# Patient Record
Sex: FEMALE | Race: WHITE | Employment: OTHER | URBAN - METROPOLITAN AREA
[De-identification: names, ages, dates, MRNs, and addresses within clinical notes are randomized per-mention and may not be internally consistent; named-entity substitution may affect disease eponyms.]

---

## 2017-01-03 ENCOUNTER — OFFICE VISIT (OUTPATIENT)
Dept: FAMILY MEDICINE CLINIC | Age: 67
End: 2017-01-03

## 2017-01-03 ENCOUNTER — HOSPITAL ENCOUNTER (OUTPATIENT)
Dept: LAB | Age: 67
Discharge: HOME | End: 2017-01-03
Payer: MEDICARE

## 2017-01-03 VITALS
DIASTOLIC BLOOD PRESSURE: 66 MMHG | WEIGHT: 194 LBS | HEIGHT: 65 IN | BODY MASS INDEX: 32.32 KG/M2 | RESPIRATION RATE: 18 BRPM | OXYGEN SATURATION: 96 % | TEMPERATURE: 96.4 F | SYSTOLIC BLOOD PRESSURE: 133 MMHG | HEART RATE: 65 BPM

## 2017-01-03 DIAGNOSIS — E11.9 TYPE 2 DIABETES MELLITUS WITHOUT COMPLICATION, WITHOUT LONG-TERM CURRENT USE OF INSULIN (HCC): Primary | ICD-10-CM

## 2017-01-03 DIAGNOSIS — E78.2 MIXED HYPERLIPIDEMIA: ICD-10-CM

## 2017-01-03 DIAGNOSIS — R73.9 HYPERGLYCEMIA: ICD-10-CM

## 2017-01-03 DIAGNOSIS — K22.4 ESOPHAGEAL DYSMOTILITY: ICD-10-CM

## 2017-01-03 DIAGNOSIS — I25.10 CORONARY ARTERY DISEASE INVOLVING NATIVE CORONARY ARTERY OF NATIVE HEART WITHOUT ANGINA PECTORIS: ICD-10-CM

## 2017-01-03 DIAGNOSIS — I10 ESSENTIAL HYPERTENSION, BENIGN: ICD-10-CM

## 2017-01-03 DIAGNOSIS — K22.4 ESOPHAGEAL MOTOR DISORDER: ICD-10-CM

## 2017-01-03 LAB
GLUCOSE POC: 139 MG/DL
HBA1C MFR BLD HPLC: 6.4 %

## 2017-01-03 PROCEDURE — 80053 COMPREHEN METABOLIC PANEL: CPT

## 2017-01-03 PROCEDURE — 82043 UR ALBUMIN QUANTITATIVE: CPT

## 2017-01-03 PROCEDURE — 80061 LIPID PANEL: CPT

## 2017-01-03 PROCEDURE — 36415 COLL VENOUS BLD VENIPUNCTURE: CPT

## 2017-01-03 RX ORDER — GLIMEPIRIDE 4 MG/1
TABLET ORAL
Refills: 0 | COMMUNITY
Start: 2016-10-07 | End: 2017-01-03 | Stop reason: DRUGHIGH

## 2017-01-03 RX ORDER — METFORMIN HYDROCHLORIDE 500 MG/1
1000 TABLET, EXTENDED RELEASE ORAL
Qty: 60 TAB | Refills: 11 | Status: SHIPPED | OUTPATIENT
Start: 2017-01-03 | End: 2017-07-17 | Stop reason: DRUGHIGH

## 2017-01-03 NOTE — PROGRESS NOTES
Chief Complaint   Patient presents with    Diabetes     follow up     [place] / August 2016 eye exam

## 2017-01-03 NOTE — MR AVS SNAPSHOT
Visit Information Date & Time Provider Department Dept. Phone Encounter #  
 1/3/2017 10:45 AM Saul Zarate Christiano [phone] [id number] Follow-up Instructions Return in about 3 months (around 4/3/2017). Upcoming Health Maintenance Date Due MICROALBUMIN Q1 3/9/1960 EYE EXAM RETINAL OR DILATED Q1 3/9/1960 GLAUCOMA SCREENING Q2Y 3/9/2015 Pneumococcal 65+ Low/Medium Risk (2 of 2 - PPSV23) 11/9/2016 HEMOGLOBIN A1C Q6M 1/27/2017 MEDICARE YEARLY EXAM 5/18/2017 LIPID PANEL Q1 10/31/2017 FOOT EXAM Q1 1/3/2018 BREAST CANCER SCRN MAMMOGRAM 4/20/2018 COLONOSCOPY 1/10/2025 DTaP/Tdap/Td series (2 - Td) 5/17/2026 Allergies as of 1/3/2017  Review Complete On: 1/3/2017 By: Davidson Bess MD  
  
 Severity Noted Reaction Type Reactions Pcn [Penicillins] High 06/14/2010   Systemic Hives Protamine High 07/16/2015    Anaphylaxis Sulfa (Sulfonamide Antibiotics) High 06/14/2010   Systemic Hives Imdur [Isosorbide Mononitrate]  07/27/2015    Other (comments)  
 headache Current Immunizations  Reviewed on 1/3/2017 Name Date Influenza High Dose Vaccine PF 10/31/2016 Influenza Vaccine 9/1/2013 Influenza Vaccine Split 10/10/2011 Reviewed by Carmencita Wallace LPN on 0/0/9885 at 46:22 AM  
You Were Diagnosed With   
  
 Codes Comments Type 2 diabetes mellitus without complication, without long-term current use of insulin (HCC)    -  Primary ICD-10-CM: E11.9 ICD-9-CM: 250.00 Coronary artery disease involving native coronary artery of native heart without angina pectoris     ICD-10-CM: I25.10 ICD-9-CM: 414.01 Esophageal motor disorder     ICD-10-CM: K22.4 ICD-9-CM: 530.5 Esophageal dysmotility     ICD-10-CM: K22.4 ICD-9-CM: 530.5 Essential hypertension, benign     ICD-10-CM: I10 
ICD-9-CM: 401.1 Mixed hyperlipidemia     ICD-10-CM: E78.2 ICD-9-CM: 272.2 Hyperglycemia     ICD-10-CM: R73.9 ICD-9-CM: 790.29 Vitals BP Pulse Temp Resp Height(growth percentile) Weight(growth percentile) 133/66 65 96.4 °F (35.8 °C) (Oral) 18 5' 5\" (1.651 m) 194 lb (88 kg) SpO2 BMI OB Status Smoking Status 96% 32.28 kg/m2 Postmenopausal Never Smoker Vitals History BMI and BSA Data Body Mass Index Body Surface Area  
 32.28 kg/m 2 2.01 m 2 Preferred Pharmacy Pharmacy Name Phone [id number] [place] [address], Demi Mcintyre [phone] Your Updated Medication List  
  
   
This list is accurate as of: 1/3/17 11:40 AM.  Always use your most recent med list.  
  
  
  
  
 ACIPHEX 20 mg tablet Generic drug:  RABEprazole Take 20 mg by mouth daily. ADVIL 200 mg tablet Generic drug:  ibuprofen Take 400 mg by mouth as needed for Pain. ALPRAZolam 0.25 mg tablet Commonly known as:  Shyrl Asai Take 1 Tab by mouth two (2) times daily as needed for Anxiety. Max Daily Amount: 0.5 mg.  
  
 amLODIPine 2.5 mg tablet Commonly known as:  NORVASC  
take 1 tablet by mouth daily  
  
 aspirin delayed-release 81 mg tablet  
take 1 tablet by mouth once daily Blood-Glucose Meter monitoring kit AS DIRECTED TWICE DAILY CALTRATE 600+D PLUS MINERALS 600 mg (1,500 mg)-400 unit Chew Generic drug:  Calcium Carbonate-Vit D3-Min Take 1 tablet by mouth daily. CARAFATE 1 gram tablet Generic drug:  sucralfate Take 1 g by mouth four (4) times daily. Patient takes usually a couple times daily  
  
 clopidogrel 75 mg Tab Commonly known as:  PLAVIX  
take 1 tablet by mouth once daily  
  
 coenzyme Q-10 200 mg capsule Commonly known as:  CO Q-10 Take 1 Cap by mouth daily. Over the counter CRESTOR 10 mg tablet Generic drug:  rosuvastatin  
take 1 tablet by mouth NIGHTLY  
  
 fluticasone 50 mcg/actuation nasal spray Commonly known as:  Ginger Echeverria instill 2 sprays into each nostril once daily GAVISCON  mg/15 mL suspension Generic drug:  aluminum hydrox-magnesium carb Take 15 mL by mouth every six (6) hours as needed for Indigestion. glucose blood VI test strips strip Commonly known as:  blood glucose test  
by Does Not Apply route Daily Check three times daily . Dx. Code E11.9 HORIZON NASAL CPAP SYSTEM  
by Does Not Apply route. Lancets Misc Check Blood sugar 3 times daily Dx. Code E11.9  
  
 levothyroxine 25 mcg tablet Commonly known as:  SYNTHROID Take 1 Tab by mouth Daily (before breakfast). metFORMIN  mg tablet Commonly known as:  GLUCOPHAGE XR Take 2 Tabs by mouth daily (with dinner). metoprolol succinate 25 mg XL tablet Commonly known as:  TOPROL-XL  
take 1 tablet by mouth once daily MIRALAX 17 gram packet Generic drug:  polyethylene glycol Take 17 g by mouth daily. MUCINEX 1,200 mg Ta12 ER tablet Generic drug:  guaiFENesin Take  by mouth as needed. NITROSTAT 0.4 mg SL tablet Generic drug:  nitroglycerin  
place 1 tablet under the tongue every 5 minutes if needed RANEXA 1,000 mg Tb12 Generic drug:  Ranolazine  
take 1 tablet by mouth twice a day RESTASIS 0.05 % ophthalmic emulsion Generic drug:  cycloSPORINE Administer  to both eyes every twelve (12) hours. triamterene-hydroCHLOROthiazide 37.5-25 mg per tablet Commonly known as:  MAXZIDE  
take 1 tablet by mouth every morning VITAMIN B-12 1,000 mcg tablet Generic drug:  cyanocobalamin Take 500 mcg by mouth daily. VITAMIN D3 1,000 unit tablet Generic drug:  cholecalciferol Take 1,000 Units by mouth daily. Prescriptions Sent to Pharmacy Refills  
 metFORMIN ER (GLUCOPHAGE XR) 500 mg tablet 11 Sig: Take 2 Tabs by mouth daily (with dinner).   
 Class: Normal  
 [facility] [address]  [facility]  [facility] [phone] Route: Oral  
  
Follow-up Instructions Return in about 3 months (around 4/3/2017). To-Do List   
 04/18/2017 9:00 AM  
  Appointment with DIABETES CLASS #4MRM at [facility] DIABETIC TREATMENT ([phone]) Introducing [facility] & HEALTH SERVICES! Lenka Mendoza introduces [employer] patient portal. Now you can access parts of your medical record, email your doctor's office, and request medication refills online. 1. In your internet browser, go to [email or web address] 2. Click on the First Time User? Click Here link in the Sign In box. You will see the New Member Sign Up page. 3. Enter your [employer] Access Code exactly as it appears below. You will not need to use this code after youve completed the sign-up process. If you do not sign up before the expiration date, you must request a new code. · [employer] Access Code: [email or web address] Expires: 4/3/2017 11:40 AM 
 
4. Enter the last four digits of your Social Security Number (xxxx) and Date of Birth (mm/dd/yyyy) as indicated and click Submit. You will be taken to the next sign-up page. 5. Create a [employer] ID. This will be your [employer] login ID and cannot be changed, so think of one that is secure and easy to remember. 6. Create a [employer] password. You can change your password at any time. 7. Enter your Password Reset Question and Answer. This can be used at a later time if you forget your password. 8. Enter your e-mail address. You will receive e-mail notification when new information is available in [address] 9. Click Sign Up. You can now view and download portions of your medical record. 10. Click the Download Summary menu link to download a portable copy of your medical information. If you have questions, please visit the Frequently Asked Questions section of the [employer] website. Remember, [employer] is NOT to be used for urgent needs. For medical emergencies, dial 911. Now available from your iPhone and Android! Please provide this summary of care documentation to your next provider. Your primary care clinician is listed as Samara Szymanski. If you have any questions after today's visit, please call [phone].

## 2017-01-03 NOTE — PROGRESS NOTES
HISTORY OF PRESENT ILLNESS  Max Bartlett is a 77 y.o. female. f/u dm2 ,hbp,chol,cad. Stopped glimiperide due to falling sugars. Doing well  Diabetes   The history is provided by the patient. This is a chronic problem. The problem occurs daily. The problem has not changed since onset. Pertinent negatives include no chest pain and no shortness of breath. Hypertension    This is a chronic problem. The problem has not changed since onset. Pertinent negatives include no chest pain, no orthopnea, no palpitations, no malaise/fatigue, no peripheral edema, no dizziness and no shortness of breath. Cholesterol Problem   This is a chronic problem. The problem occurs daily. The problem has not changed since onset. Pertinent negatives include no chest pain and no shortness of breath. Review of Systems   Constitutional: Negative for fever, malaise/fatigue and weight loss. Respiratory: Negative for shortness of breath. Cardiovascular: Negative for chest pain, palpitations and orthopnea. Gastrointestinal: Negative for blood in stool, constipation and melena. Genitourinary: Negative for frequency. Neurological: Negative for dizziness. Physical Exam   Constitutional: She appears well-developed and well-nourished. HENT:   Head: Normocephalic and atraumatic. Right Ear: External ear normal.   Left Ear: External ear normal.   Nose: Nose normal.   Mouth/Throat: Oropharynx is clear and moist.   Eyes: Conjunctivae are normal. Pupils are equal, round, and reactive to light. Neck: Normal range of motion. Neck supple. No tracheal deviation present. No thyromegaly present. Cardiovascular: Normal rate, regular rhythm, normal heart sounds and intact distal pulses. Exam reveals no gallop. No murmur heard. Pulmonary/Chest: Effort normal. No respiratory distress. She has no wheezes. Abdominal: Soft. Bowel sounds are normal. She exhibits no distension. There is no tenderness.    Lymphadenopathy:     She has no cervical adenopathy. Neurological: She is alert. Skin: Skin is warm and dry. Comprehensive Diabetic Foot Exam  was performed     Vitals reviewed. ASSESSMENT and PLAN  Ovidio Singh was seen today for diabetes. Diagnoses and all orders for this visit:    Type 2 diabetes mellitus without complication, without long-term current use of insulin (HCC)  -     AMB POC HEMOGLOBIN A1C  -     AMB POC GLUCOSE, QUANTITATIVE, BLOOD  -     MICROALBUMIN, UR, RAND W/ MICROALBUMIN/CREA RATIO  -     metFORMIN ER (GLUCOPHAGE XR) 500 mg tablet; Take 2 Tabs by mouth daily (with dinner). Coronary artery disease involving native coronary artery of native heart without angina pectoris    Esophageal motor disorder    Esophageal dysmotility    Essential hypertension, benign  -     METABOLIC PANEL, COMPREHENSIVE    Mixed hyperlipidemia  -     LIPID PANEL    Hyperglycemia  -     metFORMIN ER (GLUCOPHAGE XR) 500 mg tablet; Take 2 Tabs by mouth daily (with dinner). Follow-up Disposition:  Return in about 3 months (around 4/3/2017).

## 2017-01-04 LAB
ALBUMIN SERPL-MCNC: 4.4 G/DL (ref 3.6–4.8)
ALBUMIN/CREAT UR: 8.7 MG/G CREAT (ref 0–30)
ALBUMIN/GLOB SERPL: 1.7 {RATIO} (ref 1.1–2.5)
ALP SERPL-CCNC: 85 IU/L (ref 39–117)
ALT SERPL-CCNC: 27 IU/L (ref 0–32)
AST SERPL-CCNC: 21 IU/L (ref 0–40)
BILIRUB SERPL-MCNC: 0.9 MG/DL (ref 0–1.2)
BUN SERPL-MCNC: 17 MG/DL (ref 8–27)
BUN/CREAT SERPL: 18 (ref 11–26)
CALCIUM SERPL-MCNC: 10.2 MG/DL (ref 8.7–10.3)
CHLORIDE SERPL-SCNC: 99 MMOL/L (ref 96–106)
CHOLEST SERPL-MCNC: 142 MG/DL (ref 100–199)
CO2 SERPL-SCNC: 25 MMOL/L (ref 18–29)
CREAT SERPL-MCNC: 0.92 MG/DL (ref 0.57–1)
CREAT UR-MCNC: 133.9 MG/DL
GLOBULIN SER CALC-MCNC: 2.6 G/DL (ref 1.5–4.5)
GLUCOSE SERPL-MCNC: 140 MG/DL (ref 65–99)
HDLC SERPL-MCNC: 40 MG/DL
INTERPRETATION, 910389: NORMAL
LDLC SERPL CALC-MCNC: 45 MG/DL (ref 0–99)
MICROALBUMIN UR-MCNC: 11.6 UG/ML
POTASSIUM SERPL-SCNC: 4.3 MMOL/L (ref 3.5–5.2)
PROT SERPL-MCNC: 7 G/DL (ref 6–8.5)
SODIUM SERPL-SCNC: 143 MMOL/L (ref 134–144)
TRIGL SERPL-MCNC: 283 MG/DL (ref 0–149)
VLDLC SERPL CALC-MCNC: 57 MG/DL (ref 5–40)

## 2017-01-05 RX ORDER — METOPROLOL SUCCINATE 25 MG/1
TABLET, EXTENDED RELEASE ORAL
Qty: 30 TAB | Refills: 3 | Status: SHIPPED | OUTPATIENT
Start: 2017-01-05 | End: 2017-05-10 | Stop reason: SDUPTHER

## 2017-02-07 RX ORDER — ROSUVASTATIN CALCIUM 10 MG/1
TABLET, COATED ORAL
Qty: 30 TAB | Refills: 11 | Status: SHIPPED | OUTPATIENT
Start: 2017-02-07 | End: 2018-02-22 | Stop reason: SDUPTHER

## 2017-02-08 RX ORDER — CLOPIDOGREL BISULFATE 75 MG/1
75 TABLET ORAL DAILY
Qty: 30 TAB | Refills: 11 | Status: SHIPPED | OUTPATIENT
Start: 2017-02-08 | End: 2018-02-22 | Stop reason: SDUPTHER

## 2017-02-27 RX ORDER — AZITHROMYCIN 250 MG/1
TABLET, FILM COATED ORAL
Qty: 6 TAB | Refills: 0 | Status: SHIPPED | OUTPATIENT
Start: 2017-02-27 | End: 2017-03-04

## 2017-02-27 NOTE — TELEPHONE ENCOUNTER
PT has sinus infection. Would like a return call on if something can be called in. Uses [facility] on file. Had cold or flu a little while ago.  Please call her @ # [phone]

## 2017-02-27 NOTE — TELEPHONE ENCOUNTER
Patient is requesting antibiotic for sinus headache, ear pressure, eye watering, no coughing.  She has used Zithromax she states that she sent a refill for [facility]

## 2017-03-09 ENCOUNTER — OFFICE VISIT (OUTPATIENT)
Dept: FAMILY MEDICINE CLINIC | Age: 67
End: 2017-03-09

## 2017-03-09 VITALS
RESPIRATION RATE: 26 BRPM | OXYGEN SATURATION: 98 % | DIASTOLIC BLOOD PRESSURE: 74 MMHG | BODY MASS INDEX: 32.82 KG/M2 | WEIGHT: 197 LBS | SYSTOLIC BLOOD PRESSURE: 116 MMHG | TEMPERATURE: 97.4 F | HEART RATE: 70 BPM | HEIGHT: 65 IN

## 2017-03-09 DIAGNOSIS — M54.12 CERVICAL RADICULITIS: ICD-10-CM

## 2017-03-09 DIAGNOSIS — M54.2 NECK PAIN: Primary | ICD-10-CM

## 2017-03-09 RX ORDER — CYCLOBENZAPRINE HCL 10 MG
10 TABLET ORAL
Qty: 30 TAB | Refills: 2 | Status: SHIPPED | OUTPATIENT
Start: 2017-03-09 | End: 2017-10-16 | Stop reason: ALTCHOICE

## 2017-03-09 RX ORDER — HYDROCODONE BITARTRATE AND ACETAMINOPHEN 5; 325 MG/1; MG/1
1 TABLET ORAL
Qty: 30 TAB | Refills: 0 | Status: SHIPPED | OUTPATIENT
Start: 2017-03-09 | End: 2017-07-17 | Stop reason: ALTCHOICE

## 2017-03-09 RX ORDER — PREDNISONE 10 MG/1
TABLET ORAL
Qty: 21 TAB | Refills: 0 | Status: SHIPPED | OUTPATIENT
Start: 2017-03-09 | End: 2017-04-11 | Stop reason: ALTCHOICE

## 2017-03-09 RX ORDER — KETOROLAC TROMETHAMINE 30 MG/ML
30 INJECTION, SOLUTION INTRAMUSCULAR; INTRAVENOUS ONCE
Qty: 1 VIAL | Refills: 0
Start: 2017-03-09 | End: 2017-03-09

## 2017-03-09 NOTE — PROGRESS NOTES
HISTORY OF PRESENT ILLNESS  Roxi Irwin is a 79 y.o. female. worsening rt neck pain x 2 weeks,no apparent injury. Several prior self limited occurrances. Some tingling rt arm to fingers  Neck Pain   The history is provided by the patient. This is a recurrent problem. The problem occurs daily. The problem has not changed since onset. Review of Systems   Constitutional: Positive for malaise/fatigue. Negative for fever. Cardiovascular: Negative for claudication. Musculoskeletal: Positive for neck pain. Skin: Negative for rash. Physical Exam   Constitutional: She appears well-developed and well-nourished. HENT:   Head: Normocephalic and atraumatic. Right Ear: External ear normal.   Left Ear: External ear normal.   Nose: Nose normal.   Mouth/Throat: Oropharynx is clear and moist.   Eyes: Conjunctivae are normal.   Cardiovascular: Normal rate and regular rhythm. Musculoskeletal:        Cervical back: She exhibits decreased range of motion and tenderness. She exhibits no deformity and no spasm. Back:     and DTRs normal and symmetrical in upper extremities         ASSESSMENT and PLAN  Bonilla Riley was seen today for neck pain and shoulder pain. Diagnoses and all orders for this visit:    Neck pain  -     XR SPINE CERV PA LAT ODONT 3 V MAX; Future  -     predniSONE (STERAPRED DS) 10 mg dose pack; See administration instruction per 10mg dose pack  -     cyclobenzaprine (FLEXERIL) 10 mg tablet; Take 1 Tab by mouth three (3) times daily as needed for Muscle Spasm(s). -     HYDROcodone-acetaminophen (NORCO) 5-325 mg per tablet; Take 1 Tab by mouth every four (4) hours as needed for Pain. Max Daily Amount: 6 Tabs. -     ketorolac (TORADOL) 30 mg/mL (1 mL) injection; 1 mL by IntraVENous route once for 1 dose.   -     KETOROLAC TROMETHAMINE INJ  -     WI THER/PROPH/DIAG INJECTION, SUBCUT/IM    Cervical radiculitis  -     XR SPINE CERV PA LAT ODONT 3 V MAX; Future  -     predniSONE (STERAPRED DS) 10 mg dose pack; See administration instruction per 10mg dose pack      Follow-up Disposition:  Return in about 4 weeks (around 4/6/2017).

## 2017-03-09 NOTE — MR AVS SNAPSHOT
Visit Information Date & Time Provider Department Dept. Phone Encounter #  
 3/9/2017 12:00 PM [phone] [id number] Follow-up Instructions Return in about 4 weeks (around 4/6/2017). Your Appointments 4/11/2017 10:00 AM  
ROUTINE CARE with Karlee Melchor MD  
[facility] [address]) Appt Note: r/s 3 mon fu  
 [address] [facility] [place]  [id number] [phone] [address] [place] [address] [address] Upcoming Health Maintenance Date Due  
 EYE EXAM RETINAL OR DILATED Q1 3/9/1960 GLAUCOMA SCREENING Q2Y 3/9/2015 Pneumococcal 65+ Low/Medium Risk (2 of 2 - PPSV23) 11/9/2016 MEDICARE YEARLY EXAM 5/18/2017 HEMOGLOBIN A1C Q6M 7/3/2017 FOOT EXAM Q1 1/3/2018 MICROALBUMIN Q1 1/3/2018 LIPID PANEL Q1 1/3/2018 BREAST CANCER SCRN MAMMOGRAM 4/20/2018 COLONOSCOPY 1/10/2025 DTaP/Tdap/Td series (2 - Td) 5/17/2026 Allergies as of 3/9/2017  Review Complete On: 3/9/2017 By: Mani Hayden Severity Noted Reaction Type Reactions Pcn [Penicillins] High 06/14/2010   Systemic Hives Protamine High 07/16/2015    Anaphylaxis Sulfa (Sulfonamide Antibiotics) High 06/14/2010   Systemic Hives Imdur [Isosorbide Mononitrate]  07/27/2015    Other (comments)  
 headache Current Immunizations  Reviewed on 1/3/2017 Name Date Influenza High Dose Vaccine PF 10/31/2016 Influenza Vaccine 9/1/2013 Influenza Vaccine Split 10/10/2011 Not reviewed this visit You Were Diagnosed With   
  
 Codes Comments Neck pain    -  Primary ICD-10-CM: M54.2 ICD-9-CM: 723.1 Cervical radiculitis     ICD-10-CM: M54.12 
ICD-9-CM: 723.4 Vitals BP Pulse Temp Resp Height(growth percentile) Weight(growth percentile)  116/74 (BP 1 Location: Left arm, BP Patient Position: Sitting) 70 97.4 °F (36.3 °C) (Oral) 26 5' 5\" (1.651 m) 197 lb (89.4 kg) SpO2 BMI OB Status Smoking Status 98% 32.78 kg/m2 Postmenopausal Never Smoker Vitals History BMI and BSA Data Body Mass Index Body Surface Area 32.78 kg/m 2 2.02 m 2 Preferred Pharmacy Pharmacy Name Phone [id number] [address] [phone] Your Updated Medication List  
  
   
This list is accurate as of: 3/9/17 12:42 PM.  Always use your most recent med list.  
  
  
  
  
 ACIPHEX 20 mg tablet Generic drug:  RABEprazole Take 20 mg by mouth daily. ADVIL 200 mg tablet Generic drug:  ibuprofen Take 400 mg by mouth as needed for Pain. ALPRAZolam 0.25 mg tablet Commonly known as:  Devon Beecham Take 1 Tab by mouth two (2) times daily as needed for Anxiety. Max Daily Amount: 0.5 mg.  
  
 amLODIPine 2.5 mg tablet Commonly known as:  NORVASC  
take 1 tablet by mouth daily  
  
 aspirin delayed-release 81 mg tablet  
take 1 tablet by mouth once daily Blood-Glucose Meter monitoring kit AS DIRECTED TWICE DAILY CALTRATE 600+D PLUS MINERALS 600 mg (1,500 mg)-400 unit Chew Generic drug:  Calcium Carbonate-Vit D3-Min Take 1 tablet by mouth daily. CARAFATE 1 gram tablet Generic drug:  sucralfate Take 1 g by mouth four (4) times daily. Patient takes usually a couple times daily  
  
 clopidogrel 75 mg Tab Commonly known as:  PLAVIX Take 1 Tab by mouth daily. coenzyme Q-10 200 mg capsule Commonly known as:  CO Q-10 Take 1 Cap by mouth daily. Over the counter  
  
 cyclobenzaprine 10 mg tablet Commonly known as:  FLEXERIL Take 1 Tab by mouth three (3) times daily as needed for Muscle Spasm(s). fluticasone 50 mcg/actuation nasal spray Commonly known as:  FLONASE  
instill 2 sprays into each nostril once daily GAVISCON  mg/15 mL suspension Generic drug:  aluminum hydrox-magnesium carb Take 15 mL by mouth every six (6) hours as needed for Indigestion. glucose blood VI test strips strip Commonly known as:  blood glucose test  
by Does Not Apply route Daily Check three times daily . Dx. Code E11.9 HORIZON NASAL CPAP SYSTEM  
by Does Not Apply route. HYDROcodone-acetaminophen 5-325 mg per tablet Commonly known as:  Thelbert Carlock Take 1 Tab by mouth every four (4) hours as needed for Pain. Max Daily Amount: 6 Tabs. Lancets Misc Check Blood sugar 3 times daily Dx. Code E11.9  
  
 levothyroxine 25 mcg tablet Commonly known as:  SYNTHROID Take 1 Tab by mouth Daily (before breakfast). metFORMIN  mg tablet Commonly known as:  GLUCOPHAGE XR Take 2 Tabs by mouth daily (with dinner). metoprolol succinate 25 mg XL tablet Commonly known as:  TOPROL-XL  
take 1 tablet by mouth once daily MIRALAX 17 gram packet Generic drug:  polyethylene glycol Take 17 g by mouth daily. MUCINEX 1,200 mg Ta12 ER tablet Generic drug:  guaiFENesin Take  by mouth as needed. NITROSTAT 0.4 mg SL tablet Generic drug:  nitroglycerin  
place 1 tablet under the tongue every 5 minutes if needed  
  
 predniSONE 10 mg dose pack Commonly known as:  STERAPRED DS See administration instruction per 10mg dose pack RANEXA 1,000 mg Tb12 Generic drug:  Ranolazine  
take 1 tablet by mouth twice a day RESTASIS 0.05 % ophthalmic emulsion Generic drug:  cycloSPORINE Administer  to both eyes every twelve (12) hours. rosuvastatin 10 mg tablet Commonly known as:  CRESTOR  
take 1 tablet by mouth every evening  
  
 triamterene-hydroCHLOROthiazide 37.5-25 mg per tablet Commonly known as:  MAXZIDE  
take 1 tablet by mouth every morning VITAMIN B-12 1,000 mcg tablet Generic drug:  cyanocobalamin Take 500 mcg by mouth daily. VITAMIN D3 1,000 unit tablet Generic drug:  cholecalciferol Take 1,000 Units by mouth daily. Prescriptions Printed Refills HYDROcodone-acetaminophen (NORCO) 5-325 mg per tablet 0 Sig: Take 1 Tab by mouth every four (4) hours as needed for Pain. Max Daily Amount: 6 Tabs. Class: Print Route: Oral  
  
Prescriptions Sent to Pharmacy Refills  
 predniSONE (STERAPRED DS) 10 mg dose pack 0 Sig: See administration instruction per 10mg dose pack Class: Normal  
 Pharmacy: [facility]  [address]rich Ph #: [phone]  
 cyclobenzaprine (FLEXERIL) 10 mg tablet 2 Sig: Take 1 Tab by mouth three (3) times daily as needed for Muscle Spasm(s). Class: Normal  
 Pharmacy: [id number] Kevin Chauhan, [address] [place] [address] Ph #: [phone] Route: Oral  
  
Follow-up Instructions Return in about 4 weeks (around 4/6/2017). To-Do List   
 03/09/2017 Imaging:  XR SPINE CERV PA LAT ODONT 3 V MAX   
  
 04/18/2017 9:00 AM  
  Appointment with DIABETES CLASS #4MRM at [facility] DIABETIC TREATMENT ([phone]) Introducing [facility] & HEALTH SERVICES! Anthony Solomon introduces [employer] patient portal. Now you can access parts of your medical record, email your doctor's office, and request medication refills online. 1. In your internet browser, go to [email or web address] 2. Click on the First Time User? Click Here link in the Sign In box. You will see the New Member Sign Up page. 3. Enter your [employer] Access Code exactly as it appears below. You will not need to use this code after youve completed the sign-up process. If you do not sign up before the expiration date, you must request a new code. · [employer] Access Code: [email or web address] Expires: 4/3/2017 11:40 AM 
 
4. Enter the last four digits of your Social Security Number (xxxx) and Date of Birth (mm/dd/yyyy) as indicated and click Submit. You will be taken to the next sign-up page. 5. Create a [employer] ID. This will be your [employer] login ID and cannot be changed, so think of one that is secure and easy to remember. 6. Create a [employer] password. You can change your password at any time. 7. Enter your Password Reset Question and Answer. This can be used at a later time if you forget your password. 8. Enter your e-mail address. You will receive e-mail notification when new information is available in [address] 9. Click Sign Up. You can now view and download portions of your medical record. 10. Click the Download Summary menu link to download a portable copy of your medical information. If you have questions, please visit the Frequently Asked Questions section of the [employer] website. Remember, [employer] is NOT to be used for urgent needs. For medical emergencies, dial 911. Now available from your iPhone and Android! Please provide this summary of care documentation to your next provider. Your primary care clinician is listed as Dami Martinez. If you have any questions after today's visit, please call [phone].

## 2017-03-09 NOTE — PROGRESS NOTES
Chief Complaint   Patient presents with    Neck Pain     Pt has neck pain.  Shoulder Pain     Pt has R shoulder pain the runs down the R arm.

## 2017-03-13 DIAGNOSIS — M54.12 CERVICAL RADICULITIS: Primary | ICD-10-CM

## 2017-03-15 DIAGNOSIS — M54.12 CERVICAL RADICULITIS: Primary | ICD-10-CM

## 2017-03-15 RX ORDER — GABAPENTIN 100 MG/1
100 CAPSULE ORAL 3 TIMES DAILY
Qty: 90 CAP | Refills: 1 | Status: SHIPPED | OUTPATIENT
Start: 2017-03-15 | End: 2017-10-10

## 2017-03-15 RX ORDER — PREDNISONE 5 MG/1
TABLET ORAL
Qty: 21 TAB | Refills: 0 | Status: SHIPPED | OUTPATIENT
Start: 2017-03-15 | End: 2017-04-11 | Stop reason: ALTCHOICE

## 2017-03-21 DIAGNOSIS — B37.81 THRUSH OF MOUTH AND ESOPHAGUS (HCC): Primary | ICD-10-CM

## 2017-03-21 DIAGNOSIS — B37.0 THRUSH OF MOUTH AND ESOPHAGUS (HCC): Primary | ICD-10-CM

## 2017-03-21 RX ORDER — NYSTATIN 100000 [USP'U]/ML
1 SUSPENSION ORAL 4 TIMES DAILY
Qty: 60 ML | Refills: 2 | Status: SHIPPED | OUTPATIENT
Start: 2017-03-21 | End: 2017-07-26 | Stop reason: ALTCHOICE

## 2017-03-30 ENCOUNTER — TELEPHONE (OUTPATIENT)
Dept: CARDIOLOGY CLINIC | Age: 67
End: 2017-03-30

## 2017-03-30 NOTE — TELEPHONE ENCOUNTER
Pt needs to have a MRI and wants to know what are the guidelines for pts with stints. Pt need info today, if possible, so MRI can be scheduled    Please call.      Thanks

## 2017-04-06 NOTE — TELEPHONE ENCOUNTER
I spoke to cardiac MRI expert Dr. Shant Chaudhari who confirms there is absolutely no concern even with long stents and longer MRI times. Proceed as planned. Thanks.

## 2017-04-07 NOTE — TELEPHONE ENCOUNTER
Spoke with patient  Verified patient with 2 patient identifier    Informed per Dr Dodson Other he has  spoken to cardiac MRI expert Dr. Neil Cortés who confirms there is absolutely no concern even with long stents and longer MRI times. Proceed as planned. Patient verbalized understanding.

## 2017-04-11 ENCOUNTER — HOSPITAL ENCOUNTER (OUTPATIENT)
Dept: LAB | Age: 67
Discharge: HOME | End: 2017-04-11
Payer: MEDICARE

## 2017-04-11 ENCOUNTER — OFFICE VISIT (OUTPATIENT)
Dept: FAMILY MEDICINE CLINIC | Age: 67
End: 2017-04-11

## 2017-04-11 VITALS
RESPIRATION RATE: 26 BRPM | HEART RATE: 75 BPM | TEMPERATURE: 98 F | HEIGHT: 65 IN | OXYGEN SATURATION: 98 % | DIASTOLIC BLOOD PRESSURE: 80 MMHG | BODY MASS INDEX: 32.52 KG/M2 | WEIGHT: 195.2 LBS | SYSTOLIC BLOOD PRESSURE: 128 MMHG

## 2017-04-11 DIAGNOSIS — M50.30 DDD (DEGENERATIVE DISC DISEASE), CERVICAL: ICD-10-CM

## 2017-04-11 DIAGNOSIS — E78.2 MIXED HYPERLIPIDEMIA: ICD-10-CM

## 2017-04-11 DIAGNOSIS — K22.4 ESOPHAGEAL DYSMOTILITY: ICD-10-CM

## 2017-04-11 DIAGNOSIS — Z23 ENCOUNTER FOR IMMUNIZATION: ICD-10-CM

## 2017-04-11 DIAGNOSIS — J32.0 MAXILLARY SINUSITIS, UNSPECIFIED CHRONICITY: ICD-10-CM

## 2017-04-11 DIAGNOSIS — E11.9 TYPE 2 DIABETES MELLITUS WITHOUT COMPLICATION, WITHOUT LONG-TERM CURRENT USE OF INSULIN (HCC): Primary | ICD-10-CM

## 2017-04-11 DIAGNOSIS — K21.00 GASTROESOPHAGEAL REFLUX DISEASE WITH ESOPHAGITIS: ICD-10-CM

## 2017-04-11 DIAGNOSIS — M54.10 RADICULITIS: ICD-10-CM

## 2017-04-11 LAB
GLUCOSE POC: 202 MG/DL
HBA1C MFR BLD HPLC: 7.1 %

## 2017-04-11 PROCEDURE — 36415 COLL VENOUS BLD VENIPUNCTURE: CPT

## 2017-04-11 PROCEDURE — 80061 LIPID PANEL: CPT

## 2017-04-11 PROCEDURE — 80053 COMPREHEN METABOLIC PANEL: CPT

## 2017-04-11 RX ORDER — CETIRIZINE HCL 10 MG
10 TABLET ORAL
Qty: 30 TAB | Refills: 11 | Status: SHIPPED | OUTPATIENT
Start: 2017-04-11 | End: 2017-10-10

## 2017-04-11 RX ORDER — AZITHROMYCIN 250 MG/1
TABLET, FILM COATED ORAL
Qty: 6 TAB | Refills: 0 | Status: SHIPPED | OUTPATIENT
Start: 2017-04-11 | End: 2017-07-17 | Stop reason: ALTCHOICE

## 2017-04-11 NOTE — PROGRESS NOTES
HISTORY OF PRESENT ILLNESS  Gladis Manriquez is a 79 y.o. female. f/u dm2 doing well on glimiperide 1 mg and metformin er 1000 daily. Dysphagia improving,ongoing l max sinusitis sx. For C Spine MRI per NS  Hypertension    The history is provided by the patient. This is a chronic problem. The problem has not changed since onset. Associated symptoms include neck pain. Pertinent negatives include no chest pain, no orthopnea, no palpitations, no malaise/fatigue, no peripheral edema, no dizziness, no shortness of breath and no nausea. Cholesterol Problem   This is a chronic problem. The problem occurs daily. The problem has not changed since onset. Pertinent negatives include no chest pain, no abdominal pain and no shortness of breath. Diabetes   This is a chronic problem. The problem occurs daily. The problem has not changed since onset. Pertinent negatives include no chest pain, no abdominal pain and no shortness of breath. Neck Pain   This is a chronic problem. The problem occurs daily. The problem has not changed since onset. Pertinent negatives include no chest pain, no abdominal pain and no shortness of breath. Review of Systems   Constitutional: Negative for fever and malaise/fatigue. Respiratory: Negative for shortness of breath. Cardiovascular: Negative for chest pain, palpitations and orthopnea. Gastrointestinal: Negative for abdominal pain, heartburn and nausea. Musculoskeletal: Positive for neck pain. Skin: Negative for rash. Neurological: Positive for tingling. Negative for dizziness. Physical Exam   Constitutional: She is oriented to person, place, and time. She appears well-developed and well-nourished. HENT:   Head: Normocephalic and atraumatic. Right Ear: External ear normal.   Left Ear: External ear normal.   Nose: Nose normal.   Mouth/Throat: Oropharynx is clear and moist.   Cardiovascular: Normal rate and regular rhythm.     Pulmonary/Chest: Effort normal and breath sounds normal.   Abdominal: Soft. Bowel sounds are normal.   Musculoskeletal:        Cervical back: She exhibits decreased range of motion and tenderness. Back:    Neurological: She is alert and oriented to person, place, and time. Skin: Skin is warm and dry. ASSESSMENT and PLAN  Destiny Hutchison was seen today for hypertension and cholesterol problem. Diagnoses and all orders for this visit:    Type 2 diabetes mellitus without complication, without long-term current use of insulin ([facility])  -     METABOLIC PANEL, COMPREHENSIVE  -     AMB POC HEMOGLOBIN A1C  -     AMB POC GLUCOSE, QUANTITATIVE, BLOOD    Gastroesophageal reflux disease with esophagitis    Esophageal dysmotility    Mixed hyperlipidemia  -     LIPID PANEL    DDD (degenerative disc disease), cervical    Radiculitis    Encounter for immunization  -     Pneumococcal Polysaccharide vaccine, 23-Valent, Adult or Immunocompromised    Maxillary sinusitis, unspecified chronicity  -     azithromycin (ZITHROMAX) 250 mg tablet; Take 2 tablets today, then take 1 tablet daily  -     cetirizine (ZYRTEC) 10 mg tablet; Take 1 Tab by mouth daily as needed for Allergies. Doing well,continue current meds and treatments    Follow-up Disposition:  Return in about 3 months (around 7/11/2017).

## 2017-04-11 NOTE — MR AVS SNAPSHOT
Visit Information Date & Time Provider Department Dept. Phone Encounter #  
 4/11/2017 10:00 AM Esther RasheedSaul [phone] [id number] Follow-up Instructions Return in about 3 months (around 7/11/2017). Upcoming Health Maintenance Date Due  
 EYE EXAM RETINAL OR DILATED Q1 3/9/1960 GLAUCOMA SCREENING Q2Y 3/9/2015 Pneumococcal 65+ Low/Medium Risk (2 of 2 - PPSV23) 11/9/2016 MEDICARE YEARLY EXAM 5/18/2017 HEMOGLOBIN A1C Q6M 7/3/2017 FOOT EXAM Q1 1/3/2018 MICROALBUMIN Q1 1/3/2018 LIPID PANEL Q1 1/3/2018 BREAST CANCER SCRN MAMMOGRAM 4/20/2018 COLONOSCOPY 1/10/2025 DTaP/Tdap/Td series (2 - Td) 5/17/2026 Allergies as of 4/11/2017  Review Complete On: 4/11/2017 By: Esther Rasheed MD  
  
 Severity Noted Reaction Type Reactions Pcn [Penicillins] High 06/14/2010   Systemic Hives Protamine High 07/16/2015    Anaphylaxis Sulfa (Sulfonamide Antibiotics) High 06/14/2010   Systemic Hives Imdur [Isosorbide Mononitrate]  07/27/2015    Other (comments)  
 headache Current Immunizations  Reviewed on 1/3/2017 Name Date Influenza High Dose Vaccine PF 10/31/2016 Influenza Vaccine 9/1/2013 Influenza Vaccine Split 10/10/2011 Pneumococcal Conjugate (PCV-13) 11/9/2015 Pneumococcal Polysaccharide (PPSV-23)  Incomplete Not reviewed this visit You Were Diagnosed With   
  
 Codes Comments Type 2 diabetes mellitus without complication, without long-term current use of insulin (HCC)    -  Primary ICD-10-CM: E11.9 ICD-9-CM: 250.00 Gastroesophageal reflux disease with esophagitis     ICD-10-CM: K21.0 ICD-9-CM: 530.11 Esophageal dysmotility     ICD-10-CM: K22.4 ICD-9-CM: 530.5 Mixed hyperlipidemia     ICD-10-CM: E78.2 ICD-9-CM: 272.2 DDD (degenerative disc disease), cervical     ICD-10-CM: M50.30 ICD-9-CM: 722.4 Radiculitis     ICD-10-CM: M54.10 ICD-9-CM: 729.2 Encounter for immunization     ICD-10-CM: L88 ICD-9-CM: V03.89 Vitals BP Pulse Temp Resp Height(growth percentile) Weight(growth percentile) 128/80 (BP 1 Location: Left arm, BP Patient Position: Sitting) 75 98 °F (36.7 °C) (Oral) 26 5' 5\" (1.651 m) 195 lb 3.2 oz (88.5 kg) SpO2 BMI OB Status Smoking Status 98% 32.48 kg/m2 Postmenopausal Never Smoker Vitals History BMI and BSA Data Body Mass Index Body Surface Area  
 32.48 kg/m 2 2.01 m 2 Preferred Pharmacy Pharmacy Name Phone [id number] [address] [phone] Your Updated Medication List  
  
   
This list is accurate as of: 4/11/17 10:44 AM.  Always use your most recent med list.  
  
  
  
  
 ACIPHEX 20 mg tablet Generic drug:  RABEprazole Take 20 mg by mouth daily. ADVIL 200 mg tablet Generic drug:  ibuprofen Take 400 mg by mouth as needed for Pain. ALPRAZolam 0.25 mg tablet Commonly known as:  Brynda Flies Take 1 Tab by mouth two (2) times daily as needed for Anxiety. Max Daily Amount: 0.5 mg.  
  
 amLODIPine 2.5 mg tablet Commonly known as:  NORVASC  
take 1 tablet by mouth daily  
  
 aspirin delayed-release 81 mg tablet  
take 1 tablet by mouth once daily Blood-Glucose Meter monitoring kit AS DIRECTED TWICE DAILY CALTRATE 600+D PLUS MINERALS 600 mg (1,500 mg)-400 unit Chew Generic drug:  Calcium Carbonate-Vit D3-Min Take 1 tablet by mouth daily. CARAFATE 1 gram tablet Generic drug:  sucralfate Take 1 g by mouth four (4) times daily. Patient takes usually a couple times daily  
  
 clopidogrel 75 mg Tab Commonly known as:  PLAVIX Take 1 Tab by mouth daily. coenzyme Q-10 200 mg capsule Commonly known as:  CO Q-10 Take 1 Cap by mouth daily. Over the counter  
  
 cyclobenzaprine 10 mg tablet Commonly known as:  FLEXERIL  
 Take 1 Tab by mouth three (3) times daily as needed for Muscle Spasm(s). fluticasone 50 mcg/actuation nasal spray Commonly known as:  FLONASE  
instill 2 sprays into each nostril once daily  
  
 gabapentin 100 mg capsule Commonly known as:  NEURONTIN Take 1 Cap by mouth three (3) times daily. GAVISCON  mg/15 mL suspension Generic drug:  aluminum hydrox-magnesium carb Take 15 mL by mouth every six (6) hours as needed for Indigestion. glucose blood VI test strips strip Commonly known as:  blood glucose test  
by Does Not Apply route Daily Check three times daily . Dx. Code E11.9 HORIZON NASAL CPAP SYSTEM  
by Does Not Apply route. HYDROcodone-acetaminophen 5-325 mg per tablet Commonly known as:  [address] Take 1 Tab by mouth every four (4) hours as needed for Pain. Max Daily Amount: 6 Tabs. Lancets Misc Check Blood sugar 3 times daily Dx. Code E11.9  
  
 levothyroxine 25 mcg tablet Commonly known as:  SYNTHROID Take 1 Tab by mouth Daily (before breakfast). metFORMIN  mg tablet Commonly known as:  GLUCOPHAGE XR Take 2 Tabs by mouth daily (with dinner). metoprolol succinate 25 mg XL tablet Commonly known as:  TOPROL-XL  
take 1 tablet by mouth once daily MIRALAX 17 gram packet Generic drug:  polyethylene glycol Take 17 g by mouth daily. MUCINEX 1,200 mg Ta12 ER tablet Generic drug:  guaiFENesin Take  by mouth as needed. NITROSTAT 0.4 mg SL tablet Generic drug:  nitroglycerin  
place 1 tablet under the tongue every 5 minutes if needed  
  
 nystatin 100,000 unit/mL suspension Commonly known as:  MYCOSTATIN Take 5 mL by mouth four (4) times daily. swish and spit RANEXA 1,000 mg Tb12 Generic drug:  Ranolazine  
take 1 tablet by mouth twice a day RESTASIS 0.05 % ophthalmic emulsion Generic drug:  cycloSPORINE Administer  to both eyes every twelve (12) hours. rosuvastatin 10 mg tablet Commonly known as:  CRESTOR  
take 1 tablet by mouth every evening  
  
 triamterene-hydroCHLOROthiazide 37.5-25 mg per tablet Commonly known as:  MAXZIDE  
take 1 tablet by mouth every morning VITAMIN B-12 1,000 mcg tablet Generic drug:  cyanocobalamin Take 500 mcg by mouth daily. VITAMIN D3 1,000 unit tablet Generic drug:  cholecalciferol Take 1,000 Units by mouth daily. We Performed the Following AMB POC GLUCOSE, QUANTITATIVE, BLOOD [20391 CPT(R)] AMB POC HEMOGLOBIN A1C [49150 CPT(R)] LIPID PANEL [89474 CPT(R)] METABOLIC PANEL, COMPREHENSIVE [06407 CPT(R)] PNEUMOCOCCAL POLYSACCHARIDE VACCINE, 23-VALENT, ADULT OR IMMUNOSUPPRESSED PT DOSE, [08793 CPT(R)] Follow-up Instructions Return in about 3 months (around 7/11/2017). To-Do List   
 04/18/2017 9:00 AM  
  Appointment with DIABETES CLASS #[id number] at [facility] ([phone])  
  
 04/21/2017 10:30 AM  
  Appointment with [facility] 1 at [facility] ([phone]) 1. Please bring a list or a bag of your current medications to your appointment 2. Please be sure to remove ALL hair clips, pins, extensions, etc., prior to arriving for your MRI procedure. 3. Bring any non [facility] films or CDs pertaining to the area being imaged with you on the day of appointment. 4. A written order with a valid diagnosis and Physicians  signature is required for all scheduled tests. 5. Check in at registration 30min before your appointment time unless you were instructed to do otherwise. Introducing [facility]! Xiao Carr introduces [employer] patient portal. Now you can access parts of your medical record, email your doctor's office, and request medication refills online. 1. In your internet browser, go to [email or web address] 2. Click on the First Time User? Click Here link in the Sign In box.  You will see the New Member Sign Up page. 3. Enter your Empress[employer] Access Code exactly as it appears below. You will not need to use this code after youve completed the sign-up process. If you do not sign up before the expiration date, you must request a new code. · Empress[employer] Access Code: [email or web address] Expires: 7/6/2017  3:18 PM 
 
4. Enter the last four digits of your Social Security Number (xxxx) and Date of Birth (mm/dd/yyyy) as indicated and click Submit. You will be taken to the next sign-up page. 5. Create a [employer] ID. This will be your Empressr login ID and cannot be changed, so think of one that is secure and easy to remember. 6. Create a Empressr password. You can change your password at any time. 7. Enter your Password Reset Question and Answer. This can be used at a later time if you forget your password. 8. Enter your e-mail address. You will receive e-mail notification when new information is available in [address]. 9. Click Sign Up. You can now view and download portions of your medical record. 10. Click the Download Summary menu link to download a portable copy of your medical information. If you have questions, please visit the Frequently Asked Questions section of the Empress[employer] website. Remember, [employer]r is NOT to be used for urgent needs. For medical emergencies, dial 911. Now available from your iPhone and Android! Please provide this summary of care documentation to your next provider. Your primary care clinician is listed as Federica Quan. If you have any questions after today's visit, please call [phone].

## 2017-04-12 LAB
ALBUMIN SERPL-MCNC: 4.3 G/DL (ref 3.6–4.8)
ALBUMIN/GLOB SERPL: 1.7 {RATIO} (ref 1.2–2.2)
ALP SERPL-CCNC: 90 IU/L (ref 39–117)
ALT SERPL-CCNC: 30 IU/L (ref 0–32)
AST SERPL-CCNC: 25 IU/L (ref 0–40)
BILIRUB SERPL-MCNC: 1.1 MG/DL (ref 0–1.2)
BUN SERPL-MCNC: 16 MG/DL (ref 8–27)
BUN/CREAT SERPL: 17 (ref 12–28)
CALCIUM SERPL-MCNC: 10.3 MG/DL (ref 8.7–10.3)
CHLORIDE SERPL-SCNC: 98 MMOL/L (ref 96–106)
CHOLEST SERPL-MCNC: 129 MG/DL (ref 100–199)
CO2 SERPL-SCNC: 26 MMOL/L (ref 18–29)
CREAT SERPL-MCNC: 0.93 MG/DL (ref 0.57–1)
GLOBULIN SER CALC-MCNC: 2.6 G/DL (ref 1.5–4.5)
GLUCOSE SERPL-MCNC: 200 MG/DL (ref 65–99)
HDLC SERPL-MCNC: 37 MG/DL
INTERPRETATION, 910389: NORMAL
LDLC SERPL CALC-MCNC: 38 MG/DL (ref 0–99)
Lab: NORMAL
POTASSIUM SERPL-SCNC: 3.9 MMOL/L (ref 3.5–5.2)
PROT SERPL-MCNC: 6.9 G/DL (ref 6–8.5)
SODIUM SERPL-SCNC: 141 MMOL/L (ref 134–144)
TRIGL SERPL-MCNC: 268 MG/DL (ref 0–149)
VLDLC SERPL CALC-MCNC: 54 MG/DL (ref 5–40)

## 2017-04-21 ENCOUNTER — HOSPITAL ENCOUNTER (OUTPATIENT)
Dept: MRI IMAGING | Age: 67
Discharge: HOME | End: 2017-04-21
Attending: NEUROLOGICAL SURGERY
Payer: MEDICARE

## 2017-04-21 DIAGNOSIS — M54.12 BRACHIAL NEURITIS OR RADICULITIS NOS: ICD-10-CM

## 2017-04-21 PROCEDURE — 72141 MRI NECK SPINE W/O DYE: CPT

## 2017-05-10 RX ORDER — METOPROLOL SUCCINATE 25 MG/1
TABLET, EXTENDED RELEASE ORAL
Qty: 30 TAB | Refills: 6 | Status: SHIPPED | OUTPATIENT
Start: 2017-05-10 | End: 2017-12-18 | Stop reason: SDUPTHER

## 2017-05-25 ENCOUNTER — HOSPITAL ENCOUNTER (OUTPATIENT)
Dept: NUCLEAR MEDICINE | Age: 67
Discharge: HOME | End: 2017-05-25
Attending: SPECIALIST
Payer: MEDICARE

## 2017-05-25 DIAGNOSIS — R07.9 CHEST PAIN, UNSPECIFIED: ICD-10-CM

## 2017-05-25 DIAGNOSIS — K22.4 DYSKINESIA OF ESOPHAGUS: ICD-10-CM

## 2017-05-25 DIAGNOSIS — R14.0 BLOATING SYMPTOM: ICD-10-CM

## 2017-05-25 DIAGNOSIS — K21.9 GERD (GASTROESOPHAGEAL REFLUX DISEASE): ICD-10-CM

## 2017-05-25 DIAGNOSIS — R13.19 OTHER DYSPHAGIA: ICD-10-CM

## 2017-05-25 PROCEDURE — 78264 GASTRIC EMPTYING IMG STUDY: CPT

## 2017-06-14 RX ORDER — RANOLAZINE 1000 MG/1
TABLET, EXTENDED RELEASE ORAL
Qty: 60 TAB | Refills: 6 | Status: SHIPPED | OUTPATIENT
Start: 2017-06-14 | End: 2018-01-20 | Stop reason: SDUPTHER

## 2017-07-17 ENCOUNTER — HOSPITAL ENCOUNTER (OUTPATIENT)
Dept: LAB | Age: 67
Discharge: HOME | End: 2017-07-17
Payer: MEDICARE

## 2017-07-17 ENCOUNTER — OFFICE VISIT (OUTPATIENT)
Dept: FAMILY MEDICINE CLINIC | Age: 67
End: 2017-07-17

## 2017-07-17 ENCOUNTER — PATIENT OUTREACH (OUTPATIENT)
Dept: FAMILY MEDICINE CLINIC | Age: 67
End: 2017-07-17

## 2017-07-17 VITALS
TEMPERATURE: 98.3 F | SYSTOLIC BLOOD PRESSURE: 120 MMHG | OXYGEN SATURATION: 98 % | DIASTOLIC BLOOD PRESSURE: 78 MMHG | WEIGHT: 195.2 LBS | RESPIRATION RATE: 22 BRPM | HEIGHT: 65 IN | BODY MASS INDEX: 32.52 KG/M2 | HEART RATE: 69 BPM

## 2017-07-17 DIAGNOSIS — I10 ESSENTIAL HYPERTENSION, BENIGN: ICD-10-CM

## 2017-07-17 DIAGNOSIS — Z00.00 ROUTINE GENERAL MEDICAL EXAMINATION AT A HEALTH CARE FACILITY: ICD-10-CM

## 2017-07-17 DIAGNOSIS — L81.4 AGE SPOTS: ICD-10-CM

## 2017-07-17 DIAGNOSIS — I25.10 CORONARY ARTERY DISEASE INVOLVING NATIVE CORONARY ARTERY OF NATIVE HEART WITHOUT ANGINA PECTORIS: ICD-10-CM

## 2017-07-17 DIAGNOSIS — Z13.39 SCREENING FOR ALCOHOLISM: ICD-10-CM

## 2017-07-17 DIAGNOSIS — E11.9 TYPE 2 DIABETES MELLITUS WITHOUT COMPLICATION, WITHOUT LONG-TERM CURRENT USE OF INSULIN (HCC): Primary | ICD-10-CM

## 2017-07-17 DIAGNOSIS — R13.19 OTHER DYSPHAGIA: ICD-10-CM

## 2017-07-17 DIAGNOSIS — Z23 ENCOUNTER FOR IMMUNIZATION: ICD-10-CM

## 2017-07-17 DIAGNOSIS — E78.2 MIXED HYPERLIPIDEMIA: ICD-10-CM

## 2017-07-17 DIAGNOSIS — R73.02 IGT (IMPAIRED GLUCOSE TOLERANCE): ICD-10-CM

## 2017-07-17 DIAGNOSIS — K22.4 ESOPHAGEAL DYSMOTILITY: ICD-10-CM

## 2017-07-17 DIAGNOSIS — J34.89 NASAL OBSTRUCTION: ICD-10-CM

## 2017-07-17 LAB — HBA1C MFR BLD HPLC: 6.4 %

## 2017-07-17 PROCEDURE — 85025 COMPLETE CBC W/AUTO DIFF WBC: CPT

## 2017-07-17 PROCEDURE — 80061 LIPID PANEL: CPT

## 2017-07-17 PROCEDURE — 80053 COMPREHEN METABOLIC PANEL: CPT

## 2017-07-17 PROCEDURE — 36415 COLL VENOUS BLD VENIPUNCTURE: CPT

## 2017-07-17 RX ORDER — RANITIDINE 300 MG/1
300 TABLET ORAL 2 TIMES DAILY
Refills: 1 | COMMUNITY
Start: 2017-07-11 | End: 2019-12-11

## 2017-07-17 RX ORDER — METFORMIN HYDROCHLORIDE 750 MG/1
750 TABLET, EXTENDED RELEASE ORAL 2 TIMES DAILY WITH MEALS
Qty: 60 TAB | Refills: 11 | Status: SHIPPED | OUTPATIENT
Start: 2017-07-17 | End: 2017-12-15 | Stop reason: ALTCHOICE

## 2017-07-17 RX ORDER — PANTOPRAZOLE SODIUM 40 MG/1
TABLET, DELAYED RELEASE ORAL
Refills: 0 | COMMUNITY
Start: 2017-07-12 | End: 2017-10-10

## 2017-07-17 NOTE — PROGRESS NOTES
This is a Subsequent Medicare Annual Wellness Visit providing Personalized Prevention Plan Services (PPPS) (Performed 12 months after initial AWV and PPPS )    I have reviewed the patient's medical history in detail and updated the computerized patient record.      History     Past Medical History:   Diagnosis Date    Allergic rhinitis, cause unspecified 2011    Angina, class III ([address] 2013    as of 8/4/15 pt reports intermittent \"angina pain\" and GRAJEDA; followed by Dr Anne-Marie Gotti CAD (coronary artery disease)     angina / Dr Arce Rho    Colon polyps 2011    Diarrhea     \"random\" per pt; followed by Dr Yasmine Mtz Encounter for long-term (current) use of other medications 2011    Esophageal dysmotility 10/10/2011    Essential hypertension, benign 2011    Gastric ulcer 2015    GERD (gastroesophageal reflux disease)     Hypertension     Mixed hyperlipidemia 2011    Nausea & vomiting     Sleep apnea     CPAP;  Dr Vineet holly MD      Past Surgical History:   Procedure Laterality Date    CARDIAC CATHETERIZATION  2012         HX BUNIONECTOMY Right     HX CATARACT REMOVAL Bilateral     HX  SECTION      x3    HX CHOLECYSTECTOMY      HX HEART CATHETERIZATION  2013    catherization with 3 stents    HX HEART CATHETERIZATION  7/16/15    unable to stent; starting cardiac rehab 2015    TN EGD BALLOON DILATION ESOPHAGUS <30 MM DIAM  10/13/2010         TN EGD TRANSORAL BIOPSY SINGLE/MULTIPLE  10/13/2010         UPPER GI ENDOSCOPY,BALL DIL,30MM  3/30/2015         UPPER GI ENDOSCOPY,BIOPSY  3/30/2015          Current Outpatient Prescriptions   Medication Sig Dispense Refill    raNITIdine (ZANTAC) 300 mg tablet   1    pantoprazole (PROTONIX) 40 mg tablet Take 1 tablet by mouth every other day before breakfast for 60 days  0    Ranolazine (RANEXA) 1,000 mg Tb12 take 1 tablet by mouth twice a day 60 Tab 6    metoprolol succinate (TOPROL-XL) 25 mg XL tablet take 1 tablet by mouth once daily 30 Tab 6    cetirizine (ZYRTEC) 10 mg tablet Take 1 Tab by mouth daily as needed for Allergies. 30 Tab 11    nystatin (MYCOSTATIN) 100,000 unit/mL suspension Take 5 mL by mouth four (4) times daily. swish and spit 60 mL 2    gabapentin (NEURONTIN) 100 mg capsule Take 1 Cap by mouth three (3) times daily. 90 Cap 1    cyclobenzaprine (FLEXERIL) 10 mg tablet Take 1 Tab by mouth three (3) times daily as needed for Muscle Spasm(s). 30 Tab 2    clopidogrel (PLAVIX) 75 mg tab Take 1 Tab by mouth daily. 30 Tab 11    rosuvastatin (CRESTOR) 10 mg tablet take 1 tablet by mouth every evening 30 Tab 11    metFORMIN ER (GLUCOPHAGE XR) 500 mg tablet Take 2 Tabs by mouth daily (with dinner). 60 Tab 11    fluticasone (FLONASE) 50 mcg/actuation nasal spray instill 2 sprays into each nostril once daily 1 Bottle 11    triamterene-hydroCHLOROthiazide (MAXZIDE) 37.5-25 mg per tablet take 1 tablet by mouth every morning 90 Tab 3    levothyroxine (SYNTHROID) 25 mcg tablet Take 1 Tab by mouth Daily (before breakfast). 30 Tab 11    glucose blood VI test strips (BLOOD GLUCOSE TEST) strip by Does Not Apply route Daily Check three times daily . Dx. Code E11.9 150 Strip 11    Lancets misc Check Blood sugar 3 times daily Dx. Code E11.9 150 Each 11    aspirin delayed-release 81 mg tablet take 1 tablet by mouth once daily 30 Tab 9    amLODIPine (NORVASC) 2.5 mg tablet take 1 tablet by mouth daily 90 Tab 3    cyanocobalamin (VITAMIN B-12) 1,000 mcg tablet Take 500 mcg by mouth daily.  Blood-Glucose Meter monitoring kit AS DIRECTED TWICE DAILY 1 Kit 0    ALPRAZolam (XANAX) 0.25 mg tablet Take 1 Tab by mouth two (2) times daily as needed for Anxiety. Max Daily Amount: 0.5 mg. 50 Tab 3    OXYGEN-AIR DELIVERY SYSTEMS (HORIZON NASAL CPAP SYSTEM) by Does Not Apply route.  sucralfate (CARAFATE) 1 gram tablet Take 1 g by mouth four (4) times daily.  Patient takes usually a couple times daily  RABEprazole (ACIPHEX) 20 mg tablet Take 20 mg by mouth daily.  polyethylene glycol (MIRALAX) 17 gram packet Take 17 g by mouth daily.  ibuprofen (ADVIL) 200 mg tablet Take 400 mg by mouth as needed for Pain.  coenzyme Q-10 (CO Q-10) 200 mg capsule Take 1 Cap by mouth daily. Over the counter 30 Cap 11    Guaifenesin (MUCINEX) 1,200 mg TM12 Take  by mouth as needed.  cholecalciferol, vitamin d3, (VITAMIN D) 1,000 unit tablet Take 1,000 Units by mouth daily.  Calcium Carbonate-Vit D3-Min (CALTRATE 600+D PLUS MINERALS) 600 mg (1,500 mg)-400 unit Chew Take 1 tablet by mouth daily.  HYDROcodone-acetaminophen (NORCO) 5-325 mg per tablet Take 1 Tab by mouth every four (4) hours as needed for Pain. Max Daily Amount: 6 Tabs. 30 Tab 0    NITROSTAT 0.4 mg SL tablet place 1 tablet under the tongue every 5 minutes if needed 25 Tab 2    RESTASIS 0.05 % ophthalmic emulsion Administer  to both eyes every twelve (12) hours. 0    aluminum hydrox-magnesium carb (GAVISCON)  mg/15 mL suspension Take 15 mL by mouth every six (6) hours as needed for Indigestion.        Allergies   Allergen Reactions    Pcn [Penicillins] Hives    Protamine Anaphylaxis    Sulfa (Sulfonamide Antibiotics) Hives    Imdur [Isosorbide Mononitrate] Other (comments)     headache     Family History   Problem Relation Age of Onset    Heart Disease Mother     Hypertension Mother     Heart Disease Father     Hypertension Father     Cancer Sister      lung    Cancer Brother      brain tumor     Social History   Substance Use Topics    Smoking status: Never Smoker    Smokeless tobacco: Never Used    Alcohol use 1.0 oz/week     2 Glasses of wine per week      Comment: not always weekly      Patient Active Problem List   Diagnosis Code    Allergic rhinitis J30.9    Esophageal reflux K21.9    Essential hypertension, benign I10    Anxiety state, unspecified F41.1    Encounter for long-term (current) use of other medications Z79.899    Mixed hyperlipidemia E78.2    Esophageal dysmotility K22.4    S/P cardiac cath Z98.890    Chronic total occlusion of coronary artery I25.82    Dysphagia R13.10    Esophageal motor disorder K22.4    Myalgia and myositis, unspecified [id number]    Presence of stent in right coronary artery Z95.5    Snoring R06.83    Sleep-disordered breathing G47.30    CAD (coronary artery disease) I25.10    Diarrhea R19.7    Chest pain R07.9    Angina, class III (HCC) I20.9    Myocardial ischemia I25.9    S/P PTCA (percutaneous transluminal coronary angioplasty) Z98.61    Unstable angina (HCC) I20.0    Gastric ulcer K25.9    Routine adult health maintenance Z00.00       Depression Risk Factor Screening:     PHQ over the last two weeks 7/17/2017   Little interest or pleasure in doing things Not at all   Feeling down, depressed or hopeless Not at all   Total Score PHQ 2 0     Alcohol Risk Factor Screening: On any occasion during the past 3 months, have you had more than 3 drinks containing alcohol? No    Do you average more than 7 drinks per week? No        Functional Ability and Level of Safety:     Hearing Loss   normal-to-mild    Activities of Daily Living   Self-care. Requires assistance with: no ADLs    Fall Risk   Fall Risk Assessment, last 12 mths 7/17/2017   Able to walk? Yes   Fall in past 12 months? No     Abuse Screen   Patient is not abused    Review of Systems   Not required    Physical Examination     Evaluation of Cognitive Function:  Mood/affect:  neutral  Appearance: age appropriate  Family member/caregiver input: here alone  No exam performed today, done by Dr Quinton Russ.     Patient Care Team:  Elisabeth Rendon MD as PCP - [address] [place] [address] Ave, MD as Physician (Sleep Medicine)  Aletta Rinne, MD as Physician (Cardiology)  Chicho Paredes, RN as Nurse Navigator    Advice/Referrals/Counseling   Education and counseling provided:  End-of-Life planning (with patient's consent)  Has the Advanced Directive paper work and Your Right to decide book, just needs to complete and bring in to be scanned into chart. Ordered Tetanus vaccine. Assessment/Plan  As directed by Dr Becky Schuster.

## 2017-07-17 NOTE — PATIENT INSTRUCTIONS
Medicare Part B Preventive Services Limitations Recommendation Scheduled   Bone Mass Measurement  (age 72 & older, biennial) Requires diagnosis related to osteoporosis or estrogen deficiency. Biennial benefit unless patient has history of long-term glucocorticoid tx or baseline is needed because initial test was by other method  Had one done at [facility] years ago-I will call for report   Cardiovascular Screening Blood Tests (every 5 years)  Total cholesterol, HDL, Triglycerides Order as a panel if possible  Done 4/2017   Colorectal Cancer Screening  -Fecal occult blood test (annual)  -Flexible sigmoidoscopy (5y)  -Screening colonoscopy (10y)  -Barium Enema   Done 1/2015   Counseling to Prevent Tobacco Use (up to 8 sessions per year)  - Counseling greater than 3 and up to 10 minutes  - Counseling greater than 10 minutes Patients must be asymptomatic of tobacco-related conditions to receive as preventive service  Non smoker   Diabetes Screening Tests (at least every 3 years, Medicare covers annually or at 6-month intervals for prediabetic patients)    Fasting blood sugar (FBS) or glucose tolerance test (GTT) Patient must be diagnosed with one of the following:  -Hypertension, Dyslipidemia, obesity, previous impaired FBS or GTT  Or any two of the following: overweight, FH of diabetes, age ? 72, history of gestational diabetes, birth of baby weighing more than 9 pounds  Last A1C was 7.1 on 4/2017   Diabetes Self-Management Training (DSMT) (no USPSTF recommendation) Requires referral by treating physician for patient with diabetes or renal disease. 10 hours of initial DSMT session of no less than 30 minutes each in a continuous 12-month period. 2 hours of follow-up DSMT in subsequent years.      Glaucoma Screening (no USPSTF recommendation) Diabetes mellitus, family history, , age 48 or over,  American, age 72 or over  Within the last year with Dr Camden Rivera Virus (HIV) Screening (annually for increased risk patients)  HIV-1 and HIV-2 by EIA, ANDRES, rapid antibody test, or oral mucosa transudate Patient must be at increased risk for HIV infection per USPSTF guidelines or pregnant. Tests covered annually for patients at increased risk. Pregnant patients may receive up to 3 test during pregnancy. Not high risk   Medical Nutrition Therapy (MNT) (for diabetes or renal disease not recommended schedule) Requires referral by treating physician for patient with diabetes or renal disease. Can be provided in same year as diabetes self-management training (DSMT), and CMS recommends medical nutrition therapy take place after DSMT. Up to 3 hours for initial year and 2 hours in subsequent years. N/A   Shingles Vaccination A shingles vaccine is also recommended once in a lifetime after age 61  Done 1/2015   Seasonal Influenza Vaccination (annually)   Fall 2017   Pneumococcal Vaccination (once after 72)   All done   Hepatitis B Vaccinations (if medium/high risk) Medium/high risk factors:  End-stage renal disease,  Hemophiliacs who received Factor VIII or IX concentrates, Clients of institutions for the mentally retarded, Persons who live in the same house as a HepB virus carrier, Homosexual men, Illicit injectable drug abusers. Done 7/2016   Screening Mammography (biennial age 54-69) Annually (age 36 or over)  [de-identified] at [facility]   Screening Pap Tests and Pelvic Examination (up to age 79 and after 79 if unknown history or abnormal study last 10 years) Every 25 months except high risk  Last one was Sept 2016 at [facility].   Ultrasound Screening for Abdominal Aortic Aneurysm (AAA) (once) Patient must be referred through [facility] and not have had a screening for abdominal aortic aneurysm before under Medicare.   Limited to patients who meet one of the following criteria:  - Men who are 73-68 years old and have smoked more than 100 cigarettes in their lifetime.  -Anyone with a FH of AAA  -Anyone recommended for screening by USPSTF  N/A

## 2017-07-17 NOTE — PROGRESS NOTES
Patient seen and AWV completed. [address] to request Bone Density and last Mammogram-they are faxing over.     [address] Dr to request copy of last Pap Smear, they are faxing

## 2017-07-17 NOTE — MR AVS SNAPSHOT
Visit Information Date & Time Provider Department Dept. Phone Encounter #  
 7/17/2017 10:00 AM Saul Templeton [phone] [id number] Follow-up Instructions Return in about 3 months (around 10/17/2017). Your Appointments 7/26/2017  2:00 PM  
6 MONTH with Monisha Lynn MD  
[facility] [address] [facility]) Appt Note: 6mo f/u -lj 6-12-17; wanted to keep appt w/,jaa  
 [address]  
[phone] [address] Upcoming Health Maintenance Date Due  
 EYE EXAM RETINAL OR DILATED Q1 3/9/1960 GLAUCOMA SCREENING Q2Y 3/9/2015 MEDICARE YEARLY EXAM 5/18/2017 INFLUENZA AGE 9 TO ADULT 8/1/2017 HEMOGLOBIN A1C Q6M 10/11/2017 FOOT EXAM Q1 1/3/2018 MICROALBUMIN Q1 1/3/2018 LIPID PANEL Q1 4/11/2018 BREAST CANCER SCRN MAMMOGRAM 4/20/2019 COLONOSCOPY 1/10/2025 DTaP/Tdap/Td series (2 - Td) 5/17/2026 Allergies as of 7/17/2017  Review Complete On: 7/17/2017 By: César Laguna Severity Noted Reaction Type Reactions Pcn [Penicillins] High 06/14/2010   Systemic Hives Protamine High 07/16/2015    Anaphylaxis Sulfa (Sulfonamide Antibiotics) High 06/14/2010   Systemic Hives Imdur [Isosorbide Mononitrate]  07/27/2015    Other (comments)  
 headache Current Immunizations  Reviewed on 1/3/2017 Name Date Influenza High Dose Vaccine PF 10/31/2016 Influenza Vaccine 9/1/2013 Influenza Vaccine Split 10/10/2011 Pneumococcal Conjugate (PCV-13) 11/9/2015 Pneumococcal Polysaccharide (PPSV-23) 4/11/2017 Tdap  Incomplete Not reviewed this visit You Were Diagnosed With   
  
 Codes Comments Type 2 diabetes mellitus without complication, without long-term current use of insulin (HCC)    -  Primary ICD-10-CM: E11.9 ICD-9-CM: 250.00 Coronary artery disease involving native coronary artery of native heart without angina pectoris     ICD-10-CM: I25.10 ICD-9-CM: 414.01 Other dysphagia     ICD-10-CM: R13.19 ICD-9-CM: 787.29 Nasal obstruction     ICD-10-CM: J34.89 ICD-9-CM: 478.19 Esophageal dysmotility     ICD-10-CM: K22.4 ICD-9-CM: 530.5 Essential hypertension, benign     ICD-10-CM: I10 
ICD-9-CM: 401.1 Mixed hyperlipidemia     ICD-10-CM: E78.2 ICD-9-CM: 272.2 IGT (impaired glucose tolerance)     ICD-10-CM: R73.02 
ICD-9-CM: 790.22 Routine general medical examination at a health care facility     ICD-10-CM: Z00.00 ICD-9-CM: V70.0 Screening for alcoholism     ICD-10-CM: Z13.89 ICD-9-CM: V79.1 Encounter for immunization     ICD-10-CM: O13 ICD-9-CM: V03.89 Age spots     ICD-10-CM: L81.4 ICD-9-CM: 709.09 Vitals BP Pulse Temp Resp Height(growth percentile) Weight(growth percentile) 120/78 (BP 1 Location: Left arm, BP Patient Position: Sitting) 69 98.3 °F (36.8 °C) (Oral) 22 5' 5\" (1.651 m) 195 lb 3.2 oz (88.5 kg) SpO2 BMI OB Status Smoking Status 98% 32.48 kg/m2 Postmenopausal Never Smoker Vitals History BMI and BSA Data Body Mass Index Body Surface Area  
 32.48 kg/m 2 2.01 m 2 Preferred Pharmacy Pharmacy Name Phone [id number] [address] [facility] [phone] Your Updated Medication List  
  
   
This list is accurate as of: 7/17/17 11:03 AM.  Always use your most recent med list. ADVIL 200 mg tablet Generic drug:  ibuprofen Take 400 mg by mouth as needed for Pain. ALPRAZolam 0.25 mg tablet Commonly known as:  Jessica Val Take 1 Tab by mouth two (2) times daily as needed for Anxiety. Max Daily Amount: 0.5 mg.  
  
 amLODIPine 2.5 mg tablet Commonly known as:  NORVASC  
take 1 tablet by mouth daily  
  
 aspirin delayed-release 81 mg tablet  
take 1 tablet by mouth once daily Blood-Glucose Meter monitoring kit AS DIRECTED TWICE DAILY CALTRATE 600+D PLUS MINERALS 600 mg (1,500 mg)-400 unit Chew Generic drug:  Calcium Carbonate-Vit D3-Min Take 1 tablet by mouth daily. CARAFATE 1 gram tablet Generic drug:  sucralfate Take 1 g by mouth four (4) times daily. Patient takes usually a couple times daily  
  
 cetirizine 10 mg tablet Commonly known as:  ZYRTEC Take 1 Tab by mouth daily as needed for Allergies. clopidogrel 75 mg Tab Commonly known as:  PLAVIX Take 1 Tab by mouth daily. coenzyme Q-10 200 mg capsule Commonly known as:  CO Q-10 Take 1 Cap by mouth daily. Over the counter  
  
 cyclobenzaprine 10 mg tablet Commonly known as:  FLEXERIL Take 1 Tab by mouth three (3) times daily as needed for Muscle Spasm(s). fluticasone 50 mcg/actuation nasal spray Commonly known as:  FLONASE  
instill 2 sprays into each nostril once daily  
  
 gabapentin 100 mg capsule Commonly known as:  NEURONTIN Take 1 Cap by mouth three (3) times daily. GAVISCON  mg/15 mL suspension Generic drug:  aluminum hydrox-magnesium carb Take 15 mL by mouth every six (6) hours as needed for Indigestion. glucose blood VI test strips strip Commonly known as:  blood glucose test  
by Does Not Apply route Daily Check three times daily . Dx. Code E11.9 HORIZON NASAL CPAP SYSTEM  
by Does Not Apply route. Lancets Misc Check Blood sugar 3 times daily Dx. Code E11.9  
  
 levothyroxine 25 mcg tablet Commonly known as:  SYNTHROID Take 1 Tab by mouth Daily (before breakfast). metFORMIN  mg tablet Commonly known as:  GLUCOPHAGE XR Take 1 Tab by mouth two (2) times daily (with meals). metoprolol succinate 25 mg XL tablet Commonly known as:  TOPROL-XL  
take 1 tablet by mouth once daily MIRALAX 17 gram packet Generic drug:  polyethylene glycol Take 17 g by mouth daily. MUCINEX 1,200 mg Ta12 ER tablet Generic drug:  guaiFENesin Take  by mouth as needed. NITROSTAT 0.4 mg SL tablet Generic drug:  nitroglycerin  
place 1 tablet under the tongue every 5 minutes if needed  
  
 nystatin 100,000 unit/mL suspension Commonly known as:  MYCOSTATIN Take 5 mL by mouth four (4) times daily. swish and spit  
  
 pantoprazole 40 mg tablet Commonly known as:  PROTONIX Take 1 tablet by mouth every other day before breakfast for 60 days  
  
 raNITIdine 300 mg tablet Commonly known as:  ZANTAC  
  
 ranolazine ER 1,000 mg  
Commonly known as:  RANEXA  
take 1 tablet by mouth twice a day RESTASIS 0.05 % ophthalmic emulsion Generic drug:  cycloSPORINE Administer  to both eyes every twelve (12) hours. rosuvastatin 10 mg tablet Commonly known as:  CRESTOR  
take 1 tablet by mouth every evening  
  
 triamterene-hydroCHLOROthiazide 37.5-25 mg per tablet Commonly known as:  MAXZIDE  
take 1 tablet by mouth every morning VITAMIN B-12 1,000 mcg tablet Generic drug:  cyanocobalamin Take 500 mcg by mouth daily. VITAMIN D3 1,000 unit tablet Generic drug:  cholecalciferol Take 1,000 Units by mouth daily. Prescriptions Sent to Pharmacy Refills  
 metFORMIN ER (GLUCOPHAGE XR) 750 mg tablet 11 Sig: Take 1 Tab by mouth two (2) times daily (with meals). Class: Normal  
 Pharmacy: [id number] [address] #: [phone] Route: Oral  
  
We Performed the Following AMB POC HEMOGLOBIN A1C [90860 CPT(R)] CBC WITH AUTOMATED DIFF [44855 CPT(R)] LIPID PANEL [71750 CPT(R)] METABOLIC PANEL, COMPREHENSIVE [70522 CPT(R)] REFERRAL TO DERMATOLOGY [REF19 Custom] Comments:  
 Please evaluate patient for skin lesions REFERRAL TO ENT-OTOLARYNGOLOGY [MRV21 Custom] Comments:  
 Please evaluate patient for nasal obstruction Andrews Hanna TETANUS, DIPHTHERIA TOXOIDS AND ACELLULAR PERTUSSIS VACCINE (TDAP), IN INDIVIDS. >=7, IM O188919 CPT(R)] Follow-up Instructions Return in about 3 months (around 10/17/2017). Referral Information Referral ID Referred By Referred To  
  
 [id number] MD Demetria Fosterantza [address] [facility], [address] [facility] Phone: [phone] Fax: [phone] Visits Status Start Date End Date 1 New Request 7/17/17 7/17/18 If your referral has a status of pending review or denied, additional information will be sent to support the outcome of this decision. Referral ID Referred By Referred To  
 [id number] Warden Rj MD  
   [facility] [address] [facility], [address] [facility] Phone: [phone] Fax: [phone] Visits Status Start Date End Date 1 New Request 7/17/17 7/17/18 If your referral has a status of pending review or denied, additional information will be sent to support the outcome of this decision. Patient Instructions Medicare Part B Preventive Services Limitations Recommendation Scheduled Bone Mass Measurement 
(age 72 & older, biennial) Requires diagnosis related to osteoporosis or estrogen deficiency. Biennial benefit unless patient has history of long-term glucocorticoid tx or baseline is needed because initial test was by other method  Had one done at [facility] years ago-I will call for report Cardiovascular Screening Blood Tests (every 5 years) Total cholesterol, HDL, Triglycerides Order as a panel if possible  Done 4/2017 Colorectal Cancer Screening 
-Fecal occult blood test (annual) -Flexible sigmoidoscopy (5y) 
-Screening colonoscopy (10y) -Barium Enema   Done 1/2015 Counseling to Prevent Tobacco Use (up to 8 sessions per year) - Counseling greater than 3 and up to 10 minutes - Counseling greater than 10 minutes Patients must be asymptomatic of tobacco-related conditions to receive as preventive service  Non smoker Diabetes Screening Tests (at least every 3 years, Medicare covers annually or at 6-month intervals for prediabetic patients) Fasting blood sugar (FBS) or glucose tolerance test (GTT) Patient must be diagnosed with one of the following: 
-Hypertension, Dyslipidemia, obesity, previous impaired FBS or GTT 
Or any two of the following: overweight, FH of diabetes, age ? 72, history of gestational diabetes, birth of baby weighing more than 9 pounds  Last A1C was 7.1 on 4/2017 Diabetes Self-Management Training (DSMT) (no USPSTF recommendation) Requires referral by treating physician for patient with diabetes or renal disease. 10 hours of initial DSMT session of no less than 30 minutes each in a continuous 12-month period. 2 hours of follow-up DSMT in subsequent years. Glaucoma Screening (no USPSTF recommendation) Diabetes mellitus, family history, , age 48 or over,  American, age 72 or over  Within the last year with Dr Landy Hernandez Human Immunodeficiency Virus (HIV) Screening (annually for increased risk patients) HIV-1 and HIV-2 by EIA, ANDRES, rapid antibody test, or oral mucosa transudate Patient must be at increased risk for HIV infection per USPSTF guidelines or pregnant. Tests covered annually for patients at increased risk. Pregnant patients may receive up to 3 test during pregnancy. Not high risk Medical Nutrition Therapy (MNT) (for diabetes or renal disease not recommended schedule) Requires referral by treating physician for patient with diabetes or renal disease. Can be provided in same year as diabetes self-management training (DSMT), and CMS recommends medical nutrition therapy take place after DSMT. Up to 3 hours for initial year and 2 hours in subsequent years.   N/A  
 Shingles Vaccination A shingles vaccine is also recommended once in a lifetime after age 61  Done 1/2015 Seasonal Influenza Vaccination (annually)   Fall 2017 Pneumococcal Vaccination (once after 65)   All done Hepatitis B Vaccinations (if medium/high risk) Medium/high risk factors:  End-stage renal disease, Hemophiliacs who received Factor VIII or IX concentrates, Clients of institutions for the mentally retarded, Persons who live in the same house as a HepB virus carrier, Homosexual men, Illicit injectable drug abusers. Done 7/2016 Screening Mammography (biennial age 54-69) Annually (age 36 or over)  [de-identified] at [facility] Screening Pap Tests and Pelvic Examination (up to age 79 and after 79 if unknown history or abnormal study last 10 years) Every 24 months except high risk  Last one was Sept 2016 at [facility].  
Ultrasound Screening for Abdominal Aortic Aneurysm (AAA) (once) Patient must be referred through [facility] and not have had a screening for abdominal aortic aneurysm before under Medicare. Limited to patients who meet one of the following criteria: 
- Men who are 73-68 years old and have smoked more than 100 cigarettes in their lifetime. 
-Anyone with a FH of AAA 
-Anyone recommended for screening by USPSTF  N/A Introducing [facility] [place] [facility]! Lenka Mendoza introduces [employer] patient portal. Now you can access parts of your medical record, email your doctor's office, and request medication refills online. 1. In your internet browser, go to [email or web address] 2. Click on the First Time User? Click Here link in the Sign In box. You will see the New Member Sign Up page. 3. Enter your [employer] Access Code exactly as it appears below. You will not need to use this code after youve completed the sign-up process. If you do not sign up before the expiration date, you must request a new code. · [employer] Access Code: [email or web address] Expires: 10/15/2017 10:31 AM 
 
4. Enter the last four digits of your Social Security Number (xxxx) and Date of Birth (mm/dd/yyyy) as indicated and click Submit. You will be taken to the next sign-up page. 5. Create a [employer] ID. This will be your [employer] login ID and cannot be changed, so think of one that is secure and easy to remember. 6. Create a [employer] password. You can change your password at any time. 7. Enter your Password Reset Question and Answer. This can be used at a later time if you forget your password. 8. Enter your e-mail address. You will receive e-mail notification when new information is available in [address] 9. Click Sign Up. You can now view and download portions of your medical record. 10. Click the Download Summary menu link to download a portable copy of your medical information. If you have questions, please visit the Frequently Asked Questions section of the [employer] website. Remember, [employer] is NOT to be used for urgent needs. For medical emergencies, dial 911. Now available from your iPhone and Android! Please provide this summary of care documentation to your next provider. Your primary care clinician is listed as Mildred King. If you have any questions after today's visit, please call [phone].

## 2017-07-18 LAB
ALBUMIN SERPL-MCNC: 4.3 G/DL (ref 3.6–4.8)
ALBUMIN/GLOB SERPL: 1.6 {RATIO} (ref 1.2–2.2)
ALP SERPL-CCNC: 82 IU/L (ref 39–117)
ALT SERPL-CCNC: 22 IU/L (ref 0–32)
AST SERPL-CCNC: 19 IU/L (ref 0–40)
BASOPHILS # BLD AUTO: 0 X10E3/UL (ref 0–0.2)
BASOPHILS NFR BLD AUTO: 1 %
BILIRUB SERPL-MCNC: 0.9 MG/DL (ref 0–1.2)
BUN SERPL-MCNC: 17 MG/DL (ref 8–27)
BUN/CREAT SERPL: 18 (ref 12–28)
CALCIUM SERPL-MCNC: 10 MG/DL (ref 8.7–10.3)
CHLORIDE SERPL-SCNC: 98 MMOL/L (ref 96–106)
CHOLEST SERPL-MCNC: 123 MG/DL (ref 100–199)
CO2 SERPL-SCNC: 24 MMOL/L (ref 18–29)
CREAT SERPL-MCNC: 0.93 MG/DL (ref 0.57–1)
EOSINOPHIL # BLD AUTO: 0.1 X10E3/UL (ref 0–0.4)
EOSINOPHIL NFR BLD AUTO: 2 %
ERYTHROCYTE [DISTWIDTH] IN BLOOD BY AUTOMATED COUNT: 13 % (ref 12.3–15.4)
GLOBULIN SER CALC-MCNC: 2.7 G/DL (ref 1.5–4.5)
GLUCOSE SERPL-MCNC: 174 MG/DL (ref 65–99)
HCT VFR BLD AUTO: 42.3 % (ref 34–46.6)
HDLC SERPL-MCNC: 35 MG/DL
HGB BLD-MCNC: 13.9 G/DL (ref 11.1–15.9)
IMM GRANULOCYTES # BLD: 0.1 X10E3/UL (ref 0–0.1)
IMM GRANULOCYTES NFR BLD: 1 %
INTERPRETATION, 910389: NORMAL
LDLC SERPL CALC-MCNC: 28 MG/DL (ref 0–99)
LYMPHOCYTES # BLD AUTO: 2.3 X10E3/UL (ref 0.7–3.1)
LYMPHOCYTES NFR BLD AUTO: 28 %
MCH RBC QN AUTO: 30.2 PG (ref 26.6–33)
MCHC RBC AUTO-ENTMCNC: 32.9 G/DL (ref 31.5–35.7)
MCV RBC AUTO: 92 FL (ref 79–97)
MONOCYTES # BLD AUTO: 0.5 X10E3/UL (ref 0.1–0.9)
MONOCYTES NFR BLD AUTO: 6 %
NEUTROPHILS # BLD AUTO: 5 X10E3/UL (ref 1.4–7)
NEUTROPHILS NFR BLD AUTO: 62 %
PLATELET # BLD AUTO: 239 X10E3/UL (ref 150–379)
POTASSIUM SERPL-SCNC: 3.4 MMOL/L (ref 3.5–5.2)
PROT SERPL-MCNC: 7 G/DL (ref 6–8.5)
RBC # BLD AUTO: 4.6 X10E6/UL (ref 3.77–5.28)
SODIUM SERPL-SCNC: 139 MMOL/L (ref 134–144)
TRIGL SERPL-MCNC: 299 MG/DL (ref 0–149)
VLDLC SERPL CALC-MCNC: 60 MG/DL (ref 5–40)
WBC # BLD AUTO: 8 X10E3/UL (ref 3.4–10.8)

## 2017-07-18 NOTE — PROGRESS NOTES
HISTORY OF PRESENT ILLNESS  Nichol Reeves is a 79 y.o. female. f/u hbp,cad,chol,dm2. Cotinued gerd /dysphagic sx ,to see gi in 2 week  Diabetes   The history is provided by the patient. This is a chronic problem. The problem occurs daily. The problem has not changed since onset. Pertinent negatives include no chest pain, no abdominal pain and no headaches. Hypertension    This is a chronic problem. The problem has not changed since onset. Pertinent negatives include no chest pain, no orthopnea, no palpitations, no malaise/fatigue, no headaches and no peripheral edema. Cholesterol Problem   This is a chronic problem. The problem occurs daily. Pertinent negatives include no chest pain, no abdominal pain and no headaches. GERD   This is a chronic problem. The problem occurs daily. Pertinent negatives include no chest pain, no abdominal pain and no headaches. Review of Systems   Constitutional: Negative for fever and malaise/fatigue. Cardiovascular: Negative for chest pain, palpitations and orthopnea. Gastrointestinal: Positive for heartburn. Negative for abdominal pain, blood in stool, constipation and melena. Neurological: Negative for headaches. Physical Exam   Constitutional: She appears well-developed and well-nourished. HENT:   Head: Normocephalic and atraumatic. Right Ear: External ear normal.   Left Ear: External ear normal.   Nose: Nose normal.   Mouth/Throat: Oropharynx is clear and moist.   Eyes: Conjunctivae are normal. Pupils are equal, round, and reactive to light. Neck: Normal range of motion. Neck supple. No tracheal deviation present. No thyromegaly present. Cardiovascular: Normal rate, regular rhythm, normal heart sounds and intact distal pulses. Pulmonary/Chest: Effort normal and breath sounds normal. No respiratory distress. She has no wheezes. Abdominal: Soft. Bowel sounds are normal. She exhibits no distension. Lymphadenopathy:     She has no cervical adenopathy. Neurological: She is alert. Skin: Skin is warm and dry. Psychiatric: She has a normal mood and affect. Vitals reviewed. ASSESSMENT and PLAN  Marcio Johnson was seen today for diabetes, hypertension, cholesterol problem and annual wellness visit. Diagnoses and all orders for this visit:    Type 2 diabetes mellitus without complication, without long-term current use of insulin (HCC)  -     metFORMIN ER (GLUCOPHAGE XR) 750 mg tablet; Take 1 Tab by mouth two (2) times daily (with meals). Coronary artery disease involving native coronary artery of native heart without angina pectoris    Other dysphagia    Nasal obstruction  -     REFERRAL TO ENT-OTOLARYNGOLOGY    Esophageal dysmotility    Essential hypertension, benign  -     METABOLIC PANEL, COMPREHENSIVE  -     CBC WITH AUTOMATED DIFF    Mixed hyperlipidemia  -     LIPID PANEL    IGT (impaired glucose tolerance)  -     AMB POC HEMOGLOBIN A1C    Routine general medical examination at a health care facility  -     Tetanus, Diphtheria Toxoids and Acellular Pertussis Vaccine (TDAP)    Screening for alcoholism    Encounter for immunization  -     Tetanus, Diphtheria Toxoids and Acellular Pertussis Vaccine (TDAP)    Age spots  -     REFERRAL TO DERMATOLOGY      Follow-up Disposition:  Return in about 3 months (around 10/17/2017).

## 2017-07-19 RX ORDER — ASPIRIN 81 MG/1
TABLET ORAL
Qty: 30 TAB | Refills: 9 | Status: SHIPPED | OUTPATIENT
Start: 2017-07-19 | End: 2017-10-10

## 2017-07-26 ENCOUNTER — OFFICE VISIT (OUTPATIENT)
Dept: CARDIOLOGY CLINIC | Age: 67
End: 2017-07-26

## 2017-07-26 VITALS
HEART RATE: 70 BPM | WEIGHT: 194.7 LBS | BODY MASS INDEX: 32.44 KG/M2 | SYSTOLIC BLOOD PRESSURE: 140 MMHG | OXYGEN SATURATION: 96 % | HEIGHT: 65 IN | DIASTOLIC BLOOD PRESSURE: 70 MMHG | RESPIRATION RATE: 16 BRPM

## 2017-07-26 DIAGNOSIS — K21.9 GASTROESOPHAGEAL REFLUX DISEASE, ESOPHAGITIS PRESENCE NOT SPECIFIED: ICD-10-CM

## 2017-07-26 DIAGNOSIS — Z98.61 S/P PTCA (PERCUTANEOUS TRANSLUMINAL CORONARY ANGIOPLASTY): ICD-10-CM

## 2017-07-26 DIAGNOSIS — E78.2 MIXED HYPERLIPIDEMIA: ICD-10-CM

## 2017-07-26 DIAGNOSIS — I25.10 CORONARY ARTERY DISEASE INVOLVING NATIVE CORONARY ARTERY OF NATIVE HEART WITHOUT ANGINA PECTORIS: Primary | ICD-10-CM

## 2017-07-26 DIAGNOSIS — I10 ESSENTIAL HYPERTENSION, BENIGN: ICD-10-CM

## 2017-07-26 DIAGNOSIS — I25.82 CHRONIC TOTAL OCCLUSION OF CORONARY ARTERY: ICD-10-CM

## 2017-07-26 DIAGNOSIS — K22.4 ESOPHAGEAL DYSMOTILITY: ICD-10-CM

## 2017-07-26 NOTE — MR AVS SNAPSHOT
Visit Information Date & Time Provider Department Dept. Phone Encounter #  
 7/26/2017  2:00 PM Shahana Mcmahon, [address] [facility] [phone] [id number] Your Appointments 10/16/2017 11:00 AM  
ROUTINE CARE with Barney Garcia MD  
[facility]) Appt Note: routine follow up  
 [address] [facility] [place] [id number] [phone] [address] [place] [address] [address] Upcoming Health Maintenance Date Due  
 EYE EXAM RETINAL OR DILATED Q1 3/9/1960 GLAUCOMA SCREENING Q2Y 3/9/2015 INFLUENZA AGE 9 TO ADULT 8/1/2017 FOOT EXAM Q1 1/3/2018 MICROALBUMIN Q1 1/3/2018 HEMOGLOBIN A1C Q6M 1/17/2018 LIPID PANEL Q1 7/17/2018 MEDICARE YEARLY EXAM 7/18/2018 BREAST CANCER SCRN MAMMOGRAM 4/20/2019 COLONOSCOPY 1/10/2025 DTaP/Tdap/Td series (2 - Td) 5/17/2026 Allergies as of 7/26/2017  Review Complete On: 7/26/2017 By: Rosy Osorio NP Severity Noted Reaction Type Reactions Pcn [Penicillins] High 06/14/2010   Systemic Hives Protamine High 07/16/2015    Anaphylaxis Sulfa (Sulfonamide Antibiotics) High 06/14/2010   Systemic Hives Imdur [Isosorbide Mononitrate]  07/27/2015    Other (comments)  
 headache Current Immunizations  Reviewed on 1/3/2017 Name Date Influenza High Dose Vaccine PF 10/31/2016 Influenza Vaccine 9/1/2013 Influenza Vaccine Split 10/10/2011 Pneumococcal Conjugate (PCV-13) 11/9/2015 Pneumococcal Polysaccharide (PPSV-23) 4/11/2017 Tdap  Incomplete Not reviewed this visit You Were Diagnosed With   
  
 Codes Comments Coronary artery disease involving native coronary artery of native heart without angina pectoris    -  Primary ICD-10-CM: I25.10 ICD-9-CM: 414.01 Chronic total occlusion of coronary artery     ICD-10-CM: I25.82 ICD-9-CM: 414.00, 414.2 Mixed hyperlipidemia     ICD-10-CM: E78.2 ICD-9-CM: 272.2 Essential hypertension, benign     ICD-10-CM: I10 
ICD-9-CM: 401.1 S/P PTCA (percutaneous transluminal coronary angioplasty)     ICD-10-CM: Z98.61 ICD-9-CM: V45.82 Gastroesophageal reflux disease, esophagitis presence not specified     ICD-10-CM: K21.9 ICD-9-CM: 530.81 Esophageal dysmotility     ICD-10-CM: K22.4 ICD-9-CM: 530.5 Vitals BP Pulse Resp Height(growth percentile) Weight(growth percentile) SpO2  
 140/70 (BP Patient Position: Sitting) 70 16 5' 5\" (1.651 m) 194 lb 11.2 oz (88.3 kg) 96% BMI OB Status Smoking Status 32.4 kg/m2 Postmenopausal Never Smoker BMI and BSA Data Body Mass Index Body Surface Area  
 32.4 kg/m 2 2.01 m 2 Preferred Pharmacy Pharmacy Name Phone [id number] Dem[facility] Leslie [address] [place] [address] [phone] Your Updated Medication List  
  
   
This list is accurate as of: 7/26/17  2:44 PM.  Always use your most recent med list. ADVIL 200 mg tablet Generic drug:  ibuprofen Take 400 mg by mouth as needed for Pain. ALPRAZolam 0.25 mg tablet Commonly known as:  Johnice Salts Take 1 Tab by mouth two (2) times daily as needed for Anxiety. Max Daily Amount: 0.5 mg.  
  
 amLODIPine 2.5 mg tablet Commonly known as:  NORVASC  
take 1 tablet by mouth daily  
  
 aspirin delayed-release 81 mg tablet  
take 1 tablet by mouth once daily Blood-Glucose Meter monitoring kit AS DIRECTED TWICE DAILY CALTRATE 600+D PLUS MINERALS 600 mg (1,500 mg)-400 unit Chew Generic drug:  Calcium Carbonate-Vit D3-Min Take 1 tablet by mouth daily. CARAFATE 1 gram tablet Generic drug:  sucralfate Take 1 g by mouth four (4) times daily. Patient takes usually a couple times daily  
  
 cetirizine 10 mg tablet Commonly known as:  ZYRTEC Take 1 Tab by mouth daily as needed for Allergies. clopidogrel 75 mg Tab Commonly known as:  PLAVIX Take 1 Tab by mouth daily. coenzyme Q-10 200 mg capsule Commonly known as:  CO Q-10 Take 1 Cap by mouth daily. Over the counter  
  
 cyclobenzaprine 10 mg tablet Commonly known as:  FLEXERIL Take 1 Tab by mouth three (3) times daily as needed for Muscle Spasm(s). fluticasone 50 mcg/actuation nasal spray Commonly known as:  FLONASE  
instill 2 sprays into each nostril once daily  
  
 gabapentin 100 mg capsule Commonly known as:  NEURONTIN Take 1 Cap by mouth three (3) times daily. GAVISCON  mg/15 mL suspension Generic drug:  aluminum hydrox-magnesium carb Take 15 mL by mouth every six (6) hours as needed for Indigestion. glucose blood VI test strips strip Commonly known as:  blood glucose test  
by Does Not Apply route Daily Check three times daily . Dx. Code E11.9 HORIZON NASAL CPAP SYSTEM  
by Does Not Apply route. Lancets Misc Check Blood sugar 3 times daily Dx. Code E11.9  
  
 levothyroxine 25 mcg tablet Commonly known as:  SYNTHROID Take 1 Tab by mouth Daily (before breakfast). metFORMIN  mg tablet Commonly known as:  GLUCOPHAGE XR Take 1 Tab by mouth two (2) times daily (with meals). metoprolol succinate 25 mg XL tablet Commonly known as:  TOPROL-XL  
take 1 tablet by mouth once daily MIRALAX 17 gram packet Generic drug:  polyethylene glycol Take 17 g by mouth daily. MUCINEX 1,200 mg Ta12 ER tablet Generic drug:  guaiFENesin Take  by mouth as needed. NITROSTAT 0.4 mg SL tablet Generic drug:  nitroglycerin  
place 1 tablet under the tongue every 5 minutes if needed  
  
 pantoprazole 40 mg tablet Commonly known as:  PROTONIX Take 1 tablet by mouth every other day before breakfast for 60 days  
  
 raNITIdine 300 mg tablet Commonly known as:  ZANTAC  
  
 ranolazine ER 1,000 mg  
Commonly known as:  RANEXA  
take 1 tablet by mouth twice a day RESTASIS 0.05 % ophthalmic emulsion Generic drug:  cycloSPORINE Administer  to both eyes every twelve (12) hours. rosuvastatin 10 mg tablet Commonly known as:  CRESTOR  
take 1 tablet by mouth every evening  
  
 triamterene-hydroCHLOROthiazide 37.5-25 mg per tablet Commonly known as:  MAXZIDE  
take 1 tablet by mouth every morning VITAMIN B-12 1,000 mcg tablet Generic drug:  cyanocobalamin Take 500 mcg by mouth daily. VITAMIN D3 1,000 unit tablet Generic drug:  cholecalciferol Take 1,000 Units by mouth daily. We Performed the Following AMB POC EKG ROUTINE W/ 12 LEADS, INTER & REP [44021 CPT(R)] Introducing [facility]! Odalys Eldridge introduces [employer] patient portal. Now you can access parts of your medical record, email your doctor's office, and request medication refills online. 1. In your internet browser, go to https://[employer]. [employer] 2. Click on the First Time User? Click Here link in the Sign In box. You will see the New Member Sign Up page. 3. Enter your [employer] Access Code exactly as it appears below. You will not need to use this code after youve completed the sign-up process. If you do not sign up before the expiration date, you must request a new code. · [employer] Access Code: [email or web address] Expires: 10/15/2017 10:31 AM 
 
4. Enter the last four digits of your Social Security Number (xxxx) and Date of Birth (mm/dd/yyyy) as indicated and click Submit. You will be taken to the next sign-up page. 5. Create a [employer] ID. This will be your [employer] login ID and cannot be changed, so think of one that is secure and easy to remember. 6. Create a [employer] password. You can change your password at any time. 7. Enter your Password Reset Question and Answer. This can be used at a later time if you forget your password. 8. Enter your e-mail address. You will receive e-mail notification when new information is available in [address] 9. Click Sign Up. You can now view and download portions of your medical record. 10. Click the Download Summary menu link to download a portable copy of your medical information. If you have questions, please visit the Frequently Asked Questions section of the [employer] website. Remember, [employer] is NOT to be used for urgent needs. For medical emergencies, dial 911. Now available from your iPhone and Android! Please provide this summary of care documentation to your next provider. Your primary care clinician is listed as Mateo Agarwal. If you have any questions after today's visit, please call [phone].

## 2017-07-26 NOTE — PROGRESS NOTES
Subjective/HPI:     Lolis Cowart is a 79 y.o. female is here for f/u appt. She is having stomach issues with burning in her stomach at night, wakes her up at 3am and with bloating. She is preparing to see Dr Jessica Neves next week. She is tired, it varies dependent on multiple factors, no significant worsening. She is having spells where she will feel mentally cloudy, weak, and will sit down. She is thinking it is related to her sugar. She has checked it and it has been 140 before. Her PCP has been adjusting DM meds to try and get sugars lower. The patient denies chest pain/ shortness of breath, orthopnea, PND, LE edema, palpitations, or syncope.          PCP Provider  Tiffany Mason MD  Past Medical History:   Diagnosis Date    Allergic rhinitis, cause unspecified 2011    Angina, class III ([facility] [address] 2013    as of 8/4/15 pt reports intermittent \"angina pain\" and GRAJEDA; followed by Dr Fatimah Sparks CAD (coronary artery disease)     angina / Dr Bonds Pancoast    Colon polyps 2011    Diarrhea     \"random\" per pt; followed by Dr Khadijah Grubbs Encounter for long-term (current) use of other medications 2011    Esophageal dysmotility 10/10/2011    Essential hypertension, benign 2011    Gastric ulcer 2015    GERD (gastroesophageal reflux disease)     Hypertension     Mixed hyperlipidemia 2011    Nausea & vomiting     Sleep apnea     CPAP;  Dr Ganga holly MD      Past Surgical History:   Procedure Laterality Date    CARDIAC CATHETERIZATION           HX BUNIONECTOMY Right     HX CATARACT REMOVAL Bilateral     HX  SECTION      x3    HX CHOLECYSTECTOMY      HX HEART CATHETERIZATION  2013    catherization with 3 stents    HX HEART CATHETERIZATION  7/16/15    unable to stent; starting cardiac rehab 2015    AZ EGD BALLOON DILATION ESOPHAGUS <30 MM DIAM  10/13/2010         [place] EGD TRANSORAL BIOPSY SINGLE/MULTIPLE  10/13/2010         UPPER GI ENDOSCOPY,BALL DIL,30MM  3/30/2015         UPPER GI ENDOSCOPY,BIOPSY  3/30/2015          Allergies   Allergen Reactions    Pcn [Penicillins] Hives    Protamine Anaphylaxis    Sulfa (Sulfonamide Antibiotics) Hives    Imdur [Isosorbide Mononitrate] Other (comments)     headache      Family History   Problem Relation Age of Onset    Heart Disease Mother     Hypertension Mother     Heart Disease Father     Hypertension Father     Cancer Sister      lung    Cancer Brother      brain tumor      Current Outpatient Prescriptions   Medication Sig    aspirin delayed-release 81 mg tablet take 1 tablet by mouth once daily    raNITIdine (ZANTAC) 300 mg tablet     pantoprazole (PROTONIX) 40 mg tablet Take 1 tablet by mouth every other day before breakfast for 60 days    metFORMIN ER (GLUCOPHAGE XR) 750 mg tablet Take 1 Tab by mouth two (2) times daily (with meals).  Ranolazine (RANEXA) 1,000 mg Tb12 take 1 tablet by mouth twice a day    metoprolol succinate (TOPROL-XL) 25 mg XL tablet take 1 tablet by mouth once daily    cetirizine (ZYRTEC) 10 mg tablet Take 1 Tab by mouth daily as needed for Allergies.  cyclobenzaprine (FLEXERIL) 10 mg tablet Take 1 Tab by mouth three (3) times daily as needed for Muscle Spasm(s).  clopidogrel (PLAVIX) 75 mg tab Take 1 Tab by mouth daily.  rosuvastatin (CRESTOR) 10 mg tablet take 1 tablet by mouth every evening    fluticasone (FLONASE) 50 mcg/actuation nasal spray instill 2 sprays into each nostril once daily    triamterene-hydroCHLOROthiazide (MAXZIDE) 37.5-25 mg per tablet take 1 tablet by mouth every morning    levothyroxine (SYNTHROID) 25 mcg tablet Take 1 Tab by mouth Daily (before breakfast).  NITROSTAT 0.4 mg SL tablet place 1 tablet under the tongue every 5 minutes if needed    glucose blood VI test strips (BLOOD GLUCOSE TEST) strip by Does Not Apply route Daily Check three times daily . Dx.  Code E11.9    Lancets misc Check Blood sugar 3 times daily Dx. Code E11.9    amLODIPine (NORVASC) 2.5 mg tablet take 1 tablet by mouth daily    cyanocobalamin (VITAMIN B-12) 1,000 mcg tablet Take 500 mcg by mouth daily.  Blood-Glucose Meter monitoring kit AS DIRECTED TWICE DAILY    OXYGEN-AIR DELIVERY SYSTEMS (HORIZON NASAL CPAP SYSTEM) by Does Not Apply route.  sucralfate (CARAFATE) 1 gram tablet Take 1 g by mouth four (4) times daily. Patient takes usually a couple times daily    polyethylene glycol (MIRALAX) 17 gram packet Take 17 g by mouth daily.  ibuprofen (ADVIL) 200 mg tablet Take 400 mg by mouth as needed for Pain.  aluminum hydrox-magnesium carb (GAVISCON)  mg/15 mL suspension Take 15 mL by mouth every six (6) hours as needed for Indigestion.  coenzyme Q-10 (CO Q-10) 200 mg capsule Take 1 Cap by mouth daily. Over the counter    Guaifenesin (MUCINEX) 1,200 mg TM12 Take  by mouth as needed.  cholecalciferol, vitamin d3, (VITAMIN D) 1,000 unit tablet Take 1,000 Units by mouth daily.  Calcium Carbonate-Vit D3-Min (CALTRATE 600+D PLUS MINERALS) 600 mg (1,500 mg)-400 unit Chew Take 1 tablet by mouth daily.  gabapentin (NEURONTIN) 100 mg capsule Take 1 Cap by mouth three (3) times daily.  ALPRAZolam (XANAX) 0.25 mg tablet Take 1 Tab by mouth two (2) times daily as needed for Anxiety. Max Daily Amount: 0.5 mg.    RESTASIS 0.05 % ophthalmic emulsion Administer  to both eyes every twelve (12) hours. No current facility-administered medications for this visit. Vitals:    07/26/17 1414   BP: 140/70   Pulse: 70   Resp: 16   SpO2: 96%   Weight: 194 lb 11.2 oz (88.3 kg)   Height: 5' 5\" (1.651 m)     Social History     Social History    Marital status:      Spouse name: N/A    Number of children: N/A    Years of education: N/A     Occupational History    Not on file.      Social History Main Topics    Smoking status: Never Smoker    Smokeless tobacco: Never Used    Alcohol use 1.0 oz/week     2 Glasses of wine per week      Comment: not always weekly     Drug use: No    Sexual activity: Yes     Partners: Male     Other Topics Concern    Not on file     Social History Narrative       I have reviewed the nurses notes, vitals, problem list, allergy list, medical history, family, social history and medications. Review of Symptoms:    General: Pt denies excessive weight gain or loss. Pt is able to conduct ADL's. HEENT: Denies blurred vision, headaches, epistaxis and difficulty swallowing. Respiratory: Denies shortness of breath, GRAJEDA, wheezing or stridor. Cardiovascular: Denies precordial pain, palpitations, edema or PND  Gastrointestinal: + poor appetite, indigestion, abdominal pain, denies blood in stool  Urinary: Denies dysuria, pyuria  Musculoskeletal: Denies pain or swelling from muscles or joints  Neurologic: Denies tremor, paresthesias, or sensory motor disturbance. +episodes of mental cloudiness  Skin: Denies rash, itching or texture change. Psych: Denies depression        Physical Exam:      General: Well developed, in no acute distress, cooperative and alert  HEENT: No carotid bruits, no JVD, trach is midline. Neck Supple, PEERL, EOM intact. Heart:  Normal S1/S2 negative S3 or S4. Regular, no murmur, gallop or rub.   Respiratory: Clear bilaterally x 4, no wheezing or rales  Abdomen:   Soft, non-tender, no masses, bowel sounds are active.   Extremities:  No edema, normal cap refill, no cyanosis, atraumatic. Neuro: A&Ox3, speech clear, gait stable. Skin: Skin color is normal. No rashes or lesions.  Non diaphoretic  Vascular: 2+ pulses symmetric in all extremities    Cardiographics    ECG: SR. HR 67    Results for orders placed or performed during the hospital encounter of 07/16/15   EKG, 12 LEAD, INITIAL   Result Value Ref Range    Ventricular Rate 86 BPM    Atrial Rate 86 BPM    P-R Interval 254 ms    QRS Duration 110 ms    Q-T Interval 400 ms    QTC Calculation (Bezet) 478 ms    Calculated P Axis 55 degrees Calculated R Axis 41 degrees    Calculated T Axis 33 degrees    Diagnosis       Sinus rhythm with 1st degree AV block  Nonspecific ST abnormality  When compared with ECG of 13-DEC-2013 03:03,  No significant change  Confirmed by Yael Madrid ([id number]) on 7/17/2015 12:53:21 PM     Results for orders placed or performed in visit on 02/22/11   AMB POC EKG ROUTINE W/ 12 LEADS, INTER & REP    Narrative    EKG: normal EKG, normal sinus rhythm. Impression    EKG: normal EKG, normal sinus rhythm. Cardiology Labs:  Lab Results   Component Value Date/Time    Cholesterol, total 123 07/17/2017 11:13 AM    HDL Cholesterol 35 07/17/2017 11:13 AM    LDL, calculated 28 07/17/2017 11:13 AM    Triglyceride 299 07/17/2017 11:13 AM    CHOL/HDL Ratio 3.0 12/13/2013 03:16 AM       Lab Results   Component Value Date/Time    Sodium 139 07/17/2017 11:13 AM    Potassium 3.4 07/17/2017 11:13 AM    Chloride 98 07/17/2017 11:13 AM    CO2 24 07/17/2017 11:13 AM    Anion gap 3 07/17/2015 04:37 AM    Glucose 174 07/17/2017 11:13 AM    BUN 17 07/17/2017 11:13 AM    Creatinine 0.93 07/17/2017 11:13 AM    BUN/Creatinine ratio 18 07/17/2017 11:13 AM    GFR est AA 74 07/17/2017 11:13 AM    GFR est non-AA 64 07/17/2017 11:13 AM    Calcium 10.0 07/17/2017 11:13 AM    AST (SGOT) 19 07/17/2017 11:13 AM    Alk. phosphatase 82 07/17/2017 11:13 AM    Protein, total 7.0 07/17/2017 11:13 AM    Albumin 4.3 07/17/2017 11:13 AM    Globulin 3.6 09/14/2012 06:45 PM    A-G Ratio 1.6 07/17/2017 11:13 AM    ALT (SGPT) 22 07/17/2017 11:13 AM           Assessment:     Assessment:     Diagnoses and all orders for this visit:    1. Coronary artery disease involving native coronary artery of native heart without angina pectoris    2. Chronic total occlusion of coronary artery    3. Mixed hyperlipidemia  -     AMB POC EKG ROUTINE W/ 12 LEADS, INTER & REP    4. Essential hypertension, benign    5.  S/P PTCA (percutaneous transluminal coronary angioplasty)    6. Gastroesophageal reflux disease, esophagitis presence not specified    7. Esophageal dysmotility        ICD-10-CM ICD-9-CM    1. Coronary artery disease involving native coronary artery of native heart without angina pectoris I25.10 414.01    2. Chronic total occlusion of coronary artery I25.82 414.00      414.2    3. Mixed hyperlipidemia E78.2 272.2 AMB POC EKG ROUTINE W/ 12 LEADS, INTER & REP   4. Essential hypertension, benign I10 401.1    5. S/P PTCA (percutaneous transluminal coronary angioplasty) Z98.61 V45.82    6. Gastroesophageal reflux disease, esophagitis presence not specified K21.9 530.81    7. Esophageal dysmotility K22.4 530.5      Orders Placed This Encounter    AMB POC EKG ROUTINE W/ 12 LEADS, INTER & REP     Order Specific Question:   Reason for Exam:     Answer:   Routine        Plan:     Patient presents today for her for follow up appt with hx of ASHD and 2 unsuccessful attempts at PCI of  of RCA- She is reporting some fatigue, stomach bloating/GI issues, and some episodes of mental cloudiness/weakness/feeling shaky. EKG today SR. Echo in 2015 NL EF and no valvular disease. Last myoview 5/2016 with improved inferior ischemia with suspectedly improved collaterals to  of RCA). On ranexa, toprol and ASA, statin, ccb. LDL at goal. She has some limitation in her GERD meds d/t Plavix. Given her  recommend she continue on Plavix. She can take another GERD medication given the length of time since her last stent besides Protonix. Recommend she f/u with PCP about her mental cloudiness/fatigue and f/u in 6 months.     Hannah Del Rosario, VIRGILIO

## 2017-07-26 NOTE — LETTER
7/26/2017 5:16 PM 
 
Patient:  Marcelina Davila YOB: 1950 Date of Visit: 7/26/2017 Dear Agus Camacho MD 
[address] [place] [address] [phone] VIA In Basket 
 : Thank you for referring Ms. Jose Alberto Chamberlain to me for evaluation/treatment. Below are the relevant portions of my assessment and plan of care. If you have questions, please do not hesitate to call me. I look forward to following Ms. Tj Vincent along with you.  
 
 
 
Sincerely, 
 
 
Lois Keenan MD

## 2017-07-27 ENCOUNTER — TELEPHONE (OUTPATIENT)
Dept: CARDIOLOGY CLINIC | Age: 67
End: 2017-07-27

## 2017-07-27 NOTE — TELEPHONE ENCOUNTER
----- Message from Jair Cadet NP sent at 7/26/2017  5:01 PM EDT -----  Regarding: GERD meds  Can you call this pt, let her know he does want her to stay on Plavix. Given the time from her last stent, however, she can take another GERD med beside Protonix. Yes it may theoretically decrease effectiveness of Plavix, but she doesn't have a recent stent to be as concerned about.     Thanks  Jovi Sheets

## 2017-08-25 RX ORDER — AZITHROMYCIN 250 MG/1
TABLET, FILM COATED ORAL
Qty: 6 TAB | Refills: 0 | Status: SHIPPED | OUTPATIENT
Start: 2017-08-25 | End: 2017-08-30

## 2017-08-25 NOTE — TELEPHONE ENCOUNTER
Patient wants to get something called in for a sinus infection, she is leaving around 11:00 am to go out of town,  Please give her a call @ [phone]

## 2017-08-25 NOTE — TELEPHONE ENCOUNTER
Patient c/o congestion. She has used Zithromax.  She will be calling back with the telephone # to [facility] in [place]

## 2017-09-21 RX ORDER — AMLODIPINE BESYLATE 2.5 MG/1
TABLET ORAL
Qty: 90 TAB | Refills: 3 | Status: SHIPPED | OUTPATIENT
Start: 2017-09-21 | End: 2018-09-23 | Stop reason: SDUPTHER

## 2017-10-03 ENCOUNTER — TELEPHONE (OUTPATIENT)
Dept: CARDIOLOGY CLINIC | Age: 67
End: 2017-10-03

## 2017-10-03 NOTE — TELEPHONE ENCOUNTER
Patient scheduled for endoscopy 10-11-17 with Dr Maricel Dodson requesting recommendation on holding blood thinners prior please advise thanks

## 2017-10-04 NOTE — TELEPHONE ENCOUNTER
Ms. Valerie Galvez may discontinue aspirin and Plavix for 5 days prior to the endoscopy, restart soon thereafter when felt safe by the gastroenterologist.

## 2017-10-10 RX ORDER — ESOMEPRAZOLE MAGNESIUM 40 MG/1
40 CAPSULE, DELAYED RELEASE ORAL DAILY
COMMUNITY
End: 2018-03-26 | Stop reason: ALTCHOICE

## 2017-10-10 RX ORDER — GABAPENTIN 100 MG/1
CAPSULE ORAL
COMMUNITY
End: 2019-03-04 | Stop reason: SDUPTHER

## 2017-10-11 ENCOUNTER — HOSPITAL ENCOUNTER (OUTPATIENT)
Age: 67
Setting detail: OUTPATIENT SURGERY
Discharge: HOME | End: 2017-10-11
Attending: INTERNAL MEDICINE | Admitting: INTERNAL MEDICINE
Payer: MEDICARE

## 2017-10-11 ENCOUNTER — ANESTHESIA (OUTPATIENT)
Dept: ENDOSCOPY | Age: 67
End: 2017-10-11
Payer: MEDICARE

## 2017-10-11 ENCOUNTER — ANESTHESIA EVENT (OUTPATIENT)
Dept: ENDOSCOPY | Age: 67
End: 2017-10-11
Payer: MEDICARE

## 2017-10-11 VITALS
RESPIRATION RATE: 21 BRPM | HEIGHT: 65 IN | WEIGHT: 185.1 LBS | OXYGEN SATURATION: 98 % | DIASTOLIC BLOOD PRESSURE: 79 MMHG | TEMPERATURE: 97.6 F | BODY MASS INDEX: 30.84 KG/M2 | HEART RATE: 71 BPM | SYSTOLIC BLOOD PRESSURE: 120 MMHG

## 2017-10-11 LAB
GLUCOSE BLD STRIP.AUTO-MCNC: 133 MG/DL (ref 65–100)
SERVICE CMNT-IMP: ABNORMAL

## 2017-10-11 PROCEDURE — 76060000031 HC ANESTHESIA FIRST 0.5 HR: Performed by: INTERNAL MEDICINE

## 2017-10-11 PROCEDURE — 76040000019: Performed by: INTERNAL MEDICINE

## 2017-10-11 PROCEDURE — 74011250636 HC RX REV CODE- 250/636

## 2017-10-11 PROCEDURE — 74011000250 HC RX REV CODE- 250

## 2017-10-11 PROCEDURE — 82962 GLUCOSE BLOOD TEST: CPT

## 2017-10-11 PROCEDURE — 77030019957 HC CUF BLN GASTSCP OCOA -B: Performed by: INTERNAL MEDICINE

## 2017-10-11 RX ORDER — SODIUM CHLORIDE 9 MG/ML
INJECTION, SOLUTION INTRAVENOUS
Status: DISCONTINUED | OUTPATIENT
Start: 2017-10-11 | End: 2017-10-11 | Stop reason: HOSPADM

## 2017-10-11 RX ORDER — EPINEPHRINE 0.1 MG/ML
1 INJECTION INTRACARDIAC; INTRAVENOUS
Status: DISCONTINUED | OUTPATIENT
Start: 2017-10-11 | End: 2017-10-11 | Stop reason: HOSPADM

## 2017-10-11 RX ORDER — SODIUM CHLORIDE 0.9 % (FLUSH) 0.9 %
5-10 SYRINGE (ML) INJECTION AS NEEDED
Status: DISCONTINUED | OUTPATIENT
Start: 2017-10-11 | End: 2017-10-11 | Stop reason: HOSPADM

## 2017-10-11 RX ORDER — MIDAZOLAM HYDROCHLORIDE 1 MG/ML
.25-1 INJECTION, SOLUTION INTRAMUSCULAR; INTRAVENOUS
Status: DISCONTINUED | OUTPATIENT
Start: 2017-10-11 | End: 2017-10-11 | Stop reason: HOSPADM

## 2017-10-11 RX ORDER — LIDOCAINE HYDROCHLORIDE 20 MG/ML
INJECTION, SOLUTION INFILTRATION; PERINEURAL AS NEEDED
Status: DISCONTINUED | OUTPATIENT
Start: 2017-10-11 | End: 2017-10-11 | Stop reason: HOSPADM

## 2017-10-11 RX ORDER — NALOXONE HYDROCHLORIDE 0.4 MG/ML
0.4 INJECTION, SOLUTION INTRAMUSCULAR; INTRAVENOUS; SUBCUTANEOUS
Status: DISCONTINUED | OUTPATIENT
Start: 2017-10-11 | End: 2017-10-11 | Stop reason: HOSPADM

## 2017-10-11 RX ORDER — SODIUM CHLORIDE 0.9 % (FLUSH) 0.9 %
5-10 SYRINGE (ML) INJECTION EVERY 8 HOURS
Status: DISCONTINUED | OUTPATIENT
Start: 2017-10-11 | End: 2017-10-11 | Stop reason: HOSPADM

## 2017-10-11 RX ORDER — FENTANYL CITRATE 50 UG/ML
100 INJECTION, SOLUTION INTRAMUSCULAR; INTRAVENOUS
Status: DISCONTINUED | OUTPATIENT
Start: 2017-10-11 | End: 2017-10-11 | Stop reason: HOSPADM

## 2017-10-11 RX ORDER — PROPOFOL 10 MG/ML
INJECTION, EMULSION INTRAVENOUS AS NEEDED
Status: DISCONTINUED | OUTPATIENT
Start: 2017-10-11 | End: 2017-10-11 | Stop reason: HOSPADM

## 2017-10-11 RX ORDER — FLUMAZENIL 0.1 MG/ML
0.2 INJECTION INTRAVENOUS
Status: DISCONTINUED | OUTPATIENT
Start: 2017-10-11 | End: 2017-10-11 | Stop reason: HOSPADM

## 2017-10-11 RX ORDER — SODIUM CHLORIDE 9 MG/ML
50 INJECTION, SOLUTION INTRAVENOUS CONTINUOUS
Status: DISCONTINUED | OUTPATIENT
Start: 2017-10-11 | End: 2017-10-11 | Stop reason: HOSPADM

## 2017-10-11 RX ORDER — ATROPINE SULFATE 0.1 MG/ML
0.5 INJECTION INTRAVENOUS
Status: DISCONTINUED | OUTPATIENT
Start: 2017-10-11 | End: 2017-10-11 | Stop reason: HOSPADM

## 2017-10-11 RX ORDER — GLYCOPYRROLATE 0.2 MG/ML
INJECTION INTRAMUSCULAR; INTRAVENOUS AS NEEDED
Status: DISCONTINUED | OUTPATIENT
Start: 2017-10-11 | End: 2017-10-11 | Stop reason: HOSPADM

## 2017-10-11 RX ORDER — DEXTROMETHORPHAN/PSEUDOEPHED 2.5-7.5/.8
1.2 DROPS ORAL
Status: DISCONTINUED | OUTPATIENT
Start: 2017-10-11 | End: 2017-10-11 | Stop reason: HOSPADM

## 2017-10-11 RX ADMIN — PROPOFOL 20 MG: 10 INJECTION, EMULSION INTRAVENOUS at 16:34

## 2017-10-11 RX ADMIN — PROPOFOL 20 MG: 10 INJECTION, EMULSION INTRAVENOUS at 16:40

## 2017-10-11 RX ADMIN — PROPOFOL 20 MG: 10 INJECTION, EMULSION INTRAVENOUS at 16:32

## 2017-10-11 RX ADMIN — LIDOCAINE HYDROCHLORIDE 100 MG: 20 INJECTION, SOLUTION INFILTRATION; PERINEURAL at 16:30

## 2017-10-11 RX ADMIN — PROPOFOL 20 MG: 10 INJECTION, EMULSION INTRAVENOUS at 16:37

## 2017-10-11 RX ADMIN — PROPOFOL 20 MG: 10 INJECTION, EMULSION INTRAVENOUS at 16:33

## 2017-10-11 RX ADMIN — PROPOFOL 20 MG: 10 INJECTION, EMULSION INTRAVENOUS at 16:35

## 2017-10-11 RX ADMIN — SODIUM CHLORIDE: 9 INJECTION, SOLUTION INTRAVENOUS at 16:27

## 2017-10-11 RX ADMIN — PROPOFOL 20 MG: 10 INJECTION, EMULSION INTRAVENOUS at 16:36

## 2017-10-11 RX ADMIN — PROPOFOL 50 MG: 10 INJECTION, EMULSION INTRAVENOUS at 16:30

## 2017-10-11 RX ADMIN — PROPOFOL 30 MG: 10 INJECTION, EMULSION INTRAVENOUS at 16:31

## 2017-10-11 RX ADMIN — GLYCOPYRROLATE 0.2 MG: 0.2 INJECTION INTRAMUSCULAR; INTRAVENOUS at 16:25

## 2017-10-11 RX ADMIN — PROPOFOL 20 MG: 10 INJECTION, EMULSION INTRAVENOUS at 16:39

## 2017-10-11 NOTE — DISCHARGE INSTRUCTIONS
[address]  [address] [facility]  [phone]                     DISCHARGE INSTRUCTIONS    Max Bartlett  [id number]  1950    DISCOMFORT:  Sore throat- throat lozenges or warm salt water gargle  redness at IV site- apply warm compress to area; if redness or soreness persist- contact your physician  Gaseous discomfort- walking, belching will help relieve any discomfort  You may not operate a vehicle for 12 hours  You may not engage in an occupation involving machinery or appliances for rest of today  You may not drink alcoholic beverages for at least 12 hours  Avoid making any critical decisions for at least 24 hour  DIET  You may eat and drink after you leave. You may resume your regular diet - however -  remember your colon is empty and a heavy meal will produce gas. Avoid these foods:  vegetables, fried / greasy foods, carbonated drinks    ACTIVITY  You may resume your normal daily activities   Spend the remainder of the day resting -  avoid any strenuous activity. CALL M.D. ANY SIGN OF   Increasing pain, nausea, vomiting  Abdominal distension (swelling)  New increased bleeding (oral or rectal)  Fever (chills)  Pain in chest area  Bloody discharge from nose or mouth  Shortness of breath    Follow-up Instructions:   Call Dr. Gentry Jordan for any questions or problems. If we took a biopsy please call the office within 2 weeks to discuss your                             pathology results. Telephone # [phone]        Continue same medications.

## 2017-10-11 NOTE — IP AVS SNAPSHOT
[address] 
[phone] Patient: Emma Tafoya MRN: [id number] EEW:[id number] You are allergic to the following Allergen Reactions Pcn (Penicillins) Hives Protamine Anaphylaxis Sulfa (Sulfonamide Antibiotics) Hives Imdur (Isosorbide Mononitrate) Other (comments)  
 headache Recent Documentation Height Weight Breastfeeding? BMI OB Status Smoking Status 1.651 m 84 kg No 30.8 kg/m2 Postmenopausal Never Smoker Emergency Contacts Name Discharge Info Relation Home Work Mobile Олег Zamora DISCHARGE CAREGIVER [3] Spouse [3] [phone] About your hospitalization You were admitted on:  October 11, 2017 You last received care in the:  [facility] ENDOSCOPY You were discharged on:  October 11, 2017 Unit phone number:  [phone] Why you were hospitalized Your primary diagnosis was:  Not on File Providers Seen During Your Hospitalizations Provider Role Specialty Primary office phone Deo Schilling MD Attending Provider Gastroenterology [phone] Your Primary Care Physician (PCP) Primary Care Physician Office Phone Office Fax Nirav Stain [phone] Follow-up Information None Your Appointments Monday October 16, 2017 11:00 AM EDT ROUTINE CARE with Chante Sandhu MD  
[place] [id number] [phone] Current Discharge Medication List  
  
CONTINUE these medications which have NOT CHANGED Dose & Instructions Dispensing Information Comments Morning Noon Evening Bedtime ADVIL 200 mg tablet Generic drug:  ibuprofen Your last dose was: Your next dose is:    
   
   
 Dose:  400 mg Take 400 mg by mouth as needed for Pain. Refills:  0 ALPRAZolam 0.25 mg tablet Commonly known as:  Christen Granger Your last dose was: Your next dose is:    
   
   
 Dose:  0.25 mg Take 1 Tab by mouth two (2) times daily as needed for Anxiety. Max Daily Amount: 0.5 mg.  
 Quantity:  50 Tab Refills:  3  
     
   
   
   
  
 amLODIPine 2.5 mg tablet Commonly known as:  Jorge Jennnigs Your last dose was: Your next dose is:    
   
   
 take 1 tablet by mouth once daily Quantity:  90 Tab Refills:  3 ASPIRIN PO Your last dose was: Your next dose is:    
   
   
 Dose:  81 mg Take 81 mg by mouth daily. Refills:  0 Blood-Glucose Meter monitoring kit Your last dose was: Your next dose is:    
   
   
 AS DIRECTED TWICE DAILY Quantity:  1 Kit Refills:  0  
     
   
   
   
  
 CALTRATE 600+D PLUS MINERALS PO Your last dose was: Your next dose is: Take  by mouth. Takes one po once daily. Refills:  0  
     
   
   
   
  
 CARAFATE 1 gram tablet Generic drug:  sucralfate Your last dose was: Your next dose is:    
   
   
 Dose:  1 g Take 1 g by mouth four (4) times daily as needed. Refills:  0  
     
   
   
   
  
 clopidogrel 75 mg Tab Commonly known as:  PLAVIX Your last dose was: Your next dose is:    
   
   
 Dose:  75 mg Take 1 Tab by mouth daily. Quantity:  30 Tab Refills:  11  
     
   
   
   
  
 coenzyme Q-10 200 mg capsule Commonly known as:  CO Q-10 Your last dose was: Your next dose is:    
   
   
 Dose:  200 mg Take 1 Cap by mouth daily. Over the counter Quantity:  30 Cap Refills:  11  
     
   
   
   
  
 cyclobenzaprine 10 mg tablet Commonly known as:  FLEXERIL Your last dose was:     
   
Your next dose is:    
   
   
 Dose:  10 mg  
 Take 1 Tab by mouth three (3) times daily as needed for Muscle Spasm(s). Quantity:  30 Tab Refills:  2  
     
   
   
   
  
 esomeprazole 40 mg capsule Commonly known as:  Mohamud Boggs Your last dose was: Your next dose is:    
   
   
 Dose:  40 mg Take 40 mg by mouth daily. Refills:  0  
     
   
   
   
  
 fluticasone 50 mcg/actuation nasal spray Commonly known as:  Ezekiel Metro Your last dose was: Your next dose is:    
   
   
 instill 2 sprays into each nostril once daily Quantity:  1 Bottle Refills:  11  
     
   
   
   
  
 gabapentin 100 mg capsule Commonly known as:  NEURONTIN Your last dose was: Your next dose is: Take  by mouth three (3) times daily as needed. Refills:  0 GAVISCON EXTRA STRENGTH PO Your last dose was: Your next dose is: Take  by mouth as needed. Refills:  0  
     
   
   
   
  
 glucose blood VI test strips strip Commonly known as:  blood glucose test  
   
Your last dose was: Your next dose is:    
   
   
 by Does Not Apply route Daily Check three times daily . Dx. Code E11.9 Quantity:  150 Strip Refills:  11 HORIZON NASAL CPAP SYSTEM Your last dose was: Your next dose is:    
   
   
 by Does Not Apply route. Refills:  0 Lancets Misc Your last dose was: Your next dose is:    
   
   
 Check Blood sugar 3 times daily Dx. Code E11.9 Quantity:  150 Each Refills:  11  
     
   
   
   
  
 levothyroxine 25 mcg tablet Commonly known as:  SYNTHROID Your last dose was: Your next dose is:    
   
   
 Dose:  25 mcg Take 1 Tab by mouth Daily (before breakfast). Quantity:  30 Tab Refills:  11  
     
   
   
   
  
 metFORMIN  mg tablet Commonly known as:  GLUCOPHAGE XR Your last dose was: Your next dose is: Dose:  750 mg Take 1 Tab by mouth two (2) times daily (with meals). Quantity:  60 Tab Refills:  11  
     
   
   
   
  
 metoprolol succinate 25 mg XL tablet Commonly known as:  TOPROL-XL Your last dose was: Your next dose is:    
   
   
 take 1 tablet by mouth once daily Quantity:  30 Tab Refills:  6 MIRALAX PO Your last dose was: Your next dose is: Take  by mouth. 1/2 to one capful as needed. Refills:  0 MUCINEX PO Your last dose was: Your next dose is:    
   
   
 Dose:  1200 mg Take 1,200 mg by mouth as needed. Extra strength. Refills:  0  
     
   
   
   
  
 NITROSTAT 0.4 mg SL tablet Generic drug:  nitroglycerin Your last dose was: Your next dose is:    
   
   
 place 1 tablet under the tongue every 5 minutes if needed Quantity:  25 Tab Refills:  2  
     
   
   
   
  
 raNITIdine 300 mg tablet Commonly known as:  ZANTAC Your last dose was: Your next dose is:    
   
   
 Dose:  300 mg Take 300 mg by mouth two (2) times a day. Refills:  1  
     
   
   
   
  
 ranolazine ER 1,000 mg  
Commonly known as:  RANEXA Your last dose was: Your next dose is:    
   
   
 take 1 tablet by mouth twice a day Quantity:  60 Tab Refills:  6  
     
   
   
   
  
 rosuvastatin 10 mg tablet Commonly known as:  CRESTOR Your last dose was: Your next dose is:    
   
   
 take 1 tablet by mouth every evening Quantity:  30 Tab Refills:  11  
     
   
   
   
  
 triamterene-hydroCHLOROthiazide 37.5-25 mg per tablet Commonly known as:  Monika Divine Your last dose was: Your next dose is:    
   
   
 take 1 tablet by mouth every morning Quantity:  90 Tab Refills:  3 VITAMIN B-12 1,000 mcg tablet Generic drug:  cyanocobalamin Your last dose was: Your next dose is:    
   
   
 Dose:  500 mcg Take 500 mcg by mouth daily. Refills:  0  
     
   
   
   
  
 VITAMIN D3 1,000 unit tablet Generic drug:  cholecalciferol Your last dose was: Your next dose is:    
   
   
 Dose:  1000 Units Take 1,000 Units by mouth daily. Refills:  0 Discharge Instructions Radha Marie. 
[address] [facility] [address] [facility], [address] 
[phone] DISCHARGE INSTRUCTIONS Lisha Ramos [id number] 
1950 DISCOMFORT: 
Sore throat- throat lozenges or warm salt water gargle 
redness at IV site- apply warm compress to area; if redness or soreness persist- contact your physician Gaseous discomfort- walking, belching will help relieve any discomfort You may not operate a vehicle for 12 hours You may not engage in an occupation involving machinery or appliances for rest of today You may not drink alcoholic beverages for at least 12 hours Avoid making any critical decisions for at least 24 hour DIET You may eat and drink after you leave. You may resume your regular diet  however -  remember your colon is empty and a heavy meal will produce gas. Avoid these foods:  vegetables, fried / greasy foods, carbonated drinks ACTIVITY You may resume your normal daily activities Spend the remainder of the day resting -  avoid any strenuous activity. CALL M.D. ANY SIGN OF Increasing pain, nausea, vomiting Abdominal distension (swelling) New increased bleeding (oral or rectal) Fever (chills) Pain in chest area Bloody discharge from nose or mouth Shortness of breath Follow-up Instructions: 
 Call Dr. Gia Rascon for any questions or problems. If we took a biopsy please call the office within 2 weeks to discuss your                             pathology results. Telephone # [phone] Continue same medications. Discharge Orders None ACO Transitions of Care Introducing [employer] [address] offers a voluntary care coordination program to provide high quality service and care to [facility] fee-for-service beneficiaries. Zuleika Calero was designed to help you enhance your health and well-being through the following services: ? Transitions of Care  support for individuals who are transitioning from one care setting to another (example: Hospital to home). ? Chronic and Complex Care Coordination  support for individuals and caregivers of those with serious or chronic illnesses or with more than one chronic (ongoing) condition and those who take a number of different medications. If you meet specific medical criteria, a [address] [place] [address] Rd may call you directly to coordinate your care with your primary care physician and your other care providers. For questions about the [facility] programs, please, contact your physicians office. For general questions or additional information about Accountable Care Organizations: 
Please visit www.medicare.gov/acos. html or call 1-800-MEDICARE (9-792.264.7874) TTY users should call 8-779.620.2869. Introducing [facility]! Damir Addison introduces [employer] patient portal. Now you can access parts of your medical record, email your doctor's office, and request medication refills online. 1. In your internet browser, go to [email or web address] 2. Click on the First Time User? Click Here link in the Sign In box. You will see the New Member Sign Up page. 3. Enter your [employer] Access Code exactly as it appears below. You will not need to use this code after youve completed the sign-up process. If you do not sign up before the expiration date, you must request a new code. · [employer] Access Code: [email or web address] Expires: 10/15/2017 10:31 AM 
 
 4. Enter the last four digits of your Social Security Number (xxxx) and Date of Birth (mm/dd/yyyy) as indicated and click Submit. You will be taken to the next sign-up page. 5. Create a [employer] ID. This will be your [employer] login ID and cannot be changed, so think of one that is secure and easy to remember. 6. Create a [employer] password. You can change your password at any time. 7. Enter your Password Reset Question and Answer. This can be used at a later time if you forget your password. 8. Enter your e-mail address. You will receive e-mail notification when new information is available in [address] 9. Click Sign Up. You can now view and download portions of your medical record. 10. Click the Download Summary menu link to download a portable copy of your medical information. If you have questions, please visit the Frequently Asked Questions section of the [employer] website. Remember, [employer] is NOT to be used for urgent needs. For medical emergencies, dial 911. Now available from your iPhone and Android! General Information Please provide this summary of care documentation to your next provider. Patient Signature:  ____________________________________________________________ Date:  ____________________________________________________________  
  
Damir Douglasmp Provider Signature:  ____________________________________________________________ Date:  ____________________________________________________________
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
## 2017-10-11 NOTE — ANESTHESIA PREPROCEDURE EVALUATION
Anesthetic History     PONV          Review of Systems / Medical History  Patient summary reviewed, nursing notes reviewed and pertinent labs reviewed    Pulmonary        Sleep apnea: CPAP           Neuro/Psych              Cardiovascular    Hypertension          CAD and cardiac stents         GI/Hepatic/Renal     GERD      PUD     Endo/Other    Diabetes    Arthritis     Other Findings            Physical Exam    Airway  Mallampati: II  TM Distance: > 6 cm  Neck ROM: normal range of motion   Mouth opening: Normal     Cardiovascular    Rhythm: regular  Rate: normal         Dental  No notable dental hx       Pulmonary  Breath sounds clear to auscultation               Abdominal         Other Findings            Anesthetic Plan    ASA: 3  Anesthesia type: MAC          Induction: Intravenous  Anesthetic plan and risks discussed with: Patient

## 2017-10-11 NOTE — ROUTINE PROCESS
Alli Enter  1950  [id number]    Situation:  Verbal report received from: Elsi Mcneil RN  Procedure: Procedure(s):  RADIAL EUS  ESOPHAGOGASTRODUODENOSCOPY (EGD)    Background:    Preoperative diagnosis: ESOPHAGEAL MOTILITY DISORDER  Postoperative diagnosis: 1.- Fundic Gland Polyps      :  Dr. Bonny Boudreaux      Anesthesia gave intra-procedure sedation and medications, see anesthesia flow sheet yes    Intravenous fluids: NS@ KVO     Vital signs stable     Abdominal assessment: round and soft     Recommendation:  Discharge patient per MD order  Family or Friend   Permission to share finding with family or friend yes

## 2017-10-11 NOTE — PROGRESS NOTES

## 2017-10-12 NOTE — ANESTHESIA POSTPROCEDURE EVALUATION
Post-Anesthesia Evaluation and Assessment    Patient: Zaynab Mehta MRN: [id number]  SSN: [id number]    YOB: 1950  Age: 79 y.o. Sex: female       Cardiovascular Function/Vital Signs  Visit Vitals    /79    Pulse 71    Temp 36.4 °C (97.6 °F)    Resp 21    Ht 5' 5\" (1.651 m)    Wt 84 kg (185 lb 1.6 oz)    SpO2 98%    Breastfeeding No    BMI 30.8 kg/m2       Patient is status post MAC anesthesia for Procedure(s):  RADIAL EUS  ESOPHAGOGASTRODUODENOSCOPY (EGD). Nausea/Vomiting: None    Postoperative hydration reviewed and adequate. Pain:  Pain Scale 1: Numeric (0 - 10) (10/11/17 1705)  Pain Intensity 1: 0 (10/11/17 1705)   Managed    Neurological Status: At baseline    Mental Status and Level of Consciousness: Arousable    Pulmonary Status:   O2 Device: Room air (10/11/17 1705)   Adequate oxygenation and airway patent    Complications related to anesthesia: None    Post-anesthesia assessment completed.  No concerns    Signed By: Mary Kate Mcleod MD     October 12, 2017

## 2017-10-16 ENCOUNTER — HOSPITAL ENCOUNTER (OUTPATIENT)
Dept: LAB | Age: 67
Discharge: HOME | End: 2017-10-16
Payer: MEDICARE

## 2017-10-16 ENCOUNTER — OFFICE VISIT (OUTPATIENT)
Dept: FAMILY MEDICINE CLINIC | Age: 67
End: 2017-10-16

## 2017-10-16 VITALS
WEIGHT: 195.8 LBS | DIASTOLIC BLOOD PRESSURE: 82 MMHG | TEMPERATURE: 98 F | HEIGHT: 65 IN | SYSTOLIC BLOOD PRESSURE: 128 MMHG | BODY MASS INDEX: 32.62 KG/M2 | HEART RATE: 70 BPM | RESPIRATION RATE: 18 BRPM | OXYGEN SATURATION: 99 %

## 2017-10-16 DIAGNOSIS — Z23 ENCOUNTER FOR IMMUNIZATION: ICD-10-CM

## 2017-10-16 DIAGNOSIS — K22.4 ESOPHAGEAL MOTOR DISORDER: ICD-10-CM

## 2017-10-16 DIAGNOSIS — Z98.61 S/P PTCA (PERCUTANEOUS TRANSLUMINAL CORONARY ANGIOPLASTY): ICD-10-CM

## 2017-10-16 DIAGNOSIS — E78.2 MIXED HYPERLIPIDEMIA: ICD-10-CM

## 2017-10-16 DIAGNOSIS — I10 ESSENTIAL HYPERTENSION, BENIGN: ICD-10-CM

## 2017-10-16 DIAGNOSIS — E11.9 TYPE 2 DIABETES MELLITUS WITHOUT COMPLICATION, WITHOUT LONG-TERM CURRENT USE OF INSULIN (HCC): Primary | ICD-10-CM

## 2017-10-16 LAB
GLUCOSE POC: 243 MG/DL
HBA1C MFR BLD HPLC: 7.3 %

## 2017-10-16 PROCEDURE — 80053 COMPREHEN METABOLIC PANEL: CPT

## 2017-10-16 PROCEDURE — 36415 COLL VENOUS BLD VENIPUNCTURE: CPT

## 2017-10-16 PROCEDURE — 80061 LIPID PANEL: CPT

## 2017-10-16 NOTE — PROGRESS NOTES
Chief Complaint   Patient presents with    Diabetes     F/U on diabetes.  Hypertension     F/U on BP.  Cholesterol Problem     F/U on cholesterol.  Immunization/Injection     Pt getting flu shot.

## 2017-10-16 NOTE — MR AVS SNAPSHOT
Visit Information Date & Time Provider Department Dept. Phone Encounter #  
 10/16/2017 11:00 AM Saul Lutz [phone] [id number] Follow-up Instructions Return in about 3 months (around 1/16/2018). Your Appointments 1/31/2018  1:15 PM  
6 MONTH with Aletta Rinne, MD  
[facility] [address] [place] [address]) Appt Note: per Cory Claude, 6 month f/u,jaa  
 [address]  
[phone] [address] Upcoming Health Maintenance Date Due INFLUENZA AGE 9 TO ADULT 8/1/2017 EYE EXAM RETINAL OR DILATED Q1 9/29/2017 FOOT EXAM Q1 1/3/2018 MICROALBUMIN Q1 1/3/2018 HEMOGLOBIN A1C Q6M 1/17/2018 LIPID PANEL Q1 7/17/2018 MEDICARE YEARLY EXAM 7/18/2018 GLAUCOMA SCREENING Q2Y 9/29/2018 BREAST CANCER SCRN MAMMOGRAM 4/20/2019 COLONOSCOPY 1/10/2025 DTaP/Tdap/Td series (2 - Td) 7/19/2027 Allergies as of 10/16/2017  Review Complete On: 10/16/2017 By: Rah Aldridge Severity Noted Reaction Type Reactions Pcn [Penicillins] High 06/14/2010   Systemic Hives Protamine High 07/16/2015    Anaphylaxis Sulfa (Sulfonamide Antibiotics) High 06/14/2010   Systemic Hives Imdur [Isosorbide Mononitrate]  07/27/2015    Other (comments)  
 headache Current Immunizations  Reviewed on 8/28/2017 Name Date Influenza High Dose Vaccine PF  Incomplete, 10/31/2016 Influenza Vaccine 9/1/2013 Influenza Vaccine Split 10/10/2011 Pneumococcal Conjugate (PCV-13) 11/9/2015 Pneumococcal Polysaccharide (PPSV-23) 4/11/2017 Tdap 7/19/2017 Not reviewed this visit You Were Diagnosed With   
  
 Codes Comments Type 2 diabetes mellitus without complication, without long-term current use of insulin (HCC)    -  Primary ICD-10-CM: E11.9 ICD-9-CM: 250.00 Mixed hyperlipidemia     ICD-10-CM: E78.2 ICD-9-CM: 272.2 Essential hypertension, benign     ICD-10-CM: I10 
ICD-9-CM: 401.1 S/P PTCA (percutaneous transluminal coronary angioplasty)     ICD-10-CM: Z98.61 ICD-9-CM: V45.82 Esophageal motor disorder     ICD-10-CM: K22.4 ICD-9-CM: 530.5 Encounter for immunization     ICD-10-CM: G05 ICD-9-CM: V03.89 Vitals BP Pulse Temp Resp Height(growth percentile) Weight(growth percentile) 128/82 (BP 1 Location: Right arm, BP Patient Position: Sitting) 70 98 °F (36.7 °C) (Oral) 18 5' 5\" (1.651 m) 195 lb 12.8 oz (88.8 kg) SpO2 BMI OB Status Smoking Status 99% 32.58 kg/m2 Postmenopausal Never Smoker Vitals History BMI and BSA Data Body Mass Index Body Surface Area 32.58 kg/m 2 2.02 m 2 Preferred Pharmacy Pharmacy Name Phone [id number] [address]  [phone] Your Updated Medication List  
  
   
This list is accurate as of: 10/16/17 11:33 AM.  Always use your most recent med list. ADVIL 200 mg tablet Generic drug:  ibuprofen Take 400 mg by mouth as needed for Pain. ALPRAZolam 0.25 mg tablet Commonly known as:  Wendall De La Rosa Take 1 Tab by mouth two (2) times daily as needed for Anxiety. Max Daily Amount: 0.5 mg.  
  
 amLODIPine 2.5 mg tablet Commonly known as:  NORVASC  
take 1 tablet by mouth once daily ASPIRIN PO Take 81 mg by mouth daily. Blood-Glucose Meter monitoring kit AS DIRECTED TWICE DAILY CALTRATE 600+D PLUS MINERALS PO Take  by mouth. Takes one po once daily. CARAFATE 1 gram tablet Generic drug:  sucralfate Take 1 g by mouth four (4) times daily as needed. clopidogrel 75 mg Tab Commonly known as:  PLAVIX Take 1 Tab by mouth daily. coenzyme Q-10 200 mg capsule Commonly known as:  CO Q-10 Take 1 Cap by mouth daily. Over the counter  
  
 esomeprazole 40 mg capsule Commonly known as:  Paula White Take 40 mg by mouth daily. fluticasone 50 mcg/actuation nasal spray Commonly known as:  FLONASE  
instill 2 sprays into each nostril once daily  
  
 gabapentin 100 mg capsule Commonly known as:  NEURONTIN Take  by mouth three (3) times daily as needed. GAVISCON EXTRA STRENGTH PO Take  by mouth as needed. glucose blood VI test strips strip Commonly known as:  blood glucose test  
by Does Not Apply route Daily Check three times daily . Dx. Code E11.9 HORIZON NASAL CPAP SYSTEM  
by Does Not Apply route. Lancets Misc Check Blood sugar 3 times daily Dx. Code E11.9  
  
 levothyroxine 25 mcg tablet Commonly known as:  SYNTHROID Take 1 Tab by mouth Daily (before breakfast). metFORMIN  mg tablet Commonly known as:  GLUCOPHAGE XR Take 1 Tab by mouth two (2) times daily (with meals). metoprolol succinate 25 mg XL tablet Commonly known as:  TOPROL-XL  
take 1 tablet by mouth once daily MIRALAX PO Take  by mouth. 1/2 to one capful as needed. MUCINEX PO Take 1,200 mg by mouth as needed. Extra strength. NITROSTAT 0.4 mg SL tablet Generic drug:  nitroglycerin  
place 1 tablet under the tongue every 5 minutes if needed  
  
 raNITIdine 300 mg tablet Commonly known as:  ZANTAC Take 300 mg by mouth two (2) times a day. ranolazine ER 1,000 mg  
Commonly known as:  RANEXA  
take 1 tablet by mouth twice a day  
  
 rosuvastatin 10 mg tablet Commonly known as:  CRESTOR  
take 1 tablet by mouth every evening  
  
 triamterene-hydroCHLOROthiazide 37.5-25 mg per tablet Commonly known as:  MAXZIDE  
take 1 tablet by mouth every morning VITAMIN B-12 1,000 mcg tablet Generic drug:  cyanocobalamin Take 500 mcg by mouth daily. VITAMIN D3 1,000 unit tablet Generic drug:  cholecalciferol Take 1,000 Units by mouth daily. We Performed the Following AMB POC GLUCOSE, QUANTITATIVE, BLOOD [75394 CPT(R)] AMB POC HEMOGLOBIN A1C [95397 CPT(R)] INFLUENZA VIRUS VACCINE, HIGH DOSE SEASONAL, PRESERVATIVE FREE [50570 CPT(R)] LIPID PANEL [17097 CPT(R)] METABOLIC PANEL, COMPREHENSIVE [82904 CPT(R)] Follow-up Instructions Return in about 3 months (around 1/16/2018). Introducing [facility]! [facility] introduces Wow! Stuff patient portal. Now you can access parts of your medical record, email your doctor's office, and request medication refills online. 1. In your internet browser, go to [email or web address] 2. Click on the First Time User? Click Here link in the Sign In box. You will see the New Member Sign Up page. 3. Enter your Wow! Stuff Access Code exactly as it appears below. You will not need to use this code after youve completed the sign-up process. If you do not sign up before the expiration date, you must request a new code. · Wow! Stuff Access Code: [email or web address] Expires: 1/14/2018 11:23 AM 
 
4. Enter the last four digits of your Social Security Number (xxxx) and Date of Birth (mm/dd/yyyy) as indicated and click Submit. You will be taken to the next sign-up page. 5. Create a Wow! Stuff ID. This will be your Wow! Stuff login ID and cannot be changed, so think of one that is secure and easy to remember. 6. Create a Wow! Stuff password. You can change your password at any time. 7. Enter your Password Reset Question and Answer. This can be used at a later time if you forget your password. 8. Enter your e-mail address. You will receive e-mail notification when new information is available in [address] 9. Click Sign Up. You can now view and download portions of your medical record. 10. Click the Download Summary menu link to download a portable copy of your medical information. If you have questions, please visit the Frequently Asked Questions section of the Wow! Stuff website.  Remember, Wow! Stuff is NOT to be used for urgent needs. For medical emergencies, dial 911. Now available from your iPhone and Android! Please provide this summary of care documentation to your next provider. Your primary care clinician is listed as Berl Ore. If you have any questions after today's visit, please call [phone].

## 2017-10-17 LAB
ALBUMIN SERPL-MCNC: 4.4 G/DL (ref 3.6–4.8)
ALBUMIN/GLOB SERPL: 1.7 {RATIO} (ref 1.2–2.2)
ALP SERPL-CCNC: 82 IU/L (ref 39–117)
ALT SERPL-CCNC: 31 IU/L (ref 0–32)
AST SERPL-CCNC: 23 IU/L (ref 0–40)
BILIRUB SERPL-MCNC: 0.9 MG/DL (ref 0–1.2)
BUN SERPL-MCNC: 15 MG/DL (ref 8–27)
BUN/CREAT SERPL: 18 (ref 12–28)
CALCIUM SERPL-MCNC: 9.7 MG/DL (ref 8.7–10.3)
CHLORIDE SERPL-SCNC: 96 MMOL/L (ref 96–106)
CHOLEST SERPL-MCNC: 124 MG/DL (ref 100–199)
CO2 SERPL-SCNC: 24 MMOL/L (ref 18–29)
CREAT SERPL-MCNC: 0.85 MG/DL (ref 0.57–1)
GLOBULIN SER CALC-MCNC: 2.6 G/DL (ref 1.5–4.5)
GLUCOSE SERPL-MCNC: 240 MG/DL (ref 65–99)
HDLC SERPL-MCNC: 38 MG/DL
INTERPRETATION, 910389: NORMAL
LDLC SERPL CALC-MCNC: 33 MG/DL (ref 0–99)
Lab: NORMAL
POTASSIUM SERPL-SCNC: 3.5 MMOL/L (ref 3.5–5.2)
PROT SERPL-MCNC: 7 G/DL (ref 6–8.5)
SODIUM SERPL-SCNC: 136 MMOL/L (ref 134–144)
TRIGL SERPL-MCNC: 264 MG/DL (ref 0–149)
VLDLC SERPL CALC-MCNC: 53 MG/DL (ref 5–40)

## 2017-11-16 RX ORDER — TRIAMTERENE/HYDROCHLOROTHIAZID 37.5-25 MG
TABLET ORAL
Qty: 90 TAB | Refills: 3 | Status: SHIPPED | OUTPATIENT
Start: 2017-11-16 | End: 2017-11-24 | Stop reason: SDUPTHER

## 2017-11-24 RX ORDER — TRIAMTERENE/HYDROCHLOROTHIAZID 37.5-25 MG
TABLET ORAL
Qty: 90 TAB | Refills: 3 | Status: SHIPPED | OUTPATIENT
Start: 2017-11-24 | End: 2018-11-29 | Stop reason: SDUPTHER

## 2017-12-14 PROBLEM — E11.9 DIABETES MELLITUS (HCC): Status: ACTIVE | Noted: 2017-12-14

## 2017-12-14 PROBLEM — E11.9 DIABETES MELLITUS (HCC): Status: RESOLVED | Noted: 2017-12-14 | Resolved: 2017-12-14

## 2017-12-15 ENCOUNTER — HOSPITAL ENCOUNTER (OUTPATIENT)
Dept: LAB | Age: 67
Discharge: HOME | End: 2017-12-15
Payer: MEDICARE

## 2017-12-15 ENCOUNTER — OFFICE VISIT (OUTPATIENT)
Dept: FAMILY MEDICINE CLINIC | Age: 67
End: 2017-12-15

## 2017-12-15 VITALS
DIASTOLIC BLOOD PRESSURE: 62 MMHG | BODY MASS INDEX: 32.49 KG/M2 | TEMPERATURE: 97.6 F | HEART RATE: 67 BPM | RESPIRATION RATE: 22 BRPM | SYSTOLIC BLOOD PRESSURE: 106 MMHG | WEIGHT: 195 LBS | OXYGEN SATURATION: 99 % | HEIGHT: 65 IN

## 2017-12-15 DIAGNOSIS — I10 ESSENTIAL HYPERTENSION, BENIGN: ICD-10-CM

## 2017-12-15 DIAGNOSIS — R10.13 EPIGASTRIC ABDOMINAL PAIN: Primary | ICD-10-CM

## 2017-12-15 DIAGNOSIS — E03.9 HYPOTHYROIDISM, UNSPECIFIED TYPE: ICD-10-CM

## 2017-12-15 DIAGNOSIS — E11.59 TYPE 2 DIABETES MELLITUS WITH OTHER CIRCULATORY COMPLICATION, WITHOUT LONG-TERM CURRENT USE OF INSULIN (HCC): ICD-10-CM

## 2017-12-15 DIAGNOSIS — E78.2 MIXED HYPERLIPIDEMIA: ICD-10-CM

## 2017-12-15 DIAGNOSIS — K22.4 ESOPHAGEAL MOTOR DISORDER: ICD-10-CM

## 2017-12-15 DIAGNOSIS — I25.10 CORONARY ARTERY DISEASE INVOLVING NATIVE CORONARY ARTERY OF NATIVE HEART WITHOUT ANGINA PECTORIS: ICD-10-CM

## 2017-12-15 LAB — GLUCOSE POC: 127 MG/DL

## 2017-12-15 PROCEDURE — 84443 ASSAY THYROID STIM HORMONE: CPT

## 2017-12-15 PROCEDURE — 36415 COLL VENOUS BLD VENIPUNCTURE: CPT

## 2017-12-15 NOTE — MR AVS SNAPSHOT
Visit Information Date & Time Provider Department Dept. Phone Encounter #  
 12/15/2017 10:45 AM Saul Marinelli [phone] [id number] Follow-up Instructions Return in about 4 weeks (around 1/12/2018). Your Appointments 1/31/2018  1:15 PM  
6 MONTH with Heather Martinez MD  
[facility] [address] [place] [address]) Appt Note: per Link Cabrera, 6 month f/u,jaa  
 [address] [facility]  
[phone] [address] [facility] Upcoming Health Maintenance Date Due  
 EYE EXAM RETINAL OR DILATED Q1 9/29/2017 FOOT EXAM Q1 1/3/2018 MICROALBUMIN Q1 1/3/2018 HEMOGLOBIN A1C Q6M 4/16/2018 MEDICARE YEARLY EXAM 7/18/2018 GLAUCOMA SCREENING Q2Y 9/29/2018 LIPID PANEL Q1 10/16/2018 COLONOSCOPY 1/10/2025 DTaP/Tdap/Td series (2 - Td) 7/19/2027 Allergies as of 12/15/2017  Review Complete On: 12/15/2017 By: Bethann Boos Severity Noted Reaction Type Reactions Pcn [Penicillins] High 06/14/2010   Systemic Hives Protamine High 07/16/2015    Anaphylaxis Sulfa (Sulfonamide Antibiotics) High 06/14/2010   Systemic Hives Imdur [Isosorbide Mononitrate]  07/27/2015    Other (comments)  
 headache Current Immunizations  Reviewed on 10/16/2017 Name Date Influenza High Dose Vaccine PF 10/16/2017, 10/31/2016 Influenza Vaccine 9/1/2013 Influenza Vaccine Split 10/10/2011 Pneumococcal Conjugate (PCV-13) 11/9/2015 Pneumococcal Polysaccharide (PPSV-23) 4/11/2017 Tdap 7/19/2017 Not reviewed this visit You Were Diagnosed With   
  
 Codes Comments Epigastric abdominal pain    -  Primary ICD-10-CM: R10.13 ICD-9-CM: 789.06 Type 2 diabetes mellitus with other circulatory complication, without long-term current use of insulin (HCC)     ICD-10-CM: E11.59 
ICD-9-CM: 250.70  Essential hypertension, benign     ICD-10-CM: I10 
 ICD-9-CM: 401.1 Hypothyroidism, unspecified type     ICD-10-CM: E03.9 ICD-9-CM: 244.9 Coronary artery disease involving native coronary artery of native heart without angina pectoris     ICD-10-CM: I25.10 ICD-9-CM: 414.01 Esophageal motor disorder     ICD-10-CM: K22.4 ICD-9-CM: 530.5 Mixed hyperlipidemia     ICD-10-CM: E78.2 ICD-9-CM: 272.2 Vitals BP Pulse Temp Resp Height(growth percentile) Weight(growth percentile) 106/62 (BP 1 Location: Right arm, BP Patient Position: Sitting) 67 97.6 °F (36.4 °C) (Oral) 22 5' 5\" (1.651 m) 195 lb (88.5 kg) SpO2 BMI OB Status Smoking Status 99% 32.45 kg/m2 Postmenopausal Never Smoker BMI and BSA Data Body Mass Index Body Surface Area  
 32.45 kg/m 2 2.01 m 2 Preferred Pharmacy Pharmacy Name Phone [id number] [facility]  Anthony Barefoot [phone] Your Updated Medication List  
  
   
This list is accurate as of: 12/15/17 11:28 AM.  Always use your most recent med list. ADVIL 200 mg tablet Generic drug:  ibuprofen Take 400 mg by mouth as needed for Pain. ALPRAZolam 0.25 mg tablet Commonly known as:  Jaylan [place] Take 1 Tab by mouth two (2) times daily as needed for Anxiety. Max Daily Amount: 0.5 mg.  
  
 amLODIPine 2.5 mg tablet Commonly known as:  NORVASC  
take 1 tablet by mouth once daily ASPIRIN PO Take 81 mg by mouth daily. Blood-Glucose Meter monitoring kit AS DIRECTED TWICE DAILY CALTRATE 600+D PLUS MINERALS PO Take  by mouth. Takes one po once daily. CARAFATE 1 gram tablet Generic drug:  sucralfate Take 1 g by mouth four (4) times daily as needed. clopidogrel 75 mg Tab Commonly known as:  PLAVIX Take 1 Tab by mouth daily. coenzyme Q-10 200 mg capsule Commonly known as:  CO Q-10 Take 1 Cap by mouth daily. Over the counter  
  
 esomeprazole 40 mg capsule Commonly known as:  Nithya Arreola Take 40 mg by mouth daily. fluticasone 50 mcg/actuation nasal spray Commonly known as:  FLONASE  
instill 2 sprays into each nostril once daily  
  
 gabapentin 100 mg capsule Commonly known as:  NEURONTIN Take  by mouth three (3) times daily as needed. GAVISCON EXTRA STRENGTH PO Take  by mouth as needed. glucose blood VI test strips strip Commonly known as:  blood glucose test  
by Does Not Apply route Daily Check three times daily . Dx. Code E11.9 HORIZON NASAL CPAP SYSTEM  
by Does Not Apply route. Lancets Misc Check Blood sugar 3 times daily Dx. Code E11.9  
  
 levothyroxine 25 mcg tablet Commonly known as:  SYNTHROID Take 1 Tab by mouth Daily (before breakfast). metoprolol succinate 25 mg XL tablet Commonly known as:  TOPROL-XL  
take 1 tablet by mouth once daily MIRALAX PO Take  by mouth. 1/2 to one capful as needed. MUCINEX PO Take 1,200 mg by mouth as needed. Extra strength. NITROSTAT 0.4 mg SL tablet Generic drug:  nitroglycerin  
place 1 tablet under the tongue every 5 minutes if needed  
  
 raNITIdine 300 mg tablet Commonly known as:  ZANTAC Take 300 mg by mouth two (2) times a day. ranolazine ER 1,000 mg  
Commonly known as:  RANEXA  
take 1 tablet by mouth twice a day  
  
 rosuvastatin 10 mg tablet Commonly known as:  CRESTOR  
take 1 tablet by mouth every evening SITagliptin 100 mg tablet Commonly known as:  Ayde Bannister Take 1 Tab by mouth daily. triamterene-hydroCHLOROthiazide 37.5-25 mg per tablet Commonly known as:  MAXZIDE  
take 1 tablet by mouth every morning VITAMIN B-12 1,000 mcg tablet Generic drug:  cyanocobalamin Take 500 mcg by mouth daily. VITAMIN D3 1,000 unit tablet Generic drug:  cholecalciferol Take 1,000 Units by mouth daily. We Performed the Following AMB POC GLUCOSE, QUANTITATIVE, BLOOD [53450 CPT(R)] TSH 3RD GENERATION [95374 CPT(R)] Follow-up Instructions Return in about 4 weeks (around 1/12/2018). Introducing [facility]! [facility] introduces Liiiike patient portal. Now you can access parts of your medical record, email your doctor's office, and request medication refills online. 1. In your internet browser, go to [email or web address] 2. Click on the First Time User? Click Here link in the Sign In box. You will see the New Member Sign Up page. 3. Enter your Liiiike Access Code exactly as it appears below. You will not need to use this code after youve completed the sign-up process. If you do not sign up before the expiration date, you must request a new code. · Liiiike Access Code: [email or web address] Expires: 1/14/2018 10:23 AM 
 
4. Enter the last four digits of your Social Security Number (xxxx) and Date of Birth (mm/dd/yyyy) as indicated and click Submit. You will be taken to the next sign-up page. 5. Create a Liiiike ID. This will be your Liiiike login ID and cannot be changed, so think of one that is secure and easy to remember. 6. Create a Liiiike password. You can change your password at any time. 7. Enter your Password Reset Question and Answer. This can be used at a later time if you forget your password. 8. Enter your e-mail address. You will receive e-mail notification when new information is available in [address] 9. Click Sign Up. You can now view and download portions of your medical record. 10. Click the Download Summary menu link to download a portable copy of your medical information. If you have questions, please visit the Frequently Asked Questions section of the Liiiike website. Remember, Liiiike is NOT to be used for urgent needs. For medical emergencies, dial 911. Now available from your iPhone and Android! Please provide this summary of care documentation to your next provider. Your primary care clinician is listed as Dorian Hooks. If you have any questions after today's visit, please call [phone].

## 2017-12-15 NOTE — PROGRESS NOTES
HISTORY OF PRESENT ILLNESS  Alli Montenegro is a 79 y.o. female. F/U DM2 esophogeal dysmotility,cad,gerd,epiastric discomfort. To see GI next week  Diabetes   The history is provided by the patient. This is a chronic problem. The problem occurs daily. The problem has not changed since onset. Associated symptoms include chest pain and abdominal pain. Pertinent negatives include no headaches. Hypertension    This is a chronic problem. The problem has not changed since onset. Associated symptoms include chest pain. Pertinent negatives include no orthopnea, no palpitations, no malaise/fatigue, no headaches, no peripheral edema and no dizziness. Abdominal Pain   This is a chronic problem. The problem occurs daily. Associated symptoms include chest pain and abdominal pain. Pertinent negatives include no headaches. Review of Systems   Constitutional: Negative for fever and malaise/fatigue. Cardiovascular: Positive for chest pain. Negative for palpitations and orthopnea. Gastrointestinal: Positive for abdominal pain and heartburn. Negative for blood in stool, constipation and melena. Genitourinary: Negative for dysuria, frequency and urgency. Neurological: Negative for dizziness and headaches. Physical Exam   Constitutional: She is oriented to person, place, and time. She appears well-developed and well-nourished. HENT:   Head: Normocephalic. Right Ear: External ear normal.   Left Ear: External ear normal.   Nose: Nose normal.   Mouth/Throat: Oropharynx is clear and moist.   Cardiovascular: Normal rate, regular rhythm and normal heart sounds. Pulmonary/Chest: Effort normal and breath sounds normal.   Abdominal: Soft. Bowel sounds are normal. She exhibits no distension. There is tenderness. Mild mid epigastric tenderness   Neurological: She is alert and oriented to person, place, and time. Skin: Skin is warm and dry. ASSESSMENT and PLAN  Diagnoses and all orders for this visit:    1. Epigastric abdominal pain,will hold metformin to see if it helps    2. Type 2 diabetes mellitus with other circulatory complication, without long-term current use of insulin (HCC)  -     AMB POC GLUCOSE, QUANTITATIVE, BLOOD    3. Essential hypertension, benign    4. Hypothyroidism, unspecified type  -     TSH 3RD GENERATION    5. Coronary artery disease involving native coronary artery of native heart without angina pectoris    6. Esophageal motor disorder    7. Mixed hyperlipidemia      Follow-up Disposition:  Return in about 4 weeks (around 1/12/2018).

## 2017-12-15 NOTE — PROGRESS NOTES
Chief Complaint   Patient presents with    Diabetes     F/U on diabetes.  Hypertension     F/U on BP.  Cholesterol Problem     F/U on cholesterol.  Abdominal Pain     Pt having ab pain and diarrhea.

## 2017-12-16 LAB — TSH SERPL DL<=0.005 MIU/L-ACNC: 4.9 UIU/ML (ref 0.45–4.5)

## 2018-01-31 ENCOUNTER — OFFICE VISIT (OUTPATIENT)
Dept: CARDIOLOGY CLINIC | Age: 68
End: 2018-01-31

## 2018-01-31 VITALS
HEIGHT: 65 IN | OXYGEN SATURATION: 97 % | SYSTOLIC BLOOD PRESSURE: 124 MMHG | RESPIRATION RATE: 20 BRPM | HEART RATE: 63 BPM | DIASTOLIC BLOOD PRESSURE: 56 MMHG | BODY MASS INDEX: 32.57 KG/M2 | WEIGHT: 195.5 LBS

## 2018-01-31 DIAGNOSIS — I25.10 CORONARY ARTERY DISEASE INVOLVING NATIVE CORONARY ARTERY OF NATIVE HEART WITHOUT ANGINA PECTORIS: Primary | ICD-10-CM

## 2018-01-31 DIAGNOSIS — Z95.5 PRESENCE OF STENT IN RIGHT CORONARY ARTERY: ICD-10-CM

## 2018-01-31 DIAGNOSIS — E78.2 MIXED HYPERLIPIDEMIA: ICD-10-CM

## 2018-01-31 DIAGNOSIS — I25.82 CHRONIC TOTAL OCCLUSION OF CORONARY ARTERY: ICD-10-CM

## 2018-01-31 DIAGNOSIS — I10 ESSENTIAL HYPERTENSION, BENIGN: ICD-10-CM

## 2018-01-31 DIAGNOSIS — Z98.61 S/P PTCA (PERCUTANEOUS TRANSLUMINAL CORONARY ANGIOPLASTY): ICD-10-CM

## 2018-01-31 DIAGNOSIS — K25.9 GASTRIC ULCER, UNSPECIFIED CHRONICITY, UNSPECIFIED WHETHER GASTRIC ULCER HEMORRHAGE OR PERFORATION PRESENT: ICD-10-CM

## 2018-01-31 NOTE — MR AVS SNAPSHOT
[facility] [address] 
[phone] Patient: Natalia Madden MRN:  [id number] Visit Information Date & Time Provider Department Dept. Phone Encounter #  
 1/31/2018  1:15 PM Bartolome Coello, [address] [place] [address] [facility] [phone] [id number] Upcoming Health Maintenance Date Due  
 EYE EXAM RETINAL OR DILATED Q1 9/29/2017 FOOT EXAM Q1 1/3/2018 MICROALBUMIN Q1 1/3/2018 HEMOGLOBIN A1C Q6M 4/16/2018 MEDICARE YEARLY EXAM 7/18/2018 GLAUCOMA SCREENING Q2Y 9/29/2018 LIPID PANEL Q1 10/16/2018 BREAST CANCER SCRN MAMMOGRAM 4/20/2019 COLONOSCOPY 1/10/2025 DTaP/Tdap/Td series (2 - Td) 7/19/2027 Allergies as of 1/31/2018  Review Complete On: 1/31/2018 By: Bartolome Coello MD  
  
 Severity Noted Reaction Type Reactions Pcn [Penicillins] High 06/14/2010   Systemic Hives Protamine High 07/16/2015    Anaphylaxis Sulfa (Sulfonamide Antibiotics) High 06/14/2010   Systemic Hives Imdur [Isosorbide Mononitrate]  07/27/2015    Other (comments)  
 headache Current Immunizations  Reviewed on 10/16/2017 Name Date Influenza High Dose Vaccine PF 10/16/2017, 10/31/2016 Influenza Vaccine 9/1/2013 Influenza Vaccine Split 10/10/2011 Pneumococcal Conjugate (PCV-13) 11/9/2015 Pneumococcal Polysaccharide (PPSV-23) 4/11/2017 Tdap 7/19/2017 Not reviewed this visit You Were Diagnosed With   
  
 Codes Comments Coronary artery disease involving native coronary artery of native heart without angina pectoris    -  Primary ICD-10-CM: I25.10 ICD-9-CM: 414.01 Chronic total occlusion of coronary artery     ICD-10-CM: I25.82 ICD-9-CM: 414.00, 414.2 Essential hypertension, benign     ICD-10-CM: I10 
ICD-9-CM: 401.1 Mixed hyperlipidemia     ICD-10-CM: E78.2 ICD-9-CM: 272.2  S/P PTCA (percutaneous transluminal coronary angioplasty)     ICD-10-CM: Z98.61 ICD-9-CM: V45.82 Presence of stent in right coronary artery     ICD-10-CM: Z95.5 ICD-9-CM: V45.82 Gastric ulcer, unspecified chronicity, unspecified whether gastric ulcer hemorrhage or perforation present     ICD-10-CM: K25.9 ICD-9-CM: 531.90 Vitals BP Pulse Resp Height(growth percentile) Weight(growth percentile) SpO2  
 124/56 (BP 1 Location: Right arm, BP Patient Position: Sitting) 63 20 5' 5\" (1.651 m) 195 lb 8 oz (88.7 kg) 97% BMI OB Status Smoking Status 32.53 kg/m2 Postmenopausal Never Smoker Vitals History BMI and BSA Data Body Mass Index Body Surface Area 32.53 kg/m 2 2.02 m 2 Preferred Pharmacy Pharmacy Name Phone [id number] [address] [phone] Your Updated Medication List  
  
   
This list is accurate as of: 1/31/18  2:08 PM.  Always use your most recent med list. ADVIL 200 mg tablet Generic drug:  ibuprofen Take 400 mg by mouth as needed for Pain. ALPRAZolam 0.25 mg tablet Commonly known as:  Towana Dennys Take 1 Tab by mouth two (2) times daily as needed for Anxiety. Max Daily Amount: 0.5 mg.  
  
 amLODIPine 2.5 mg tablet Commonly known as:  NORVASC  
take 1 tablet by mouth once daily ASPIRIN PO Take 81 mg by mouth daily. Blood-Glucose Meter monitoring kit AS DIRECTED TWICE DAILY CALTRATE 600+D PLUS MINERALS PO Take  by mouth. Takes one po once daily. CARAFATE 1 gram tablet Generic drug:  sucralfate Take 1 g by mouth four (4) times daily as needed. clopidogrel 75 mg Tab Commonly known as:  PLAVIX Take 1 Tab by mouth daily. coenzyme Q-10 200 mg capsule Commonly known as:  CO Q-10 Take 1 Cap by mouth daily. Over the counter  
  
 esomeprazole 40 mg capsule Commonly known as:  Ophelia Grit Take 40 mg by mouth daily. fluticasone 50 mcg/actuation nasal spray Commonly known as:  Willisrafat Hall instill 2 sprays into each nostril once daily  
  
 gabapentin 100 mg capsule Commonly known as:  NEURONTIN Take  by mouth three (3) times daily as needed. GAVISCON EXTRA STRENGTH PO Take  by mouth as needed. glucose blood VI test strips strip Commonly known as:  blood glucose test  
by Does Not Apply route Daily Check three times daily . Dx. Code E11.9 HORIZON NASAL CPAP SYSTEM  
by Does Not Apply route. Lancets Misc Check Blood sugar 3 times daily Dx. Code E11.9  
  
 levothyroxine 25 mcg tablet Commonly known as:  SYNTHROID Take 1 Tab by mouth Daily (before breakfast). metoprolol succinate 25 mg XL tablet Commonly known as:  TOPROL-XL  
take 1 tablet by mouth once daily MIRALAX PO Take  by mouth. 1/2 to one capful as needed. MUCINEX PO Take 1,200 mg by mouth as needed. Extra strength. NITROSTAT 0.4 mg SL tablet Generic drug:  nitroglycerin  
place 1 tablet under the tongue every 5 minutes if needed RANEXA 1,000 mg Generic drug:  ranolazine ER  
take 1 tablet by mouth twice a day  
  
 raNITIdine 300 mg tablet Commonly known as:  ZANTAC Take 300 mg by mouth two (2) times a day. rosuvastatin 10 mg tablet Commonly known as:  CRESTOR  
take 1 tablet by mouth every evening SITagliptin 100 mg tablet Commonly known as:  Ceil Equality Take 1 Tab by mouth daily. triamterene-hydroCHLOROthiazide 37.5-25 mg per tablet Commonly known as:  MAXZIDE  
take 1 tablet by mouth every morning VITAMIN B-12 1,000 mcg tablet Generic drug:  cyanocobalamin Take 500 mcg by mouth daily. VITAMIN D3 1,000 unit tablet Generic drug:  cholecalciferol Take 1,000 Units by mouth daily. We Performed the Following AMB POC EKG ROUTINE W/ 12 LEADS, INTER & REP [11299 CPT(R)] Introducing [facility]!    
 Maynard [employer] introduces [employer] patient portal. Now you can access parts of your medical record, email your doctor's office, and request medication refills online. 1. In your internet browser, go to [email or web address] [employer] 2. Click on the First Time User? Click Here link in the Sign In box. You will see the New Member Sign Up page. 3. Enter your [employer] Access Code exactly as it appears below. You will not need to use this code after youve completed the sign-up process. If you do not sign up before the expiration date, you must request a new code. · [employer] Access Code: [email or web address] Expires: 5/1/2018  2:07 PM 
 
4. Enter the last four digits of your Social Security Number (xxxx) and Date of Birth (mm/dd/yyyy) as indicated and click Submit. You will be taken to the next sign-up page. 5. Create a [employer] ID. This will be your [employer] login ID and cannot be changed, so think of one that is secure and easy to remember. 6. Create a [employer] password. You can change your password at any time. 7. Enter your Password Reset Question and Answer. This can be used at a later time if you forget your password. 8. Enter your e-mail address. You will receive e-mail notification when new information is available in [address] 9. Click Sign Up. You can now view and download portions of your medical record. 10. Click the Download Summary menu link to download a portable copy of your medical information. If you have questions, please visit the Frequently Asked Questions section of the [employer] website. Remember, [employer] is NOT to be used for urgent needs. For medical emergencies, dial 911. Now available from your iPhone and Android! Please provide this summary of care documentation to your next provider. Your primary care clinician is listed as Alecia Hooksly. If you have any questions after today's visit, please call [phone].

## 2018-01-31 NOTE — PROGRESS NOTES
1. Have you been to the ER, urgent care clinic since your last visit? Hospitalized since your last visit? NO    2. Have you seen or consulted any other health care providers outside of the [address] [place] [address] since your last visit? Include any pap smears or colon screening. YES, DERMATOLOGIST.     6 MONTH FOLLOW UP. NO CARDIAC C/O.

## 2018-01-31 NOTE — PROGRESS NOTES
[address] [address]  [phone]     Subjective:      Bentley Tyson is a 79 y.o. female is here for routine f/u. The patient denies shortness of breath, orthopnea, PND, LE edema, palpitations, syncope, or presyncope. Gets mild exertional chest discomfort, improves with continued walking. Ranexa helps. Uses very rare NTG 1-3 per month.       Patient Active Problem List    Diagnosis Date Noted    Diabetes mellitus ([facility] 12/14/2017    Routine adult health maintenance 10/28/2015    Gastric ulcer 08/11/2015    Unstable angina ([address] 07/16/2015    Angina, class III ([facility] [address] 07/07/2015    Myocardial ischemia 07/07/2015    S/P PTCA (percutaneous transluminal coronary angioplasty) 07/07/2015    Chest pain 05/15/2015    Diarrhea 01/05/2015    CAD (coronary artery disease) 10/23/2014    Snoring 10/21/2014    Sleep-disordered breathing 10/21/2014    Presence of stent in right coronary artery 12/30/2013    Myalgia and myositis, unspecified 02/04/2013    Dysphagia 10/14/2012    Esophageal motor disorder 10/14/2012    Chronic total occlusion of coronary artery 06/25/2012    S/P cardiac cath 03/07/2012    Esophageal dysmotility 10/10/2011    Allergic rhinitis 02/20/2011    Esophageal reflux 02/20/2011    Essential hypertension, benign 02/20/2011    Anxiety state, unspecified 02/20/2011    Encounter for long-term (current) use of other medications 02/20/2011    Mixed hyperlipidemia 02/20/2011      Lianna Clarke MD  Past Medical History:   Diagnosis Date    Allergic rhinitis, cause unspecified 2/20/2011    Angina, class III ([address] 12/13/2013    as of 8/4/15 pt reports intermittent \"angina pain\" and GRAJEDA; followed by Dr [place]za Arthritis     neck - numbness of arm    CAD (coronary artery disease)     angina / Dr Rashmi Hu    Colon polyps 2/22/2011    Diabetes ([address]     Diarrhea     \"random\" per pt; followed by Dr Natalia Ortiz for long-term (current) use of other medications 2011    Esophageal dysmotility 10/10/2011    Essential hypertension, benign 2011    Gastric ulcer 2015    GERD (gastroesophageal reflux disease)     Hypertension     Ill-defined condition     torn MCL - left - no surgery    Mixed hyperlipidemia 2011    Nausea & vomiting     Sleep apnea     CPAP;  Dr Daisha holly MD      Past Surgical History:   Procedure Laterality Date    CARDIAC CATHETERIZATION  2012         HX BUNIONECTOMY Right     HX CATARACT REMOVAL Bilateral     HX  SECTION      x3    HX CHOLECYSTECTOMY      HX GI      EGDs with dilatation    HX HEART CATHETERIZATION      catherization with 3 stents - states stents are blocked    HX HEART CATHETERIZATION  2015    unable to stent; tried to unblock stents but unable to/starting cardiac rehab 2015/has collateral circulation    [place] EGD BALLOON DILATION ESOPHAGUS <30 MM DIAM  10/13/2010         [place] EGD TRANSORAL BIOPSY SINGLE/MULTIPLE  10/13/2010         UPPER GI ENDOSCOPY,BALL DIL,30MM  3/30/2015         UPPER GI ENDOSCOPY,BIOPSY  3/30/2015          Allergies   Allergen Reactions    Pcn [Penicillins] Hives    Protamine Anaphylaxis    Sulfa (Sulfonamide Antibiotics) Hives    Imdur [Isosorbide Mononitrate] Other (comments)     headache      Family History   Problem Relation Age of Onset    Heart Disease Mother     Hypertension Mother     Heart Attack Mother     Heart Disease Father     Hypertension Father     Heart Surgery Father     Cancer Sister      lung    Cancer Brother      brain tumor - malignant    Breast Cancer Sister       Social History     Social History    Marital status:      Spouse name: N/A    Number of children: N/A    Years of education: N/A     Occupational History    Not on file.      Social History Main Topics    Smoking status: Never Smoker    Smokeless tobacco: Never Used    Alcohol use 1.0 oz/week     2 Glasses of wine per week      Comment: not always weekly     Drug use: No    Sexual activity: Yes     Partners: Male     Other Topics Concern    Not on file     Social History Narrative      Current Outpatient Prescriptions   Medication Sig    RANEXA 1,000 mg take 1 tablet by mouth twice a day    metoprolol succinate (TOPROL-XL) 25 mg XL tablet take 1 tablet by mouth once daily    triamterene-hydroCHLOROthiazide (MAXZIDE) 37.5-25 mg per tablet take 1 tablet by mouth every morning    SITagliptin (JANUVIA) 100 mg tablet Take 1 Tab by mouth daily.  MAG CARB/ALUMINUM HYDROX/ALGIN (GAVISCON EXTRA STRENGTH PO) Take  by mouth as needed.  ASPIRIN PO Take 81 mg by mouth daily.  MINH/D3/MAG11/ZINC//RONEN/BOR (CALTRATE 600+D PLUS MINERALS PO) Take  by mouth. Takes one po once daily.  GUAIFENESIN (MUCINEX PO) Take 1,200 mg by mouth as needed. Extra strength.  esomeprazole (NEXIUM) 40 mg capsule Take 40 mg by mouth daily.  POLYETHYLENE GLYCOL 3350 (MIRALAX PO) Take  by mouth. 1/2 to one capful as needed.  amLODIPine (NORVASC) 2.5 mg tablet take 1 tablet by mouth once daily    raNITIdine (ZANTAC) 300 mg tablet Take 300 mg by mouth two (2) times a day.  clopidogrel (PLAVIX) 75 mg tab Take 1 Tab by mouth daily.  rosuvastatin (CRESTOR) 10 mg tablet take 1 tablet by mouth every evening    fluticasone (FLONASE) 50 mcg/actuation nasal spray instill 2 sprays into each nostril once daily    NITROSTAT 0.4 mg SL tablet place 1 tablet under the tongue every 5 minutes if needed    glucose blood VI test strips (BLOOD GLUCOSE TEST) strip by Does Not Apply route Daily Check three times daily . Dx. Code E11.9    Lancets misc Check Blood sugar 3 times daily Dx. Code E11.9    cyanocobalamin (VITAMIN B-12) 1,000 mcg tablet Take 500 mcg by mouth daily.  Blood-Glucose Meter monitoring kit AS DIRECTED TWICE DAILY    OXYGEN-AIR DELIVERY SYSTEMS (HORIZON NASAL CPAP SYSTEM) by Does Not Apply route.     sucralfate (CARAFATE) 1 gram tablet Take 1 g by mouth four (4) times daily as needed.  ibuprofen (ADVIL) 200 mg tablet Take 400 mg by mouth as needed for Pain.  coenzyme Q-10 (CO Q-10) 200 mg capsule Take 1 Cap by mouth daily. Over the counter    cholecalciferol, vitamin d3, (VITAMIN D) 1,000 unit tablet Take 1,000 Units by mouth daily.  gabapentin (NEURONTIN) 100 mg capsule Take  by mouth three (3) times daily as needed.  levothyroxine (SYNTHROID) 25 mcg tablet Take 1 Tab by mouth Daily (before breakfast).  ALPRAZolam (XANAX) 0.25 mg tablet Take 1 Tab by mouth two (2) times daily as needed for Anxiety. Max Daily Amount: 0.5 mg. No current facility-administered medications for this visit. Review of Symptoms:  11 systems reviewed, negative other than as stated in the HPI    Physical ExamPhysical Exam:    Vitals:    01/31/18 1322 01/31/18 1332   BP: 130/60 124/56   Pulse: 63    Resp: 20    SpO2: 97%    Weight: 195 lb 8 oz (88.7 kg)    Height: 5' 5\" (1.651 m)      Body mass index is 32.53 kg/(m^2). General PE   Gen:  NAD  Mental Status - Alert. General Appearance - Not in acute distress. Chest and Lung Exam   Inspection: Accessory muscles - No use of accessory muscles in breathing. Auscultation:   Breath sounds: - Normal.   Cardiovascular   Inspection: Jugular vein - Bilateral - Inspection Normal.   Palpation/Percussion:   Apical Impulse: - Normal.   Auscultation: Rhythm - Regular. Heart Sounds - S1 WNL and S2 WNL. No S3 or S4. Murmurs & Other Heart Sounds: Auscultation of the heart reveals - No Murmurs. Peripheral Vascular   Upper Extremity: Inspection - Bilateral - No Cyanotic nailbeds or Digital clubbing. Lower Extremity:   Palpation: Edema - Bilateral - No edema. Abdomen:   Soft, non-tender, bowel sounds are active.   Neuro: A&O times 3, CN and motor grossly WNL    Labs:   Lab Results   Component Value Date/Time    Cholesterol, total 124 10/16/2017 11:46 AM    Cholesterol, total 123 07/17/2017 11:13 AM    Cholesterol, total 129 04/11/2017 10:45 AM    Cholesterol, total 142 01/03/2017 11:55 AM    Cholesterol, total 135 10/31/2016 10:14 AM    HDL Cholesterol 38 10/16/2017 11:46 AM    HDL Cholesterol 35 07/17/2017 11:13 AM    HDL Cholesterol 37 04/11/2017 10:45 AM    HDL Cholesterol 40 01/03/2017 11:55 AM    HDL Cholesterol 40 10/31/2016 10:14 AM    LDL, calculated 33 10/16/2017 11:46 AM    LDL, calculated 28 07/17/2017 11:13 AM    LDL, calculated 38 04/11/2017 10:45 AM    LDL, calculated 45 01/03/2017 11:55 AM    LDL, calculated 41 10/31/2016 10:14 AM    Triglyceride 264 10/16/2017 11:46 AM    Triglyceride 299 07/17/2017 11:13 AM    Triglyceride 268 04/11/2017 10:45 AM    Triglyceride 283 01/03/2017 11:55 AM    Triglyceride 271 10/31/2016 10:14 AM    CHOL/HDL Ratio 3.0 12/13/2013 03:16 AM     Lab Results   Component Value Date/Time    CK 72 09/14/2012 10:20 PM     Lab Results   Component Value Date/Time    Sodium 136 10/16/2017 11:46 AM    Potassium 3.5 10/16/2017 11:46 AM    Chloride 96 10/16/2017 11:46 AM    CO2 24 10/16/2017 11:46 AM    Anion gap 3 07/17/2015 04:37 AM    Glucose 240 10/16/2017 11:46 AM    BUN 15 10/16/2017 11:46 AM    Creatinine 0.85 10/16/2017 11:46 AM    BUN/Creatinine ratio 18 10/16/2017 11:46 AM    GFR est AA 82 10/16/2017 11:46 AM    GFR est non-AA 71 10/16/2017 11:46 AM    Calcium 9.7 10/16/2017 11:46 AM    Bilirubin, total 0.9 10/16/2017 11:46 AM    AST (SGOT) 23 10/16/2017 11:46 AM    Alk. phosphatase 82 10/16/2017 11:46 AM    Protein, total 7.0 10/16/2017 11:46 AM    Albumin 4.4 10/16/2017 11:46 AM    Globulin 3.6 09/14/2012 06:45 PM    A-G Ratio 1.7 10/16/2017 11:46 AM    ALT (SGPT) 31 10/16/2017 11:46 AM       EKG:  NSR     Assessment:        1. Coronary artery disease involving native coronary artery of native heart without angina pectoris    2. Chronic total occlusion of coronary artery    3. Essential hypertension, benign    4.  Mixed hyperlipidemia 5. S/P PTCA (percutaneous transluminal coronary angioplasty)    6. Presence of stent in right coronary artery        Orders Placed This Encounter    AMB POC EKG ROUTINE W/ 12 LEADS, INTER & REP     Order Specific Question:   Reason for Exam:     Answer:   routine        Plan:     CAD:  2 unsuccessful attempts at PCI of  of RCA, last in 7/2015. Echo in 2015 NL EF and no valvular disease. Last myoview 5/2016 with improved inferior ischemia with suspectedly improved collaterals to  of RCA. On ranexa, toprol and ASA, plavix, statin, ccb. LDL at goal. Given her  recommend she continue on Plavix. She can take another GERD medication given the length of time since her last stent other than Protonix. Counseled on diet and exercise- eventual goal of 30-60 minutes 5-7 times a week as per AHA guidelines. She admits that she needs to get back to the gymnasium.     Armida Gomes MD

## 2018-02-02 RX ORDER — FLUTICASONE PROPIONATE 50 MCG
SPRAY, SUSPENSION (ML) NASAL
Qty: 16 G | Refills: 11 | Status: SHIPPED | OUTPATIENT
Start: 2018-02-02 | End: 2019-05-05 | Stop reason: SDUPTHER

## 2018-02-20 PROBLEM — E11.9 DIABETES MELLITUS (HCC): Status: ACTIVE | Noted: 2018-02-20

## 2018-02-20 PROBLEM — E11.9 DIABETES MELLITUS (HCC): Status: RESOLVED | Noted: 2017-12-14 | Resolved: 2018-02-20

## 2018-02-21 ENCOUNTER — OFFICE VISIT (OUTPATIENT)
Dept: FAMILY MEDICINE CLINIC | Age: 68
End: 2018-02-21

## 2018-02-21 VITALS
OXYGEN SATURATION: 98 % | TEMPERATURE: 97.8 F | HEART RATE: 76 BPM | WEIGHT: 195.2 LBS | HEIGHT: 65 IN | DIASTOLIC BLOOD PRESSURE: 64 MMHG | SYSTOLIC BLOOD PRESSURE: 124 MMHG | RESPIRATION RATE: 24 BRPM | BODY MASS INDEX: 32.52 KG/M2

## 2018-02-21 DIAGNOSIS — I25.10 CORONARY ARTERY DISEASE INVOLVING NATIVE CORONARY ARTERY OF NATIVE HEART WITHOUT ANGINA PECTORIS: ICD-10-CM

## 2018-02-21 DIAGNOSIS — I10 ESSENTIAL HYPERTENSION, BENIGN: ICD-10-CM

## 2018-02-21 DIAGNOSIS — E11.59 TYPE 2 DIABETES MELLITUS WITH OTHER CIRCULATORY COMPLICATION, WITHOUT LONG-TERM CURRENT USE OF INSULIN (HCC): Primary | ICD-10-CM

## 2018-02-21 DIAGNOSIS — E78.2 MIXED HYPERLIPIDEMIA: ICD-10-CM

## 2018-02-21 DIAGNOSIS — K22.4 ESOPHAGEAL DYSMOTILITY: ICD-10-CM

## 2018-02-21 LAB
GLUCOSE POC: 231 MG/DL
HBA1C MFR BLD HPLC: 7.4 %

## 2018-02-21 RX ORDER — GLIMEPIRIDE 2 MG/1
2 TABLET ORAL
Qty: 30 TAB | Refills: 11
Start: 2018-02-21 | End: 2018-06-18 | Stop reason: SDUPTHER

## 2018-02-21 NOTE — PROGRESS NOTES
HISTORY OF PRESENT ILLNESS  Barbara Godfrey is a 79 y.o. female. f/u DM2 ,metformin on hold with some improvement in abdominal discomfort. Sugars borderline. Stable GRAJEDA ,Chest Discomfort. GI appt pending  Diabetes   The history is provided by the patient. This is a chronic problem. The problem occurs daily. The problem has been gradually improving. Associated symptoms include shortness of breath. Pertinent negatives include no chest pain, no abdominal pain and no headaches. Hypertension    This is a chronic problem. The problem has not changed since onset. Associated symptoms include malaise/fatigue and shortness of breath. Pertinent negatives include no chest pain, no orthopnea, no palpitations, no headaches, no peripheral edema and no dizziness. Cholesterol Problem   This is a chronic problem. The problem occurs daily. The problem has not changed since onset. Associated symptoms include shortness of breath. Pertinent negatives include no chest pain, no abdominal pain and no headaches. Review of Systems   Constitutional: Positive for malaise/fatigue. Negative for fever. Respiratory: Positive for shortness of breath. Cardiovascular: Negative for chest pain, palpitations and orthopnea. Gastrointestinal: Negative for abdominal pain, blood in stool, constipation and diarrhea. Genitourinary: Negative for frequency. Neurological: Negative for dizziness and headaches. Physical Exam   Constitutional: She appears well-developed and well-nourished. HENT:   Head: Normocephalic and atraumatic. Right Ear: External ear normal.   Left Ear: External ear normal.   Nose: Nose normal.   Mouth/Throat: Oropharynx is clear and moist.   Cardiovascular: Normal rate and regular rhythm. Pulmonary/Chest: Effort normal and breath sounds normal.   Abdominal: Soft. Bowel sounds are normal. She exhibits no distension. There is no tenderness. ASSESSMENT and PLAN  Diagnoses and all orders for this visit:    1.  Type 2 diabetes mellitus with other circulatory complication, without long-term current use of insulin (HCC)  -     AMB POC HEMOGLOBIN A1C  -     AMB POC GLUCOSE, QUANTITATIVE, BLOOD  -     HM DIABETES FOOT EXAM  [order this if you have performed a diabetic foot exam today)  -     glimepiride (AMARYL) 2 mg tablet; Take 1 Tab by mouth every morning. 2. Coronary artery disease involving native coronary artery of native heart without angina pectoris    3. Mixed hyperlipidemia  -     LIPID PANEL    4. Esophageal dysmotility  -     [facility]    5. Essential hypertension, benign  -     METABOLIC PANEL, COMPREHENSIVE    F/U with GI. Continue current meds and treatments. Follow-up Disposition:  Return in about 4 weeks (around 3/21/2018).

## 2018-02-21 NOTE — MR AVS SNAPSHOT
[address] [facility] 
 
 
 [address] [facility] [address] [address] 
[phone] Patient: Harrie Goodpasture MRN: [id number] [facility]:[birth year] Visit Information Date & Time Provider Department Dept. Phone Encounter #  
 2/21/2018  9:45 AM Saul Thompson [phone] [id number] Follow-up Instructions Return in about 4 weeks (around 3/21/2018). Your Appointments 7/25/2018  1:00 PM  
ESTABLISHED PATIENT with Leonard Sheets MD  
[facility] Appt Note: Per , 6mo f/u-scc [id number] [facility]  
[phone] [id number] [facility] Upcoming Health Maintenance Date Due  
 EYE EXAM RETINAL OR DILATED Q1 9/29/2017 FOOT EXAM Q1 1/3/2018 MICROALBUMIN Q1 1/3/2018 HEMOGLOBIN A1C Q6M 4/16/2018 MEDICARE YEARLY EXAM 7/18/2018 GLAUCOMA SCREENING Q2Y 9/29/2018 LIPID PANEL Q1 10/16/2018 BREAST CANCER SCRN MAMMOGRAM 4/20/2019 COLONOSCOPY 1/10/2025 DTaP/Tdap/Td series (2 - Td) 7/19/2027 Allergies as of 2/21/2018  Review Complete On: 2/21/2018 By: Rosie Jones Severity Noted Reaction Type Reactions Pcn [Penicillins] High 06/14/2010   Systemic Hives Protamine High 07/16/2015    Anaphylaxis Sulfa (Sulfonamide Antibiotics) High 06/14/2010   Systemic Hives Imdur [Isosorbide Mononitrate]  07/27/2015    Other (comments)  
 headache Current Immunizations  Reviewed on 10/16/2017 Name Date Influenza High Dose Vaccine PF 10/16/2017, 10/31/2016 Influenza Vaccine 9/1/2013 Influenza Vaccine Split 10/10/2011 Pneumococcal Conjugate (PCV-13) 11/9/2015 Pneumococcal Polysaccharide (PPSV-23) 4/11/2017 Tdap 7/19/2017 Not reviewed this visit You Were Diagnosed With   
  
 Codes Comments  Type 2 diabetes mellitus with other circulatory complication, without long-term current use of insulin ([facility]    -  Primary ICD-10-CM: E11.59 
ICD-9-CM: 250.70 Coronary artery disease involving native coronary artery of native heart without angina pectoris     ICD-10-CM: I25.10 ICD-9-CM: 414.01 Mixed hyperlipidemia     ICD-10-CM: E78.2 ICD-9-CM: 272.2 Esophageal dysmotility     ICD-10-CM: K22.4 ICD-9-CM: 530.5 Essential hypertension, benign     ICD-10-CM: I10 
ICD-9-CM: 401.1 Vitals BP Pulse Temp Resp Height(growth percentile) Weight(growth percentile) 124/64 (BP 1 Location: Left arm, BP Patient Position: Sitting) 76 97.8 °F (36.6 °C) (Oral) 24 5' 5\" (1.651 m) 195 lb 3.2 oz (88.5 kg) SpO2 BMI OB Status Smoking Status 98% 32.48 kg/m2 Postmenopausal Never Smoker Vitals History BMI and BSA Data Body Mass Index Body Surface Area  
 32.48 kg/m 2 2.01 m 2 Preferred Pharmacy Pharmacy Name Phone [id number] [address] [phone] Your Updated Medication List  
  
   
This list is accurate as of 2/21/18 10:39 AM.  Always use your most recent med list. ADVIL 200 mg tablet Generic drug:  ibuprofen Take 400 mg by mouth as needed for Pain. ALPRAZolam 0.25 mg tablet Commonly known as:  Blinda Scot Take 1 Tab by mouth two (2) times daily as needed for Anxiety. Max Daily Amount: 0.5 mg.  
  
 amLODIPine 2.5 mg tablet Commonly known as:  NORVASC  
take 1 tablet by mouth once daily ASPIRIN PO Take 81 mg by mouth daily. Blood-Glucose Meter monitoring kit AS DIRECTED TWICE DAILY CALTRATE 600+D PLUS MINERALS PO Take  by mouth. Takes one po once daily. CARAFATE 1 gram tablet Generic drug:  sucralfate Take 1 g by mouth four (4) times daily as needed. clopidogrel 75 mg Tab Commonly known as:  PLAVIX Take 1 Tab by mouth daily. coenzyme Q-10 200 mg capsule Commonly known as:  CO Q-10  
 Take 1 Cap by mouth daily. Over the counter  
  
 esomeprazole 40 mg capsule Commonly known as:  Eliezer Alter Take 40 mg by mouth daily. fluticasone 50 mcg/actuation nasal spray Commonly known as:  FLONASE  
instill 2 sprays into each nostril once daily  
  
 gabapentin 100 mg capsule Commonly known as:  NEURONTIN Take  by mouth three (3) times daily as needed. GAVISCON EXTRA STRENGTH PO Take  by mouth as needed. glimepiride 2 mg tablet Commonly known as:  AMARYL Take 1 Tab by mouth every morning. glucose blood VI test strips strip Commonly known as:  blood glucose test  
by Does Not Apply route Daily Check three times daily . Dx. Code E11.9 HORIZON NASAL CPAP SYSTEM  
by Does Not Apply route. Lancets Misc Check Blood sugar 3 times daily Dx. Code E11.9  
  
 levothyroxine 25 mcg tablet Commonly known as:  SYNTHROID Take 1 Tab by mouth Daily (before breakfast). metoprolol succinate 25 mg XL tablet Commonly known as:  TOPROL-XL  
take 1 tablet by mouth once daily MIRALAX PO Take  by mouth. 1/2 to one capful as needed. MUCINEX PO Take 1,200 mg by mouth as needed. Extra strength. NITROSTAT 0.4 mg SL tablet Generic drug:  nitroglycerin  
place 1 tablet under the tongue every 5 minutes if needed RANEXA 1,000 mg Generic drug:  ranolazine ER  
take 1 tablet by mouth twice a day  
  
 raNITIdine 300 mg tablet Commonly known as:  ZANTAC Take 300 mg by mouth two (2) times a day. rosuvastatin 10 mg tablet Commonly known as:  CRESTOR  
take 1 tablet by mouth every evening SITagliptin 100 mg tablet Commonly known as:  Cochise Perlita Take 1 Tab by mouth daily. triamterene-hydroCHLOROthiazide 37.5-25 mg per tablet Commonly known as:  MAXZIDE  
take 1 tablet by mouth every morning VITAMIN B-12 1,000 mcg tablet Generic drug:  cyanocobalamin Take 500 mcg by mouth daily. VITAMIN D3 1,000 unit tablet Generic drug:  cholecalciferol Take 1,000 Units by mouth daily. We Performed the Following AMB POC GLUCOSE, QUANTITATIVE, BLOOD [85011 CPT(R)] AMB POC HEMOGLOBIN A1C [93758 CPT(R)]  DIABETES FOOT EXAM [HM7 Custom] LIPID PANEL [20094 CPT(R)] METABOLIC PANEL, COMPREHENSIVE [40421 CPT(R)] REFERRAL TO GASTROENTEROLOGY [GRN97 Custom] Follow-up Instructions Return in about 4 weeks (around 3/21/2018). Referral Information Referral ID Referred By Referred To  
  
 [id number] [facility] [address] [place], [address] [facility] Visits Status Start Date End Date 1 New Request 2/21/18 2/21/19 If your referral has a status of pending review or denied, additional information will be sent to support the outcome of this decision. Introducing [facility] & HEALTH SERVICES! [facility] introduces [employer] patient portal. Now you can access parts of your medical record, email your doctor's office, and request medication refills online. 1. In your internet browser, go to [email or web address] 2. Click on the First Time User? Click Here link in the Sign In box. You will see the New Member Sign Up page. 3. Enter your [employer] Access Code exactly as it appears below. You will not need to use this code after youve completed the sign-up process. If you do not sign up before the expiration date, you must request a new code. · [employer] Access Code: [email or web address] Expires: 5/1/2018  2:07 PM 
 
4. Enter the last four digits of your Social Security Number (xxxx) and Date of Birth (mm/dd/yyyy) as indicated and click Submit. You will be taken to the next sign-up page. 5. Create a [employer] ID. This will be your [employer] login ID and cannot be changed, so think of one that is secure and easy to remember. 6. Create a [employer] password. You can change your password at any time. 7. Enter your Password Reset Question and Answer. This can be used at a later time if you forget your password. 8. Enter your e-mail address. You will receive e-mail notification when new information is available in [address] 9. Click Sign Up. You can now view and download portions of your medical record. 10. Click the Download Summary menu link to download a portable copy of your medical information. If you have questions, please visit the Frequently Asked Questions section of the [employer] website. Remember, [employer] is NOT to be used for urgent needs. For medical emergencies, dial 911. Now available from your iPhone and Android! Please provide this summary of care documentation to your next provider. Your primary care clinician is listed as Humberto Cason. If you have any questions after today's visit, please call [phone].

## 2018-02-21 NOTE — PROGRESS NOTES
Chief Complaint   Patient presents with    Diabetes     F/U on diabetes.  Hypertension     F/U on BP.  Cholesterol Problem     F/U on cholesterol.      A release form was signed and faxed to get pt last eye exam.

## 2018-02-22 LAB
ALBUMIN SERPL-MCNC: 4.4 G/DL (ref 3.6–4.8)
ALBUMIN/GLOB SERPL: 1.8 {RATIO} (ref 1.2–2.2)
ALP SERPL-CCNC: 87 IU/L (ref 39–117)
ALT SERPL-CCNC: 25 IU/L (ref 0–32)
AST SERPL-CCNC: 20 IU/L (ref 0–40)
BILIRUB SERPL-MCNC: 0.9 MG/DL (ref 0–1.2)
BUN SERPL-MCNC: 16 MG/DL (ref 8–27)
BUN/CREAT SERPL: 18 (ref 12–28)
CALCIUM SERPL-MCNC: 9.8 MG/DL (ref 8.7–10.3)
CHLORIDE SERPL-SCNC: 97 MMOL/L (ref 96–106)
CHOLEST SERPL-MCNC: 151 MG/DL (ref 100–199)
CO2 SERPL-SCNC: 22 MMOL/L (ref 18–29)
CREAT SERPL-MCNC: 0.9 MG/DL (ref 0.57–1)
GFR SERPLBLD CREATININE-BSD FMLA CKD-EPI: 66 ML/MIN/{1.73_M2}
GFR SERPLBLD CREATININE-BSD FMLA CKD-EPI: 77 ML/MIN/{1.73_M2}
GLOBULIN SER CALC-MCNC: 2.5 G/L (ref 1.5–4.5)
GLUCOSE SERPL-MCNC: 232 MG/DL (ref 65–99)
HDLC SERPL-MCNC: 37 MG/DL
INTERPRETATION, 910389: NORMAL
LDLC SERPL CALC-MCNC: 45 MG/DL (ref 0–99)
POTASSIUM SERPL-SCNC: 3.7 MMOL/L (ref 3.5–5.2)
PROT SERPL-MCNC: 6.9 G/DL (ref 6–8.5)
SODIUM SERPL-SCNC: 138 MMOL/L (ref 134–144)
TRIGL SERPL-MCNC: 345 MG/DL (ref 0–149)
VLDLC SERPL CALC-MCNC: 69 MG/DL (ref 5–40)

## 2018-02-23 RX ORDER — CLOPIDOGREL BISULFATE 75 MG/1
TABLET ORAL
Qty: 30 TAB | Refills: 11 | Status: SHIPPED | OUTPATIENT
Start: 2018-02-23 | End: 2019-02-28 | Stop reason: SDUPTHER

## 2018-02-23 RX ORDER — ROSUVASTATIN CALCIUM 10 MG/1
TABLET, COATED ORAL
Qty: 30 TAB | Refills: 11 | Status: SHIPPED | OUTPATIENT
Start: 2018-02-23 | End: 2019-02-27 | Stop reason: SDUPTHER

## 2018-03-26 ENCOUNTER — OFFICE VISIT (OUTPATIENT)
Dept: FAMILY MEDICINE CLINIC | Age: 68
End: 2018-03-26

## 2018-03-26 VITALS
HEIGHT: 65 IN | WEIGHT: 197.4 LBS | RESPIRATION RATE: 24 BRPM | SYSTOLIC BLOOD PRESSURE: 126 MMHG | HEART RATE: 60 BPM | DIASTOLIC BLOOD PRESSURE: 84 MMHG | OXYGEN SATURATION: 97 % | TEMPERATURE: 97.2 F | BODY MASS INDEX: 32.89 KG/M2

## 2018-03-26 DIAGNOSIS — K21.9 GASTROESOPHAGEAL REFLUX DISEASE, ESOPHAGITIS PRESENCE NOT SPECIFIED: ICD-10-CM

## 2018-03-26 DIAGNOSIS — I25.10 CORONARY ARTERY DISEASE INVOLVING NATIVE CORONARY ARTERY OF NATIVE HEART WITHOUT ANGINA PECTORIS: ICD-10-CM

## 2018-03-26 DIAGNOSIS — E11.59 TYPE 2 DIABETES MELLITUS WITH OTHER CIRCULATORY COMPLICATION, WITHOUT LONG-TERM CURRENT USE OF INSULIN (HCC): Primary | ICD-10-CM

## 2018-03-26 DIAGNOSIS — I10 ESSENTIAL HYPERTENSION, BENIGN: ICD-10-CM

## 2018-03-26 PROBLEM — E11.40 TYPE 2 DIABETES MELLITUS WITH DIABETIC NEUROPATHY (HCC): Status: ACTIVE | Noted: 2018-03-26

## 2018-03-26 RX ORDER — RABEPRAZOLE SODIUM 20 MG/1
TABLET, DELAYED RELEASE ORAL
Refills: 0 | COMMUNITY
Start: 2018-03-16 | End: 2019-10-17

## 2018-03-26 RX ORDER — AZELASTINE 1 MG/ML
SPRAY, METERED NASAL AS NEEDED
Refills: 0 | COMMUNITY
Start: 2018-03-21 | End: 2019-10-17

## 2018-03-26 NOTE — MR AVS SNAPSHOT
[address] [facility] 
 
 
 [address] [facility] [address] [address] 
[phone] Patient: Bentley Tyson MRN: [id number] OGX:[birth year] Visit Information Date & Time Provider Department Dept. Phone Encounter #  
 3/26/2018 11:45 AM Saul López [phone] [id number] Follow-up Instructions Return in about 2 months (around 5/26/2018). Your Appointments 7/25/2018  1:00 PM  
ESTABLISHED PATIENT with Desiree Watson MD  
[facility] [address]) Appt Note: Per , 6mo f/u-scc [address]  
[phone] [address] Upcoming Health Maintenance Date Due MICROALBUMIN Q1 1/3/2018 MEDICARE YEARLY EXAM 7/18/2018 HEMOGLOBIN A1C Q6M 8/21/2018 EYE EXAM RETINAL OR DILATED Q1 10/19/2018 FOOT EXAM Q1 2/21/2019 LIPID PANEL Q1 2/21/2019 BREAST CANCER SCRN MAMMOGRAM 4/20/2019 GLAUCOMA SCREENING Q2Y 10/19/2019 COLONOSCOPY 1/10/2025 DTaP/Tdap/Td series (2 - Td) 7/19/2027 Allergies as of 3/26/2018  Review Complete On: 3/26/2018 By: Mallory Pierre Severity Noted Reaction Type Reactions Pcn [Penicillins] High 06/14/2010   Systemic Hives Protamine High 07/16/2015    Anaphylaxis Sulfa (Sulfonamide Antibiotics) High 06/14/2010   Systemic Hives Imdur [Isosorbide Mononitrate]  07/27/2015    Other (comments)  
 headache Current Immunizations  Reviewed on 10/16/2017 Name Date Influenza High Dose Vaccine PF 10/16/2017, 10/31/2016 Influenza Vaccine 9/1/2013 Influenza Vaccine Split 10/10/2011 Pneumococcal Conjugate (PCV-13) 11/9/2015 Pneumococcal Polysaccharide (PPSV-23) 4/11/2017 Tdap 7/19/2017 Not reviewed this visit You Were Diagnosed With   
  
 Codes Comments  Type 2 diabetes mellitus with other circulatory complication, without long-term current use of insulin ([facility]    -  Primary ICD-10-CM: E11.59 
ICD-9-CM: 250.70 Vitals BP Pulse Temp Resp Height(growth percentile) Weight(growth percentile) 126/84 (BP 1 Location: Right arm, BP Patient Position: Sitting) 60 97.2 °F (36.2 °C) (Oral) 24 5' 5\" (1.651 m) 197 lb 6.4 oz (89.5 kg) SpO2 BMI OB Status Smoking Status 97% 32.85 kg/m2 Postmenopausal Never Smoker Vitals History BMI and BSA Data Body Mass Index Body Surface Area  
 32.85 kg/m 2 2.03 m 2 Preferred Pharmacy Pharmacy Name Phone [id number] [address] [phone] Your Updated Medication List  
  
   
This list is accurate as of 3/26/18 12:13 PM.  Always use your most recent med list. ADVIL 200 mg tablet Generic drug:  ibuprofen Take 400 mg by mouth as needed for Pain. ALPRAZolam 0.25 mg tablet Commonly known as:  Brigitte Snuffer Take 1 Tab by mouth two (2) times daily as needed for Anxiety. Max Daily Amount: 0.5 mg.  
  
 amLODIPine 2.5 mg tablet Commonly known as:  NORVASC  
take 1 tablet by mouth once daily ASPIRIN PO Take 81 mg by mouth daily. azelastine 137 mcg (0.1 %) nasal spray Commonly known as:  ASTELIN Blood-Glucose Meter monitoring kit AS DIRECTED TWICE DAILY CALTRATE 600+D PLUS MINERALS PO Take  by mouth. Takes one po once daily. CARAFATE 1 gram tablet Generic drug:  sucralfate Take 1 g by mouth four (4) times daily as needed. clopidogrel 75 mg Tab Commonly known as:  PLAVIX  
take 1 tablet by mouth daily  
  
 coenzyme Q-10 200 mg capsule Commonly known as:  CO Q-10 Take 1 Cap by mouth daily. Over the counter  
  
 fluticasone 50 mcg/actuation nasal spray Commonly known as:  FLONASE  
instill 2 sprays into each nostril once daily  
  
 gabapentin 100 mg capsule Commonly known as:  NEURONTIN Take  by mouth three (3) times daily as needed. GAVISCON EXTRA STRENGTH PO Take  by mouth as needed. glimepiride 2 mg tablet Commonly known as:  AMARYL Take 1 Tab by mouth every morning. glucose blood VI test strips strip Commonly known as:  blood glucose test  
by Does Not Apply route Daily Check three times daily . Dx. Code E11.9 HORIZON NASAL CPAP SYSTEM  
by Does Not Apply route. Lancets Misc Check Blood sugar 3 times daily Dx. Code E11.9  
  
 levothyroxine 25 mcg tablet Commonly known as:  SYNTHROID Take 1 Tab by mouth Daily (before breakfast). metoprolol succinate 25 mg XL tablet Commonly known as:  TOPROL-XL  
take 1 tablet by mouth once daily MIRALAX PO Take  by mouth. 1/2 to one capful as needed. MUCINEX PO Take 1,200 mg by mouth as needed. Extra strength. NITROSTAT 0.4 mg SL tablet Generic drug:  nitroglycerin  
place 1 tablet under the tongue every 5 minutes if needed RABEprazole 20 mg tablet Commonly known as:  ACIPHEX  
take 1 tablet by mouth once daily BEFORE A MEAL  
  
 RANEXA 1,000 mg Generic drug:  ranolazine ER  
take 1 tablet by mouth twice a day  
  
 raNITIdine 300 mg tablet Commonly known as:  ZANTAC Take 300 mg by mouth two (2) times a day. rosuvastatin 10 mg tablet Commonly known as:  CRESTOR  
take 1 tablet by mouth every evening SITagliptin 100 mg tablet Commonly known as:  Kelsi Lindsay Take 1 Tab by mouth daily. triamterene-hydroCHLOROthiazide 37.5-25 mg per tablet Commonly known as:  MAXZIDE  
take 1 tablet by mouth every morning VITAMIN B-12 1,000 mcg tablet Generic drug:  cyanocobalamin Take 500 mcg by mouth daily. VITAMIN D3 1,000 unit tablet Generic drug:  cholecalciferol Take 1,000 Units by mouth daily. Follow-up Instructions Return in about 2 months (around 5/26/2018). Introducing hospitals & HEALTH SERVICES!    
 [employer] introduces [employer] patient portal. Now you can access parts of your medical record, email your doctor's office, and request medication refills online. 1. In your internet browser, go to [email or web address] 2. Click on the First Time User? Click Here link in the Sign In box. You will see the New Member Sign Up page. 3. Enter your [employer] Access Code exactly as it appears below. You will not need to use this code after youve completed the sign-up process. If you do not sign up before the expiration date, you must request a new code. · [employer] Access Code: [email or web address] Expires: 5/1/2018  3:07 PM 
 
4. Enter the last four digits of your Social Security Number (xxxx) and Date of Birth (mm/dd/yyyy) as indicated and click Submit. You will be taken to the next sign-up page. 5. Create a [employer] ID. This will be your [employer] login ID and cannot be changed, so think of one that is secure and easy to remember. 6. Create a [employer] password. You can change your password at any time. 7. Enter your Password Reset Question and Answer. This can be used at a later time if you forget your password. 8. Enter your e-mail address. You will receive e-mail notification when new information is available in [address] 9. Click Sign Up. You can now view and download portions of your medical record. 10. Click the Download Summary menu link to download a portable copy of your medical information. If you have questions, please visit the Frequently Asked Questions section of the [employer] website. Remember, [employer] is NOT to be used for urgent needs. For medical emergencies, dial 911. Now available from your iPhone and Android! Please provide this summary of care documentation to your next provider. Your primary care clinician is listed as Miguel Ángel Hyatt. If you have any questions after today's visit, please call [phone].

## 2018-03-27 NOTE — PROGRESS NOTES
HISTORY OF PRESENT ILLNESS  Lamonte Fowler is a 76 y.o. female. f/u dm2,hbp, cad gerd. Feeling ok,back on aciphex. Worsening sinus congestion  Diabetes   The history is provided by the patient. This is a chronic problem. The problem occurs daily. The problem has not changed since onset. Pertinent negatives include no chest pain. Hypertension    This is a chronic problem. The problem has not changed since onset. Associated symptoms include malaise/fatigue. Pertinent negatives include no chest pain, no orthopnea and no palpitations. Nasal Congestion    This is a chronic problem. The problem has been gradually worsening. The pain is moderate. Associated symptoms include congestion, sinus pressure and sore throat. Pertinent negatives include no chest pain. Review of Systems   Constitutional: Positive for malaise/fatigue. Negative for fever and weight loss. HENT: Positive for congestion, sinus pressure and sore throat. Cardiovascular: Negative for chest pain, palpitations and orthopnea. Gastrointestinal: Positive for heartburn. Genitourinary: Negative for frequency. Physical Exam   Constitutional: She appears well-developed and well-nourished. HENT:   Head: Normocephalic and atraumatic. Right Ear: External ear normal.   Left Ear: External ear normal.   Nose: Mucosal edema and rhinorrhea present. Mouth/Throat: Posterior oropharyngeal erythema present. Eyes: Conjunctivae are normal. Pupils are equal, round, and reactive to light. Neck: Normal range of motion. Neck supple. No tracheal deviation present. No thyromegaly present. Cardiovascular: Normal rate, regular rhythm and normal heart sounds. Exam reveals no gallop. No murmur heard. Pulmonary/Chest: Effort normal and breath sounds normal. No respiratory distress. Abdominal: Soft. Bowel sounds are normal.   Musculoskeletal: Normal range of motion. Neurological: She is alert. Skin: Skin is warm and dry.    Psychiatric: She has a normal mood and affect. Vitals reviewed. ASSESSMENT and PLAN  Diagnoses and all orders for this visit:    1. Type 2 diabetes mellitus with other circulatory complication, without long-term current use of insulin ([facility]    2. Coronary artery disease involving native coronary artery of native heart without angina pectoris    3. Gastroesophageal reflux disease, esophagitis presence not specified    4. Essential hypertension, benign    Doing well,continue current meds and treatments    Follow-up Disposition:  Return in about 2 months (around 5/26/2018).

## 2018-05-31 ENCOUNTER — OFFICE VISIT (OUTPATIENT)
Dept: FAMILY MEDICINE CLINIC | Age: 68
End: 2018-05-31

## 2018-05-31 ENCOUNTER — HOSPITAL ENCOUNTER (OUTPATIENT)
Dept: LAB | Age: 68
Discharge: HOME | End: 2018-05-31
Payer: MEDICARE

## 2018-05-31 VITALS
HEIGHT: 65 IN | BODY MASS INDEX: 32.86 KG/M2 | SYSTOLIC BLOOD PRESSURE: 122 MMHG | DIASTOLIC BLOOD PRESSURE: 70 MMHG | TEMPERATURE: 97.8 F | OXYGEN SATURATION: 97 % | HEART RATE: 55 BPM | WEIGHT: 197.2 LBS | RESPIRATION RATE: 16 BRPM

## 2018-05-31 DIAGNOSIS — I25.82 CHRONIC TOTAL OCCLUSION OF CORONARY ARTERY: ICD-10-CM

## 2018-05-31 DIAGNOSIS — E11.40 TYPE 2 DIABETES MELLITUS WITH DIABETIC NEUROPATHY, WITHOUT LONG-TERM CURRENT USE OF INSULIN (HCC): Primary | ICD-10-CM

## 2018-05-31 DIAGNOSIS — K22.4 ESOPHAGEAL DYSMOTILITY: ICD-10-CM

## 2018-05-31 DIAGNOSIS — E78.2 MIXED HYPERLIPIDEMIA: ICD-10-CM

## 2018-05-31 DIAGNOSIS — I10 ESSENTIAL HYPERTENSION, BENIGN: ICD-10-CM

## 2018-05-31 LAB
GLUCOSE POC: 151 MG/DL
HBA1C MFR BLD HPLC: 6.7 %

## 2018-05-31 PROCEDURE — 80061 LIPID PANEL: CPT

## 2018-05-31 PROCEDURE — 80053 COMPREHEN METABOLIC PANEL: CPT

## 2018-05-31 PROCEDURE — 36415 COLL VENOUS BLD VENIPUNCTURE: CPT

## 2018-05-31 RX ORDER — ESOMEPRAZOLE MAGNESIUM 40 MG/1
CAPSULE, DELAYED RELEASE ORAL
Refills: 1 | COMMUNITY
Start: 2018-02-22 | End: 2018-07-25

## 2018-05-31 NOTE — PROGRESS NOTES
HISTORY OF PRESENT ILLNESS  Virginia Robles is a 76 y.o. female. f/u DM2 ,.Sugars borderline. Stable GRAJEDA ,Chest Discomfort. GI appt pending  Diabetes   The history is provided by the patient. This is a chronic problem. The problem occurs daily. The problem has been gradually improving. Pertinent negatives include no chest pain, no abdominal pain, no headaches and no shortness of breath. Hypertension    This is a chronic problem. The problem has been gradually improving. Associated symptoms include malaise/fatigue. Pertinent negatives include no chest pain, no orthopnea, no palpitations, no headaches, no peripheral edema, no dizziness and no shortness of breath. Cholesterol Problem   This is a chronic problem. The problem occurs daily. The problem has not changed since onset. Pertinent negatives include no chest pain, no abdominal pain, no headaches and no shortness of breath. Review of Systems   Constitutional: Positive for malaise/fatigue. Negative for fever. Respiratory: Negative for shortness of breath. Cardiovascular: Negative for chest pain, palpitations and orthopnea. Gastrointestinal: Negative for abdominal pain, blood in stool, constipation and diarrhea. Genitourinary: Negative for dysuria and frequency. Neurological: Negative for dizziness and headaches. Physical Exam   Constitutional: She appears well-developed and well-nourished. HENT:   Head: Normocephalic and atraumatic. Right Ear: External ear normal.   Left Ear: External ear normal.   Nose: Nose normal.   Mouth/Throat: Oropharynx is clear and moist.   Cardiovascular: Normal rate and regular rhythm. No murmur heard. Pulmonary/Chest: Effort normal and breath sounds normal.   Abdominal: Soft. Bowel sounds are normal. She exhibits no distension. There is no tenderness. ASSESSMENT and PLAN  Diagnoses and all orders for this visit:    1.  Type 2 diabetes mellitus with diabetic neuropathy, without long-term current use of insulin (HCC),good control  -     AMB POC HEMOGLOBIN A1C  -     AMB POC GLUCOSE, QUANTITATIVE, BLOOD    2. Chronic total occlusion of coronary artery    3. Esophageal dysmotility    4. Essential hypertension, benign  -     METABOLIC PANEL, COMPREHENSIVE    5. Mixed hyperlipidemia  -     LIPID PANEL    F/U with GI. Continue current meds and treatments.     Follow-up Disposition: Not on File

## 2018-05-31 NOTE — MR AVS SNAPSHOT
[address] [facility] 
 
 
 [address] [facility] [address] [address] 
[phone] Patient: Lolis Cowart MRN: [id number] PBF:[id number] Visit Information Date & Time Provider Department Dept. Phone Encounter #  
 5/31/2018 10:45 AM Saul Vance [phone] Follow-up Instructions Return in about 3 months (around 8/31/2018). Your Appointments 7/25/2018  1:00 PM  
ESTABLISHED PATIENT with Narendra Ennis MD  
[facility] Appt Note: Per , 6mo f/u-scc [id number] [facility]  
[phone] [address] [facility] Upcoming Health Maintenance Date Due MICROALBUMIN Q1 1/3/2018 MEDICARE YEARLY EXAM 7/18/2018 Influenza Age 5 to Adult 8/1/2018 HEMOGLOBIN A1C Q6M 8/21/2018 EYE EXAM RETINAL OR DILATED Q1 10/19/2018 FOOT EXAM Q1 2/21/2019 LIPID PANEL Q1 2/21/2019 GLAUCOMA SCREENING Q2Y 10/19/2019 BREAST CANCER SCRN MAMMOGRAM 4/24/2020 COLONOSCOPY 1/10/2025 DTaP/Tdap/Td series (2 - Td) 7/19/2027 Allergies as of 5/31/2018  Review Complete On: 5/31/2018 By: Tiffany Mason MD  
  
 Severity Noted Reaction Type Reactions Pcn [Penicillins] High 06/14/2010   Systemic Hives Protamine High 07/16/2015    Anaphylaxis Sulfa (Sulfonamide Antibiotics) High 06/14/2010   Systemic Hives Imdur [Isosorbide Mononitrate]  07/27/2015    Other (comments)  
 headache Current Immunizations  Reviewed on 10/16/2017 Name Date Influenza High Dose Vaccine PF 10/16/2017, 10/31/2016 Influenza Vaccine 9/1/2013 Influenza Vaccine Split 10/10/2011 Pneumococcal Conjugate (PCV-13) 11/9/2015 Pneumococcal Polysaccharide (PPSV-23) 4/11/2017 Tdap 7/19/2017 Not reviewed this visit You Were Diagnosed With   
  
 Codes Comments Type 2 diabetes mellitus with diabetic neuropathy, without long-term current use of insulin (HCC)    -  Primary ICD-10-CM: E11.40 ICD-9-CM: 250.60, 357.2 Chronic total occlusion of coronary artery     ICD-10-CM: I25.82 ICD-9-CM: 414.00, 414.2 Esophageal dysmotility     ICD-10-CM: K22.4 ICD-9-CM: 530.5 Essential hypertension, benign     ICD-10-CM: I10 
ICD-9-CM: 401.1 Mixed hyperlipidemia     ICD-10-CM: E78.2 ICD-9-CM: 272.2 Vitals BP Pulse Temp Resp Height(growth percentile) Weight(growth percentile) 122/70 (BP 1 Location: Right arm, BP Patient Position: Sitting) (!) 55 97.8 °F (36.6 °C) (Oral) 16 5' 5\" (1.651 m) 197 lb 3.2 oz (89.4 kg) SpO2 BMI OB Status Smoking Status 97% 32.82 kg/m2 Postmenopausal Never Smoker Vitals History BMI and BSA Data Body Mass Index Body Surface Area  
 32.82 kg/m 2 2.02 m 2 Preferred Pharmacy Pharmacy Name Phone [id number] Dem[address] Leslie [address] [place] [address] [phone] Your Updated Medication List  
  
   
This list is accurate as of 5/31/18 11:11 AM.  Always use your most recent med list. ADVIL 200 mg tablet Generic drug:  ibuprofen Take 400 mg by mouth as needed for Pain. ALPRAZolam 0.25 mg tablet Commonly known as:  Johnice Salts Take 1 Tab by mouth two (2) times daily as needed for Anxiety. Max Daily Amount: 0.5 mg.  
  
 amLODIPine 2.5 mg tablet Commonly known as:  NORVASC  
take 1 tablet by mouth once daily ASPIRIN PO Take 81 mg by mouth daily. azelastine 137 mcg (0.1 %) nasal spray Commonly known as:  ASTELIN Blood-Glucose Meter monitoring kit AS DIRECTED TWICE DAILY CALTRATE 600+D PLUS MINERALS PO Take  by mouth. Takes one po once daily. CARAFATE 1 gram tablet Generic drug:  sucralfate Take 1 g by mouth four (4) times daily as needed. clopidogrel 75 mg Tab Commonly known as:  PLAVIX take 1 tablet by mouth daily  
  
 coenzyme Q-10 200 mg capsule Commonly known as:  CO Q-10 Take 1 Cap by mouth daily. Over the counter  
  
 esomeprazole 40 mg capsule Commonly known as:  NEXIUM  
  
 fluticasone 50 mcg/actuation nasal spray Commonly known as:  FLONASE  
instill 2 sprays into each nostril once daily  
  
 gabapentin 100 mg capsule Commonly known as:  NEURONTIN Take  by mouth three (3) times daily as needed. GAVISCON EXTRA STRENGTH PO Take  by mouth as needed. glimepiride 2 mg tablet Commonly known as:  AMARYL Take 1 Tab by mouth every morning. glucose blood VI test strips strip Commonly known as:  blood glucose test  
by Does Not Apply route Daily Check three times daily . Dx. Code E11.9 HORIZON NASAL CPAP SYSTEM  
by Does Not Apply route. Lancets Misc Check Blood sugar 3 times daily Dx. Code E11.9  
  
 levothyroxine 25 mcg tablet Commonly known as:  SYNTHROID Take 1 Tab by mouth Daily (before breakfast). metoprolol succinate 25 mg XL tablet Commonly known as:  TOPROL-XL  
take 1 tablet by mouth once daily MIRALAX PO Take  by mouth. 1/2 to one capful as needed. MUCINEX PO Take 1,200 mg by mouth as needed. Extra strength. NITROSTAT 0.4 mg SL tablet Generic drug:  nitroglycerin  
place 1 tablet under the tongue every 5 minutes if needed RABEprazole 20 mg tablet Commonly known as:  ACIPHEX  
take 1 tablet by mouth once daily BEFORE A MEAL  
  
 RANEXA 1,000 mg Generic drug:  ranolazine ER  
take 1 tablet by mouth twice a day  
  
 raNITIdine 300 mg tablet Commonly known as:  ZANTAC Take 300 mg by mouth two (2) times a day. rosuvastatin 10 mg tablet Commonly known as:  CRESTOR  
take 1 tablet by mouth every evening SITagliptin 100 mg tablet Commonly known as:  Birdena Broom Take 1 Tab by mouth daily. triamterene-hydroCHLOROthiazide 37.5-25 mg per tablet Commonly known as:  MAXZIDE  
take 1 tablet by mouth every morning VITAMIN B-12 1,000 mcg tablet Generic drug:  cyanocobalamin Take 500 mcg by mouth daily. VITAMIN D3 1,000 unit tablet Generic drug:  cholecalciferol Take 1,000 Units by mouth daily. We Performed the Following AMB POC GLUCOSE, QUANTITATIVE, BLOOD [30684 CPT(R)] AMB POC HEMOGLOBIN A1C [54300 CPT(R)] LIPID PANEL [09524 CPT(R)] METABOLIC PANEL, COMPREHENSIVE [31168 CPT(R)] Follow-up Instructions Return in about 3 months (around 8/31/2018). Introducing [facility]! Odalys Eldridge introduces [employer] patient portal. Now you can access parts of your medical record, email your doctor's office, and request medication refills online. 1. In your internet browser, go to [email or web address] 2. Click on the First Time User? Click Here link in the Sign In box. You will see the New Member Sign Up page. 3. Enter your Connect [facility] Access Code exactly as it appears below. You will not need to use this code after youve completed the sign-up process. If you do not sign up before the expiration date, you must request a new code. · [employer] [facility] Access Code: [email or web address] Expires: 8/29/2018 10:43 AM 
 
4. Enter the last four digits of your Social Security Number (xxxx) and Date of Birth (mm/dd/yyyy) as indicated and click Submit. You will be taken to the next sign-up page. 5. Create a [employer] ID. This will be your Connect [facility] login ID and cannot be changed, so think of one that is secure and easy to remember. 6. Create a [facility] password. You can change your password at any time. 7. Enter your Password Reset Question and Answer. This can be used at a later time if you forget your password. 8. Enter your e-mail address. You will receive e-mail notification when new information is available in [address] 9. Click Sign Up. You can now view and download portions of your medical record. 10. Click the Download Summary menu link to download a portable copy of your medical information. If you have questions, please visit the Frequently Asked Questions section of the [employer] website. Remember, [employer] is NOT to be used for urgent needs. For medical emergencies, dial 911. Now available from your iPhone and Android! Please provide this summary of care documentation to your next provider. Your primary care clinician is listed as Zunilda Galvan. If you have any questions after today's visit, please call [phone].

## 2018-06-01 LAB
ALBUMIN SERPL-MCNC: 4.1 G/DL (ref 3.6–4.8)
ALBUMIN/GLOB SERPL: 1.5 {RATIO} (ref 1.2–2.2)
ALP SERPL-CCNC: 83 IU/L (ref 39–117)
ALT SERPL-CCNC: 24 IU/L (ref 0–32)
AST SERPL-CCNC: 19 IU/L (ref 0–40)
BILIRUB SERPL-MCNC: 0.9 MG/DL (ref 0–1.2)
BUN SERPL-MCNC: 15 MG/DL (ref 8–27)
BUN/CREAT SERPL: 18 (ref 12–28)
CALCIUM SERPL-MCNC: 9.7 MG/DL (ref 8.7–10.3)
CHLORIDE SERPL-SCNC: 102 MMOL/L (ref 96–106)
CHOLEST SERPL-MCNC: 138 MG/DL (ref 100–199)
CO2 SERPL-SCNC: 24 MMOL/L (ref 18–29)
CREAT SERPL-MCNC: 0.85 MG/DL (ref 0.57–1)
GFR SERPLBLD CREATININE-BSD FMLA CKD-EPI: 71 ML/MIN/1.73
GFR SERPLBLD CREATININE-BSD FMLA CKD-EPI: 81 ML/MIN/1.73
GLOBULIN SER CALC-MCNC: 2.8 G/DL (ref 1.5–4.5)
GLUCOSE SERPL-MCNC: 148 MG/DL (ref 65–99)
HDLC SERPL-MCNC: 39 MG/DL
INTERPRETATION, 910389: NORMAL
LDLC SERPL CALC-MCNC: 49 MG/DL (ref 0–99)
POTASSIUM SERPL-SCNC: 3.7 MMOL/L (ref 3.5–5.2)
PROT SERPL-MCNC: 6.9 G/DL (ref 6–8.5)
SODIUM SERPL-SCNC: 141 MMOL/L (ref 134–144)
TRIGL SERPL-MCNC: 249 MG/DL (ref 0–149)
VLDLC SERPL CALC-MCNC: 50 MG/DL (ref 5–40)

## 2018-06-06 ENCOUNTER — TELEPHONE (OUTPATIENT)
Dept: CARDIOLOGY CLINIC | Age: 68
End: 2018-06-06

## 2018-06-06 RX ORDER — NITROGLYCERIN 0.4 MG/1
TABLET SUBLINGUAL
Qty: 25 TAB | Refills: 2 | Status: SHIPPED | OUTPATIENT
Start: 2018-06-06 | End: 2020-03-24 | Stop reason: SDUPTHER

## 2018-06-06 NOTE — TELEPHONE ENCOUNTER
Pt needs nitroglycerine script sent to pharmacy- pt has appt to be seen next month but script ran out.   Thanks, [facility]ole

## 2018-06-18 DIAGNOSIS — E11.59 TYPE 2 DIABETES MELLITUS WITH OTHER CIRCULATORY COMPLICATION, WITHOUT LONG-TERM CURRENT USE OF INSULIN (HCC): ICD-10-CM

## 2018-06-19 RX ORDER — GLIMEPIRIDE 2 MG/1
TABLET ORAL
Qty: 30 TAB | Refills: 11 | Status: SHIPPED | OUTPATIENT
Start: 2018-06-19 | End: 2018-06-21 | Stop reason: SDUPTHER

## 2018-06-21 DIAGNOSIS — E11.59 TYPE 2 DIABETES MELLITUS WITH OTHER CIRCULATORY COMPLICATION, WITHOUT LONG-TERM CURRENT USE OF INSULIN (HCC): ICD-10-CM

## 2018-06-21 NOTE — TELEPHONE ENCOUNTER
Patient called stating that she needs a refill on glimepiride (AMARYL) 2 mg tablet. Patient can be reached at [phone].

## 2018-06-22 RX ORDER — GLIMEPIRIDE 2 MG/1
TABLET ORAL
Qty: 30 TAB | Refills: 11 | Status: SHIPPED | OUTPATIENT
Start: 2018-06-22 | End: 2019-08-05

## 2018-07-25 ENCOUNTER — OFFICE VISIT (OUTPATIENT)
Dept: CARDIOLOGY CLINIC | Age: 68
End: 2018-07-25

## 2018-07-25 VITALS
DIASTOLIC BLOOD PRESSURE: 74 MMHG | SYSTOLIC BLOOD PRESSURE: 130 MMHG | OXYGEN SATURATION: 98 % | WEIGHT: 197.4 LBS | HEIGHT: 65 IN | RESPIRATION RATE: 18 BRPM | HEART RATE: 58 BPM | BODY MASS INDEX: 32.89 KG/M2

## 2018-07-25 DIAGNOSIS — G47.33 OSA (OBSTRUCTIVE SLEEP APNEA): ICD-10-CM

## 2018-07-25 DIAGNOSIS — E78.2 MIXED HYPERLIPIDEMIA: ICD-10-CM

## 2018-07-25 DIAGNOSIS — I73.9 CLAUDICATION IN PERIPHERAL VASCULAR DISEASE (HCC): ICD-10-CM

## 2018-07-25 DIAGNOSIS — I25.10 CORONARY ARTERY DISEASE INVOLVING NATIVE CORONARY ARTERY OF NATIVE HEART WITHOUT ANGINA PECTORIS: Primary | ICD-10-CM

## 2018-07-25 DIAGNOSIS — Z95.5 PRESENCE OF STENT IN RIGHT CORONARY ARTERY: ICD-10-CM

## 2018-07-25 DIAGNOSIS — I25.82 CHRONIC TOTAL OCCLUSION OF CORONARY ARTERY: ICD-10-CM

## 2018-07-25 DIAGNOSIS — I10 ESSENTIAL HYPERTENSION, BENIGN: ICD-10-CM

## 2018-07-25 NOTE — PROGRESS NOTES
1. Have you been to the ER, urgent care clinic since your last visit? Hospitalized since your last visit? No.    2. Have you seen or consulted any other health care providers outside of the [address] [place] [address] since your last visit? Include any pap smears or colon screening.    No.      Chief Complaint   Patient presents with    Other     6 month- pt denies any cardiac symptoms

## 2018-07-25 NOTE — PROGRESS NOTES
[address], [address] [place] [address] Ne, [address] [facility]  [phone]     Subjective:      Richard Alan is a 76 y.o. female is here for routine f/u. Reports occasional mild exertional chest discomfort,resolves with rest.   States very rare use of NTG. Walks around neighborhood during nice weather. The patient denies shortness of breath, orthopnea, PND, LE edema, palpitations, syncope, or presyncope. She does complain of leg fatigue, sometimes with worse with exertion. Feels fatigued in general at times. Has not seen her sleep doctor in a couple years.       Patient Active Problem List    Diagnosis Date Noted    Type 2 diabetes mellitus with diabetic neuropathy ([facility] 03/26/2018    Diabetes mellitus ([facility] [address] 02/20/2018    Routine adult health maintenance 10/28/2015    Gastric ulcer 08/11/2015    Unstable angina ([address] 07/16/2015    Angina, class III ([address] 07/07/2015    Myocardial ischemia 07/07/2015    S/P PTCA (percutaneous transluminal coronary angioplasty) 07/07/2015    Chest pain 05/15/2015    Diarrhea 01/05/2015    CAD (coronary artery disease) 10/23/2014    Snoring 10/21/2014    Sleep-disordered breathing 10/21/2014    Presence of stent in right coronary artery 12/30/2013    Myalgia and myositis, unspecified 02/04/2013    Dysphagia 10/14/2012    Esophageal motor disorder 10/14/2012    Chronic total occlusion of coronary artery 06/25/2012    S/P cardiac cath 03/07/2012    Esophageal dysmotility 10/10/2011    Allergic rhinitis 02/20/2011    Esophageal reflux 02/20/2011    Essential hypertension, benign 02/20/2011    Anxiety state, unspecified 02/20/2011    Encounter for long-term (current) use of other medications 02/20/2011    Mixed hyperlipidemia 02/20/2011      Leonardo Pastor MD  Past Medical History:   Diagnosis Date    Allergic rhinitis, cause unspecified 2/20/2011    Angina, class III ([address] 12/13/2013    as of 8/4/15 pt reports intermittent \"angina pain\" and GRAJEDA; followed by Dr Dickson Sheehan Arthritis     neck - numbness of arm    CAD (coronary artery disease)     angina / Dr Rocío Lawton    Colon polyps 2011    Diabetes ([address]     Diarrhea     \"random\" per pt; followed by Dr Reyes Service for long-term (current) use of other medications 2011    Esophageal dysmotility 10/10/2011    Essential hypertension, benign 2011    Gastric ulcer 2015    GERD (gastroesophageal reflux disease)     Hypertension     Ill-defined condition     torn MCL - left - no surgery    Mixed hyperlipidemia 2011    Nausea & vomiting     Sleep apnea     CPAP;  Dr Jerry holly MD      Past Surgical History:   Procedure Laterality Date    CARDIAC CATHETERIZATION  2012         HX BUNIONECTOMY Right     HX CATARACT REMOVAL Bilateral     HX  SECTION      x3    HX CHOLECYSTECTOMY      HX GI      EGDs with dilatation    HX HEART CATHETERIZATION      catherization with 3 stents - states stents are blocked    HX HEART CATHETERIZATION  2015    unable to stent; tried to unblock stents but unable to/starting cardiac rehab 2015/has collateral circulation    AR EGD BALLOON DILATION ESOPHAGUS <30 MM DIAM  10/13/2010         AR EGD TRANSORAL BIOPSY SINGLE/MULTIPLE  10/13/2010         UPPER GI ENDOSCOPY,BALL DIL,30MM  3/30/2015         UPPER GI ENDOSCOPY,BIOPSY  3/30/2015          Allergies   Allergen Reactions    Pcn [Penicillins] Hives    Protamine Anaphylaxis    Sulfa (Sulfonamide Antibiotics) Hives    Imdur [Isosorbide Mononitrate] Other (comments)     headache      Family History   Problem Relation Age of Onset    Heart Disease Mother     Hypertension Mother     Heart Attack Mother     Heart Disease Father     Hypertension Father     Heart Surgery Father     Cancer Sister      lung    Cancer Brother      brain tumor - malignant    Breast Cancer Sister       Social History     Social History    Marital status:      Spouse name: N/A    Number of children: N/A    Years of education: N/A     Occupational History    Not on file. Social History Main Topics    Smoking status: Never Smoker    Smokeless tobacco: Never Used    Alcohol use 1.0 oz/week     2 Glasses of wine per week      Comment: occasionally    Drug use: No    Sexual activity: Yes     Partners: Male     Other Topics Concern    Not on file     Social History Narrative      Current Outpatient Prescriptions   Medication Sig    glimepiride (AMARYL) 2 mg tablet take 1 tablet by mouth every morning    nitroglycerin (NITROSTAT) 0.4 mg SL tablet place 1 tablet under the tongue every 5 minutes if needed    RABEprazole (ACIPHEX) 20 mg tablet take 1 tablet by mouth once daily BEFORE A MEAL    azelastine (ASTELIN) 137 mcg (0.1 %) nasal spray     rosuvastatin (CRESTOR) 10 mg tablet take 1 tablet by mouth every evening    clopidogrel (PLAVIX) 75 mg tab take 1 tablet by mouth daily    fluticasone (FLONASE) 50 mcg/actuation nasal spray instill 2 sprays into each nostril once daily    RANEXA 1,000 mg take 1 tablet by mouth twice a day    metoprolol succinate (TOPROL-XL) 25 mg XL tablet take 1 tablet by mouth once daily    triamterene-hydroCHLOROthiazide (MAXZIDE) 37.5-25 mg per tablet take 1 tablet by mouth every morning    SITagliptin (JANUVIA) 100 mg tablet Take 1 Tab by mouth daily.  MAG CARB/ALUMINUM HYDROX/ALGIN (GAVISCON EXTRA STRENGTH PO) Take  by mouth as needed.  ASPIRIN PO Take 81 mg by mouth daily.  MINH/D3/MAG11/ZINC//RONEN/BOR (CALTRATE 600+D PLUS MINERALS PO) Take  by mouth. Takes one po once daily.  gabapentin (NEURONTIN) 100 mg capsule Take  by mouth three (3) times daily as needed.  GUAIFENESIN (MUCINEX PO) Take 1,200 mg by mouth as needed. Extra strength.  POLYETHYLENE GLYCOL 3350 (MIRALAX PO) Take  by mouth. 1/2 to one capful as needed.     amLODIPine (NORVASC) 2.5 mg tablet take 1 tablet by mouth once daily    raNITIdine (ZANTAC) 300 mg tablet Take 300 mg by mouth two (2) times a day.  glucose blood VI test strips (BLOOD GLUCOSE TEST) strip by Does Not Apply route Daily Check three times daily . Dx. Code E11.9    Lancets misc Check Blood sugar 3 times daily Dx. Code E11.9    cyanocobalamin (VITAMIN B-12) 1,000 mcg tablet Take 500 mcg by mouth daily.  Blood-Glucose Meter monitoring kit AS DIRECTED TWICE DAILY    OXYGEN-AIR DELIVERY SYSTEMS (HORIZON NASAL CPAP SYSTEM) by Does Not Apply route.  sucralfate (CARAFATE) 1 gram tablet Take 1 g by mouth four (4) times daily as needed.  ibuprofen (ADVIL) 200 mg tablet Take 400 mg by mouth as needed for Pain.  coenzyme Q-10 (CO Q-10) 200 mg capsule Take 1 Cap by mouth daily. Over the counter    cholecalciferol, vitamin d3, (VITAMIN D) 1,000 unit tablet Take 1,000 Units by mouth daily. No current facility-administered medications for this visit. Review of Symptoms:  11 systems reviewed, negative other than as stated in the HPI    Physical ExamPhysical Exam:    Vitals:    07/25/18 1253 07/25/18 1309 07/25/18 1333   BP: 140/90 142/84 130/74   Pulse: (!) 58     Resp: 18     SpO2: 98%     Weight: 197 lb 6.4 oz (89.5 kg)     Height: 5' 5\" (1.651 m)       Body mass index is 32.85 kg/(m^2). General PE   Gen:  NAD  Mental Status - Alert. General Appearance - Not in acute distress. Chest and Lung Exam   Inspection: Accessory muscles - No use of accessory muscles in breathing. Auscultation:   Breath sounds: - Normal.   Cardiovascular   Inspection: Jugular vein - Bilateral - Inspection Normal.   Palpation/Percussion:   Apical Impulse: - Normal.   Auscultation: Rhythm - Regular. Heart Sounds - S1 WNL and S2 WNL. No S3 or S4. Murmurs & Other Heart Sounds: Auscultation of the heart reveals - No Murmurs. Peripheral Vascular   Upper Extremity: Inspection - Bilateral - No Cyanotic nailbeds or Digital clubbing.    Lower Extremity:   Palpation: Edema - Bilateral - No edema. Abdomen:   Soft, non-tender, bowel sounds are active. Neuro: A&O times 3, CN and motor grossly WNL    Labs:   Lab Results   Component Value Date/Time    Cholesterol, total 138 05/31/2018 11:15 AM    Cholesterol, total 151 02/21/2018 10:34 AM    Cholesterol, total 124 10/16/2017 11:46 AM    Cholesterol, total 123 07/17/2017 11:13 AM    Cholesterol, total 129 04/11/2017 10:45 AM    HDL Cholesterol 39 (L) 05/31/2018 11:15 AM    HDL Cholesterol 37 (L) 02/21/2018 10:34 AM    HDL Cholesterol 38 (L) 10/16/2017 11:46 AM    HDL Cholesterol 35 (L) 07/17/2017 11:13 AM    HDL Cholesterol 37 (L) 04/11/2017 10:45 AM    LDL, calculated 49 05/31/2018 11:15 AM    LDL, calculated 45 02/21/2018 10:34 AM    LDL, calculated 33 10/16/2017 11:46 AM    LDL, calculated 28 07/17/2017 11:13 AM    LDL, calculated 38 04/11/2017 10:45 AM    Triglyceride 249 (H) 05/31/2018 11:15 AM    Triglyceride 345 (H) 02/21/2018 10:34 AM    Triglyceride 264 (H) 10/16/2017 11:46 AM    Triglyceride 299 (H) 07/17/2017 11:13 AM    Triglyceride 268 (H) 04/11/2017 10:45 AM    CHOL/HDL Ratio 3.0 12/13/2013 03:16 AM     Lab Results   Component Value Date/Time    CK 72 09/14/2012 10:20 PM     Lab Results   Component Value Date/Time    Sodium 141 05/31/2018 11:15 AM    Potassium 3.7 05/31/2018 11:15 AM    Chloride 102 05/31/2018 11:15 AM    CO2 24 05/31/2018 11:15 AM    Anion gap 3 (L) 07/17/2015 04:37 AM    Glucose 148 (H) 05/31/2018 11:15 AM    BUN 15 05/31/2018 11:15 AM    Creatinine 0.85 05/31/2018 11:15 AM    BUN/Creatinine ratio 18 05/31/2018 11:15 AM    GFR est AA 81 05/31/2018 11:15 AM    GFR est non-AA 71 05/31/2018 11:15 AM    Calcium 9.7 05/31/2018 11:15 AM    Bilirubin, total 0.9 05/31/2018 11:15 AM    AST (SGOT) 19 05/31/2018 11:15 AM    Alk.  phosphatase 83 05/31/2018 11:15 AM    Protein, total 6.9 05/31/2018 11:15 AM    Albumin 4.1 05/31/2018 11:15 AM    Globulin 3.6 09/14/2012 06:45 PM    A-G Ratio 1.5 05/31/2018 11:15 AM    ALT (SGPT) 24 05/31/2018 11:15 AM       EKG:  SB with 1 AVB     Assessment:     Assessment:      1. Coronary artery disease involving native coronary artery of native heart without angina pectoris    2. Chronic total occlusion of coronary artery    3. Essential hypertension, benign    4. Presence of stent in right coronary artery    5. Mixed hyperlipidemia        Orders Placed This Encounter    AMB POC EKG ROUTINE W/ 12 LEADS, INTER & REP     Order Specific Question:   Reason for Exam:     Answer:   routine        Plan:     Pt presents for f/u    CAD:  Reports occasional mild exertional chest discomfort,resolves with rest.   States very rare use of NTG. Ranexa has helped her symptom tremendously. 2 unsuccessful attempts at PCI of  of RCA, last in 7/2015. Echo in 2015 NL EF and no valvular disease.   Last myoview 5/2016 with improved inferior ischemia with suspectedly improved collaterals to  of RCA.    On ranexa, toprol and ASA, plavix, statin, ccb. Given her  recommend she continue on Plavix. HTN  Repeat BP much improved. Continue current therapy    HLD   5/18 LDL at 49. On statin. Lipids and labs followed by PCP    leg fatigue, mildly reduced dorsalis pedis pulses:  Check ABIs    Generalized fatigue/PHUC on CPAP:  Advised to touch base with her sleep doctor to see if her equipment needs adjustment. Counseled on diet and exercise- eventual goal of 30-60 minutes 5-7 times a week as per AHA guidelines. Continue current care and f/u in 6 months.     Marilyn Harley MD

## 2018-07-25 NOTE — MR AVS SNAPSHOT
[address] [facility] 
[phone] Patient: Tiffany Ojeda MRN:  [id number] Visit Information Date & Time Provider Department Dept. Phone Encounter #  
 7/25/2018  1:00 PM Myrtle Martin, [address] [facility] [phone] Follow-up Instructions Routing History Follow-up and Disposition History Your Appointments 8/8/2018 11:00 AM  
VASCULAR TEST with VASCULAR, [facility]) Appt Note: Dr Will Grant [[id number]] (Order [id number]) [address] [facility]  
[phone] [address] [facility]  
  
    
 8/31/2018 11:00 AM  
ROUTINE CARE with Dena Shah MD  
[facility]) Appt Note: routine follow up  
 [address] [facility] 7 [id number] [phone] [address] [place] [id number]  
  
    
 1/24/2019 10:15 AM  
ESTABLISHED PATIENT with Myrtle Martin MD  
[facility]) Appt Note: 6 months [address] [facility]  
[phone] [address] [facility] Upcoming Health Maintenance Date Due MICROALBUMIN Q1 1/3/2018 Influenza Age 5 to Adult 8/1/2018 EYE EXAM RETINAL OR DILATED Q1 10/19/2018 HEMOGLOBIN A1C Q6M 11/30/2018 FOOT EXAM Q1 2/21/2019 LIPID PANEL Q1 5/31/2019 GLAUCOMA SCREENING Q2Y 10/19/2019 BREAST CANCER SCRN MAMMOGRAM 4/24/2020 COLONOSCOPY 1/10/2025 DTaP/Tdap/Td series (2 - Td) 7/19/2027 Allergies as of 7/25/2018  Review Complete On: 7/25/2018 By: Myrtle Martin MD  
  
 Severity Noted Reaction Type Reactions Pcn [Penicillins] High 06/14/2010   Systemic Hives Protamine High 07/16/2015    Anaphylaxis Sulfa (Sulfonamide Antibiotics) High 06/14/2010   Systemic Hives Imdur [Isosorbide Mononitrate]  07/27/2015    Other (comments)  
 headache Current Immunizations  Reviewed on 10/16/2017 Name Date Influenza High Dose Vaccine PF 10/16/2017, 10/31/2016 Influenza Vaccine 9/1/2013 Influenza Vaccine Split 10/10/2011 Pneumococcal Conjugate (PCV-13) 11/9/2015 Pneumococcal Polysaccharide (PPSV-23) 4/11/2017 Tdap 7/19/2017 Not reviewed this visit You Were Diagnosed With   
  
 Codes Comments Coronary artery disease involving native coronary artery of native heart without angina pectoris    -  Primary ICD-10-CM: I25.10 ICD-9-CM: 414.01 Chronic total occlusion of coronary artery     ICD-10-CM: I25.82 ICD-9-CM: 414.00, 414.2 Essential hypertension, benign     ICD-10-CM: I10 
ICD-9-CM: 401.1 Presence of stent in right coronary artery     ICD-10-CM: Z95.5 ICD-9-CM: V45.82 Mixed hyperlipidemia     ICD-10-CM: E78.2 ICD-9-CM: 272.2   
 PHUC (obstructive sleep apnea)     ICD-10-CM: G47.33 
ICD-9-CM: 327.23 Claudication [facility] peripheral vascular disease [facility]     ICD-10-CM: I73.9 ICD-9-CM: 443. 9 Vitals BP Pulse Resp Height(growth percentile) Weight(growth percentile) SpO2  
 130/74 (!) 58 18 5' 5\" (1.651 m) 197 lb 6.4 oz (89.5 kg) 98% BMI OB Status Smoking Status 32.85 kg/m2 Postmenopausal Never Smoker Vitals History BMI and BSA Data Body Mass Index Body Surface Area  
 32.85 kg/m 2 2.03 m 2 Preferred Pharmacy Pharmacy Name Phone [id number] [address] [place] [address] [phone] Your Updated Medication List  
  
   
This list is accurate as of 7/25/18  2:14 PM.  Always use your most recent med list. ADVIL 200 mg tablet Generic drug:  ibuprofen Take 400 mg by mouth as needed for Pain. amLODIPine 2.5 mg tablet Commonly known as:  Lawson Hinojosa take 1 tablet by mouth once daily ASPIRIN PO Take 81 mg by mouth daily. azelastine 137 mcg (0.1 %) nasal spray Commonly known as:  ASTELIN Blood-Glucose Meter monitoring kit AS DIRECTED TWICE DAILY CALTRATE 600+D PLUS MINERALS PO Take  by mouth. Takes one po once daily. CARAFATE 1 gram tablet Generic drug:  sucralfate Take 1 g by mouth four (4) times daily as needed. clopidogrel 75 mg Tab Commonly known as:  PLAVIX  
take 1 tablet by mouth daily  
  
 coenzyme Q-10 200 mg capsule Commonly known as:  CO Q-10 Take 1 Cap by mouth daily. Over the counter  
  
 fluticasone 50 mcg/actuation nasal spray Commonly known as:  FLONASE  
instill 2 sprays into each nostril once daily  
  
 gabapentin 100 mg capsule Commonly known as:  NEURONTIN Take  by mouth three (3) times daily as needed. GAVISCON EXTRA STRENGTH PO Take  by mouth as needed. glimepiride 2 mg tablet Commonly known as:  AMARYL  
take 1 tablet by mouth every morning  
  
 glucose blood VI test strips strip Commonly known as:  blood glucose test  
by Does Not Apply route Daily Check three times daily . Dx. Code E11.9 HORIZON NASAL CPAP SYSTEM  
by Does Not Apply route. Lancets Misc Check Blood sugar 3 times daily Dx. Code E11.9  
  
 metoprolol succinate 25 mg XL tablet Commonly known as:  TOPROL-XL  
take 1 tablet by mouth once daily MIRALAX PO Take  by mouth. 1/2 to one capful as needed. MUCINEX PO Take 1,200 mg by mouth as needed. Extra strength. nitroglycerin 0.4 mg SL tablet Commonly known as:  NITROSTAT  
place 1 tablet under the tongue every 5 minutes if needed RABEprazole 20 mg tablet Commonly known as:  ACIPHEX  
take 1 tablet by mouth once daily BEFORE A MEAL  
  
 RANEXA 1,000 mg Generic drug:  ranolazine ER  
take 1 tablet by mouth twice a day  
  
 raNITIdine 300 mg tablet Commonly known as:  ZANTAC Take 300 mg by mouth two (2) times a day. rosuvastatin 10 mg tablet Commonly known as:  CRESTOR  
take 1 tablet by mouth every evening SITagliptin 100 mg tablet Commonly known as:  Erling Sinner Take 1 Tab by mouth daily. triamterene-hydroCHLOROthiazide 37.5-25 mg per tablet Commonly known as:  MAXZIDE  
take 1 tablet by mouth every morning VITAMIN B-12 1,000 mcg tablet Generic drug:  cyanocobalamin Take 500 mcg by mouth daily. VITAMIN D3 1,000 unit tablet Generic drug:  cholecalciferol Take 1,000 Units by mouth daily. We Performed the Following AMB POC EKG ROUTINE W/ 12 LEADS, INTER & REP [67154 CPT(R)] To-Do List   
 07/25/2018 Imaging:  ANKLE BRACHIAL INDEX Introducing \[facility]\"" & HEALTH SERVICES! Romayne Duster introduces [employer] patient portal. Now you can access parts of your medical record, email your doctor's office, and request medication refills online. 1. In your internet browser, go to [email or web address] 2. Click on the First Time User? Click Here link in the Sign In box. You will see the New Member Sign Up page. 3. Enter your [employer] Access Code exactly as it appears below. You will not need to use this code after youve completed the sign-up process. If you do not sign up before the expiration date, you must request a new code. · [employer] Access Code: [email or web address] Expires: 8/29/2018 10:43 AM 
 
4. Enter the last four digits of your Social Security Number (xxxx) and Date of Birth (mm/dd/yyyy) as indicated and click Submit. You will be taken to the next sign-up page. 5. Create a [employer] ID. This will be your [employer] login ID and cannot be changed, so think of one that is secure and easy to remember. 6. Create a [employer] password. You can change your password at any time. 7. Enter your Password Reset Question and Answer. This can be used at a later time if you forget your password. 8. Enter your e-mail address. You will receive e-mail notification when new information is available in [address] 9. Click Sign Up. You can now view and download portions of your medical record. 10. Click the Download Summary menu link to download a portable copy of your medical information. If you have questions, please visit the Frequently Asked Questions section of the [employer] website. Remember, [employer] is NOT to be used for urgent needs. For medical emergencies, dial 911. Now available from your iPhone and Android! Please provide this summary of care documentation to your next provider. Your primary care clinician is listed as Lyndsey Negrete. If you have any questions after today's visit, please call [phone].

## 2018-07-30 ENCOUNTER — TELEPHONE (OUTPATIENT)
Dept: SLEEP MEDICINE | Age: 68
End: 2018-07-30

## 2018-07-30 DIAGNOSIS — G47.33 OBSTRUCTIVE SLEEP APNEA (ADULT) (PEDIATRIC): Primary | ICD-10-CM

## 2018-08-06 ENCOUNTER — DOCUMENTATION ONLY (OUTPATIENT)
Dept: SLEEP MEDICINE | Age: 68
End: 2018-08-06

## 2018-08-08 ENCOUNTER — CLINICAL SUPPORT (OUTPATIENT)
Dept: CARDIOLOGY CLINIC | Age: 68
End: 2018-08-08

## 2018-08-08 ENCOUNTER — TELEPHONE (OUTPATIENT)
Dept: CARDIOLOGY CLINIC | Age: 68
End: 2018-08-08

## 2018-08-08 DIAGNOSIS — R29.898 LEG FATIGUE: Primary | ICD-10-CM

## 2018-08-08 NOTE — TELEPHONE ENCOUNTER
Verified patient with two identifiers. Pt informed of DARIA study results. Arterial doppler scheduled per Dr Roz Coats.

## 2018-08-23 ENCOUNTER — CLINICAL SUPPORT (OUTPATIENT)
Dept: CARDIOLOGY CLINIC | Age: 68
End: 2018-08-23

## 2018-08-23 DIAGNOSIS — R68.89 ABNORMAL ANKLE BRACHIAL INDEX (ABI): Primary | ICD-10-CM

## 2018-08-23 NOTE — PROCEDURES
[facility]  *** FINAL REPORT ***    Name: Kaley Bernal  MRN: [id number]        Outpatient  : 09 Mar 1950  HIS Order #: [id number]  [address] [facility] Visit #: [id number]  Date: 23 Aug 2018    TYPE OF TEST: Extremity Arterial Duplex    REASON FOR TEST                            Right                     Left  Artery               PSV   Finding             PSV   Finding  ------------------  -----  ---------------    -----  ---------------  External iliac:  Common femoral:     111.0  Normal              82.0  Normal  Profunda femoris:    70.0  Normal              70.0  Normal  Proximal SFA:       111.0                     108.0  Mid SFA:             83.0  Normal              93.0  Normal  Distal SFA:          79.0                      81.0  Popliteal:           65.0  Normal              69.0  Normal  Anterior tibial:     79.0  Normal              45.0  Normal  Posterior tibial:    67.0  Normal             117.0  Normal    Pressures               Right     Left               -----     -----     Brachial:           DP:           PT:            DARIA:            Toe: INTERPRETATION/FINDINGS  Right leg :  1. No significant occlusive disease in the arteries examined. Left leg :  1. No significant occlusive disease in the arteries examined. ADDITIONAL COMMENTS    I have personally reviewed the data relevant to the interpretation of  this  study. TECHNOLOGIST: Darline Cagle RVT  Signed: 2018 02:36 PM    PHYSICIAN: Aroldo Pantoja.  Yaniv Damon MD  Signed: 2018 04:42 PM

## 2018-08-27 ENCOUNTER — TELEPHONE (OUTPATIENT)
Dept: CARDIOLOGY CLINIC | Age: 68
End: 2018-08-27

## 2018-08-27 NOTE — TELEPHONE ENCOUNTER
----- Message from Sean Valle MD sent at 8/23/2018  4:46 PM EDT -----  Inform her no significant stenosis on LE arterial doppler. Dr. Daljit Gaytan pt. Called pt, verified pt with two pt identifiers, told pt her le arterial doppler has no significant stenosis noted on it. Pt verbalized understanding.

## 2018-08-31 ENCOUNTER — HOSPITAL ENCOUNTER (OUTPATIENT)
Dept: LAB | Age: 68
Discharge: HOME | End: 2018-08-31
Payer: MEDICARE

## 2018-08-31 ENCOUNTER — OFFICE VISIT (OUTPATIENT)
Dept: FAMILY MEDICINE CLINIC | Age: 68
End: 2018-08-31

## 2018-08-31 VITALS
HEIGHT: 65 IN | DIASTOLIC BLOOD PRESSURE: 74 MMHG | RESPIRATION RATE: 18 BRPM | SYSTOLIC BLOOD PRESSURE: 124 MMHG | TEMPERATURE: 97.6 F | OXYGEN SATURATION: 97 % | WEIGHT: 197.6 LBS | HEART RATE: 65 BPM | BODY MASS INDEX: 32.92 KG/M2

## 2018-08-31 DIAGNOSIS — E78.2 MIXED HYPERLIPIDEMIA: ICD-10-CM

## 2018-08-31 DIAGNOSIS — I10 ESSENTIAL HYPERTENSION, BENIGN: ICD-10-CM

## 2018-08-31 DIAGNOSIS — Z98.61 S/P PTCA (PERCUTANEOUS TRANSLUMINAL CORONARY ANGIOPLASTY): ICD-10-CM

## 2018-08-31 DIAGNOSIS — E11.40 TYPE 2 DIABETES MELLITUS WITH DIABETIC NEUROPATHY, WITHOUT LONG-TERM CURRENT USE OF INSULIN (HCC): Primary | ICD-10-CM

## 2018-08-31 DIAGNOSIS — K22.4 ESOPHAGEAL MOTOR DISORDER: ICD-10-CM

## 2018-08-31 DIAGNOSIS — I25.10 CORONARY ARTERY DISEASE INVOLVING NATIVE CORONARY ARTERY OF NATIVE HEART WITHOUT ANGINA PECTORIS: ICD-10-CM

## 2018-08-31 LAB
GLUCOSE POC: 154 MG/DL
HBA1C MFR BLD HPLC: 6.4 %

## 2018-08-31 PROCEDURE — 80061 LIPID PANEL: CPT

## 2018-08-31 PROCEDURE — 80053 COMPREHEN METABOLIC PANEL: CPT

## 2018-08-31 PROCEDURE — 36415 COLL VENOUS BLD VENIPUNCTURE: CPT

## 2018-08-31 NOTE — PROGRESS NOTES
This is the Subsequent Medicare Annual Wellness Exam, performed 12 months or more after the Initial AWV or the last Subsequent AWV I have reviewed the patient's medical history in detail and updated the computerized patient record. History Past Medical History:  
Diagnosis Date  Allergic rhinitis, cause unspecified 2011  Angina, class III ([facility] 2013  
 as of 8/4/15 pt reports intermittent \"angina pain\" and GRAJEDA; followed by Dr Ramírez Sanches  Anxiety  Arthritis   
 neck - numbness of arm  CAD (coronary artery disease)   
 angina / Dr Tavia Cuenca  Colon polyps 2011  Diabetes ([facility]  Diarrhea \"random\" per pt; followed by Dr Luis Collins  Encounter for long-term (current) use of other medications 2011  Esophageal dysmotility 10/10/2011  Essential hypertension, benign 2011  Gastric ulcer 2015  GERD (gastroesophageal reflux disease)  Hypertension  Ill-defined condition   
 torn MCL - left - no surgery  Mixed hyperlipidemia 2011  Nausea & vomiting  Sleep apnea CPAP;  Dr Liana holly MD  
  
Past Surgical History:  
Procedure Laterality Date [address]    HX BUNIONECTOMY Right  HX CATARACT REMOVAL Bilateral   
 HX  SECTION    
 x3  
 HX CHOLECYSTECTOMY  HX GI    
 EGDs with dilatation  HX HEART CATHETERIZATION    
 catherization with 3 stents - states stents are blocked  HX HEART CATHETERIZATION  2015  
 unable to stent; tried to unblock stents but unable to/starting cardiac rehab Aug2015/has collateral circulation  AZ EGD BALLOON DILATION ESOPHAGUS <30 MM DIAM  10/13/2010  AZ EGD TRANSORAL BIOPSY SINGLE/MULTIPLE  10/13/2010  UPPER GI ENDOSCOPY,BALL DIL,30MM  3/30/2015  UPPER GI ENDOSCOPY,BIOPSY  3/30/2015 Current Outpatient Prescriptions Medication Sig Dispense Refill  glimepiride (AMARYL) 2 mg tablet take 1 tablet by mouth every morning 30 Tab 11  
 RABEprazole (ACIPHEX) 20 mg tablet take 1 tablet by mouth once daily BEFORE A MEAL  0  
 azelastine (ASTELIN) 137 mcg (0.1 %) nasal spray   0  
 rosuvastatin (CRESTOR) 10 mg tablet take 1 tablet by mouth every evening 30 Tab 11  
 clopidogrel (PLAVIX) 75 mg tab take 1 tablet by mouth daily 30 Tab 11  
 fluticasone (FLONASE) 50 mcg/actuation nasal spray instill 2 sprays into each nostril once daily 16 g 11  
 RANEXA 1,000 mg take 1 tablet by mouth twice a day 180 Tab 4  
 metoprolol succinate (TOPROL-XL) 25 mg XL tablet take 1 tablet by mouth once daily 90 Tab 4  
 triamterene-hydroCHLOROthiazide (MAXZIDE) 37.5-25 mg per tablet take 1 tablet by mouth every morning 90 Tab 3  
 SITagliptin (JANUVIA) 100 mg tablet Take 1 Tab by mouth daily. 30 Tab 11  
 MAG CARB/ALUMINUM HYDROX/ALGIN (GAVISCON EXTRA STRENGTH PO) Take  by mouth as needed.  ASPIRIN PO Take 81 mg by mouth daily.  MINH/D3/MAG11/ZINC//RONEN/BOR (CALTRATE 600+D PLUS MINERALS PO) Take  by mouth. Takes one po once daily.  gabapentin (NEURONTIN) 100 mg capsule Take  by mouth three (3) times daily as needed.  GUAIFENESIN (MUCINEX PO) Take 1,200 mg by mouth as needed. Extra strength.  POLYETHYLENE GLYCOL 3350 (MIRALAX PO) Take  by mouth. 1/2 to one capful as needed.  amLODIPine (NORVASC) 2.5 mg tablet take 1 tablet by mouth once daily 90 Tab 3  
 raNITIdine (ZANTAC) 300 mg tablet Take 300 mg by mouth two (2) times a day. 1  
 glucose blood VI test strips (BLOOD GLUCOSE TEST) strip by Does Not Apply route Daily Check three times daily . Dx. Code E11.9 150 Strip 11  Lancets misc Check Blood sugar 3 times daily Dx. Code E11.9 150 Each 11  
 cyanocobalamin (VITAMIN B-12) 1,000 mcg tablet Take 500 mcg by mouth daily.     
 Blood-Glucose Meter monitoring kit AS DIRECTED TWICE DAILY 1 Kit 0  
  OXYGEN-AIR DELIVERY SYSTEMS (HORIZON NASAL CPAP SYSTEM) by Does Not Apply route.  sucralfate (CARAFATE) 1 gram tablet Take 1 g by mouth four (4) times daily as needed.  ibuprofen (ADVIL) 200 mg tablet Take 400 mg by mouth as needed for Pain.  coenzyme Q-10 (CO Q-10) 200 mg capsule Take 1 Cap by mouth daily. Over the counter 30 Cap 11  
 cholecalciferol, vitamin d3, (VITAMIN D) 1,000 unit tablet Take 1,000 Units by mouth daily.  nitroglycerin (NITROSTAT) 0.4 mg SL tablet place 1 tablet under the tongue every 5 minutes if needed 25 Tab 2 Allergies Allergen Reactions  Pcn [Penicillins] Hives  Protamine Anaphylaxis  Sulfa (Sulfonamide Antibiotics) Hives  Imdur [Isosorbide Mononitrate] Other (comments)  
  headache Family History Problem Relation Age of Onset  Heart Disease Mother  Hypertension Mother  Heart Attack Mother  Heart Disease Father  Hypertension Father  Heart Surgery Father  Cancer Sister   
  lung  Cancer Brother   
  brain tumor - malignant  Breast Cancer Sister Social History Substance Use Topics  Smoking status: Never Smoker  Smokeless tobacco: Never Used  Alcohol use 1.0 oz/week 2 Glasses of wine per week Comment: occasionally Patient Active Problem List  
Diagnosis Code  Allergic rhinitis J30.9  Esophageal reflux K21.9  Essential hypertension, benign I10  
 Anxiety state, unspecified F41.1  Encounter for long-term (current) use of other medications Z79.899  Mixed hyperlipidemia E78.2  Esophageal dysmotility K22.4  
 S/P cardiac cath Z98.890  Chronic total occlusion of coronary artery I25.82  Dysphagia R13.10  Esophageal motor disorder K22.4  Myalgia and myositis, unspecified [id number]  Presence of stent in right coronary artery Z95.5  Snoring R06.83  
 PHUC (obstructive sleep apnea) G47.33  
 CAD (coronary artery disease) I25.10  Diarrhea R19.7  Chest pain R07.9  Angina, class III ([facility]) I20.9  Myocardial ischemia I25.9  S/P PTCA (percutaneous transluminal coronary angioplasty) Z98.61  
 Unstable angina ([facility]) I20.0  Gastric ulcer K25.9  Routine adult health maintenance Z00.00  
 Diabetes mellitus ([facility] E11.9  Type 2 diabetes mellitus with diabetic neuropathy ([facility]) E11.40 Depression Risk Factor Screening: PHQ over the last two weeks 8/31/2018 Little interest or pleasure in doing things Not at all Feeling down, depressed, irritable, or hopeless Not at all Total Score PHQ 2 0 Alcohol Risk Factor Screening: You do not drink alcohol or very rarely. Functional Ability and Level of Safety:  
Hearing Loss Hearing is good. Activities of Daily Living The home contains: grab bars Patient does total self care Fall Risk Fall Risk Assessment, last 12 mths 8/31/2018 Able to walk? Yes Fall in past 12 months? No  
 
 
Abuse Screen Patient is not abused Cognitive Screening Evaluation of Cognitive Function: 
Has your family/caregiver stated any concerns about your memory: no 
Normal 
 
Patient Care Team  
Patient Care Team: 
Noah Somers MD as PCP - [facility] Allen Polo MD as Physician (Sleep Medicine) Guerda Trujillo MD as Physician (Cardiology) Radha Lange RN as Nurse Navigator Assessment/Plan Education and counseling provided: 
Are appropriate based on today's review and evaluation Diagnoses and all orders for this visit: 
 
1. Type 2 diabetes mellitus with diabetic neuropathy, without long-term current use of insulin ([facility] -     AMB POC HEMOGLOBIN R6O 
-     METABOLIC PANEL, COMPREHENSIVE 
-     AMB POC GLUCOSE, QUANTITATIVE, BLOOD 2. Coronary artery disease involving native coronary artery of native heart without angina pectoris 3. Mixed hyperlipidemia -     LIPID PANEL 4. S/P PTCA (percutaneous transluminal coronary angioplasty) 5. Esophageal motor disorder 6. Essential hypertension, benign Health Maintenance Due Topic Date Due  
 MICROALBUMIN Q1  01/03/2018  MEDICARE YEARLY EXAM  07/25/2018  Influenza Age 5 to Adult  08/01/2018

## 2018-08-31 NOTE — PROGRESS NOTES
Chief Complaint Patient presents with  Diabetes F/U on diabetes.  Hypertension F/U on BP.  Cholesterol Problem F/U on cholesterol.

## 2018-08-31 NOTE — MR AVS SNAPSHOT
[facility] 
 
 
 [address] [facility] [address] [address] 
[phone] Patient: Ulysses Turner MRN: [id number] ODT:9[birth year] Visit Information Date & Time Provider Department Dept. Phone Encounter #  
 8/31/2018 11:00 AM Saul Ernst [phone] [id number] Follow-up Instructions Return in about 3 months (around 11/30/2018). Your Appointments 11/26/2018 11:40 AM  
Any with Leeann Leon MD  
[address] [facility] [place] ([facility] Appt Note: yearly f/up- last seen in 2016  
 [address] [facility], [address] [id number] [address] [place] [address], [address] [phone]  
  
    
 1/24/2019 10:15 AM  
ESTABLISHED PATIENT with Julia Colon MD  
[facility] Appt Note: 6 months [address]  
[phone] [address] Upcoming Health Maintenance Date Due MICROALBUMIN Q1 1/3/2018 MEDICARE YEARLY EXAM 7/25/2018 Influenza Age 5 to Adult 8/1/2018 EYE EXAM RETINAL OR DILATED Q1 10/19/2018 HEMOGLOBIN A1C Q6M 11/30/2018 FOOT EXAM Q1 2/21/2019 LIPID PANEL Q1 5/31/2019 GLAUCOMA SCREENING Q2Y 10/19/2019 BREAST CANCER SCRN MAMMOGRAM 4/24/2020 COLONOSCOPY 1/10/2025 DTaP/Tdap/Td series (2 - Td) 7/19/2027 Allergies as of 8/31/2018  Review Complete On: 8/31/2018 By: Roman Dominguez Severity Noted Reaction Type Reactions Pcn [Penicillins] High 06/14/2010   Systemic Hives Protamine High 07/16/2015    Anaphylaxis Sulfa (Sulfonamide Antibiotics) High 06/14/2010   Systemic Hives Imdur [Isosorbide Mononitrate]  07/27/2015    Other (comments)  
 headache Current Immunizations  Reviewed on 10/16/2017 Name Date Influenza High Dose Vaccine PF 10/16/2017, 10/31/2016 Influenza Vaccine 9/1/2013 Influenza Vaccine Split 10/10/2011 Pneumococcal Conjugate (PCV-13) 11/9/2015 Pneumococcal Polysaccharide (PPSV-23) 4/11/2017 Tdap 7/19/2017 Not reviewed this visit You Were Diagnosed With   
  
 Codes Comments Type 2 diabetes mellitus with diabetic neuropathy, without long-term current use of insulin (HCC)    -  Primary ICD-10-CM: E11.40 ICD-9-CM: 250.60, 357.2 Coronary artery disease involving native coronary artery of native heart without angina pectoris     ICD-10-CM: I25.10 ICD-9-CM: 414.01 Mixed hyperlipidemia     ICD-10-CM: E78.2 ICD-9-CM: 272.2 S/P PTCA (percutaneous transluminal coronary angioplasty)     ICD-10-CM: Z98.61 ICD-9-CM: V45.82 Esophageal motor disorder     ICD-10-CM: K22.4 ICD-9-CM: 530.5 Essential hypertension, benign     ICD-10-CM: I10 
ICD-9-CM: 401.1 Vitals BP Pulse Temp Resp Height(growth percentile) Weight(growth percentile) 124/74 (BP 1 Location: Right arm, BP Patient Position: Sitting) 65 97.6 °F (36.4 °C) (Oral) 18 5' 5\" (1.651 m) 197 lb 9.6 oz (89.6 kg) SpO2 BMI OB Status Smoking Status 97% 32.88 kg/m2 Postmenopausal Never Smoker BMI and BSA Data Body Mass Index Body Surface Area  
 32.88 kg/m 2 2.03 m 2 Preferred Pharmacy Pharmacy Name Phone [id number] [address] [phone] Your Updated Medication List  
  
   
This list is accurate as of 8/31/18 11:37 AM.  Always use your most recent med list. ADVIL 200 mg tablet Generic drug:  ibuprofen Take 400 mg by mouth as needed for Pain. amLODIPine 2.5 mg tablet Commonly known as:  NORVASC  
take 1 tablet by mouth once daily ASPIRIN PO Take 81 mg by mouth daily. azelastine 137 mcg (0.1 %) nasal spray Commonly known as:  ASTELIN  
  
 Blood-Glucose Meter monitoring kit AS DIRECTED TWICE DAILY CALTRATE 600+D PLUS MINERALS PO Take  by mouth. Takes one po once daily. CARAFATE 1 gram tablet Generic drug:  sucralfate Take 1 g by mouth four (4) times daily as needed. clopidogrel 75 mg Tab Commonly known as:  PLAVIX  
take 1 tablet by mouth daily  
  
 coenzyme Q-10 200 mg capsule Commonly known as:  CO Q-10 Take 1 Cap by mouth daily. Over the counter  
  
 fluticasone 50 mcg/actuation nasal spray Commonly known as:  FLONASE  
instill 2 sprays into each nostril once daily  
  
 gabapentin 100 mg capsule Commonly known as:  NEURONTIN Take  by mouth three (3) times daily as needed. GAVISCON EXTRA STRENGTH PO Take  by mouth as needed. glimepiride 2 mg tablet Commonly known as:  AMARYL  
take 1 tablet by mouth every morning  
  
 glucose blood VI test strips strip Commonly known as:  blood glucose test  
by Does Not Apply route Daily Check three times daily . Dx. Code E11.9 HORIZON NASAL CPAP SYSTEM  
by Does Not Apply route. Lancets Misc Check Blood sugar 3 times daily Dx. Code E11.9  
  
 metoprolol succinate 25 mg XL tablet Commonly known as:  TOPROL-XL  
take 1 tablet by mouth once daily MIRALAX PO Take  by mouth. 1/2 to one capful as needed. MUCINEX PO Take 1,200 mg by mouth as needed. Extra strength. nitroglycerin 0.4 mg SL tablet Commonly known as:  NITROSTAT  
place 1 tablet under the tongue every 5 minutes if needed RABEprazole 20 mg tablet Commonly known as:  ACIPHEX  
take 1 tablet by mouth once daily BEFORE A MEAL  
  
 RANEXA 1,000 mg Generic drug:  ranolazine ER  
take 1 tablet by mouth twice a day  
  
 raNITIdine 300 mg tablet Commonly known as:  ZANTAC Take 300 mg by mouth two (2) times a day. rosuvastatin 10 mg tablet Commonly known as:  CRESTOR  
take 1 tablet by mouth every evening SITagliptin 100 mg tablet Commonly known as:  Thor Ericka Take 1 Tab by mouth daily. triamterene-hydroCHLOROthiazide 37.5-25 mg per tablet Commonly known as:  MAXZIDE  
take 1 tablet by mouth every morning VITAMIN B-12 1,000 mcg tablet Generic drug:  cyanocobalamin Take 500 mcg by mouth daily. VITAMIN D3 1,000 unit tablet Generic drug:  cholecalciferol Take 1,000 Units by mouth daily. We Performed the Following AMB POC GLUCOSE, QUANTITATIVE, BLOOD [49167 CPT(R)] AMB POC HEMOGLOBIN A1C [42899 CPT(R)] LIPID PANEL [37508 CPT(R)] METABOLIC PANEL, COMPREHENSIVE [86059 CPT(R)] Follow-up Instructions Return in about 3 months (around 11/30/2018). Introducing [facility] [place] [facility]! [facility] introduces [employer] patient portal. Now you can access parts of your medical record, email your doctor's office, and request medication refills online. 1. In your internet browser, go to [email or web address] 2. Click on the First Time User? Click Here link in the Sign In box. You will see the New Member Sign Up page. 3. Enter your Yoogaia Access Code exactly as it appears below. You will not need to use this code after youve completed the sign-up process. If you do not sign up before the expiration date, you must request a new code. · Yo[employer]ia Access Code: [email or web address] Expires: 11/27/2018  2:16 PM 
 
4. Enter the last four digits of your Social Security Number (xxxx) and Date of Birth (mm/dd/yyyy) as indicated and click Submit. You will be taken to the next sign-up page. 5. Create a BUXt ID. This will be your Yoogaia login ID and cannot be changed, so think of one that is secure and easy to remember. 6. Create a Yoogaia password. You can change your password at any time. 7. Enter your Password Reset Question and Answer. This can be used at a later time if you forget your password. 8. Enter your e-mail address.  You will receive e-mail notification when new information is available in [employer]. 9. Click Sign Up. You can now view and download portions of your medical record. 10. Click the Download Summary menu link to download a portable copy of your medical information. If you have questions, please visit the Frequently Asked Questions section of the [employer] website. Remember, [employer] is NOT to be used for urgent needs. For medical emergencies, dial 911. Now available from your iPhone and Android! Please provide this summary of care documentation to your next provider. Your primary care clinician is listed as Joycelyn Patino. If you have any questions after today's visit, please call [phone].

## 2018-08-31 NOTE — PROGRESS NOTES
HISTORY OF PRESENT ILLNESS Felicitas Galaviz is a 76 y.o. female. f/u dm2,hbp, cad gerd. Feeling ok,sugars improving,chest pain stable Diabetes The history is provided by the patient. This is a chronic problem. The problem occurs daily. The problem has not changed since onset. Associated symptoms include chest pain. Hypertension This is a chronic problem. The problem has not changed since onset. Associated symptoms include chest pain and malaise/fatigue. Pertinent negatives include no orthopnea and no palpitations. Cholesterol Problem Associated symptoms include chest pain. GERD This is a chronic problem. The problem has not changed since onset. Associated symptoms include chest pain. Review of Systems Constitutional: Positive for malaise/fatigue. Negative for fever and weight loss. Cardiovascular: Positive for chest pain. Negative for palpitations and orthopnea. Gastrointestinal: Positive for heartburn. Negative for blood in stool, constipation and diarrhea. Genitourinary: Negative for frequency. Physical Exam  
Constitutional: She appears well-developed and well-nourished. HENT:  
Head: Normocephalic and atraumatic. Right Ear: External ear normal.  
Left Ear: External ear normal.  
Nose: Mucosal edema and rhinorrhea present. Mouth/Throat: Posterior oropharyngeal erythema present. Eyes: Conjunctivae are normal. Pupils are equal, round, and reactive to light. Neck: Normal range of motion. Neck supple. No tracheal deviation present. No thyromegaly present. Cardiovascular: Normal rate, regular rhythm and normal heart sounds. Exam reveals no gallop. No murmur heard. Pulmonary/Chest: Effort normal and breath sounds normal. No respiratory distress. Abdominal: Soft. Bowel sounds are normal. She exhibits no distension. There is no tenderness. There is no rebound. Musculoskeletal: Normal range of motion. Neurological: She is alert. Skin: Skin is warm and dry. Psychiatric: She has a normal mood and affect. Vitals reviewed. ASSESSMENT and PLAN Diagnoses and all orders for this visit: 
 
1. Type 2 diabetes mellitus with diabetic neuropathy, without long-term current use of insulin ([facility] -     AMB POC HEMOGLOBIN T3W 
-     METABOLIC PANEL, COMPREHENSIVE 
-     AMB POC GLUCOSE, QUANTITATIVE, BLOOD 2. Coronary artery disease involving native coronary artery of native heart without angina pectoris 3. Mixed hyperlipidemia -     LIPID PANEL 4. S/P PTCA (percutaneous transluminal coronary angioplasty) 5. Esophageal motor disorder 6. Essential hypertension, benign Doing well,continue current meds and treatments Follow-up Disposition: 
Return in about 3 months (around 11/30/2018).

## 2018-09-01 LAB
ALBUMIN SERPL-MCNC: 4.5 G/DL (ref 3.6–4.8)
ALBUMIN/GLOB SERPL: 2 {RATIO} (ref 1.2–2.2)
ALP SERPL-CCNC: 81 IU/L (ref 39–117)
ALT SERPL-CCNC: 18 IU/L (ref 0–32)
AST SERPL-CCNC: 18 IU/L (ref 0–40)
BILIRUB SERPL-MCNC: 0.8 MG/DL (ref 0–1.2)
BUN SERPL-MCNC: 16 MG/DL (ref 8–27)
BUN/CREAT SERPL: 17 (ref 12–28)
CALCIUM SERPL-MCNC: 10 MG/DL (ref 8.7–10.3)
CHLORIDE SERPL-SCNC: 104 MMOL/L (ref 96–106)
CHOLEST SERPL-MCNC: 139 MG/DL (ref 100–199)
CO2 SERPL-SCNC: 21 MMOL/L (ref 20–29)
CREAT SERPL-MCNC: 0.95 MG/DL (ref 0.57–1)
GLOBULIN SER CALC-MCNC: 2.2 G/DL (ref 1.5–4.5)
GLUCOSE SERPL-MCNC: 157 MG/DL (ref 65–99)
HDLC SERPL-MCNC: 39 MG/DL
INTERPRETATION, 910389: NORMAL
LDLC SERPL CALC-MCNC: 39 MG/DL (ref 0–99)
Lab: NORMAL
POTASSIUM SERPL-SCNC: 3.6 MMOL/L (ref 3.5–5.2)
PROT SERPL-MCNC: 6.7 G/DL (ref 6–8.5)
SODIUM SERPL-SCNC: 142 MMOL/L (ref 134–144)
TRIGL SERPL-MCNC: 306 MG/DL (ref 0–149)
VLDLC SERPL CALC-MCNC: 61 MG/DL (ref 5–40)

## 2018-09-26 RX ORDER — AMLODIPINE BESYLATE 2.5 MG/1
TABLET ORAL
Qty: 90 TAB | Refills: 3 | Status: SHIPPED | OUTPATIENT
Start: 2018-09-26 | End: 2019-09-24 | Stop reason: SDUPTHER

## 2018-10-28 DIAGNOSIS — E11.9 TYPE 2 DIABETES MELLITUS WITHOUT COMPLICATION, WITHOUT LONG-TERM CURRENT USE OF INSULIN (HCC): ICD-10-CM

## 2018-10-29 RX ORDER — SITAGLIPTIN 100 MG/1
TABLET, FILM COATED ORAL
Qty: 30 TAB | Refills: 11 | Status: SHIPPED | OUTPATIENT
Start: 2018-10-29 | End: 2019-11-07 | Stop reason: SDUPTHER

## 2018-11-26 ENCOUNTER — DOCUMENTATION ONLY (OUTPATIENT)
Dept: SLEEP MEDICINE | Age: 68
End: 2018-11-26

## 2018-11-26 ENCOUNTER — OFFICE VISIT (OUTPATIENT)
Dept: SLEEP MEDICINE | Age: 68
End: 2018-11-26

## 2018-11-26 VITALS
HEART RATE: 66 BPM | OXYGEN SATURATION: 94 % | WEIGHT: 194 LBS | DIASTOLIC BLOOD PRESSURE: 53 MMHG | HEIGHT: 65 IN | SYSTOLIC BLOOD PRESSURE: 129 MMHG | BODY MASS INDEX: 32.32 KG/M2

## 2018-11-26 DIAGNOSIS — G47.33 OBSTRUCTIVE SLEEP APNEA (ADULT) (PEDIATRIC): Primary | ICD-10-CM

## 2018-11-26 DIAGNOSIS — I25.10 CORONARY ARTERY DISEASE INVOLVING NATIVE CORONARY ARTERY OF NATIVE HEART WITHOUT ANGINA PECTORIS: ICD-10-CM

## 2018-11-26 DIAGNOSIS — E11.9 TYPE 2 DIABETES MELLITUS WITHOUT COMPLICATION, WITHOUT LONG-TERM CURRENT USE OF INSULIN (HCC): ICD-10-CM

## 2018-11-26 NOTE — PROGRESS NOTES
Settings Change requested by GARRISON Ace   Per Subhash Louis MD  Set Mode to CPAP   Set Set pressure to 7.0 cmH2O   Set EPR to Off   Set EPR level to 1   Set Ramp enable to Off   Set Ramp time to 5 min   Set Start pressure to 4.0 cmH2O    Previous Settings   Set Start pressure to 4.0 cmH2O   Set Mode to CPAP   Set Set pressure to 6.0 cmH2O   Set EPR to Off   Set Ramp enable to Off   Set Ramp time to 5 min

## 2018-11-26 NOTE — PROGRESS NOTES
[address] [facility] [place]. [place], [address]  Tel.  [phone]  Fax. 100 [facility] 60  [place], [address] [facility]  Tel.  [phone]  Fax. [phone] [address] [facility] Rosanna Armendariz   Tel.  [phone]  Fax. [phone]     S>Raina Tellez is a 76 y.o. female seen for a positive airway pressure follow-up. She reports no problems using the device. The following problems are identified:    Drowsiness no Problems exhaling no   Snoring No, feels like she needs more air Forget to put on occasionally   Mask Comfortable yes Can't fall asleep no   Dry Mouth no Mask falls off no   Air Leaking no Frequent awakenings no     Download reviewed. She admits that her sleep has improved. Therapy Apnea Index averaged over PAP use: 5 /hr which reflects improved sleep breathing condition. Allergies   Allergen Reactions    Penicillins Hives     Other reaction(s): Hives    Protamine Anaphylaxis    Sulfa (Sulfonamide Antibiotics) Hives    Imdur [Isosorbide Mononitrate] Other (comments)     headache       She has a current medication list which includes the following prescription(s): januvia, amlodipine, glimepiride, nitroglycerin, rabeprazole, azelastine, rosuvastatin, clopidogrel, fluticasone, ranexa, metoprolol succinate, triamterene-hydrochlorothiazide, mag carb/aluminum hydrox/algin, aspirin, calcium carb/vit d3/minerals, gabapentin, guaifenesin, polyethylene glycol 3350, ranitidine, glucose blood vi test strips, lancets, cyanocobalamin, blood-glucose meter, oxygen-air delivery systems, sucralfate, ibuprofen, coenzyme q-10, and cholecalciferol. .      She  has a past medical history of Allergic rhinitis, cause unspecified, Angina, class III ([address] Anxiety, Arthritis, CAD (coronary artery disease), Colon polyps, Diabetes ([address] Diarrhea, Encounter for long-term (current) use of other medications, Esophageal dysmotility, Essential hypertension, benign, Gastric ulcer, GERD (gastroesophageal reflux disease), Hypertension, Ill-defined condition, Mixed hyperlipidemia, Nausea & vomiting, and Sleep apnea. [place] Sleepiness Score: 6   and Modified F.O.S.Q. Score Total / 2: 16.5   which reflect improved sleep quality over therapy time. O>    Visit Vitals  /53 (BP 1 Location: Left arm, BP Patient Position: Sitting)   Pulse 66   Ht 5' 5\" (1.651 m)   Wt 194 lb (88 kg)   SpO2 94%   BMI 32.28 kg/m²           General:   Alert, oriented, not in distress   Neck:   No JVD    Chest/Lungs:  symetrical lung expansion , no accessory muscle use    Extremities:  no obvious rashes , negative edema    Neuro:  No focal deficits ; No obvious tremor    Psych:  Normal affect ,  Normal countenance ;         A>    ICD-10-CM ICD-9-CM    1. Obstructive sleep apnea (adult) (pediatric) G47.33 327.23 AMB SUPPLY ORDER   2. Coronary artery disease involving native coronary artery of native heart without angina pectoris I25.10 414.01    3. Type 2 diabetes mellitus without complication, without long-term current use of insulin ([facility]) E11.9 250.00      AHI = 28(3-15). On CPAP :  6 cmH2O. Compliant:      yes    Therapeutic Response:  Positive    P>      * Follow-up Disposition:  Return in about 1 year (around 11/26/2019). Change to 7 cmH20  PAP therapy is effective and remains necessary for treatment of sleep apnea    * She was asked to contact our office for any problems regarding PAP therapy. * Counseling was provided regarding the importance of regular PAP use and on proper sleep hygiene and safe driving. * Re-enforced proper and regular cleaning for the device. 2. Coronary Artery Disease - she continues on his current regimen. I have reviewed the relationship between heart disease and sleep disordered breathing. 3. Type II diabetes - she continues on her current regimen. I have reviewed the relationship between sleep disordered breathing as it relates to diabetes.   Type II diabetes - she continues on her current regimen. I have reviewed the relationship between sleep disordered breathing as it relates to diabetes.   Electronically signed by    Jahaira Rubio MD  Diplomate in Sleep Medicine  SHAYAN

## 2018-11-26 NOTE — PATIENT INSTRUCTIONS
217 [facility] [place]. [place], [address]  Tel.  [phone]  Fax. 100 [facility] 60  [place], [address] [facility] [facility] [address]  Tel.  [phone]  Fax. [phone] [address]  Tel.  [phone]  Fax. [phone]     PROPER SLEEP HYGIENE    What to avoid  · Do not have drinks with caffeine, such as coffee or black tea, for 8 hours before bed. · Do not smoke or use other types of tobacco near bedtime. Nicotine is a stimulant and can keep you awake. · Avoid drinking alcohol late in the evening, because it can cause you to wake in the middle of the night. · Do not eat a big meal close to bedtime. If you are hungry, eat a light snack. · Do not drink a lot of water close to bedtime, because the need to urinate may wake you up during the night. · Do not read or watch TV in bed. Use the bed only for sleeping and sexual activity. What to try  · Go to bed at the same time every night, and wake up at the same time every morning. Do not take naps during the day. · Keep your bedroom quiet, dark, and cool. · Get regular exercise, but not within 3 to 4 hours of your bedtime. .  · Sleep on a comfortable pillow and mattress. · If watching the clock makes you anxious, turn it facing away from you so you cannot see the time. · If you worry when you lie down, start a worry book. Well before bedtime, write down your worries, and then set the book and your concerns aside. · Try meditation or other relaxation techniques before you go to bed. · If you cannot fall asleep, get up and go to another room until you feel sleepy. Do something relaxing. Repeat your bedtime routine before you go to bed again. · Make your house quiet and calm about an hour before bedtime. Turn down the lights, turn off the TV, log off the computer, and turn down the volume on music. This can help you relax after a busy day.     Drowsy Driving  The [address] [place] [address] Road Traffic Safety Administration cites drowsiness as a causing factor in more than 931,952 police reported crashes annually, resulting in 76,000 injuries and 1,500 deaths. Other surveys suggest 55% of people polled have driven while drowsy in the past year, 23% had fallen asleep but not crashed, 3% crashed, and 2% had and accident due to drowsy driving. Who is at risk? Young Drivers: One study of drowsy driving accidents states that 55% of the drivers were under 25 years. Of those, 75% were male. Shift Workers and Travelers: People who work overnight or travel across time zones frequently are at higher risk of experiencing Circadian Rhythm Disorders. They are trying to work and function when their body is programed to sleep. Sleep Deprived: Lack of sleep has a serious impact on your ability to pay attention or focus on a task. Consistently getting less than the average of 8 hours your body needs creates partial or cumulative sleep deprivation. Untreated Sleep Disorders: Sleep Apnea, Narcolepsy, R.L.S., and other sleep disorders (untreated) prevent a person from getting enough restful sleep. This leads to excessive daytime sleepiness and increases the risk for drowsy driving accidents by up to 7 times. Medications / Alcohol: Even over the counter medications can cause drowsiness. Medications that impair a drivers attention should have a warning label. Alcohol naturally makes you sleepy and on its own can cause accidents. Combined with excessive drowsiness its effects are amplified. Signs of Drowsy Driving:   * You don't remember driving the last few miles   * You may drift out of your sal   * You are unable to focus and your thoughts wander   * You may yawn more often than normal   * You have difficulty keeping your eyes open / nodding off   * Missing traffic signs, speeding, or tailgating  Prevention-   Good sleep hygiene, lifestyle and behavioral choices have the most impact on drowsy driving.  There is no substitute for sleep and the average person requires 8 hours nightly. If you find yourself driving drowsy, stop and sleep. Consider the sleep hygiene tips provided during your visit as well. Medication Refill Policy: Refills for all medications require 1 week advance notice. Please have your pharmacy fax a refill request. We are unable to fax, or call in \"controled substance\" medications and you will need to pick these prescriptions up from our office. [employer] Activation    Thank you for requesting access to [employer]. Please follow the instructions below to securely access and download your online medical record. [employer] allows you to send messages to your doctor, view your test results, renew your prescriptions, schedule appointments, and more. How Do I Sign Up? 1. In your internet browser, go to [email or web address]. 2. Click on the First Time User? Click Here link in the Sign In box. You will see the New Member Sign Up page. 3. Enter your [employer] Access Code exactly as it appears below. You will not need to use this code after youve completed the sign-up process. If you do not sign up before the expiration date, you must request a new code. [employer] Access Code: [email or web address]  Expires: 2018  1:16 PM (This is the date your [employer] access code will )    4. Enter the last four digits of your Social Security Number (xxxx) and Date of Birth (mm/dd/yyyy) as indicated and click Submit. You will be taken to the next sign-up page. 5. Create a [employer] Network ID. This will be your [employer] login ID and cannot be changed, so think of one that is secure and easy to remember. 6. Create a [employer] password. You can change your password at any time. 7. Enter your Password Reset Question and Answer. This can be used at a later time if you forget your password. 8. Enter your e-mail address. You will receive e-mail notification when new information is available in [address] 9. Click Sign Up.  You can now view and download portions of your medical record. 10. Click the Download Summary menu link to download a portable copy of your medical information. Additional Information    If you have questions, please call [phone]. Remember, [employer] is NOT to be used for urgent needs. For medical emergencies, dial 911.

## 2018-11-29 RX ORDER — TRIAMTERENE/HYDROCHLOROTHIAZID 37.5-25 MG
TABLET ORAL
Qty: 90 TAB | Refills: 3 | Status: SHIPPED | OUTPATIENT
Start: 2018-11-29 | End: 2019-12-30 | Stop reason: SDUPTHER

## 2018-12-03 ENCOUNTER — OFFICE VISIT (OUTPATIENT)
Dept: FAMILY MEDICINE CLINIC | Age: 68
End: 2018-12-03

## 2018-12-03 ENCOUNTER — HOSPITAL ENCOUNTER (OUTPATIENT)
Dept: LAB | Age: 68
Discharge: HOME | End: 2018-12-03
Payer: MEDICARE

## 2018-12-03 VITALS
SYSTOLIC BLOOD PRESSURE: 128 MMHG | TEMPERATURE: 98 F | HEART RATE: 66 BPM | DIASTOLIC BLOOD PRESSURE: 78 MMHG | RESPIRATION RATE: 20 BRPM | HEIGHT: 65 IN | BODY MASS INDEX: 33.36 KG/M2 | WEIGHT: 200.2 LBS | OXYGEN SATURATION: 99 %

## 2018-12-03 DIAGNOSIS — E78.2 MIXED HYPERLIPIDEMIA: ICD-10-CM

## 2018-12-03 DIAGNOSIS — I25.10 CORONARY ARTERY DISEASE INVOLVING NATIVE CORONARY ARTERY OF NATIVE HEART WITHOUT ANGINA PECTORIS: ICD-10-CM

## 2018-12-03 DIAGNOSIS — E11.40 TYPE 2 DIABETES MELLITUS WITH DIABETIC NEUROPATHY, WITHOUT LONG-TERM CURRENT USE OF INSULIN (HCC): ICD-10-CM

## 2018-12-03 DIAGNOSIS — E11.9 TYPE 2 DIABETES MELLITUS WITHOUT COMPLICATION, WITHOUT LONG-TERM CURRENT USE OF INSULIN (HCC): Primary | ICD-10-CM

## 2018-12-03 LAB
GLUCOSE POC: 169 MG/DL
HBA1C MFR BLD HPLC: 6.4 %

## 2018-12-03 PROCEDURE — 36415 COLL VENOUS BLD VENIPUNCTURE: CPT

## 2018-12-03 PROCEDURE — 80053 COMPREHEN METABOLIC PANEL: CPT

## 2018-12-03 PROCEDURE — 80061 LIPID PANEL: CPT

## 2018-12-03 RX ORDER — LUBIPROSTONE 24 UG/1
CAPSULE, GELATIN COATED ORAL
Refills: 1 | COMMUNITY
Start: 2018-11-27 | End: 2019-01-24 | Stop reason: DRUGHIGH

## 2018-12-03 NOTE — PROGRESS NOTES
Chief Complaint Patient presents with  Diabetes F/U ondiabetes.  Hypertension F/U on BP.  Cholesterol Problem F/U on cholesterol.

## 2018-12-03 NOTE — PROGRESS NOTES
HISTORY OF PRESENT ILLNESS Maisha Burch is a 76 y.o. female. f/u dm2,hbp, cad,chol gerd. Feeling ok,followed by GI. Diabetes The history is provided by the patient. This is a chronic problem. The problem occurs daily. The problem has not changed since onset. Hypertension This is a chronic problem. The problem has been gradually improving. Associated symptoms include malaise/fatigue. Pertinent negatives include no orthopnea and no palpitations. Cholesterol Problem This is a chronic problem. The problem occurs daily. The problem has been gradually improving. Review of Systems Constitutional: Positive for malaise/fatigue. Negative for fever and weight loss. Cardiovascular: Negative for palpitations and orthopnea. Gastrointestinal: Positive for heartburn. Negative for blood in stool and constipation. Genitourinary: Negative for dysuria and frequency. Physical Exam  
Constitutional: She appears well-developed and well-nourished. HENT:  
Head: Normocephalic and atraumatic. Right Ear: External ear normal.  
Left Ear: External ear normal.  
Nose: Mucosal edema and rhinorrhea present. Mouth/Throat: Posterior oropharyngeal erythema present. Eyes: Conjunctivae are normal. Pupils are equal, round, and reactive to light. Neck: Normal range of motion. Neck supple. No tracheal deviation present. No thyromegaly present. Cardiovascular: Normal rate, regular rhythm and normal heart sounds. Exam reveals no gallop. No murmur heard. Pulmonary/Chest: Effort normal and breath sounds normal. No respiratory distress. Abdominal: Soft. Bowel sounds are normal.  
Musculoskeletal: Normal range of motion. Neurological: She is alert. Skin: Skin is warm and dry. Psychiatric: She has a normal mood and affect. Vitals reviewed. ASSESSMENT and PLAN Diagnoses and all orders for this visit: 
 
1.  Type 2 diabetes mellitus without complication, without long-term current use of insulin ([facility]),doing well,can halt glimiperide -     METABOLIC PANEL, COMPREHENSIVE 
-     AMB POC GLUCOSE, QUANTITATIVE, BLOOD 
-     AMB POC HEMOGLOBIN A1C 
 
2. Type 2 diabetes mellitus with diabetic neuropathy, without long-term current use of insulin ([facility] -     METABOLIC PANEL, COMPREHENSIVE 
-     AMB POC GLUCOSE, QUANTITATIVE, BLOOD 
-     AMB POC HEMOGLOBIN A1C 
 
3. Mixed hyperlipidemia -     LIPID PANEL 4. Coronary artery disease involving native coronary artery of native heart without angina pectoris,stable. Continue current meds and treatments. Doing well,continue current meds and treatments Follow-up Disposition: 
Return in about 3 months (around 3/3/2019).

## 2018-12-04 LAB
ALBUMIN SERPL-MCNC: 4.5 G/DL (ref 3.6–4.8)
ALBUMIN/GLOB SERPL: 1.7 {RATIO} (ref 1.2–2.2)
ALP SERPL-CCNC: 91 IU/L (ref 39–117)
ALT SERPL-CCNC: 17 IU/L (ref 0–32)
AST SERPL-CCNC: 19 IU/L (ref 0–40)
BILIRUB SERPL-MCNC: 0.9 MG/DL (ref 0–1.2)
BUN SERPL-MCNC: 12 MG/DL (ref 8–27)
BUN/CREAT SERPL: 13 (ref 12–28)
CALCIUM SERPL-MCNC: 9.8 MG/DL (ref 8.7–10.3)
CHLORIDE SERPL-SCNC: 99 MMOL/L (ref 96–106)
CHOLEST SERPL-MCNC: 138 MG/DL (ref 100–199)
CO2 SERPL-SCNC: 23 MMOL/L (ref 20–29)
CREAT SERPL-MCNC: 0.95 MG/DL (ref 0.57–1)
GLOBULIN SER CALC-MCNC: 2.7 G/DL (ref 1.5–4.5)
GLUCOSE SERPL-MCNC: 165 MG/DL (ref 65–99)
HDLC SERPL-MCNC: 39 MG/DL
INTERPRETATION, 910389: NORMAL
LDLC SERPL CALC-MCNC: 35 MG/DL (ref 0–99)
Lab: NORMAL
POTASSIUM SERPL-SCNC: 3.6 MMOL/L (ref 3.5–5.2)
PROT SERPL-MCNC: 7.2 G/DL (ref 6–8.5)
SODIUM SERPL-SCNC: 142 MMOL/L (ref 134–144)
TRIGL SERPL-MCNC: 322 MG/DL (ref 0–149)
VLDLC SERPL CALC-MCNC: 64 MG/DL (ref 5–40)

## 2019-01-24 ENCOUNTER — OFFICE VISIT (OUTPATIENT)
Dept: CARDIOLOGY CLINIC | Age: 69
End: 2019-01-24

## 2019-01-24 VITALS
HEART RATE: 68 BPM | SYSTOLIC BLOOD PRESSURE: 120 MMHG | OXYGEN SATURATION: 96 % | DIASTOLIC BLOOD PRESSURE: 90 MMHG | WEIGHT: 200.2 LBS | HEIGHT: 65 IN | BODY MASS INDEX: 33.36 KG/M2 | RESPIRATION RATE: 16 BRPM

## 2019-01-24 DIAGNOSIS — Z98.61 S/P PTCA (PERCUTANEOUS TRANSLUMINAL CORONARY ANGIOPLASTY): ICD-10-CM

## 2019-01-24 DIAGNOSIS — E78.2 MIXED HYPERLIPIDEMIA: ICD-10-CM

## 2019-01-24 DIAGNOSIS — I25.119 CORONARY ARTERY DISEASE INVOLVING NATIVE CORONARY ARTERY OF NATIVE HEART WITH ANGINA PECTORIS (HCC): Primary | ICD-10-CM

## 2019-01-24 DIAGNOSIS — I25.82 CHRONIC TOTAL OCCLUSION OF CORONARY ARTERY: ICD-10-CM

## 2019-01-24 DIAGNOSIS — I10 ESSENTIAL HYPERTENSION, BENIGN: ICD-10-CM

## 2019-01-24 RX ORDER — LUBIPROSTONE 8 UG/1
CAPSULE, GELATIN COATED ORAL
Refills: 1 | COMMUNITY
Start: 2018-12-28 | End: 2019-09-11

## 2019-01-24 NOTE — PROGRESS NOTES
Subjective/HPI:  
 
Ms. Josie Blunt is a 76 y.o. female is here for routine f/u. She has a PMHx of CAD, DM2, HTN, HLD and PHUC. She notes worsening chest pain symptoms over the holiday period, which she attributes to increased stress. She had to take 2 SL NTG last week while walking up bleachers attending a ball game. She denies complaints of orthopnea, PND or edema. She has some shortness of breath with moderate to extreme exertion, which is her baseline. She is compliant with her CPAP mask. She complains of blurry vision in her left eye that is worsening, and has an appt to see opthalmology tomorrow. PCP Provider David Thomas MD 
Past Medical History:  
Diagnosis Date  Allergic rhinitis, cause unspecified 2011  Angina, class III ([facility] 2013  
 as of 8/4/15 pt reports intermittent \"angina pain\" and GRAJEDA; followed by Dr Kelsi Blackwell  Anxiety  Arthritis   
 neck - numbness of arm  CAD (coronary artery disease)   
 angina / Dr Ronnell Tafoya  Colon polyps 2011  Diabetes ([facility]  Diarrhea \"random\" per pt; followed by Dr Noelle Esparza  Encounter for long-term (current) use of other medications 2011  Esophageal dysmotility 10/10/2011  Essential hypertension, benign 2011  Gastric ulcer 2015  GERD (gastroesophageal reflux disease)  Hypertension  Ill-defined condition   
 torn MCL - left - no surgery  Mixed hyperlipidemia 2011  Nausea & vomiting  Sleep apnea CPAP;  Dr Jerry holly MD  
  
Past Surgical History:  
Procedure Laterality Date [address]    HX BUNIONECTOMY Right  HX CATARACT REMOVAL Bilateral   
 HX  SECTION    
 x3  
 HX CHOLECYSTECTOMY  HX GI    
 EGDs with dilatation  HX HEART CATHETERIZATION    
 catherization with 3 stents - states stents are blocked  HX HEART CATHETERIZATION  2015 unable to stent; tried to unblock stents but unable to/starting cardiac rehab Augu 2015/has collateral circulation  [place] EGD BALLOON DILATION ESOPHAGUS <30 MM DIAM  10/13/2010  [place] EGD TRANSORAL BIOPSY SINGLE/MULTIPLE  10/13/2010  UPPER GI ENDOSCOPY,BALL DIL,30MM  3/30/2015  UPPER GI ENDOSCOPY,BIOPSY  3/30/2015 Family History Problem Relation Age of Onset  Heart Disease Mother  Hypertension Mother  Heart Attack Mother  Heart Disease Father  Hypertension Father  Heart Surgery Father  Cancer Sister   
     lung  Cancer Brother   
     brain tumor - malignant  Breast Cancer Sister Social History Socioeconomic History  Marital status:  Spouse name: Not on file  Number of children: Not on file  Years of education: Not on file  Highest education level: Not on file Social Needs  Financial resource strain: Not on file  Food insecurity - worry: Not on file  Food insecurity - inability: Not on file  Transportation needs - medical: Not on file  Transportation needs - non-medical: Not on file Occupational History  Not on file Tobacco Use  Smoking status: Never Smoker  Smokeless tobacco: Never Used Substance and Sexual Activity  Alcohol use: Yes Alcohol/week: 1.0 oz Types: 2 Glasses of wine per week Comment: occasionally  Drug use: No  
 Sexual activity: Yes  
  Partners: Male Other Topics Concern  Not on file Social History Narrative  Not on file Allergies Allergen Reactions  Penicillins Hives Other reaction(s): Hives  Protamine Anaphylaxis  Sulfa (Sulfonamide Antibiotics) Hives  Imdur [Isosorbide Mononitrate] Other (comments)  
  headache Current Outpatient Medications Medication Sig  AMITIZA 8 mcg capsule take 1 capsule by mouth twice a day with meals  metoprolol succinate (TOPROL-XL) 25 mg XL tablet take 1 tablet by mouth once daily  triamterene-hydroCHLOROthiazide (MAXZIDE) 37.5-25 mg per tablet take 1 tablet by mouth every morning  JANUVIA 100 mg tablet take 1 tablet by mouth once daily  amLODIPine (NORVASC) 2.5 mg tablet take 1 tablet by mouth once daily  glimepiride (AMARYL) 2 mg tablet take 1 tablet by mouth every morning (Patient taking differently: take 1 tablet by mouth every morning)  nitroglycerin (NITROSTAT) 0.4 mg SL tablet place 1 tablet under the tongue every 5 minutes if needed  RABEprazole (ACIPHEX) 20 mg tablet take 1 tablet by mouth once daily BEFORE A MEAL  
 azelastine (ASTELIN) 137 mcg (0.1 %) nasal spray as needed.  rosuvastatin (CRESTOR) 10 mg tablet take 1 tablet by mouth every evening  clopidogrel (PLAVIX) 75 mg tab take 1 tablet by mouth daily  fluticasone (FLONASE) 50 mcg/actuation nasal spray instill 2 sprays into each nostril once daily  RANEXA 1,000 mg take 1 tablet by mouth twice a day  MAG CARB/ALUMINUM HYDROX/ALGIN (GAVISCON EXTRA STRENGTH PO) Take  by mouth as needed.  ASPIRIN PO Take 81 mg by mouth daily.  MINH/D3/MAG11/ZINC//RONEN/BOR (CALTRATE 600+D PLUS MINERALS PO) Take  by mouth. Takes one po once daily.  gabapentin (NEURONTIN) 100 mg capsule Take  by mouth three (3) times daily as needed.  GUAIFENESIN (MUCINEX PO) Take 1,200 mg by mouth as needed. Extra strength.  raNITIdine (ZANTAC) 300 mg tablet Take 300 mg by mouth two (2) times a day.  glucose blood VI test strips (BLOOD GLUCOSE TEST) strip by Does Not Apply route Daily Check three times daily . Dx. Code E11.9  Lancets misc Check Blood sugar 3 times daily Dx. Code E11.9  cyanocobalamin (VITAMIN B-12) 1,000 mcg tablet Take 500 mcg by mouth daily.  Blood-Glucose Meter monitoring kit AS DIRECTED TWICE DAILY  OXYGEN-AIR DELIVERY SYSTEMS (HORIZON NASAL CPAP SYSTEM) by Does Not Apply route.  sucralfate (CARAFATE) 1 gram tablet Take 1 g by mouth four (4) times daily as needed.  ibuprofen (ADVIL) 200 mg tablet Take 400 mg by mouth as needed for Pain.  coenzyme Q-10 (CO Q-10) 200 mg capsule Take 1 Cap by mouth daily. Over the counter  cholecalciferol, vitamin d3, (VITAMIN D) 1,000 unit tablet Take 1,000 Units by mouth daily.  POLYETHYLENE GLYCOL 3350 (MIRALAX PO) Take  by mouth. 1/2 to one capful as needed. No current facility-administered medications for this visit. I have reviewed the problem list, allergy list, medical history, family, social history and medications. Review of Symptoms: 
 
Review of Systems Constitutional: Negative for chills, fever and weight loss. HENT: Negative for nosebleeds. Eyes: Negative for blurred vision and double vision. Respiratory: Negative for cough, shortness of breath and wheezing. Cardiovascular: Negative for chest pain, palpitations, orthopnea, leg swelling and PND. Gastrointestinal: Negative for abdominal pain, blood in stool, diarrhea, nausea and vomiting. Musculoskeletal: Negative for joint pain. Skin: Negative for rash. Neurological: Negative for dizziness, tingling and loss of consciousness. Endo/Heme/Allergies: Does not bruise/bleed easily. Physical Exam:   
 
General: Well developed, in no acute distress, cooperative and alert HEENT: No carotid bruits, no JVD, trach is midline. Neck Supple, PEERL, EOM intact. Heart:  reg rate and rhythm; normal S1/S2; no murmurs, gallops or rubs.  
Respiratory: Clear bilaterally x 4, no wheezing or rales Abdomen:   Soft, non-tender, no distention, no masses. + BS.  
Extremities:  Normal cap refill, no cyanosis, atraumatic. No edema. Neuro: A&Ox3, speech clear, gait stable. Skin: Skin color is normal. No rashes or lesions. Non diaphoretic Vascular: 2+ pulses symmetric in all extremities Vitals:  
 01/24/19 1018 01/24/19 1019 BP: 130/80 120/90 Pulse: 68 Resp: 16 SpO2: 96% Weight: 200 lb 3.2 oz (90.8 kg) Height: 5' 5\" (1.651 m) Cardiographics ECG: sinus rhythm with 1st degree AV block, with GA interval 222 ms. Results for orders placed or performed during the hospital encounter of 07/16/15 EKG, 12 LEAD, INITIAL Result Value Ref Range Ventricular Rate 86 BPM  
 Atrial Rate 86 BPM  
 P-R Interval 254 ms QRS Duration 110 ms  
 Q-T Interval 400 ms QTC Calculation (Bezet) 478 ms Calculated P Axis 55 degrees Calculated R Axis 41 degrees Calculated T Axis 33 degrees Diagnosis Sinus rhythm with 1st degree AV block Nonspecific ST abnormality When compared with ECG of 13-DEC-2013 03:03, No significant change Confirmed by Nicole Galeano ([id number]) on 7/17/2015 12:53:21 PM 
  
Results for orders placed or performed in visit on 02/22/11 AMB POC EKG ROUTINE W/ 12 LEADS, INTER & REP Narrative EKG: normal EKG, normal sinus rhythm. Impression EKG: normal EKG, normal sinus rhythm. Cardiology Labs: 
Lab Results Component Value Date/Time Cholesterol, total 138 12/03/2018 11:27 AM  
 HDL Cholesterol 39 (L) 12/03/2018 11:27 AM  
 LDL, calculated 35 12/03/2018 11:27 AM  
 Triglyceride 322 (H) 12/03/2018 11:27 AM  
 CHOL/HDL Ratio 3.0 12/13/2013 03:16 AM  
 
 
Lab Results Component Value Date/Time Sodium 142 12/03/2018 11:27 AM  
 Potassium 3.6 12/03/2018 11:27 AM  
 Chloride 99 12/03/2018 11:27 AM  
 CO2 23 12/03/2018 11:27 AM  
 Anion gap 3 (L) 07/17/2015 04:37 AM  
 Glucose 165 (H) 12/03/2018 11:27 AM  
 BUN 12 12/03/2018 11:27 AM  
 Creatinine 0.95 12/03/2018 11:27 AM  
 BUN/Creatinine ratio 13 12/03/2018 11:27 AM  
 GFR est AA 71 12/03/2018 11:27 AM  
 GFR est non-AA 62 12/03/2018 11:27 AM  
 Calcium 9.8 12/03/2018 11:27 AM  
 Bilirubin, total 0.9 12/03/2018 11:27 AM  
 AST (SGOT) 19 12/03/2018 11:27 AM  
 Alk.  phosphatase 91 12/03/2018 11:27 AM  
 Protein, total 7.2 12/03/2018 11:27 AM  
 Albumin 4.5 12/03/2018 11:27 AM  
 Globulin 3.6 09/14/2012 06:45 PM  
 A-G Ratio 1.7 12/03/2018 11:27 AM  
 ALT (SGPT) 17 12/03/2018 11:27 AM  
  
 
 
 Assessment: 
 
 Assessment: ICD-10-CM ICD-9-CM 1. Coronary artery disease involving native coronary artery of native heart with angina pectoris (HCC) I25.119 414.01 AMB POC EKG ROUTINE W/ 12 LEADS, INTER & REP  
  413.9 AMITIZA 8 mcg capsule STRESS TEST LEXISCAN/CARDIOLITE  
   STRESS TEST LEXISCAN/CARDIOLITE 2. Essential hypertension, benign I10 401.1 AMB POC EKG ROUTINE W/ 12 LEADS, INTER & REP  
   AMITIZA 8 mcg capsule STRESS TEST LEXISCAN/CARDIOLITE  
   STRESS TEST LEXISCAN/CARDIOLITE 3. Mixed hyperlipidemia E78.2 272.2 AMB POC EKG ROUTINE W/ 12 LEADS, INTER & REP  
   AMITIZA 8 mcg capsule STRESS TEST LEXISCAN/CARDIOLITE  
   STRESS TEST LEXISCAN/CARDIOLITE 4. Chronic total occlusion of coronary artery I25.82 414.00   
  414.2 5. S/P PTCA (percutaneous transluminal coronary angioplasty) Z98.61 V45.82 Plan: 1. Coronary artery disease involving native coronary artery of native heart without angina pectoris With  of the RCA with 2x unsuccessful attempts to revascularize this in the past. Echo with preserved LVEF 60-65% in 7/2015. She has some worsening symptoms, but is on a very good medical regimen. Will plan for NM Lexiscan to assess for new areas of ischemia. Would continue her DAPT lifelong given , along with BB, CCB, Ranolazine and statin therapy. F/u in 6 months if Laury Night is negative and symptoms are stable; otherwise, will f/u sooner to discuss further plan of care. - STRESS TEST LEXISCAN/CARDIOLITE; Future - AMB POC EKG ROUTINE W/ 12 LEADS, INTER & REP 2. Essential hypertension, benign BP controlled; continue present anti-hypertensive therapy and low sodium diet. Consider lisinopril given diabetes, for renal protection. 3. Mixed hyperlipidemia LDL 35;  in 12/2018. Continue statin therapy and low fat, low cholesterol diet. Follow up in 6 months, sooner PRN. Malissa Astudillo, FNP-BC Lemuel Petty MD

## 2019-01-24 NOTE — PROGRESS NOTES
1. Have you been to the ER, urgent care clinic since your last visit? Hospitalized since your last visit? No 
 
2. Have you seen or consulted any other health care providers outside of the [address] [place] [address] since your last visit? Include any pap smears or colon screening. Yes When: DR. Arely Rivero 12/2018 Chief Complaint Patient presents with  Follow-up Hx of CAD, PTCA Pt c/o occasional chest pain with walking. Pt has taken NTG 1-2 tablets in the past few months. Pt c/o fatigue-states she has had more stress recently (grandson moved in with her)

## 2019-02-02 RX ORDER — RANOLAZINE 1000 MG/1
TABLET, FILM COATED, EXTENDED RELEASE ORAL
Qty: 180 TAB | Refills: 4 | Status: SHIPPED | OUTPATIENT
Start: 2019-02-02 | End: 2020-02-10 | Stop reason: SDUPTHER

## 2019-02-06 ENCOUNTER — CLINICAL SUPPORT (OUTPATIENT)
Dept: CARDIOLOGY CLINIC | Age: 69
End: 2019-02-06

## 2019-02-06 VITALS
SYSTOLIC BLOOD PRESSURE: 128 MMHG | HEIGHT: 65 IN | BODY MASS INDEX: 33.32 KG/M2 | WEIGHT: 200 LBS | DIASTOLIC BLOOD PRESSURE: 70 MMHG

## 2019-02-06 DIAGNOSIS — I10 ESSENTIAL HYPERTENSION, BENIGN: ICD-10-CM

## 2019-02-06 DIAGNOSIS — R07.9 CHEST PAIN, UNSPECIFIED TYPE: ICD-10-CM

## 2019-02-06 DIAGNOSIS — E78.2 MIXED HYPERLIPIDEMIA: ICD-10-CM

## 2019-02-06 DIAGNOSIS — R06.02 SOB (SHORTNESS OF BREATH): ICD-10-CM

## 2019-02-06 DIAGNOSIS — R93.1 ABNORMAL NUCLEAR CARDIAC IMAGING TEST: Primary | ICD-10-CM

## 2019-02-06 DIAGNOSIS — I25.119 CORONARY ARTERY DISEASE INVOLVING NATIVE CORONARY ARTERY OF NATIVE HEART WITH ANGINA PECTORIS (HCC): ICD-10-CM

## 2019-02-06 LAB
STRESS BASELINE DIAS BP: 70 MMHG
STRESS BASELINE HR: 61 BPM
STRESS BASELINE SYS BP: 128 MMHG
STRESS PEAK DIAS BP: 76 MMHG
STRESS PEAK SYS BP: 140 MMHG
STRESS PERCENT HR ACHIEVED: 58 %
STRESS POST PEAK HR: 75 BPM
STRESS RATE PRESSURE PRODUCT: NORMAL BPM*MMHG
STRESS TARGET HR: 129 BPM

## 2019-02-11 ENCOUNTER — TELEPHONE (OUTPATIENT)
Dept: CARDIOLOGY CLINIC | Age: 69
End: 2019-02-11

## 2019-02-11 NOTE — TELEPHONE ENCOUNTER
----- Message from Tameka Hanson MD sent at 2/11/2019  7:44 AM EST -----  Please advise the patient that there are no new areas of blood flow concern to her heart in the area of somewhat limited blood flow on the bottom of the heart is slightly improved from the previous, probably as a result of good collateral blood flow from blood vessels that have formed to help supply that area. That is good news. Continue with healthy diet and gradual increase in exercise, if symptoms are stable follow-up in 6 months. If progressive symptoms, let us know and follow-up sooner.

## 2019-02-11 NOTE — PROGRESS NOTES
Please advise the patient that there are no new areas of blood flow concern to her heart in the area of somewhat limited blood flow on the bottom of the heart is slightly improved from the previous, probably as a result of good collateral blood flow from blood vessels that have formed to help supply that area. That is good news. Continue with healthy diet and gradual increase in exercise, if symptoms are stable follow-up in 6 months. If progressive symptoms, let us know and follow-up sooner.

## 2019-02-28 RX ORDER — ROSUVASTATIN CALCIUM 10 MG/1
TABLET, COATED ORAL
Qty: 30 TAB | Refills: 11 | Status: SHIPPED | OUTPATIENT
Start: 2019-02-28 | End: 2020-02-18 | Stop reason: SDUPTHER

## 2019-03-04 ENCOUNTER — HOSPITAL ENCOUNTER (OUTPATIENT)
Dept: LAB | Age: 69
Discharge: HOME | End: 2019-03-04
Payer: MEDICARE

## 2019-03-04 ENCOUNTER — OFFICE VISIT (OUTPATIENT)
Dept: FAMILY MEDICINE CLINIC | Age: 69
End: 2019-03-04

## 2019-03-04 VITALS
WEIGHT: 199.8 LBS | RESPIRATION RATE: 20 BRPM | OXYGEN SATURATION: 97 % | TEMPERATURE: 97.6 F | HEIGHT: 65 IN | DIASTOLIC BLOOD PRESSURE: 68 MMHG | HEART RATE: 59 BPM | BODY MASS INDEX: 33.29 KG/M2 | SYSTOLIC BLOOD PRESSURE: 110 MMHG

## 2019-03-04 DIAGNOSIS — E78.2 MIXED HYPERLIPIDEMIA: ICD-10-CM

## 2019-03-04 DIAGNOSIS — K22.4 ESOPHAGEAL DYSMOTILITY: ICD-10-CM

## 2019-03-04 DIAGNOSIS — I25.10 CORONARY ARTERY DISEASE INVOLVING NATIVE CORONARY ARTERY OF NATIVE HEART WITHOUT ANGINA PECTORIS: ICD-10-CM

## 2019-03-04 DIAGNOSIS — E11.9 TYPE 2 DIABETES MELLITUS WITHOUT COMPLICATION, WITHOUT LONG-TERM CURRENT USE OF INSULIN (HCC): Primary | ICD-10-CM

## 2019-03-04 DIAGNOSIS — H65.91 SEROMUCINOUS OTITIS MEDIA OF RIGHT EAR: ICD-10-CM

## 2019-03-04 DIAGNOSIS — M54.2 CERVICALGIA: ICD-10-CM

## 2019-03-04 DIAGNOSIS — K21.00 GASTROESOPHAGEAL REFLUX DISEASE WITH ESOPHAGITIS: ICD-10-CM

## 2019-03-04 LAB
GLUCOSE POC: 143 MG/DL
HBA1C MFR BLD HPLC: 6.6 %

## 2019-03-04 PROCEDURE — 80053 COMPREHEN METABOLIC PANEL: CPT

## 2019-03-04 PROCEDURE — 36415 COLL VENOUS BLD VENIPUNCTURE: CPT

## 2019-03-04 PROCEDURE — 80061 LIPID PANEL: CPT

## 2019-03-04 RX ORDER — CYCLOBENZAPRINE HCL 10 MG
10 TABLET ORAL
Qty: 50 TAB | Refills: 3 | Status: SHIPPED | OUTPATIENT
Start: 2019-03-04 | End: 2019-03-14

## 2019-03-04 RX ORDER — PREDNISONE 10 MG/1
10 TABLET ORAL 2 TIMES DAILY
Qty: 14 TAB | Refills: 0 | Status: SHIPPED | OUTPATIENT
Start: 2019-03-04 | End: 2019-06-04 | Stop reason: ALTCHOICE

## 2019-03-04 RX ORDER — AZITHROMYCIN 250 MG/1
TABLET, FILM COATED ORAL
Qty: 6 TAB | Refills: 0 | Status: SHIPPED | OUTPATIENT
Start: 2019-03-04 | End: 2019-06-04 | Stop reason: ALTCHOICE

## 2019-03-04 RX ORDER — GABAPENTIN 100 MG/1
100 CAPSULE ORAL 3 TIMES DAILY
Qty: 90 CAP | Refills: 5 | Status: SHIPPED | OUTPATIENT
Start: 2019-03-04 | End: 2020-10-13 | Stop reason: SDUPTHER

## 2019-03-04 NOTE — PROGRESS NOTES
HISTORY OF PRESENT ILLNESS Marcelina Davila is a 76 y.o. female. f/u dm2,hbp, cad,chol gerd. Feeling ok. Diabetes The history is provided by the patient. This is a chronic problem. The problem occurs daily. The problem has not changed since onset. Hypertension The history is provided by the patient. This is a chronic problem. The problem has not changed since onset. Associated symptoms include malaise/fatigue. Pertinent negatives include no orthopnea and no palpitations. Cholesterol Problem The history is provided by the patient. This is a chronic problem. The problem occurs daily. The problem has been gradually improving. Review of Systems Constitutional: Positive for malaise/fatigue. Negative for fever and weight loss. Respiratory: Negative for cough. Cardiovascular: Negative for palpitations and orthopnea. Gastrointestinal: Positive for heartburn. Negative for blood in stool and constipation. Genitourinary: Negative for dysuria and frequency. Musculoskeletal: Negative for myalgias. Psychiatric/Behavioral: Negative for depression. Physical Exam  
Constitutional: She is oriented to person, place, and time. She appears well-developed and well-nourished. HENT:  
Head: Normocephalic and atraumatic. Right Ear: External ear normal.  
Left Ear: External ear normal.  
Nose: Mucosal edema and rhinorrhea present. Mouth/Throat: Posterior oropharyngeal erythema present. Eyes: Conjunctivae are normal. Pupils are equal, round, and reactive to light. Neck: Normal range of motion. Neck supple. No tracheal deviation present. No thyromegaly present. Cardiovascular: Normal rate, regular rhythm and normal heart sounds. Exam reveals no gallop. No murmur heard. Pulmonary/Chest: Effort normal and breath sounds normal. No respiratory distress. Abdominal: Soft. Bowel sounds are normal.  
Musculoskeletal: Normal range of motion. Neurological: She is alert and oriented to person, place, and time. Skin: Skin is warm and dry. Psychiatric: She has a normal mood and affect. Her behavior is normal.  
Vitals reviewed. Diagnoses and all orders for this visit: 
 
1. Type 2 diabetes mellitus without complication, without long-term current use of insulin ([facility] -     METABOLIC PANEL, COMPREHENSIVE 
-     AMB POC HEMOGLOBIN A1C 
-     AMB POC GLUCOSE, QUANTITATIVE, BLOOD 2. Mixed hyperlipidemia -     LIPID PANEL 3. Coronary artery disease involving native coronary artery of native heart without angina pectoris 4. Esophageal dysmotility 5. Gastroesophageal reflux disease with esophagitis 6. Seromucinous otitis media of right ear -     predniSONE (DELTASONE) 10 mg tablet; Take 10 mg by mouth two (2) times a day. -     azithromycin (ZITHROMAX) 250 mg tablet; Take 2 tabs together day 1,then 1 tab daily. Take with food Heddie [place] 7. Cervicalgia 
-     gabapentin (NEURONTIN) 100 mg capsule; Take 1 Cap by mouth three (3) times daily. -     cyclobenzaprine (FLEXERIL) 10 mg tablet; Take 1 Tab by mouth three (3) times daily as needed for Muscle Spasm(s) for up to 10 days. Follow-up Disposition: 
Return in about 3 months (around 6/4/2019). Follow-up Disposition: 
Return in about 3 months (around 6/4/2019).

## 2019-03-05 LAB
ALBUMIN SERPL-MCNC: 4.2 G/DL (ref 3.6–4.8)
ALBUMIN/GLOB SERPL: 1.6 {RATIO} (ref 1.2–2.2)
ALP SERPL-CCNC: 86 IU/L (ref 39–117)
ALT SERPL-CCNC: 14 IU/L (ref 0–32)
AST SERPL-CCNC: 13 IU/L (ref 0–40)
BILIRUB SERPL-MCNC: 0.8 MG/DL (ref 0–1.2)
BUN SERPL-MCNC: 14 MG/DL (ref 8–27)
BUN/CREAT SERPL: 16 (ref 12–28)
CALCIUM SERPL-MCNC: 9.6 MG/DL (ref 8.7–10.3)
CHLORIDE SERPL-SCNC: 103 MMOL/L (ref 96–106)
CHOLEST SERPL-MCNC: 132 MG/DL (ref 100–199)
CO2 SERPL-SCNC: 23 MMOL/L (ref 20–29)
CREAT SERPL-MCNC: 0.86 MG/DL (ref 0.57–1)
GLOBULIN SER CALC-MCNC: 2.6 G/DL (ref 1.5–4.5)
GLUCOSE SERPL-MCNC: 141 MG/DL (ref 65–99)
HDLC SERPL-MCNC: 43 MG/DL
INTERPRETATION, 910389: NORMAL
LDLC SERPL CALC-MCNC: 48 MG/DL (ref 0–99)
Lab: NORMAL
POTASSIUM SERPL-SCNC: 3.5 MMOL/L (ref 3.5–5.2)
PROT SERPL-MCNC: 6.8 G/DL (ref 6–8.5)
SODIUM SERPL-SCNC: 141 MMOL/L (ref 134–144)
TRIGL SERPL-MCNC: 203 MG/DL (ref 0–149)
VLDLC SERPL CALC-MCNC: 41 MG/DL (ref 5–40)

## 2019-04-16 ENCOUNTER — TELEPHONE (OUTPATIENT)
Dept: FAMILY MEDICINE CLINIC | Age: 69
End: 2019-04-16

## 2019-04-16 DIAGNOSIS — M65.312 TRIGGER FINGER OF LEFT THUMB: Primary | ICD-10-CM

## 2019-04-16 RX ORDER — DICLOFENAC SODIUM 10 MG/G
GEL TOPICAL
Qty: 100 G | Refills: 3 | Status: SHIPPED | OUTPATIENT
Start: 2019-04-16 | End: 2020-06-10 | Stop reason: ALTCHOICE

## 2019-05-05 RX ORDER — FLUTICASONE PROPIONATE 50 MCG
SPRAY, SUSPENSION (ML) NASAL
Qty: 16 G | Refills: 11 | Status: SHIPPED | OUTPATIENT
Start: 2019-05-05 | End: 2020-08-12

## 2019-05-23 ENCOUNTER — TELEPHONE (OUTPATIENT)
Dept: CARDIOLOGY CLINIC | Age: 69
End: 2019-05-23

## 2019-05-23 NOTE — TELEPHONE ENCOUNTER
Patient scheduled for endoscopic procedure 8/9/19 requesting cardiac clearance and holding blood  thinners prior please advise thanks

## 2019-06-04 ENCOUNTER — OFFICE VISIT (OUTPATIENT)
Dept: FAMILY MEDICINE CLINIC | Age: 69
End: 2019-06-04

## 2019-06-04 ENCOUNTER — HOSPITAL ENCOUNTER (OUTPATIENT)
Dept: LAB | Age: 69
Discharge: HOME | End: 2019-06-04
Payer: MEDICARE

## 2019-06-04 VITALS
WEIGHT: 202 LBS | OXYGEN SATURATION: 98 % | BODY MASS INDEX: 33.66 KG/M2 | TEMPERATURE: 97.3 F | HEART RATE: 66 BPM | DIASTOLIC BLOOD PRESSURE: 68 MMHG | SYSTOLIC BLOOD PRESSURE: 124 MMHG | RESPIRATION RATE: 18 BRPM | HEIGHT: 65 IN

## 2019-06-04 DIAGNOSIS — R22.0 LEFT FACIAL SWELLING: ICD-10-CM

## 2019-06-04 DIAGNOSIS — E78.2 MIXED HYPERLIPIDEMIA: ICD-10-CM

## 2019-06-04 DIAGNOSIS — E11.9 TYPE 2 DIABETES MELLITUS WITHOUT COMPLICATION, WITHOUT LONG-TERM CURRENT USE OF INSULIN (HCC): Primary | ICD-10-CM

## 2019-06-04 DIAGNOSIS — I25.10 CORONARY ARTERY DISEASE INVOLVING NATIVE CORONARY ARTERY OF NATIVE HEART WITHOUT ANGINA PECTORIS: ICD-10-CM

## 2019-06-04 DIAGNOSIS — K22.4 ESOPHAGEAL DYSMOTILITY: ICD-10-CM

## 2019-06-04 DIAGNOSIS — M65.312 TRIGGER FINGER OF LEFT THUMB: ICD-10-CM

## 2019-06-04 LAB
GLUCOSE POC: 140 MG/DL
HBA1C MFR BLD HPLC: 6.8 %

## 2019-06-04 PROCEDURE — 36415 COLL VENOUS BLD VENIPUNCTURE: CPT

## 2019-06-04 PROCEDURE — 80053 COMPREHEN METABOLIC PANEL: CPT

## 2019-06-04 PROCEDURE — 80061 LIPID PANEL: CPT

## 2019-06-04 NOTE — PROGRESS NOTES
HISTORY OF PRESENT ILLNESS  Lisha Ramos is a 71 y.o. female. f/u dm2,hbp, cad,chol gerd. Has stable reproducible exertional chest painFeeling ok,followed by GI,Cardiology. Has new facial swelling l nasolabial fold  Diabetes   The history is provided by the patient. This is a chronic problem. The problem occurs daily. The problem has not changed since onset. Hypertension    The history is provided by the patient. This is a chronic problem. The problem has been gradually improving. Associated symptoms include malaise/fatigue. Pertinent negatives include no orthopnea and no palpitations. Cholesterol Problem   The history is provided by the patient. This is a chronic problem. The problem occurs daily. The problem has been gradually improving. Facial Swelling   The history is provided by the patient. This is a new problem. The current episode started more than 1 week ago. The problem occurs daily. The problem has not changed since onset. Review of Systems   Constitutional: Positive for malaise/fatigue. HENT: Positive for facial swelling. Cardiovascular: Negative for palpitations and orthopnea. Gastrointestinal: Positive for heartburn. Negative for blood in stool and constipation. Genitourinary: Negative for frequency. Physical Exam   Constitutional: She appears well-developed and well-nourished. HENT:   Head: Normocephalic and atraumatic. Right Ear: External ear normal.   Left Ear: External ear normal.   Nose: Mucosal edema and rhinorrhea present. Mouth/Throat: Posterior oropharyngeal erythema present. Eyes: Pupils are equal, round, and reactive to light. Conjunctivae are normal.   Neck: Normal range of motion. Neck supple. No tracheal deviation present. No thyromegaly present. Cardiovascular: Normal rate, regular rhythm and normal heart sounds. Exam reveals no gallop. No murmur heard. Pulmonary/Chest: Effort normal and breath sounds normal. No respiratory distress.    Abdominal: Soft. Bowel sounds are normal.   Musculoskeletal: Normal range of motion. Triggering of l thumb DIP   Neurological: She is alert. Skin: Skin is warm and dry. Psychiatric: She has a normal mood and affect. Vitals reviewed. Diagnoses and all orders for this visit:    1. Type 2 diabetes mellitus without complication, without long-term current use of insulin (HCC)  -     METABOLIC PANEL, COMPREHENSIVE  -     AMB POC GLUCOSE, QUANTITATIVE, BLOOD  -     AMB POC HEMOGLOBIN A1C    2. Mixed hyperlipidemia  -     LIPID PANEL    3. Coronary artery disease involving native coronary artery of native heart without angina pectoris    4. Esophageal dysmotility    5. Trigger finger of left thumb  -     REFERRAL TO HAND SURGERY    6. Left facial swelling,likely due to CPAP device,to discuss with Sleep              Doing well,continue current meds and treatments    Follow-up and Dispositions    · Return in about 3 months (around 9/4/2019).

## 2019-06-04 NOTE — PROGRESS NOTES
Chief Complaint   Patient presents with    Diabetes     F/U on diabetes.  Hypertension     F/U on BP.  Cholesterol Problem     F/U on cholesterol.  Back Pain     Pt state she is having back pain. 1. Have you been to the ER, urgent care clinic since your last visit? Hospitalized since your last visit? No    2. Have you seen or consulted any other health care providers outside of the [address] [place] [address] since your last visit? Include any pap smears or colon screening.  No.

## 2019-06-05 LAB
ALBUMIN SERPL-MCNC: 4.2 G/DL (ref 3.6–4.8)
ALBUMIN/GLOB SERPL: 1.6 {RATIO} (ref 1.2–2.2)
ALP SERPL-CCNC: 85 IU/L (ref 39–117)
ALT SERPL-CCNC: 17 IU/L (ref 0–32)
AST SERPL-CCNC: 14 IU/L (ref 0–40)
BILIRUB SERPL-MCNC: 1 MG/DL (ref 0–1.2)
BUN SERPL-MCNC: 13 MG/DL (ref 8–27)
BUN/CREAT SERPL: 15 (ref 12–28)
CALCIUM SERPL-MCNC: 10 MG/DL (ref 8.7–10.3)
CHLORIDE SERPL-SCNC: 101 MMOL/L (ref 96–106)
CHOLEST SERPL-MCNC: 129 MG/DL (ref 100–199)
CO2 SERPL-SCNC: 24 MMOL/L (ref 20–29)
CREAT SERPL-MCNC: 0.86 MG/DL (ref 0.57–1)
GLOBULIN SER CALC-MCNC: 2.6 G/DL (ref 1.5–4.5)
GLUCOSE SERPL-MCNC: 138 MG/DL (ref 65–99)
HDLC SERPL-MCNC: 38 MG/DL
INTERPRETATION, 910389: NORMAL
LDLC SERPL CALC-MCNC: 40 MG/DL (ref 0–99)
Lab: NORMAL
POTASSIUM SERPL-SCNC: 3.5 MMOL/L (ref 3.5–5.2)
PROT SERPL-MCNC: 6.8 G/DL (ref 6–8.5)
SODIUM SERPL-SCNC: 140 MMOL/L (ref 134–144)
TRIGL SERPL-MCNC: 254 MG/DL (ref 0–149)
VLDLC SERPL CALC-MCNC: 51 MG/DL (ref 5–40)

## 2019-07-13 DIAGNOSIS — E11.59 TYPE 2 DIABETES MELLITUS WITH OTHER CIRCULATORY COMPLICATION, WITHOUT LONG-TERM CURRENT USE OF INSULIN (HCC): ICD-10-CM

## 2019-07-14 RX ORDER — GLIMEPIRIDE 2 MG/1
TABLET ORAL
Qty: 30 TAB | Refills: 11 | Status: SHIPPED | OUTPATIENT
Start: 2019-07-14 | End: 2020-06-10

## 2019-07-18 ENCOUNTER — TELEPHONE (OUTPATIENT)
Dept: SLEEP MEDICINE | Age: 69
End: 2019-07-18

## 2019-07-19 RX ORDER — AZITHROMYCIN 250 MG/1
TABLET, FILM COATED ORAL
Qty: 6 TAB | Refills: 0 | Status: SHIPPED | OUTPATIENT
Start: 2019-07-19 | End: 2019-07-24

## 2019-07-19 RX ORDER — PREDNISONE 10 MG/1
10 TABLET ORAL 2 TIMES DAILY
Qty: 10 TAB | Refills: 0 | Status: SHIPPED | OUTPATIENT
Start: 2019-07-19 | End: 2019-08-05

## 2019-08-05 RX ORDER — CYCLOBENZAPRINE HCL 10 MG
TABLET ORAL
COMMUNITY
End: 2021-11-08 | Stop reason: ALTCHOICE

## 2019-08-05 NOTE — PERIOP NOTES
[facility]  Ambulatory Surgery Unit  Pre-operative Instructions for Endo Procedures    Procedure Date  Friday, August 9, 2019            Tentative Arrival Time 0900      1. On the day of your procedure, please report to the Ambulatory Surgery Unit Registration Desk and sign in at your designated time. The Ambulatory Surgery Unit is located in [facility] on the [facility] side of the \[facility]\"" across from the [address] [place] [address]. Please have all of your health insurance cards and a photo ID. 2. You must have someone with you to drive you home, as you should not drive a car for 24 hours following anesthesia. Please make arrangements for a responsible adult friend or family member to stay with you for at least the first 24 hours after your procedure. 3. Do not have anything to eat or drink (including water, gum, mints, coffee, juice) after 11:59 PM, Thursday. This may not apply to medications prescribed by your physician. (Please note below the special instructions with medications to take the morning of your procedure.)    4. If applicable, follow the clear liquid diet and bowel prep instructions provided by your physician's office. If you do not have this information, or have any questions, please contact your physician's office. 5. We recommend you do not drink any alcoholic beverages for 24 hours before and after your procedure. 6. Contact your surgeons office for instructions on the following medications: non-steroidal anti-inflammatory drugs (i.e. Advil, Aleve), vitamins, and supplements. (Some surgeons will want you to stop these medications prior to surgery and others may allow you to take them)   **If you are currently taking Plavix, Coumadin, Aspirin and/or other blood-thinning agents, contact your surgeon for instructions. ** Your surgeon will partner with the physician prescribing these medications to determine if it is safe to stop or if you need to continue taking. Please do not stop taking these medications without instructions from your surgeon. 7. In an effort to help prevent surgical site infection, we ask that you shower with an anti-bacterial soap (i.e. Dial or Safeguard) on the morning of your procedure. Do not apply any lotions, powders, or deodorants after showering. 8. Wear comfortable clothes. Wear glasses instead of contacts. Do not bring any jewelry or money (other than copays or fees as instructed). Do not wear make-up, particularly mascara, the morning of your procedure. Wear your hair loose or down, no ponytails, buns, shawna pins or clips. All body piercings must be removed. 9. You should understand that if you do not follow these instructions your procedure may be cancelled. If your physical condition changes (i.e. fever, cold or flu) please contact your surgeon as soon as possible. 10. It is important that you be on time. If a situation occurs where you may be late, or if you have any questions or problems, please call [phone]. 11. Your procedure time may be subject to change. You will receive a phone call the day prior to confirm your arrival time. Special Instructions: Take all medications and inhalers, as prescribed, on the morning of surgery with a sip of water EXCEPT: no diabetic medications day of surgery. Insulin Dependent Diabetic patients: Take your diabetic medications as prescribed the day before surgery. Hold all diabetic medications the day of surgery. If you are scheduled to arrive for surgery after 8:00 AM, and your AM blood sugar is >200, please call Ambulatory Surgery. I understand a pre-operative phone call will be made to verify my procedure time. In the event that I am not available, I give permission for a message to be left on my answering service and/or with another person?       yes    Preop instructions reviewed  Pt verbalized understanding.      ___________________      ___________________ ___________________  (Signature of Patient)          (Witness)                   (Date and Time)

## 2019-08-07 RX ORDER — SODIUM CHLORIDE 9 MG/ML
75 INJECTION, SOLUTION INTRAVENOUS CONTINUOUS
Status: CANCELLED | OUTPATIENT
Start: 2019-08-07 | End: 2019-08-07

## 2019-08-07 RX ORDER — SODIUM CHLORIDE 0.9 % (FLUSH) 0.9 %
5-40 SYRINGE (ML) INJECTION EVERY 8 HOURS
Status: CANCELLED | OUTPATIENT
Start: 2019-08-07

## 2019-08-07 RX ORDER — SODIUM CHLORIDE 0.9 % (FLUSH) 0.9 %
5-40 SYRINGE (ML) INJECTION AS NEEDED
Status: CANCELLED | OUTPATIENT
Start: 2019-08-07

## 2019-08-08 ENCOUNTER — DOCUMENTATION ONLY (OUTPATIENT)
Dept: SLEEP MEDICINE | Age: 69
End: 2019-08-08

## 2019-08-08 ENCOUNTER — OFFICE VISIT (OUTPATIENT)
Dept: SLEEP MEDICINE | Age: 69
End: 2019-08-08

## 2019-08-08 ENCOUNTER — ANESTHESIA EVENT (OUTPATIENT)
Dept: SURGERY | Age: 69
End: 2019-08-08
Payer: MEDICARE

## 2019-08-08 VITALS
SYSTOLIC BLOOD PRESSURE: 120 MMHG | OXYGEN SATURATION: 95 % | HEIGHT: 65 IN | DIASTOLIC BLOOD PRESSURE: 74 MMHG | WEIGHT: 199 LBS | RESPIRATION RATE: 17 BRPM | HEART RATE: 68 BPM | BODY MASS INDEX: 33.15 KG/M2

## 2019-08-08 DIAGNOSIS — G47.33 OBSTRUCTIVE SLEEP APNEA (ADULT) (PEDIATRIC): Primary | ICD-10-CM

## 2019-08-08 DIAGNOSIS — I10 ESSENTIAL HYPERTENSION, BENIGN: ICD-10-CM

## 2019-08-08 NOTE — PATIENT INSTRUCTIONS
[address] [place]. [place], [address]  Tel.  [phone]  Fax. [address] [facility] [address]  [place], [address] [facility]  Tel.  [phone]  Fax. [phone] [address] [facility] Rosanna Armendariz  Tel.  [phone]  Fax. [phone]     PROPER SLEEP HYGIENE    What to avoid  · Do not have drinks with caffeine, such as coffee or black tea, for 8 hours before bed. · Do not smoke or use other types of tobacco near bedtime. Nicotine is a stimulant and can keep you awake. · Avoid drinking alcohol late in the evening, because it can cause you to wake in the middle of the night. · Do not eat a big meal close to bedtime. If you are hungry, eat a light snack. · Do not drink a lot of water close to bedtime, because the need to urinate may wake you up during the night. · Do not read or watch TV in bed. Use the bed only for sleeping and sexual activity. What to try  · Go to bed at the same time every night, and wake up at the same time every morning. Do not take naps during the day. · Keep your bedroom quiet, dark, and cool. · Get regular exercise, but not within 3 to 4 hours of your bedtime. .  · Sleep on a comfortable pillow and mattress. · If watching the clock makes you anxious, turn it facing away from you so you cannot see the time. · If you worry when you lie down, start a worry book. Well before bedtime, write down your worries, and then set the book and your concerns aside. · Try meditation or other relaxation techniques before you go to bed. · If you cannot fall asleep, get up and go to another room until you feel sleepy. Do something relaxing. Repeat your bedtime routine before you go to bed again. · Make your house quiet and calm about an hour before bedtime. Turn down the lights, turn off the TV, log off the computer, and turn down the volume on music. This can help you relax after a busy day.     Drowsy Driving  The [address] [place] [address] HTP Traffic Safety Administration cites drowsiness as a causing factor in more than 486,047 police reported crashes annually, resulting in 76,000 injuries and 1,500 deaths. Other surveys suggest 55% of people polled have driven while drowsy in the past year, 23% had fallen asleep but not crashed, 3% crashed, and 2% had and accident due to drowsy driving. Who is at risk? Young Drivers: One study of drowsy driving accidents states that 55% of the drivers were under 25 years. Of those, 75% were male. Shift Workers and Travelers: People who work overnight or travel across time zones frequently are at higher risk of experiencing Circadian Rhythm Disorders. They are trying to work and function when their body is programed to sleep. Sleep Deprived: Lack of sleep has a serious impact on your ability to pay attention or focus on a task. Consistently getting less than the average of 8 hours your body needs creates partial or cumulative sleep deprivation. Untreated Sleep Disorders: Sleep Apnea, Narcolepsy, R.L.S., and other sleep disorders (untreated) prevent a person from getting enough restful sleep. This leads to excessive daytime sleepiness and increases the risk for drowsy driving accidents by up to 7 times. Medications / Alcohol: Even over the counter medications can cause drowsiness. Medications that impair a drivers attention should have a warning label. Alcohol naturally makes you sleepy and on its own can cause accidents. Combined with excessive drowsiness its effects are amplified. Signs of Drowsy Driving:   * You don't remember driving the last few miles   * You may drift out of your sal   * You are unable to focus and your thoughts wander   * You may yawn more often than normal   * You have difficulty keeping your eyes open / nodding off   * Missing traffic signs, speeding, or tailgating  Prevention-   Good sleep hygiene, lifestyle and behavioral choices have the most impact on drowsy driving.  There is no substitute for sleep and the average person requires 8 hours nightly. If you find yourself driving drowsy, stop and sleep. Consider the sleep hygiene tips provided during your visit as well. Medication Refill Policy: Refills for all medications require 1 week advance notice. Please have your pharmacy fax a refill request. We are unable to fax, or call in \"controled substance\" medications and you will need to pick these prescriptions up from our office. Payteller Activation    Thank you for requesting access to [employer]. Please follow the instructions below to securely access and download your online medical record. Payteller allows you to send messages to your doctor, view your test results, renew your prescriptions, schedule appointments, and more. How Do I Sign Up? 1. In your internet browser, go to [email or web address]. 2. Click on the First Time User? Click Here link in the Sign In box. You will see the New Member Sign Up page. 3. Enter your Payteller Access Code exactly as it appears below. You will not need to use this code after youve completed the sign-up process. If you do not sign up before the expiration date, you must request a new code. Pay[employer]eller Access Code: [email or web address]  Expires: 2019 10:46 AM (This is the date your Payteller access code will )    4. Enter the last four digits of your Social Security Number (xxxx) and Date of Birth (mm/dd/yyyy) as indicated and click Submit. You will be taken to the next sign-up page. 5. Create a Payteller ID. This will be your Payteller login ID and cannot be changed, so think of one that is secure and easy to remember. 6. Create a Payteller password. You can change your password at any time. 7. Enter your Password Reset Question and Answer. This can be used at a later time if you forget your password. 8. Enter your e-mail address. You will receive e-mail notification when new information is available in [address] 9. Click Sign Up.  You can now view and download portions of your medical record. 10. Click the Download Summary menu link to download a portable copy of your medical information. Additional Information    If you have questions, please call [phone]. Remember, [employer] is NOT to be used for urgent needs. For medical emergencies, dial 911.

## 2019-08-08 NOTE — PROGRESS NOTES
[address] [facility] [place]. [place], [address]  Tel.  [phone]  Fax. 100 [facility] 60  [place], [address]  Tel.  [phone]  Fax. [phone] [address]  Tel.  [phone]  Fax. [phone]     S>Raina Maier is a 71 y.o. female seen for a positive airway pressure follow-up. She reports no problems using the device. The following problems are identified:    Drowsiness no Problems exhaling Yes, she had called to have her device turned down. Erinernesto Fox still showing device set at 7   Snoring no Forget to put on no   Mask Comfortable No , causing swelling on side she sleeps on Can't fall asleep no   Dry Mouth no Mask falls off no   Air Leaking no Frequent awakenings no     Download reviewed. She admits that her sleep has improved. Therapy Apnea Index averaged over PAP use: 5 /hr which reflects improved sleep breathing condition. Allergies   Allergen Reactions    Penicillins Hives     Other reaction(s): Hives    Protamine Anaphylaxis    Sulfa (Sulfonamide Antibiotics) Hives    Imdur [Isosorbide Mononitrate] Other (comments)     headache       She has a current medication list which includes the following prescription(s): cyclobenzaprine, glimepiride, fluticasone propionate, diclofenac, gabapentin, clopidogrel, rosuvastatin, ranexa, amitiza, metoprolol succinate, triamterene-hydrochlorothiazide, januvia, amlodipine, nitroglycerin, rabeprazole, azelastine, mag carb/aluminum hydrox/algin, aspirin, calcium carb/vit d3/minerals, ranitidine, glucose blood vi test strips, lancets, cyanocobalamin, blood-glucose meter, sucralfate, ibuprofen, coenzyme q-10, cholecalciferol, and guaifenesin. .      She  has a past medical history of Allergic rhinitis, cause unspecified (2/20/2011), Angina, class III ([address] (12/13/2013), Anxiety, Arthritis, CAD (coronary artery disease), Colon polyps (2/22/2011), Diabetes ([facility] [address] Diarrhea, Encounter for long-term (current) use of other medications (2/20/2011), Esophageal dysmotility (10/10/2011), Essential hypertension, benign (2/20/2011), Gastric ulcer (8/11/2015), GERD (gastroesophageal reflux disease), Hypertension, Ill-defined condition, Mixed hyperlipidemia (2/20/2011), Nausea & vomiting, and Sleep apnea. Gorham Sleepiness Score: 4   and Modified F.O.S.Q. Score Total / 2: 16   which reflect improved sleep quality over therapy time. O>    Visit Vitals  /74 (BP 1 Location: Left arm, BP Patient Position: Sitting)   Pulse 68   Resp 17   Ht 5' 5\" (1.651 m)   Wt 199 lb (90.3 kg)   SpO2 95%   BMI 33.12 kg/m²           General:   Alert, oriented, not in distress   Neck:   No JVD    Chest/Lungs:  symetrical lung expansion , no accessory muscle use    Extremities:  no obvious rashes , negative edema    Neuro:  No focal deficits ; No obvious tremor    Psych:  Normal affect ,  Normal countenance ;         A>    ICD-10-CM ICD-9-CM    1. Obstructive sleep apnea (adult) (pediatric) G47.33 327.23 AMB SUPPLY ORDER   2. Essential hypertension, benign I10 401.1      AHI = 28(3-15). On CPAP :  7 cmH2O. Compliant:      yes    Therapeutic Response:  Positive    P>      *   Follow-up and Dispositions    · Return in about 1 year (around 8/8/2020). she is compliant with PAP therapy and PAP continues to benefit patient and remains necessary for control of her sleep apnea. I have ordered replacement supplies with a mask-fitting  I have reviewed medicare requirements regarding PAP usage  she will continue on her current pressure settings. * She was asked to contact our office for any problems regarding PAP therapy. * Counseling was provided regarding the importance of regular PAP use and on proper sleep hygiene and safe driving. * Re-enforced proper and regular cleaning for the device. 2. Hypertension - she continues on her current regimen.   I have reviewed the relationship between hypertension as it relates to sleep-disordered breathing.    Electronically signed by    Corry Azevedo MD  Diplomate in Sleep Medicine  [facility]

## 2019-08-09 ENCOUNTER — HOSPITAL ENCOUNTER (OUTPATIENT)
Age: 69
Setting detail: OUTPATIENT SURGERY
Discharge: HOME | End: 2019-08-09
Attending: SPECIALIST | Admitting: SPECIALIST
Payer: MEDICARE

## 2019-08-09 ENCOUNTER — ANESTHESIA (OUTPATIENT)
Dept: SURGERY | Age: 69
End: 2019-08-09
Payer: MEDICARE

## 2019-08-09 VITALS
OXYGEN SATURATION: 97 % | RESPIRATION RATE: 14 BRPM | WEIGHT: 199 LBS | HEART RATE: 68 BPM | SYSTOLIC BLOOD PRESSURE: 125 MMHG | BODY MASS INDEX: 33.15 KG/M2 | TEMPERATURE: 97.9 F | DIASTOLIC BLOOD PRESSURE: 75 MMHG | HEIGHT: 65 IN

## 2019-08-09 PROBLEM — R14.2 ERUCTATION: Status: ACTIVE | Noted: 2019-08-09

## 2019-08-09 LAB
GLUCOSE BLD STRIP.AUTO-MCNC: 202 MG/DL (ref 65–100)
GLUCOSE BLD STRIP.AUTO-MCNC: 213 MG/DL (ref 65–100)
H PYLORI FROM TISSUE: NEGATIVE
KIT LOT NO., HCLOLOT: NORMAL
NEGATIVE CONTROL: NEGATIVE
POSITIVE CONTROL: POSITIVE
SERVICE CMNT-IMP: ABNORMAL
SERVICE CMNT-IMP: ABNORMAL

## 2019-08-09 PROCEDURE — 76210000040 HC AMBSU PH I REC FIRST 0.5 HR: Performed by: SPECIALIST

## 2019-08-09 PROCEDURE — 74011250636 HC RX REV CODE- 250/636: Performed by: NURSE ANESTHETIST, CERTIFIED REGISTERED

## 2019-08-09 PROCEDURE — C1726 CATH, BAL DIL, NON-VASCULAR: HCPCS

## 2019-08-09 PROCEDURE — 88305 TISSUE EXAM BY PATHOLOGIST: CPT

## 2019-08-09 PROCEDURE — 74011250636 HC RX REV CODE- 250/636: Performed by: ANESTHESIOLOGY

## 2019-08-09 PROCEDURE — 74011000250 HC RX REV CODE- 250: Performed by: NURSE ANESTHETIST, CERTIFIED REGISTERED

## 2019-08-09 PROCEDURE — 82962 GLUCOSE BLOOD TEST: CPT

## 2019-08-09 PROCEDURE — 76210000046 HC AMBSU PH II REC FIRST 0.5 HR: Performed by: SPECIALIST

## 2019-08-09 PROCEDURE — 87077 CULTURE AEROBIC IDENTIFY: CPT | Performed by: SPECIALIST

## 2019-08-09 PROCEDURE — 77030021352 HC CBL LD SYS DISP COVD -B: Performed by: SPECIALIST

## 2019-08-09 PROCEDURE — 76030000002 HC AMB SURG OR TIME FIRST 0.: Performed by: SPECIALIST

## 2019-08-09 PROCEDURE — 77030020255 HC SOL INJ LR 1000ML BG: Performed by: SPECIALIST

## 2019-08-09 PROCEDURE — 76060000073 HC AMB SURG ANES FIRST 0.5 HR: Performed by: SPECIALIST

## 2019-08-09 RX ORDER — ATROPINE SULFATE 0.1 MG/ML
0.5 INJECTION INTRAVENOUS
Status: DISCONTINUED | OUTPATIENT
Start: 2019-08-09 | End: 2019-08-09 | Stop reason: HOSPADM

## 2019-08-09 RX ORDER — HYDROMORPHONE HYDROCHLORIDE 1 MG/ML
.2-.5 INJECTION, SOLUTION INTRAMUSCULAR; INTRAVENOUS; SUBCUTANEOUS
Status: DISCONTINUED | OUTPATIENT
Start: 2019-08-09 | End: 2019-08-09 | Stop reason: HOSPADM

## 2019-08-09 RX ORDER — MORPHINE SULFATE 10 MG/ML
2 INJECTION, SOLUTION INTRAMUSCULAR; INTRAVENOUS
Status: DISCONTINUED | OUTPATIENT
Start: 2019-08-09 | End: 2019-08-09 | Stop reason: HOSPADM

## 2019-08-09 RX ORDER — ONDANSETRON 2 MG/ML
4 INJECTION INTRAMUSCULAR; INTRAVENOUS AS NEEDED
Status: DISCONTINUED | OUTPATIENT
Start: 2019-08-09 | End: 2019-08-09 | Stop reason: HOSPADM

## 2019-08-09 RX ORDER — PROPOFOL 10 MG/ML
INJECTION, EMULSION INTRAVENOUS AS NEEDED
Status: DISCONTINUED | OUTPATIENT
Start: 2019-08-09 | End: 2019-08-09 | Stop reason: HOSPADM

## 2019-08-09 RX ORDER — MIDAZOLAM HYDROCHLORIDE 1 MG/ML
.25-5 INJECTION, SOLUTION INTRAMUSCULAR; INTRAVENOUS
Status: DISCONTINUED | OUTPATIENT
Start: 2019-08-09 | End: 2019-08-09 | Stop reason: HOSPADM

## 2019-08-09 RX ORDER — FLUMAZENIL 0.1 MG/ML
0.2 INJECTION INTRAVENOUS
Status: DISCONTINUED | OUTPATIENT
Start: 2019-08-09 | End: 2019-08-09 | Stop reason: HOSPADM

## 2019-08-09 RX ORDER — SODIUM CHLORIDE 0.9 % (FLUSH) 0.9 %
5-40 SYRINGE (ML) INJECTION AS NEEDED
Status: DISCONTINUED | OUTPATIENT
Start: 2019-08-09 | End: 2019-08-09 | Stop reason: HOSPADM

## 2019-08-09 RX ORDER — NALOXONE HYDROCHLORIDE 0.4 MG/ML
0.4 INJECTION, SOLUTION INTRAMUSCULAR; INTRAVENOUS; SUBCUTANEOUS
Status: DISCONTINUED | OUTPATIENT
Start: 2019-08-09 | End: 2019-08-09 | Stop reason: HOSPADM

## 2019-08-09 RX ORDER — DEXTROMETHORPHAN/PSEUDOEPHED 2.5-7.5/.8
1.2 DROPS ORAL
Status: DISCONTINUED | OUTPATIENT
Start: 2019-08-09 | End: 2019-08-09 | Stop reason: HOSPADM

## 2019-08-09 RX ORDER — SODIUM CHLORIDE, SODIUM LACTATE, POTASSIUM CHLORIDE, CALCIUM CHLORIDE 600; 310; 30; 20 MG/100ML; MG/100ML; MG/100ML; MG/100ML
25 INJECTION, SOLUTION INTRAVENOUS CONTINUOUS
Status: DISCONTINUED | OUTPATIENT
Start: 2019-08-09 | End: 2019-08-09 | Stop reason: HOSPADM

## 2019-08-09 RX ORDER — FENTANYL CITRATE 50 UG/ML
25 INJECTION, SOLUTION INTRAMUSCULAR; INTRAVENOUS
Status: DISCONTINUED | OUTPATIENT
Start: 2019-08-09 | End: 2019-08-09 | Stop reason: HOSPADM

## 2019-08-09 RX ORDER — SODIUM CHLORIDE 0.9 % (FLUSH) 0.9 %
5-40 SYRINGE (ML) INJECTION EVERY 8 HOURS
Status: DISCONTINUED | OUTPATIENT
Start: 2019-08-09 | End: 2019-08-09 | Stop reason: HOSPADM

## 2019-08-09 RX ORDER — GLYCOPYRROLATE 0.2 MG/ML
INJECTION INTRAMUSCULAR; INTRAVENOUS AS NEEDED
Status: DISCONTINUED | OUTPATIENT
Start: 2019-08-09 | End: 2019-08-09 | Stop reason: HOSPADM

## 2019-08-09 RX ORDER — LIDOCAINE HYDROCHLORIDE 20 MG/ML
INJECTION, SOLUTION EPIDURAL; INFILTRATION; INTRACAUDAL; PERINEURAL AS NEEDED
Status: DISCONTINUED | OUTPATIENT
Start: 2019-08-09 | End: 2019-08-09 | Stop reason: HOSPADM

## 2019-08-09 RX ORDER — DIPHENHYDRAMINE HYDROCHLORIDE 50 MG/ML
12.5 INJECTION, SOLUTION INTRAMUSCULAR; INTRAVENOUS AS NEEDED
Status: DISCONTINUED | OUTPATIENT
Start: 2019-08-09 | End: 2019-08-09 | Stop reason: HOSPADM

## 2019-08-09 RX ADMIN — PROPOFOL 250 MG: 10 INJECTION, EMULSION INTRAVENOUS at 10:28

## 2019-08-09 RX ADMIN — SODIUM CHLORIDE, SODIUM LACTATE, POTASSIUM CHLORIDE, AND CALCIUM CHLORIDE 25 ML/HR: 600; 310; 30; 20 INJECTION, SOLUTION INTRAVENOUS at 09:14

## 2019-08-09 RX ADMIN — GLYCOPYRROLATE 0.2 MG: 0.2 INJECTION, SOLUTION INTRAMUSCULAR; INTRAVENOUS at 10:18

## 2019-08-09 RX ADMIN — LIDOCAINE HYDROCHLORIDE 100 MG: 20 INJECTION, SOLUTION INTRAVENOUS at 10:19

## 2019-08-09 RX ADMIN — SODIUM CHLORIDE, SODIUM LACTATE, POTASSIUM CHLORIDE, AND CALCIUM CHLORIDE: 600; 310; 30; 20 INJECTION, SOLUTION INTRAVENOUS at 10:16

## 2019-08-09 NOTE — ANESTHESIA PREPROCEDURE EVALUATION
Anesthetic History     PONV          Review of Systems / Medical History  Patient summary reviewed, nursing notes reviewed and pertinent labs reviewed    Pulmonary        Sleep apnea: CPAP           Neuro/Psych              Cardiovascular    Hypertension          CAD and cardiac stents    Exercise tolerance: <4 METS     GI/Hepatic/Renal     GERD      PUD    Comments: Dysphagia  Bloating  Esophageal Motility Disorder Endo/Other    Diabetes: type 2    Obesity and arthritis     Other Findings              Physical Exam    Airway  Mallampati: II  TM Distance: > 6 cm  Neck ROM: normal range of motion   Mouth opening: Normal     Cardiovascular    Rhythm: regular  Rate: normal         Dental      Comments: Several missing teeth, none loose.    Pulmonary  Breath sounds clear to auscultation               Abdominal  GI exam deferred       Other Findings            Anesthetic Plan    ASA: 3  Anesthesia type: general and total IV anesthesia          Induction: Intravenous  Anesthetic plan and risks discussed with: Patient

## 2019-08-09 NOTE — TELEPHONE ENCOUNTER
Settings requested by Abena Jon   Set Mode to CPAP   Set Set pressure to 6.0 cmH2O   Set EPR to Off   Set EPR level to 1   Set Ramp enable to Off   Set Ramp time to 5 min   Set Start pressure to 4.0 cmH2O    Previous Settings   Set Start pressure to 4.0 cmH2O   Set Mode to CPAP   Set Set pressure to 7.0 cmH2O   Set EPR to Off   Set Ramp enable to Off   Set Ramp time to 5 min

## 2019-08-09 NOTE — PERIOP NOTES
Johann Hall  1950  [id number]    Situation:  Verbal report given from: Francisco Perez CRNA  Procedure: Procedure(s):  ESOPHAGOGASTRODUODENOSCOPY (EGD) WITH BIOPSIES  ESOPHAGEAL DILATION    Background:    Preoperative diagnosis: BLOATING/DYSPHAGIA/ESOPHAGEAL MOTILITY DISORDER    Postoperative diagnosis: Corkscrew esophagus, esophageal stricture    :  Dr. Ania Russell    Assistant(s): Circ-1: Gagan España  Circ-2: Joaquim Calvillo RN  Scrub Tech-1: Karlos Amin Specimens:   ID Type Source Tests Collected by Time Destination   1 : Mid-esophagus biopsy Preservative Esophagus, Mid  Mery Quinn MD 8/9/2019 1019 Pathology       Assessment:  Intra-procedure medications   Propofol 250 mg      Anesthesia gave intra-procedure sedation and medications, see anesthesia flow sheet     Intravenous fluids: LR@ KVO     Vital signs stable     Abdominal assessment: round and soft       Recommendation:    Permission to share finding with  yes    All side rails up, bed in low position, wheels locked. Nurse at bedside.

## 2019-08-09 NOTE — DISCHARGE INSTRUCTIONS
Chrissy Hatchet  [id number]  1950              Procedure  Discharge Instructions:      Discomfort:  Redness at IV site- apply warm compress to area; if redness or soreness persist- contact your physician  There may be a slight amount of blood passed from the rectum  Gaseous discomfort- walking, belching will help relieve any discomfort  You may not operate a vehicle for 12 hours  You may not engage in an occupation involving machinery or appliances for rest of today  You may not drink alcoholic beverages for at least 12 hours  Avoid making any critical decisions for at least 24 hour  DIET:   You may resume your normal diet today. You should not overeat or \"feast\" today as your abdomen may become distended or uncomfortable. MEDICATIONS:   I reconciled this list from the list you gave us when you came today for the procedure. Please clarify with me, your primary care physician and the nurse who is discharging you if we have any discrepancies. Aspirin and or non-steroidal medication (Ibuprofen, Motrin, naproxen, etc.) is ok in limited quantities. ACTIVITY:  You may resume your normal daily activities it is recommended that you spend the remainder of the day resting -  avoid any strenuous activity. CALL M.D. ANY SIGN OF:  Increasing pain, nausea, vomiting  Abdominal distension (swelling)  New increased bleeding (oral or rectal)  Fever (chills)  Pain in chest area  Bloody discharge from nose or mouth  Shortness of breath          Follow-up Instructions:   Call Dr. Dariana Sauer for the results of  biopsy in approximately one week  Telephone #  [phone]  Follow up visit as previously scheduled. Jonny Alaniz MD  10:36 AM  8/9/2019         DO NOT TAKE SLEEPING MEDICATIONS OR ANTIANXIETY MEDICATIONS WHILE TAKING NARCOTIC PAIN MEDICATIONS,  ESPECIALLY THE NIGHT OF ANESTHESIA. CPAP PATIENTS BE SURE TO WEAR MACHINE WHENEVER NAPPING OR SLEEPING.     DISCHARGE SUMMARY from Nurse    The following personal items collected during your admission are returned to you:   Dental Appliance: Dental Appliances: None  Vision: Visual Aid: Glasses(purse)  Hearing Aid:    Jewelry: Jewelry: Watch(2 rings/1 watch)  Clothing: Clothing: With patient  Other Valuables:    Valuables sent to safe:        PATIENT INSTRUCTIONS:    After General Anesthesia or Intravenous Sedation, for 24 hours or while taking prescription Narcotics:        Someone should be with you for the next 24 hours. For your own safety, a responsible adult must drive you home. · Limit your activities  · Recommended activity: Rest today, up with assistance today. Do not climb stairs or shower unattended for the next 24 hours. · Please start with a soft bland diet and advance as tolerated (no nausea) to regular diet. · If you have a sore throat you should try the following: fluids, warm salt water gargles, or throat lozenges. If it does not improve after several days please follow up with your primary physician. · Do not drive and operate hazardous machinery  · Do not make important personal or business decisions  · Do  not drink alcoholic beverages  · If you have not urinated within 8 hours after discharge, please contact your surgeon on call. Report the following to your surgeon:  · Excessive pain, swelling, redness or odor of or around the surgical area  · Temperature over 100.5  · Nausea and vomiting lasting longer than 4 hours or if unable to take medications  · Any signs of decreased circulation or nerve impairment to extremity: change in color, persistent  numbness, tingling, coldness or increase pain      · You will receive a Post Operative Call from one of the Recovery Room Nurses on the day after your surgery to check on you. It is very important for us to know how you are recovering after your surgery. If you have an issue or need to speak with someone, please call your surgeon, do not wait for the post operative call.     · You may receive an e-mail or letter in the mail from [employer] regarding your experience with us in the Ambulatory Surgery Unit. Your feedback is valuable to us and we appreciate your participation in the survey. · If the above instructions are not adequate, please contact CYNTHIA Bellamy, RN Perianesthesia Manager at [phone]. If you are having problems after your surgery, call the physician at his office number. · We wish you a speedy recovery ? What to do at Home:      *  Please give a list of your current medications to your Primary Care Provider. *  Please update this list whenever your medications are discontinued, doses are      changed, or new medications (including over-the-counter products) are added. *  Please carry medication information at all times in case of emergency situations. If you have not received your influenza and/or pneumococcal vaccine, please follow up with your primary care physician. The discharge information has been reviewed with the patient and spouse. The patient and spouse verbalized understanding.

## 2019-08-09 NOTE — PROCEDURES
Esophagogastroduodenoscopy    Indications:  Dysphagia, esophageal  Known motor disorder    Medications:  See anesthesia form    Assistants:  Ronda johnson      Post procedure diagnosis:  Corkscrew esophagus, esophageal stricture    Description of Procedure:    Prior to the procedure its objectives, risks, consequences and alternatives were discussed with the patient who then elected to proceed. The Olympus video endoscope was inserted under direct vision into the mouth and then into the esophagus. The lumen of the esophagus had a corkscrew esophagus. The mucosal of the esophagus looked normal until the distal two cm of the esophagus where a ringed appearance was seen. There was a sensation of a clasp knife at the ge junction. A stenosis became visually evident at the time of dilation. The z-line was located at 39cm. There were no diagnostic abnormalities of the body, fundus, antrum, cardia and incisura of the stomach. This included direct and retroflexion examination. The first and second portion of the duodenum appeared normal.    I took biopsies of the stomach for hp rapid and biopsies of the mid esophagus for eosinophilic esophagitis. I then dilated the distal esophagus with a through the scope balloon. I dilated from 15mm to 16.5mm to 18 mm. Each time the balloon was inflated for sixty seconds. There were no apparent complications. Complications: There were no apparent complications and the patient tolerated the procedure well.         Implants:  none    Estimated Blood Loss:  none  Specimens Removed:  Stomach, hp rapid  Mid esophagus  Impressions:  Corkscrew esophagus  Stenosis of the esophagus, dilated to 18mm        Signed By: Alexandro Blas MD                        August 9, 2019     10:32 AM

## 2019-08-09 NOTE — PERIOP NOTES
Permission received to review discharge instructions and discuss private health information with   Patient states that family will be with them for at least 24 hours following today's procedure.    Bear Paw warmer attached to pt's gown; remote given to pt

## 2019-08-09 NOTE — PERIOP NOTES
Endoscope was pre-cleaned at bedside immediately by TB    CRE balloon dilatation of the esophagus  15 mm Balloon inflated to 3 ATMs and held for 60 seconds   16.5 mm Balloon inflated to 4.5 ATMs and held for 60 seconds. 18 mm Balloon inflated to 7 ATMs and held for 60 seconds.     No subcutaneous crepitus of the chest or cervical region was noted post dilatation.     Rapid H Pylori obtained @1021

## 2019-08-09 NOTE — ANESTHESIA POSTPROCEDURE EVALUATION
Procedure(s):  ESOPHAGOGASTRODUODENOSCOPY (EGD) WITH BIOPSIES  ESOPHAGEAL DILATION. general, total IV anesthesia    Anesthesia Post Evaluation        Patient location during evaluation: PACU  Note status: Adequate. Level of consciousness: responsive to verbal stimuli and sleepy but conscious  Pain management: satisfactory to patient  Airway patency: patent  Anesthetic complications: no  Cardiovascular status: acceptable  Respiratory status: acceptable  Hydration status: acceptable  Comments: +Post-Anesthesia Evaluation and Assessment    Patient: Johann Borges MRN: [id number]  SSN: [id number]   YOB: 1950  Age: 71 y.o. Sex: female      Cardiovascular Function/Vital Signs    /67   Pulse 69   Temp 36.7 °C (98.1 °F)   Resp 16   Ht 5' 5\" (1.651 m)   Wt 90.3 kg (199 lb)   SpO2 96%   Breastfeeding? No   BMI 33.12 kg/m²     Patient is status post Procedure(s):  ESOPHAGOGASTRODUODENOSCOPY (EGD) WITH BIOPSIES  ESOPHAGEAL DILATION. Nausea/Vomiting: Controlled. Postoperative hydration reviewed and adequate. Pain:  Pain Scale 1: Numeric (0 - 10) (08/09/19 1039)  Pain Intensity 1: 0 (08/09/19 1039)   Managed. Neurological Status:   Neuro (WDL): Within Defined Limits (08/09/19 1034)   At baseline. Mental Status and Level of Consciousness: Arousable. Pulmonary Status:   O2 Device: Nasal cannula (08/09/19 1035)   Adequate oxygenation and airway patent. Complications related to anesthesia: None    Post-anesthesia assessment completed. No concerns.     Signed By: Jason Oneill MD    8/9/2019  Post anesthesia nausea and vomiting:  controlled      Vitals Value Taken Time   /67 8/9/2019 10:39 AM   Temp 36.7 °C (98.1 °F) 8/9/2019 10:35 AM   Pulse 69 8/9/2019 10:39 AM   Resp 16 8/9/2019 10:39 AM   SpO2 96 % 8/9/2019 10:35 AM

## 2019-09-05 ENCOUNTER — TELEPHONE (OUTPATIENT)
Dept: SLEEP MEDICINE | Age: 69
End: 2019-09-05

## 2019-09-05 NOTE — TELEPHONE ENCOUNTER
Patient was called and informed that her CPAP pressure was changed from 6 cmH2O to 7 cmH2O via airview.

## 2019-09-05 NOTE — TELEPHONE ENCOUNTER
Patient called in today stating she had gotten back from vacation and her machine was moved to the setting of 6. She said at her last office visit Dr. Juvencio Kaplan stated it should stay on a 7. Patient wants to have her setting moved back to 7.

## 2019-09-11 ENCOUNTER — OFFICE VISIT (OUTPATIENT)
Dept: FAMILY MEDICINE CLINIC | Age: 69
End: 2019-09-11

## 2019-09-11 ENCOUNTER — HOSPITAL ENCOUNTER (OUTPATIENT)
Dept: LAB | Age: 69
Discharge: HOME | End: 2019-09-11
Payer: MEDICARE

## 2019-09-11 VITALS
HEIGHT: 65 IN | TEMPERATURE: 97.9 F | RESPIRATION RATE: 20 BRPM | WEIGHT: 202 LBS | BODY MASS INDEX: 33.66 KG/M2 | OXYGEN SATURATION: 97 % | SYSTOLIC BLOOD PRESSURE: 140 MMHG | HEART RATE: 66 BPM | DIASTOLIC BLOOD PRESSURE: 76 MMHG

## 2019-09-11 DIAGNOSIS — K21.00 GASTROESOPHAGEAL REFLUX DISEASE WITH ESOPHAGITIS: ICD-10-CM

## 2019-09-11 DIAGNOSIS — E78.2 MIXED HYPERLIPIDEMIA: ICD-10-CM

## 2019-09-11 DIAGNOSIS — K22.4 ESOPHAGEAL DYSMOTILITY: ICD-10-CM

## 2019-09-11 DIAGNOSIS — Z23 ENCOUNTER FOR IMMUNIZATION: ICD-10-CM

## 2019-09-11 DIAGNOSIS — E11.59 TYPE 2 DIABETES MELLITUS WITH OTHER CIRCULATORY COMPLICATION, WITHOUT LONG-TERM CURRENT USE OF INSULIN (HCC): ICD-10-CM

## 2019-09-11 DIAGNOSIS — I25.10 CORONARY ARTERY DISEASE INVOLVING NATIVE CORONARY ARTERY OF NATIVE HEART WITHOUT ANGINA PECTORIS: ICD-10-CM

## 2019-09-11 DIAGNOSIS — Z00.00 MEDICARE ANNUAL WELLNESS VISIT, SUBSEQUENT: Primary | ICD-10-CM

## 2019-09-11 LAB
GLUCOSE POC: 186 MG/DL
HBA1C MFR BLD HPLC: 7.4 %

## 2019-09-11 PROCEDURE — 82043 UR ALBUMIN QUANTITATIVE: CPT

## 2019-09-11 PROCEDURE — 36415 COLL VENOUS BLD VENIPUNCTURE: CPT

## 2019-09-11 PROCEDURE — 82465 ASSAY BLD/SERUM CHOLESTEROL: CPT

## 2019-09-11 PROCEDURE — 80053 COMPREHEN METABOLIC PANEL: CPT
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
## 2019-09-11 NOTE — PROGRESS NOTES
HISTORY OF PRESENT ILLNESS  Tom Celaya is a 71 y.o. female. f/u dm2,hbp, cad,chol gerd. Feeling more fatigued and irritable. Sleep not restful. Diabetes   The history is provided by the patient. This is a chronic problem. The problem occurs daily. The problem has not changed since onset. Nothing relieves the symptoms. Hypertension    The history is provided by the patient. This is a chronic problem. The problem has been gradually improving. Associated symptoms include malaise/fatigue. Pertinent negatives include no orthopnea and no palpitations. Cholesterol Problem   The history is provided by the patient. This is a chronic problem. The problem occurs daily. The problem has been gradually improving. Fatigue   The history is provided by the patient. This is a chronic problem. The problem occurs daily. Review of Systems   Constitutional: Positive for fatigue and malaise/fatigue. Negative for fever and weight loss. Respiratory: Negative for cough. Cardiovascular: Negative for palpitations and orthopnea. Gastrointestinal: Positive for heartburn. Negative for blood in stool and constipation. Genitourinary: Negative for dysuria and frequency. Musculoskeletal: Negative for back pain. Psychiatric/Behavioral: Negative for depression. The patient does not have insomnia. Physical Exam   Constitutional: She is oriented to person, place, and time. She appears well-developed and well-nourished. HENT:   Head: Normocephalic and atraumatic. Right Ear: External ear normal.   Left Ear: External ear normal.   Nose: Mucosal edema and rhinorrhea present. Mouth/Throat: Posterior oropharyngeal erythema present. Eyes: Pupils are equal, round, and reactive to light. Conjunctivae are normal.   Neck: Normal range of motion. Neck supple. No tracheal deviation present. No thyromegaly present. Cardiovascular: Normal rate, regular rhythm and normal heart sounds. Exam reveals no gallop.    No murmur heard.  Pulmonary/Chest: Effort normal and breath sounds normal. No respiratory distress. Musculoskeletal: Normal range of motion. Neurological: She is alert and oriented to person, place, and time. Skin: Skin is warm and dry. Psychiatric: She has a normal mood and affect. Her behavior is normal.   Vitals reviewed. Diagnoses and all orders for this visit:    1. Medicare annual wellness visit, subsequent    2. Type 2 diabetes mellitus with other circulatory complication, without long-term current use of insulin ([facility])  -     METABOLIC PANEL, COMPREHENSIVE  -     AMB POC HEMOGLOBIN A1C  -     MICROALBUMIN, UR, RAND W/ MICROALB/CREAT RATIO  -     AMB POC GLUCOSE, QUANTITATIVE, BLOOD    3. Mixed hyperlipidemia  -     CHOLESTEROL, TOTAL    4. Coronary artery disease involving native coronary artery of native heart without angina pectoris    5. Esophageal dysmotility    6. Gastroesophageal reflux disease with esophagitis    7.  Encounter for immunization  -     INFLUENZA VACCINE INACTIVATED (IIV), SUBUNIT, ADJUVANTED, IM              Doing well,continue current meds and treatments

## 2019-09-11 NOTE — PROGRESS NOTES
Chief Complaint   Patient presents with    Diabetes     follow up    Hypertension     follow up    Cholesterol Problem     follow up

## 2019-09-12 LAB
ALBUMIN SERPL-MCNC: 4.2 G/DL (ref 3.6–4.8)
ALBUMIN/CREAT UR: 13.6 MG/G CREAT (ref 0–30)
ALBUMIN/GLOB SERPL: 1.6 {RATIO} (ref 1.2–2.2)
ALP SERPL-CCNC: 101 IU/L (ref 39–117)
ALT SERPL-CCNC: 17 IU/L (ref 0–32)
AST SERPL-CCNC: 19 IU/L (ref 0–40)
BILIRUB SERPL-MCNC: 0.8 MG/DL (ref 0–1.2)
BUN SERPL-MCNC: 13 MG/DL (ref 8–27)
BUN/CREAT SERPL: 17 (ref 12–28)
CALCIUM SERPL-MCNC: 9.9 MG/DL (ref 8.7–10.3)
CHLORIDE SERPL-SCNC: 99 MMOL/L (ref 96–106)
CHOLEST SERPL-MCNC: 143 MG/DL (ref 100–199)
CO2 SERPL-SCNC: 26 MMOL/L (ref 20–29)
CREAT SERPL-MCNC: 0.75 MG/DL (ref 0.57–1)
CREAT UR-MCNC: 91.5 MG/DL
GLOBULIN SER CALC-MCNC: 2.6 G/DL (ref 1.5–4.5)
GLUCOSE SERPL-MCNC: 175 MG/DL (ref 65–99)
Lab: NORMAL
MICROALBUMIN UR-MCNC: 12.4 UG/ML
POTASSIUM SERPL-SCNC: 3.5 MMOL/L (ref 3.5–5.2)
PROT SERPL-MCNC: 6.8 G/DL (ref 6–8.5)
SODIUM SERPL-SCNC: 140 MMOL/L (ref 134–144)

## 2019-09-26 RX ORDER — AMLODIPINE BESYLATE 2.5 MG/1
TABLET ORAL
Qty: 90 TAB | Refills: 3 | Status: SHIPPED | OUTPATIENT
Start: 2019-09-26 | End: 2020-10-22

## 2019-10-17 ENCOUNTER — OFFICE VISIT (OUTPATIENT)
Dept: CARDIOLOGY CLINIC | Age: 69
End: 2019-10-17

## 2019-10-17 VITALS
HEART RATE: 64 BPM | HEIGHT: 65 IN | BODY MASS INDEX: 33.89 KG/M2 | WEIGHT: 203.4 LBS | OXYGEN SATURATION: 97 % | SYSTOLIC BLOOD PRESSURE: 134 MMHG | RESPIRATION RATE: 16 BRPM | DIASTOLIC BLOOD PRESSURE: 76 MMHG

## 2019-10-17 DIAGNOSIS — E78.2 MIXED HYPERLIPIDEMIA: ICD-10-CM

## 2019-10-17 DIAGNOSIS — I10 ESSENTIAL HYPERTENSION, BENIGN: ICD-10-CM

## 2019-10-17 DIAGNOSIS — I25.10 CORONARY ARTERY DISEASE INVOLVING NATIVE CORONARY ARTERY OF NATIVE HEART WITHOUT ANGINA PECTORIS: Primary | ICD-10-CM

## 2019-10-17 DIAGNOSIS — G47.33 OSA (OBSTRUCTIVE SLEEP APNEA): ICD-10-CM

## 2019-10-17 DIAGNOSIS — E11.8 CONTROLLED TYPE 2 DIABETES MELLITUS WITH COMPLICATION, WITHOUT LONG-TERM CURRENT USE OF INSULIN (HCC): ICD-10-CM

## 2019-10-17 DIAGNOSIS — I25.82 CHRONIC TOTAL OCCLUSION OF CORONARY ARTERY: ICD-10-CM

## 2019-10-17 DIAGNOSIS — Z98.61 S/P PTCA (PERCUTANEOUS TRANSLUMINAL CORONARY ANGIOPLASTY): ICD-10-CM

## 2019-10-17 NOTE — PROGRESS NOTES
1. Have you been to the ER, urgent care clinic since your last visit? Hospitalized since your last visit? Yes 8/2019 Endoscopy    2. Have you seen or consulted any other health care providers outside of the [address] [place] [address] since your last visit? Include any pap smears or colon screening. Audiologist.    Chief Complaint   Patient presents with    Follow-up    Coronary Artery Disease    Patient has no cardiac complaints having lower extremities aching all the time will improve with activity.

## 2019-10-17 NOTE — PROGRESS NOTES
[address] [facility], [place], [address] [facility]  [phone]     Subjective:      Rosa Hernandez is a 71 y.o. female is here for routine f/u on ASHD HTN HLD PHUC  S/p esophageal dilatation performed by Dr Teresa Alvarado in 8/19, doing well. She wears a fit bit, sometimes she walks 10,000 steps, no exertional symptoms. The patient denies chest pain/ shortness of breath, orthopnea, PND, LE edema, palpitations, syncope, or presyncope.        Patient Active Problem List    Diagnosis Date Noted    Eructation 08/09/2019    Type 2 diabetes mellitus with diabetic neuropathy ([facility] [address] 03/26/2018    Diabetes mellitus ([facility] 02/20/2018    Routine adult health maintenance 10/28/2015    Gastric ulcer 08/11/2015    Unstable angina ([facility] [address] 07/16/2015    Angina, class III ([facility] [address] 07/07/2015    Myocardial ischemia 07/07/2015    S/P PTCA (percutaneous transluminal coronary angioplasty) 07/07/2015    Chest pain 05/15/2015    Diarrhea 01/05/2015    CAD (coronary artery disease) 10/23/2014    Snoring 10/21/2014    PHUC (obstructive sleep apnea) 10/21/2014    Presence of stent in right coronary artery 12/30/2013    Myalgia and myositis, unspecified 02/04/2013    Esophageal dysphagia 10/14/2012    Esophageal motor disorder 10/14/2012    Chronic total occlusion of coronary artery 06/25/2012    S/P cardiac cath 03/07/2012    Esophageal dysmotility 10/10/2011    Allergic rhinitis 02/20/2011    Esophageal reflux 02/20/2011    Essential hypertension, benign 02/20/2011    Anxiety state, unspecified 02/20/2011    Encounter for long-term (current) use of other medications 02/20/2011    Mixed hyperlipidemia 02/20/2011      Lucia Guido MD  Past Medical History:   Diagnosis Date    Allergic rhinitis, cause unspecified 2/20/2011    Angina, class III ([address] 12/13/2013    as of 8/4/15 pt reports intermittent \"angina pain\" and GRAJEDA; followed by Dr Josemanuel Willingham Arthritis     neck - numbness of arm  CAD (coronary artery disease)     angina / Dr Long Crest    Colon polyps 2011    Diabetes ([facility] [address]     Diarrhea     \"random\" per pt; followed by Dr Celina Louis for long-term (current) use of other medications 2011    Eructation 2019    Esophageal dysmotility 10/10/2011    Essential hypertension, benign 2011    Gastric ulcer 2015    GERD (gastroesophageal reflux disease)     Hypertension     Ill-defined condition     torn MCL - left - no surgery    Mixed hyperlipidemia 2011    Nausea & vomiting     Sleep apnea     CPAP;  Dr Cayetano holly MD      Past Surgical History:   Procedure Laterality Date    CARDIAC CATHETERIZATION           HX BUNIONECTOMY Right     HX CATARACT REMOVAL Bilateral     HX  SECTION      x3    HX CHOLECYSTECTOMY      HX COLONOSCOPY      HX GI      EGDs with dilatation    HX HEART CATHETERIZATION      catherization with 3 stents - states stents are blocked    HX HEART CATHETERIZATION  2015    unable to stent; tried to unblock stents but unable to/starting cardiac rehab Aug2015/has collateral circulation    AK EGD BALLOON DILATION ESOPHAGUS <30 MM DIAM  10/13/2010         AK EGD TRANSORAL BIOPSY SINGLE/MULTIPLE  10/13/2010         UPPER GI ENDOSCOPY,BALL DIL,30MM  3/30/2015         UPPER GI ENDOSCOPY,BALL DIL,30MM  2019         UPPER GI ENDOSCOPY,BIOPSY  3/30/2015         UPPER GI ENDOSCOPY,BIOPSY  2019          Allergies   Allergen Reactions    Penicillins Hives     Other reaction(s): Hives    Protamine Anaphylaxis    Sulfa (Sulfonamide Antibiotics) Hives    Imdur [Isosorbide Mononitrate] Other (comments)     headache      Family History   Problem Relation Age of Onset    Heart Disease Mother     Hypertension Mother     Heart Attack Mother     Heart Disease Father     Hypertension Father     Heart Surgery Father     Cancer Sister         lung    Cancer Brother         brain tumor - malignant  Breast Cancer Sister       Social History     Socioeconomic History    Marital status:      Spouse name: Not on file    Number of children: Not on file    Years of education: Not on file    Highest education level: Not on file   Occupational History    Not on file   Social Needs    Financial resource strain: Not on file    Food insecurity:     Worry: Not on file     Inability: Not on file    Transportation needs:     Medical: Not on file     Non-medical: Not on file   Tobacco Use    Smoking status: Never Smoker    Smokeless tobacco: Never Used   Substance and Sexual Activity    Alcohol use: Yes     Alcohol/week: 1.7 standard drinks     Types: 2 Glasses of wine per week     Comment: occasionally    Drug use: Never    Sexual activity: Yes     Partners: Male   Lifestyle    Physical activity:     Days per week: Not on file     Minutes per session: Not on file    Stress: Not on file   Relationships    Social connections:     Talks on phone: Not on file     Gets together: Not on file     Attends Congregation service: Not on file     Active member of club or organization: Not on file     Attends meetings of clubs or organizations: Not on file     Relationship status: Not on file    Intimate partner violence:     Fear of current or ex partner: Not on file     Emotionally abused: Not on file     Physically abused: Not on file     Forced sexual activity: Not on file   Other Topics Concern    Not on file   Social History Narrative    Not on file      Current Outpatient Medications   Medication Sig    amLODIPine (NORVASC) 2.5 mg tablet take 1 tablet by mouth once daily    cyclobenzaprine (FLEXERIL) 10 mg tablet Take  by mouth three (3) times daily as needed for Muscle Spasm(s).     glimepiride (AMARYL) 2 mg tablet take 1 tablet by mouth every morning    fluticasone propionate (FLONASE) 50 mcg/actuation nasal spray instill 2 sprays into each nostril once daily    diclofenac (VOLTAREN) 1 % gel Apply locally to affected area 4 times daily    gabapentin (NEURONTIN) 100 mg capsule Take 1 Cap by mouth three (3) times daily. (Patient taking differently: Take 100 mg by mouth as needed.)    clopidogrel (PLAVIX) 75 mg tab take 1 tablet by mouth once daily    rosuvastatin (CRESTOR) 10 mg tablet take 1 tablet by mouth every evening    RANEXA 1,000 mg take 1 tablet by mouth twice a day    metoprolol succinate (TOPROL-XL) 25 mg XL tablet take 1 tablet by mouth once daily    triamterene-hydroCHLOROthiazide (MAXZIDE) 37.5-25 mg per tablet take 1 tablet by mouth every morning    JANUVIA 100 mg tablet take 1 tablet by mouth once daily    nitroglycerin (NITROSTAT) 0.4 mg SL tablet place 1 tablet under the tongue every 5 minutes if needed    MAG CARB/ALUMINUM HYDROX/ALGIN (GAVISCON EXTRA STRENGTH PO) Take  by mouth as needed.  ASPIRIN PO Take 81 mg by mouth daily.  MINH/D3/MAG11/ZINC//RONEN/BOR (CALTRATE 600+D PLUS MINERALS PO) Take  by mouth. Takes one po once daily.  GUAIFENESIN (MUCINEX PO) Take 1,200 mg by mouth as needed. Extra strength.  raNITIdine (ZANTAC) 300 mg tablet Take 300 mg by mouth two (2) times a day.  glucose blood VI test strips (BLOOD GLUCOSE TEST) strip by Does Not Apply route Daily Check three times daily . Dx. Code E11.9    Lancets misc Check Blood sugar 3 times daily Dx. Code E11.9    cyanocobalamin (VITAMIN B-12) 1,000 mcg tablet Take 500 mcg by mouth daily.  Blood-Glucose Meter monitoring kit AS DIRECTED TWICE DAILY    sucralfate (CARAFATE) 1 gram tablet Take 1 g by mouth four (4) times daily as needed.  ibuprofen (ADVIL) 200 mg tablet Take 400 mg by mouth as needed for Pain.  coenzyme Q-10 (CO Q-10) 200 mg capsule Take 1 Cap by mouth daily. Over the counter    cholecalciferol, vitamin d3, (VITAMIN D) 1,000 unit tablet Take 1,000 Units by mouth daily. No current facility-administered medications for this visit.           Review of Symptoms:  11 systems reviewed, negative other than as stated in the HPI    Physical ExamPhysical Exam:    Vitals:    10/17/19 1026 10/17/19 1037   BP: 140/80 134/76   Pulse: 64    Resp: 16    SpO2: 97%    Weight: 203 lb 6.4 oz (92.3 kg)    Height: 5' 5\" (1.651 m)      Body mass index is 33.85 kg/m². General PE  Gen:  NAD  Mental Status - Alert. General Appearance - Not in acute distress. HEENT:  PERRL, no carotid bruits or JVD  Chest and Lung Exam   Inspection: Accessory muscles - No use of accessory muscles in breathing. Auscultation:   Breath sounds: - Normal.   Cardiovascular   Inspection: Jugular vein - Bilateral - Inspection Normal.   Palpation/Percussion:   Apical Impulse: - Normal.   Auscultation: Rhythm - Regular. Heart Sounds - S1 WNL and S2 WNL. No S3 or S4. Murmurs & Other Heart Sounds: Auscultation of the heart reveals - No Murmurs. Peripheral Vascular   Upper Extremity: Inspection - Bilateral - No Cyanotic nailbeds or Digital clubbing. Lower Extremity:   Palpation: Edema - Bilateral - No edema. Abdomen:   Soft, non-tender, bowel sounds are active.   Neuro: A&O times 3, CN and motor grossly WNL    Labs:   Lab Results   Component Value Date/Time    Cholesterol, total 143 09/11/2019 11:51 AM    Cholesterol, total 129 06/04/2019 10:00 AM    Cholesterol, total 132 03/04/2019 12:06 PM    Cholesterol, total 138 12/03/2018 11:27 AM    Cholesterol, total 139 08/31/2018 11:59 AM    HDL Cholesterol 38 (L) 06/04/2019 10:00 AM    HDL Cholesterol 43 03/04/2019 12:06 PM    HDL Cholesterol 39 (L) 12/03/2018 11:27 AM    HDL Cholesterol 39 (L) 08/31/2018 11:59 AM    HDL Cholesterol 39 (L) 05/31/2018 11:15 AM    LDL, calculated 40 06/04/2019 10:00 AM    LDL, calculated 48 03/04/2019 12:06 PM    LDL, calculated 35 12/03/2018 11:27 AM    LDL, calculated 39 08/31/2018 11:59 AM    LDL, calculated 49 05/31/2018 11:15 AM    Triglyceride 254 (H) 06/04/2019 10:00 AM    Triglyceride 203 (H) 03/04/2019 12:06 PM    Triglyceride 322 (H) 12/03/2018 11:27 AM    Triglyceride 306 (H) 08/31/2018 11:59 AM    Triglyceride 249 (H) 05/31/2018 11:15 AM    CHOL/HDL Ratio 3.0 12/13/2013 03:16 AM     Lab Results   Component Value Date/Time    CK 72 09/14/2012 10:20 PM     Lab Results   Component Value Date/Time    Sodium 140 09/11/2019 11:51 AM    Potassium 3.5 09/11/2019 11:51 AM    Chloride 99 09/11/2019 11:51 AM    CO2 26 09/11/2019 11:51 AM    Anion gap 3 (L) 07/17/2015 04:37 AM    Glucose 175 (H) 09/11/2019 11:51 AM    BUN 13 09/11/2019 11:51 AM    Creatinine 0.75 09/11/2019 11:51 AM    BUN/Creatinine ratio 17 09/11/2019 11:51 AM    GFR est AA 94 09/11/2019 11:51 AM    GFR est non-AA 82 09/11/2019 11:51 AM    Calcium 9.9 09/11/2019 11:51 AM    Bilirubin, total 0.8 09/11/2019 11:51 AM    AST (SGOT) 19 09/11/2019 11:51 AM    Alk. phosphatase 101 09/11/2019 11:51 AM    Protein, total 6.8 09/11/2019 11:51 AM    Albumin 4.2 09/11/2019 11:51 AM    Globulin 3.6 09/14/2012 06:45 PM    A-G Ratio 1.6 09/11/2019 11:51 AM    ALT (SGPT) 17 09/11/2019 11:51 AM       EKG:  NSR      Assessment:        1. Coronary artery disease involving native coronary artery of native heart without angina pectoris    2. Essential hypertension, benign    3. Mixed hyperlipidemia    4. PHUC (obstructive sleep apnea)    5. Chronic total occlusion of coronary artery    6. S/P PTCA (percutaneous transluminal coronary angioplasty)    7. Controlled type 2 diabetes mellitus with complication, without long-term current use of insulin ([facility] [address]        Orders Placed This Encounter    AMB POC EKG ROUTINE W/ 12 LEADS, INTER & REP     Order Specific Question:   Reason for Exam:     Answer:   routine        Plan:     Patient presents for f/u, doing well and stable from cardiac standpoint. S/p esophageal dilatation performed by Dr Nathanael Membreno in 8/19. She wears a fit bit, sometimes she walks 10,000 steps, no exertional symptoms. ASHD  2 unsuccessful attempts at PCI of  of RCA, last in 7/2015.   Echo in 2015 NL EF and no valvular disease.   No ischemia per NST in 2/19  Last myoview 5/2016 with improved inferior ischemia with suspectedly improved collaterals to  of RCA.    Continue ASA, Plavix: Given her  recommend she continue on Plavix. Continue BB, CCB, Ranexa    HTN  Controlled with current therapy     HLD   6/19 LDL at 40. On statin. Lipids and labs followed by PCP    PHUC  On CPAP therapy    DM  On oral agents    Counseled on diet and exercise- eventual goal of 30-60 minutes 5-7 times a week as per AHA guidelines. Continue current care and f/u in 1 year, sooner PRN.       Amanda Meade MD

## 2019-11-07 DIAGNOSIS — E11.9 TYPE 2 DIABETES MELLITUS WITHOUT COMPLICATION, WITHOUT LONG-TERM CURRENT USE OF INSULIN (HCC): ICD-10-CM

## 2019-11-07 RX ORDER — SITAGLIPTIN 100 MG/1
TABLET, FILM COATED ORAL
Qty: 180 TAB | Refills: 0 | Status: SHIPPED | OUTPATIENT
Start: 2019-11-07 | End: 2021-07-18

## 2019-12-11 ENCOUNTER — HOSPITAL ENCOUNTER (OUTPATIENT)
Dept: LAB | Age: 69
Discharge: HOME | End: 2019-12-11
Payer: MEDICARE

## 2019-12-11 ENCOUNTER — OFFICE VISIT (OUTPATIENT)
Dept: FAMILY MEDICINE CLINIC | Age: 69
End: 2019-12-11

## 2019-12-11 VITALS
SYSTOLIC BLOOD PRESSURE: 126 MMHG | WEIGHT: 202 LBS | HEIGHT: 65 IN | TEMPERATURE: 97.9 F | DIASTOLIC BLOOD PRESSURE: 66 MMHG | BODY MASS INDEX: 33.66 KG/M2 | RESPIRATION RATE: 18 BRPM | OXYGEN SATURATION: 97 % | HEART RATE: 61 BPM

## 2019-12-11 DIAGNOSIS — I25.10 CORONARY ARTERY DISEASE INVOLVING NATIVE CORONARY ARTERY OF NATIVE HEART WITHOUT ANGINA PECTORIS: ICD-10-CM

## 2019-12-11 DIAGNOSIS — E11.9 TYPE 2 DIABETES MELLITUS WITHOUT COMPLICATION, WITHOUT LONG-TERM CURRENT USE OF INSULIN (HCC): Primary | ICD-10-CM

## 2019-12-11 DIAGNOSIS — E78.2 MIXED HYPERLIPIDEMIA: ICD-10-CM

## 2019-12-11 DIAGNOSIS — K22.4 ESOPHAGEAL DYSMOTILITY: ICD-10-CM

## 2019-12-11 LAB
GLUCOSE POC: 185 MG/DL
HBA1C MFR BLD HPLC: 7.6 %

## 2019-12-11 PROCEDURE — 80061 LIPID PANEL: CPT

## 2019-12-11 PROCEDURE — 80053 COMPREHEN METABOLIC PANEL: CPT

## 2019-12-11 PROCEDURE — 36415 COLL VENOUS BLD VENIPUNCTURE: CPT

## 2019-12-11 NOTE — PROGRESS NOTES
HISTORY OF PRESENT ILLNESS  Monty Salmeron is a 71 y.o. female. f/u dm2,hbp, cad,chol gerd. Feeling ok,followed by GI,Cardiology. Diabetes   The history is provided by the patient. This is a chronic problem. The problem occurs daily. The problem has been gradually worsening. Hypertension    The history is provided by the patient. This is a chronic problem. The problem has been gradually improving. Associated symptoms include malaise/fatigue. Pertinent negatives include no orthopnea and no palpitations. Cholesterol Problem   The history is provided by the patient. This is a chronic problem. The problem occurs daily. The problem has been gradually improving. GERD   The history is provided by the patient. This is a chronic problem. The problem occurs daily. The problem has not changed since onset. Review of Systems   Constitutional: Positive for malaise/fatigue. Cardiovascular: Negative for palpitations and orthopnea. Gastrointestinal: Positive for heartburn. Negative for blood in stool and constipation. Genitourinary: Negative for frequency. Musculoskeletal: Negative for back pain and myalgias. Physical Exam  Vitals signs reviewed. Constitutional:       Appearance: She is well-developed. HENT:      Head: Normocephalic and atraumatic. Right Ear: External ear normal.      Left Ear: External ear normal.      Nose: Mucosal edema and rhinorrhea present. Mouth/Throat:      Pharynx: Posterior oropharyngeal erythema present. Eyes:      Conjunctiva/sclera: Conjunctivae normal.      Pupils: Pupils are equal, round, and reactive to light. Neck:      Musculoskeletal: Normal range of motion and neck supple. Thyroid: No thyromegaly. Trachea: No tracheal deviation. Cardiovascular:      Rate and Rhythm: Normal rate and regular rhythm. Heart sounds: Normal heart sounds. No murmur. No gallop. Pulmonary:      Effort: Pulmonary effort is normal. No respiratory distress. Breath sounds: Normal breath sounds. Abdominal:      General: Bowel sounds are normal.      Palpations: Abdomen is soft. Musculoskeletal: Normal range of motion. Comments: Triggering of l thumb DIP   Skin:     General: Skin is warm and dry. Neurological:      General: No focal deficit present. Mental Status: She is alert. Mental status is at baseline. Diagnoses and all orders for this visit:    1. Type 2 diabetes mellitus without complication, without long-term current use of insulin (HCC)  -     METABOLIC PANEL, COMPREHENSIVE  -     AMB POC GLUCOSE, QUANTITATIVE, BLOOD  -     AMB POC HEMOGLOBIN A1C    2. Mixed hyperlipidemia  -     LIPID PANEL    3. Coronary artery disease involving native coronary artery of native heart without angina pectoris    4. Esophageal dysmotility                    Doing well,continue current meds and treatments    Follow-up and Dispositions    · Return in about 3 months (around 3/11/2020).

## 2019-12-11 NOTE — PROGRESS NOTES
Chief Complaint   Patient presents with    Diabetes     F/U on diabetes.  Hypertension     F/U on BP.  Cholesterol Problem     F/U on cholesterol. 1. Have you been to the ER, urgent care clinic since your last visit? Hospitalized since your last visit? No    2. Have you seen or consulted any other health care providers outside of the [address] [place] [address] since your last visit? Include any pap smears or colon screening.  No

## 2019-12-12 LAB
ALBUMIN SERPL-MCNC: 4.3 G/DL (ref 3.6–4.8)
ALBUMIN/GLOB SERPL: 1.7 {RATIO} (ref 1.2–2.2)
ALP SERPL-CCNC: 92 IU/L (ref 39–117)
ALT SERPL-CCNC: 20 IU/L (ref 0–32)
AST SERPL-CCNC: 12 IU/L (ref 0–40)
BILIRUB SERPL-MCNC: 1 MG/DL (ref 0–1.2)
BUN SERPL-MCNC: 18 MG/DL (ref 8–27)
BUN/CREAT SERPL: 17 (ref 12–28)
CALCIUM SERPL-MCNC: 10 MG/DL (ref 8.7–10.3)
CHLORIDE SERPL-SCNC: 101 MMOL/L (ref 96–106)
CHOLEST SERPL-MCNC: 148 MG/DL (ref 100–199)
CO2 SERPL-SCNC: 24 MMOL/L (ref 20–29)
CREAT SERPL-MCNC: 1.09 MG/DL (ref 0.57–1)
GLOBULIN SER CALC-MCNC: 2.5 G/DL (ref 1.5–4.5)
GLUCOSE SERPL-MCNC: 186 MG/DL (ref 65–99)
HDLC SERPL-MCNC: 40 MG/DL
INTERPRETATION, 910389: NORMAL
INTERPRETATION: NORMAL
LDLC SERPL CALC-MCNC: 55 MG/DL (ref 0–99)
Lab: NORMAL
PDF IMAGE, 910387: NORMAL
POTASSIUM SERPL-SCNC: 3.5 MMOL/L (ref 3.5–5.2)
PROT SERPL-MCNC: 6.8 G/DL (ref 6–8.5)
SODIUM SERPL-SCNC: 141 MMOL/L (ref 134–144)
TRIGL SERPL-MCNC: 264 MG/DL (ref 0–149)
VLDLC SERPL CALC-MCNC: 53 MG/DL (ref 5–40)

## 2019-12-30 RX ORDER — TRIAMTERENE/HYDROCHLOROTHIAZID 37.5-25 MG
TABLET ORAL
Qty: 90 TAB | Refills: 3 | Status: SHIPPED | OUTPATIENT
Start: 2019-12-30 | End: 2021-01-22 | Stop reason: SDUPTHER

## 2019-12-30 NOTE — TELEPHONE ENCOUNTER
----- Message from Naty Johnson sent at 12/30/2019 10:53 AM EST -----  Regarding: Vika Bibraquel / Refill  Patient's pharmacy switched from [facility] to [facility], and all medications require new prescriptions. Patient is out of Triamterene HCTZ 37.5-25mg. She requests return call from [place] at  [phone].

## 2020-01-13 RX ORDER — AZITHROMYCIN 250 MG/1
TABLET, FILM COATED ORAL
Qty: 6 TAB | Refills: 0 | Status: SHIPPED | OUTPATIENT
Start: 2020-01-13 | End: 2020-01-18

## 2020-01-13 NOTE — TELEPHONE ENCOUNTER
----- Message from Linda De Luna sent at 1/13/2020  9:41 AM EST -----  Regarding: Dr. Zak Leavitt  General Message/Vendor Calls    Caller's first and last name: Arnold Fuentes      Reason for call: possible medication for sinus infection       Callback required yes/no and why: yes      Best contact number(s): [phone]      Details to clarify the request: Pt wanted to know if she could get something called into her pharmacy for a sinus infection.        Linda De Luna

## 2020-02-10 RX ORDER — RANOLAZINE 1000 MG/1
TABLET, EXTENDED RELEASE ORAL
Qty: 180 TAB | Refills: 4 | Status: SHIPPED | OUTPATIENT
Start: 2020-02-10 | End: 2020-02-17 | Stop reason: SDUPTHER

## 2020-02-17 ENCOUNTER — TELEPHONE (OUTPATIENT)
Dept: CARDIOLOGY CLINIC | Age: 70
End: 2020-02-17

## 2020-02-17 RX ORDER — RANOLAZINE 1000 MG/1
TABLET, EXTENDED RELEASE ORAL
Qty: 180 TAB | Refills: 4 | Status: SHIPPED | OUTPATIENT
Start: 2020-02-17 | End: 2021-02-17

## 2020-02-17 NOTE — TELEPHONE ENCOUNTER
Please call [facility]  and ask for Dimple Sahni at [phone] in reference to the patient needs the following medication refilled ranolazine ER (RANEXA) 1,000 mg            Thanks

## 2020-02-17 NOTE — TELEPHONE ENCOUNTER
Called pharmacy at [facility] as requested in message below. Verified patient with two patient identifiers. States Ranexa has already been filled, did not need to speak with us.

## 2020-02-18 RX ORDER — ROSUVASTATIN CALCIUM 10 MG/1
TABLET, COATED ORAL
Qty: 90 TAB | Refills: 1 | Status: SHIPPED | OUTPATIENT
Start: 2020-02-18 | End: 2020-08-16

## 2020-02-18 NOTE — TELEPHONE ENCOUNTER
[facility]  sent a 80 day fax request for patient's Crestor 10mg . Last visit:12/11/19  Next visit:3/10/20  Previous refill 2/28/19(30+11R) Patient is requesting a 90 day supply to replace 30 day supply.      Requested Prescriptions     Pending Prescriptions Disp Refills    rosuvastatin (CRESTOR) 10 mg tablet 90 Tab 1     Sig: take 1 tablet by mouth every evening

## 2020-03-10 ENCOUNTER — OFFICE VISIT (OUTPATIENT)
Dept: FAMILY MEDICINE CLINIC | Age: 70
End: 2020-03-10

## 2020-03-10 ENCOUNTER — HOSPITAL ENCOUNTER (OUTPATIENT)
Dept: LAB | Age: 70
Discharge: HOME | End: 2020-03-10
Payer: MEDICARE

## 2020-03-10 VITALS
HEIGHT: 65 IN | WEIGHT: 202 LBS | HEART RATE: 66 BPM | DIASTOLIC BLOOD PRESSURE: 64 MMHG | BODY MASS INDEX: 33.66 KG/M2 | RESPIRATION RATE: 18 BRPM | SYSTOLIC BLOOD PRESSURE: 118 MMHG | OXYGEN SATURATION: 98 % | TEMPERATURE: 97.4 F

## 2020-03-10 DIAGNOSIS — E78.2 MIXED HYPERLIPIDEMIA: ICD-10-CM

## 2020-03-10 DIAGNOSIS — K22.4 ESOPHAGEAL DYSMOTILITY: ICD-10-CM

## 2020-03-10 DIAGNOSIS — E11.9 TYPE 2 DIABETES MELLITUS WITHOUT COMPLICATION, WITHOUT LONG-TERM CURRENT USE OF INSULIN (HCC): Primary | ICD-10-CM

## 2020-03-10 DIAGNOSIS — K21.00 GASTROESOPHAGEAL REFLUX DISEASE WITH ESOPHAGITIS: ICD-10-CM

## 2020-03-10 DIAGNOSIS — I10 ESSENTIAL HYPERTENSION, BENIGN: ICD-10-CM

## 2020-03-10 DIAGNOSIS — I25.10 CORONARY ARTERY DISEASE INVOLVING NATIVE CORONARY ARTERY OF NATIVE HEART WITHOUT ANGINA PECTORIS: ICD-10-CM

## 2020-03-10 LAB
GLUCOSE POC: 245 MG/DL
HBA1C MFR BLD HPLC: 7.3 %

## 2020-03-10 PROCEDURE — 80061 LIPID PANEL: CPT

## 2020-03-10 PROCEDURE — 80053 COMPREHEN METABOLIC PANEL: CPT

## 2020-03-10 NOTE — PROGRESS NOTES
HISTORY OF PRESENT ILLNESS  Trista Munoz is a 79 y.o. female. f/u dm2,hbp, cad,chol gerd. Feeling ok,stable exertional chest discomfort  Diabetes   The history is provided by the patient. This is a chronic problem. The problem occurs daily. The problem has not changed since onset. Associated symptoms include chest pain. Pertinent negatives include no shortness of breath. Nothing relieves the symptoms. Hypertension    The history is provided by the patient. This is a chronic problem. The problem has been gradually improving. Associated symptoms include chest pain and malaise/fatigue. Pertinent negatives include no orthopnea, no palpitations and no shortness of breath. Cholesterol Problem   The history is provided by the patient. This is a chronic problem. The problem occurs daily. The problem has been gradually improving. Associated symptoms include chest pain. Pertinent negatives include no shortness of breath. Fatigue   The history is provided by the patient. This is a chronic problem. The problem occurs daily. Associated symptoms include chest pain. Pertinent negatives include no shortness of breath. Review of Systems   Constitutional: Positive for fatigue and malaise/fatigue. Negative for fever and weight loss. Respiratory: Negative for cough and shortness of breath. Cardiovascular: Positive for chest pain. Negative for palpitations and orthopnea. Gastrointestinal: Positive for heartburn. Negative for blood in stool and constipation. Genitourinary: Negative for dysuria and frequency. Musculoskeletal: Negative for back pain. Psychiatric/Behavioral: Negative for depression. The patient does not have insomnia. Physical Exam  Vitals signs reviewed. Constitutional:       Appearance: She is well-developed. HENT:      Head: Normocephalic and atraumatic. Right Ear: External ear normal.      Left Ear: External ear normal.      Nose: Mucosal edema and congestion present.  No rhinorrhea. Mouth/Throat:      Pharynx: No posterior oropharyngeal erythema. Eyes:      Conjunctiva/sclera: Conjunctivae normal.      Pupils: Pupils are equal, round, and reactive to light. Neck:      Musculoskeletal: Normal range of motion and neck supple. Thyroid: No thyromegaly. Trachea: No tracheal deviation. Cardiovascular:      Rate and Rhythm: Normal rate and regular rhythm. Heart sounds: Normal heart sounds. No murmur. No gallop. Pulmonary:      Effort: Pulmonary effort is normal. No respiratory distress. Breath sounds: Normal breath sounds. Musculoskeletal: Normal range of motion. Skin:     General: Skin is warm and dry. Neurological:      Mental Status: She is alert and oriented to person, place, and time. Psychiatric:         Behavior: Behavior normal.         Diagnoses and all orders for this visit:    1. Type 2 diabetes mellitus without complication, without long-term current use of insulin (HCC)  -     AMB POC GLUCOSE, QUANTITATIVE, BLOOD  -     AMB POC HEMOGLOBIN A1C    2. Coronary artery disease involving native coronary artery of native heart without angina pectoris    3. Mixed hyperlipidemia  -     LIPID PANEL    4. Esophageal dysmotility    5. Gastroesophageal reflux disease with esophagitis    6.  Essential hypertension, benign  -     METABOLIC PANEL, COMPREHENSIVE              Doing well,continue current meds and treatments

## 2020-03-10 NOTE — PROGRESS NOTES
Chief Complaint   Patient presents with    Diabetes     F/U on diabetes.  Hypertension     F/U on BP.  Cholesterol Problem     F/U on cholesterol. 1. Have you been to the ER, urgent care clinic since your last visit? Hospitalized since your last visit? No    2. Have you seen or consulted any other health care providers outside of the [address] [place] [address] since your last visit? Include any pap smears or colon screening.  No

## 2020-03-11 LAB
ALBUMIN SERPL-MCNC: 4.1 G/DL (ref 3.8–4.8)
ALBUMIN/GLOB SERPL: 1.8 {RATIO} (ref 1.2–2.2)
ALP SERPL-CCNC: 84 IU/L (ref 39–117)
ALT SERPL-CCNC: 19 IU/L (ref 0–32)
AST SERPL-CCNC: 18 IU/L (ref 0–40)
BILIRUB SERPL-MCNC: 1 MG/DL (ref 0–1.2)
BUN SERPL-MCNC: 14 MG/DL (ref 8–27)
BUN/CREAT SERPL: 14 (ref 12–28)
CALCIUM SERPL-MCNC: 9.4 MG/DL (ref 8.7–10.3)
CHLORIDE SERPL-SCNC: 103 MMOL/L (ref 96–106)
CHOLEST SERPL-MCNC: 130 MG/DL (ref 100–199)
CO2 SERPL-SCNC: 23 MMOL/L (ref 20–29)
CREAT SERPL-MCNC: 1 MG/DL (ref 0.57–1)
GLOBULIN SER CALC-MCNC: 2.3 G/DL (ref 1.5–4.5)
GLUCOSE SERPL-MCNC: 239 MG/DL (ref 65–99)
HDLC SERPL-MCNC: 35 MG/DL
INTERPRETATION, 910389: NORMAL
INTERPRETATION: NORMAL
LDLC SERPL CALC-MCNC: 45 MG/DL (ref 0–99)
PDF IMAGE, 910387: NORMAL
POTASSIUM SERPL-SCNC: 3.5 MMOL/L (ref 3.5–5.2)
PROT SERPL-MCNC: 6.4 G/DL (ref 6–8.5)
SODIUM SERPL-SCNC: 139 MMOL/L (ref 134–144)
TRIGL SERPL-MCNC: 249 MG/DL (ref 0–149)
VLDLC SERPL CALC-MCNC: 50 MG/DL (ref 5–40)

## 2020-03-20 RX ORDER — METOPROLOL SUCCINATE 25 MG/1
TABLET, EXTENDED RELEASE ORAL
Qty: 90 TAB | Refills: 4 | Status: SHIPPED | OUTPATIENT
Start: 2020-03-20 | End: 2021-04-26

## 2020-03-24 RX ORDER — NITROGLYCERIN 0.4 MG/1
TABLET SUBLINGUAL
Qty: 1 BOTTLE | Refills: 1 | Status: SHIPPED | OUTPATIENT
Start: 2020-03-24

## 2020-06-10 ENCOUNTER — VIRTUAL VISIT (OUTPATIENT)
Dept: FAMILY MEDICINE CLINIC | Age: 70
End: 2020-06-10

## 2020-06-10 DIAGNOSIS — E11.59 TYPE 2 DIABETES MELLITUS WITH OTHER CIRCULATORY COMPLICATION, WITHOUT LONG-TERM CURRENT USE OF INSULIN (HCC): ICD-10-CM

## 2020-06-10 DIAGNOSIS — G89.29 CHRONIC RIGHT SHOULDER PAIN: ICD-10-CM

## 2020-06-10 DIAGNOSIS — M25.511 CHRONIC RIGHT SHOULDER PAIN: ICD-10-CM

## 2020-06-10 DIAGNOSIS — I10 ESSENTIAL HYPERTENSION, BENIGN: ICD-10-CM

## 2020-06-10 DIAGNOSIS — K22.4 ESOPHAGEAL DYSMOTILITY: ICD-10-CM

## 2020-06-10 DIAGNOSIS — K21.00 GASTROESOPHAGEAL REFLUX DISEASE WITH ESOPHAGITIS: ICD-10-CM

## 2020-06-10 DIAGNOSIS — I25.10 CORONARY ARTERY DISEASE INVOLVING NATIVE CORONARY ARTERY OF NATIVE HEART WITHOUT ANGINA PECTORIS: ICD-10-CM

## 2020-06-10 DIAGNOSIS — Z98.61 S/P PTCA (PERCUTANEOUS TRANSLUMINAL CORONARY ANGIOPLASTY): ICD-10-CM

## 2020-06-10 DIAGNOSIS — E78.2 MIXED HYPERLIPIDEMIA: ICD-10-CM

## 2020-06-10 DIAGNOSIS — E11.9 TYPE 2 DIABETES MELLITUS WITHOUT COMPLICATION, WITHOUT LONG-TERM CURRENT USE OF INSULIN (HCC): Primary | ICD-10-CM

## 2020-06-10 RX ORDER — FAMOTIDINE 40 MG/1
TABLET, FILM COATED ORAL
COMMUNITY
Start: 2020-05-19

## 2020-06-10 RX ORDER — GLIMEPIRIDE 2 MG/1
3 TABLET ORAL
Qty: 45 TAB | Refills: 11 | Status: SHIPPED | OUTPATIENT
Start: 2020-06-10 | End: 2021-06-10

## 2020-06-10 RX ORDER — RABEPRAZOLE SODIUM 20 MG/1
TABLET, DELAYED RELEASE ORAL
COMMUNITY
Start: 2020-04-17 | End: 2022-07-13 | Stop reason: ALTCHOICE

## 2020-06-10 NOTE — PROGRESS NOTES
Chief Complaint   Patient presents with    Diabetes     F/U on diabetes.  Hypertension     F/U on BP.  Cholesterol Problem     F/U on cholesterol.  Ankle Pain     Pt stated having R ankle pain.  Shoulder Pain     Pt stated having R shoulder pain. 1. Have you been to the ER, urgent care clinic since your last visit? Hospitalized since your last visit? No    2. Have you seen or consulted any other health care providers outside of the [address] [place] [address] since your last visit? Include any pap smears or colon screening. No      Pt stated no pain at the moment.

## 2020-06-10 NOTE — PROGRESS NOTES
HISTORY OF PRESENT ILLNESS  Patient encounter by synchronous (real time) audio technology which is patient initiated. The Patient is aware that this encounter is a billable service with coverage determined by their insurance carrier,as discussed at the time of check in. The patient has given verbal consent to proceed    Bianca Spivey is a 79 y.o. female c/o  Worsening chronic rt shoulder pain with no apparent injury,worsening sugar control with fastings >200. Also. f/u dm2,hbp, cad,chol gerd. Feeling ok,stable exertional chest discomfort  Diabetes   The history is provided by the patient. This is a chronic problem. The problem occurs daily. The problem has been gradually worsening. Pertinent negatives include no chest pain. Nothing relieves the symptoms. Hypertension    The history is provided by the patient. This is a chronic problem. The problem has been gradually improving. Associated symptoms include malaise/fatigue. Pertinent negatives include no chest pain, no orthopnea and no palpitations. Fatigue   The history is provided by the patient. This is a chronic problem. The problem occurs daily. Pertinent negatives include no chest pain. Shoulder Pain    The history is provided by the patient. There was no injury mechanism. The right shoulder is affected. The pain is at a severity of 5/10. The pain is moderate. The pain has been intermittent since onset. The pain does not radiate. There is no history of shoulder injury. She has no other injuries. There is no history of shoulder surgery. Pertinent negatives include no numbness. Review of Systems   Constitutional: Positive for fatigue and malaise/fatigue. Negative for fever and weight loss. Respiratory: Negative for cough. Cardiovascular: Negative for chest pain, palpitations and orthopnea. Gastrointestinal: Positive for heartburn. Negative for blood in stool and constipation. Genitourinary: Negative for dysuria and frequency.    Musculoskeletal: Positive for joint pain. Neurological: Negative for numbness. Psychiatric/Behavioral: Negative for depression. The patient does not have insomnia. Diagnoses and all orders for this visit:    1. Type 2 diabetes mellitus without complication, without long-term current use of insulin (HCC),WILL INCREASE AMARYL TO 1.5 TABS DAILY    2. Chronic right shoulder pain  -     REFERRAL TO ORTHOPEDICS    3. Mixed hyperlipidemia    4. Coronary artery disease involving native coronary artery of native heart without angina pectoris    5. S/P PTCA (percutaneous transluminal coronary angioplasty)    6. Esophageal dysmotility    7. Gastroesophageal reflux disease with esophagitis    8. Essential hypertension, benign    9. Type 2 diabetes mellitus with other circulatory complication, without long-term current use of insulin (HCC)  -     glimepiride (AMARYL) 2 mg tablet; Take 1.5 Tabs by mouth every morning. RTC 1 MO          Doing well,continue current meds and treatments     Due to this being a telehealth encounter (During  [address] [facility]),evaluation of the following organ systems was limited:Vitals/Constitutional/EENT,Respiratory,CV,GI,,MS,Neuro,Skin /Heme-Lymph-Imm  Pursuant to the emergency declaration under the [place] [address] waiver authority and the [facility] Appropriations Act,this Virtual Visit was conducted ,with patient consent,to reduce the patients risk of exposure to Covid 19 and to provide continuity of care  for an established patient. Services were provided through a video synchronous discussion virtually to substitute for in person appointment. This visit was completed using Doxy. me    Time in Virtual Visit:15      minutes

## 2020-07-10 ENCOUNTER — OFFICE VISIT (OUTPATIENT)
Dept: FAMILY MEDICINE CLINIC | Age: 70
End: 2020-07-10

## 2020-07-10 ENCOUNTER — HOSPITAL ENCOUNTER (OUTPATIENT)
Dept: LAB | Age: 70
Discharge: HOME | End: 2020-07-10
Payer: MEDICARE

## 2020-07-10 VITALS
DIASTOLIC BLOOD PRESSURE: 70 MMHG | RESPIRATION RATE: 18 BRPM | HEIGHT: 65 IN | OXYGEN SATURATION: 98 % | SYSTOLIC BLOOD PRESSURE: 122 MMHG | WEIGHT: 199.4 LBS | HEART RATE: 71 BPM | TEMPERATURE: 97.7 F | BODY MASS INDEX: 33.22 KG/M2

## 2020-07-10 DIAGNOSIS — I10 ESSENTIAL HYPERTENSION, BENIGN: ICD-10-CM

## 2020-07-10 DIAGNOSIS — E78.2 MIXED HYPERLIPIDEMIA: ICD-10-CM

## 2020-07-10 DIAGNOSIS — G89.29 CHRONIC RIGHT SHOULDER PAIN: ICD-10-CM

## 2020-07-10 DIAGNOSIS — M25.511 CHRONIC RIGHT SHOULDER PAIN: ICD-10-CM

## 2020-07-10 DIAGNOSIS — I25.10 CORONARY ARTERY DISEASE INVOLVING NATIVE CORONARY ARTERY OF NATIVE HEART WITHOUT ANGINA PECTORIS: ICD-10-CM

## 2020-07-10 DIAGNOSIS — E11.9 TYPE 2 DIABETES MELLITUS WITHOUT COMPLICATION, WITHOUT LONG-TERM CURRENT USE OF INSULIN (HCC): Primary | ICD-10-CM

## 2020-07-10 DIAGNOSIS — E11.21 TYPE 2 DIABETES WITH NEPHROPATHY (HCC): ICD-10-CM

## 2020-07-10 LAB
GLUCOSE POC: 150 MG/DL
HBA1C MFR BLD HPLC: 7.6 %

## 2020-07-10 PROCEDURE — 80053 COMPREHEN METABOLIC PANEL: CPT

## 2020-07-10 PROCEDURE — 80061 LIPID PANEL: CPT

## 2020-07-10 NOTE — PROGRESS NOTES
Chief Complaint   Patient presents with    Diabetes     follow up    Hypertension     follow up    Cholesterol Problem     follow up    Shoulder Pain     right shoulder x 3 months     1. Have you been to the ER, urgent care clinic since your last visit? Hospitalized since your last visit?no    2. Have you seen or consulted any other health care providers outside of the [address] [place] [address] since your last visit? Include any pap smears or colon screening.  no

## 2020-07-10 NOTE — PROGRESS NOTES
HISTORY OF PRESENT ILLNESS  Mayela Magaña is a 79 y.o. female. f/u dm2,hbp, cad,chol gerd. Feeling fatigued,some minor l ear fullness. Some increased lower extremity myalgia. Rt shoulder pain chronic. Diabetes   The history is provided by the patient. This is a chronic problem. The problem occurs daily. The problem has not changed since onset. Hypertension    The history is provided by the patient. This is a chronic problem. The problem has been gradually improving. Associated symptoms include malaise/fatigue. Pertinent negatives include no orthopnea and no palpitations. Cholesterol Problem   The history is provided by the patient. This is a chronic problem. The problem occurs daily. The problem has been gradually improving. GERD   The history is provided by the patient. This is a chronic problem. The problem occurs daily. The problem has not changed since onset. Shoulder Pain    The history is provided by the patient. There was no injury mechanism. The right shoulder is affected. The pain is at a severity of 4/10. The pain is moderate. The pain has been intermittent since onset. Pertinent negatives include no numbness, no muscle weakness and no tingling. Review of Systems   Constitutional: Positive for malaise/fatigue. HENT: Positive for congestion and ear pain. Respiratory: Negative for cough. Cardiovascular: Negative for palpitations and orthopnea. Gastrointestinal: Positive for heartburn. Negative for blood in stool and constipation. Genitourinary: Negative for frequency. Musculoskeletal: Positive for joint pain. Negative for back pain and myalgias. Neurological: Negative for tingling and numbness. Physical Exam  Vitals signs reviewed. Constitutional:       Appearance: She is well-developed. She is obese. HENT:      Head: Normocephalic and atraumatic.       Right Ear: Tympanic membrane and external ear normal.      Left Ear: Tympanic membrane and external ear normal.      Nose: Mucosal edema, congestion and rhinorrhea present. Mouth/Throat:      Pharynx: Posterior oropharyngeal erythema present. Eyes:      Conjunctiva/sclera: Conjunctivae normal.      Pupils: Pupils are equal, round, and reactive to light. Neck:      Musculoskeletal: Normal range of motion and neck supple. Thyroid: No thyromegaly. Trachea: No tracheal deviation. Cardiovascular:      Rate and Rhythm: Normal rate and regular rhythm. Heart sounds: Normal heart sounds. No murmur. No gallop. Pulmonary:      Effort: Pulmonary effort is normal. No respiratory distress. Breath sounds: Normal breath sounds. Abdominal:      General: Bowel sounds are normal.      Palpations: Abdomen is soft. Musculoskeletal:      Right shoulder: She exhibits decreased range of motion, crepitus and decreased strength. She exhibits no tenderness and no deformity. Skin:     General: Skin is warm and dry. Neurological:      General: No focal deficit present. Mental Status: She is alert. Mental status is at baseline. Diagnoses and all orders for this visit:    1. Type 2 diabetes mellitus without complication, without long-term current use of insulin ([facility])  -     METABOLIC PANEL, COMPREHENSIVE  -     AMB POC GLUCOSE, QUANTITATIVE, BLOOD  -     AMB POC HEMOGLOBIN A1C    2. Mixed hyperlipidemia  -     LIPID PANEL    3. Coronary artery disease involving native coronary artery of native heart without angina pectoris    4.  Essential hypertension, benign                    Doing well,continue current meds and treatments

## 2020-07-11 LAB
ALBUMIN SERPL-MCNC: 4.3 G/DL (ref 3.8–4.8)
ALBUMIN/GLOB SERPL: 1.7 {RATIO} (ref 1.2–2.2)
ALP SERPL-CCNC: 92 IU/L (ref 39–117)
ALT SERPL-CCNC: 25 IU/L (ref 0–32)
AST SERPL-CCNC: 21 IU/L (ref 0–40)
BILIRUB SERPL-MCNC: 1 MG/DL (ref 0–1.2)
BUN SERPL-MCNC: 14 MG/DL (ref 8–27)
BUN/CREAT SERPL: 14 (ref 12–28)
CALCIUM SERPL-MCNC: 10.1 MG/DL (ref 8.7–10.3)
CHLORIDE SERPL-SCNC: 101 MMOL/L (ref 96–106)
CHOLEST SERPL-MCNC: 137 MG/DL (ref 100–199)
CO2 SERPL-SCNC: 23 MMOL/L (ref 20–29)
CREAT SERPL-MCNC: 0.98 MG/DL (ref 0.57–1)
GLOBULIN SER CALC-MCNC: 2.5 G/DL (ref 1.5–4.5)
GLUCOSE SERPL-MCNC: 158 MG/DL (ref 65–99)
HDLC SERPL-MCNC: 38 MG/DL
INTERPRETATION, 910389: NORMAL
INTERPRETATION: NORMAL
LDLC SERPL CALC-MCNC: 48 MG/DL (ref 0–99)
Lab: NORMAL
PDF IMAGE, 910387: NORMAL
POTASSIUM SERPL-SCNC: 4.1 MMOL/L (ref 3.5–5.2)
PROT SERPL-MCNC: 6.8 G/DL (ref 6–8.5)
SODIUM SERPL-SCNC: 140 MMOL/L (ref 134–144)
TRIGL SERPL-MCNC: 254 MG/DL (ref 0–149)
VLDLC SERPL CALC-MCNC: 51 MG/DL (ref 5–40)

## 2020-08-12 RX ORDER — FLUTICASONE PROPIONATE 50 MCG
SPRAY, SUSPENSION (ML) NASAL
Qty: 16 G | Refills: 11 | Status: SHIPPED | OUTPATIENT
Start: 2020-08-12 | End: 2021-08-24

## 2020-08-16 RX ORDER — ROSUVASTATIN CALCIUM 10 MG/1
TABLET, COATED ORAL
Qty: 90 TAB | Refills: 1 | Status: SHIPPED | OUTPATIENT
Start: 2020-08-16 | End: 2021-02-26

## 2020-09-14 ENCOUNTER — VIRTUAL VISIT (OUTPATIENT)
Dept: SLEEP MEDICINE | Age: 70
End: 2020-09-14
Payer: MEDICARE

## 2020-09-14 ENCOUNTER — DOCUMENTATION ONLY (OUTPATIENT)
Dept: SLEEP MEDICINE | Age: 70
End: 2020-09-14

## 2020-09-14 DIAGNOSIS — I10 ESSENTIAL HYPERTENSION, BENIGN: ICD-10-CM

## 2020-09-14 DIAGNOSIS — E11.9 TYPE 2 DIABETES MELLITUS WITHOUT COMPLICATION, WITHOUT LONG-TERM CURRENT USE OF INSULIN (HCC): ICD-10-CM

## 2020-09-14 DIAGNOSIS — G47.33 OBSTRUCTIVE SLEEP APNEA (ADULT) (PEDIATRIC): Primary | ICD-10-CM

## 2020-09-14 PROCEDURE — 1101F PT FALLS ASSESS-DOCD LE1/YR: CPT | Performed by: INTERNAL MEDICINE

## 2020-09-14 PROCEDURE — G8756 NO BP MEASURE DOC: HCPCS | Performed by: INTERNAL MEDICINE

## 2020-09-14 PROCEDURE — G8399 PT W/DXA RESULTS DOCUMENT: HCPCS | Performed by: INTERNAL MEDICINE

## 2020-09-14 PROCEDURE — 3017F COLORECTAL CA SCREEN DOC REV: CPT | Performed by: INTERNAL MEDICINE

## 2020-09-14 PROCEDURE — G8427 DOCREV CUR MEDS BY ELIG CLIN: HCPCS | Performed by: INTERNAL MEDICINE

## 2020-09-14 PROCEDURE — G8432 DEP SCR NOT DOC, RNG: HCPCS | Performed by: INTERNAL MEDICINE

## 2020-09-14 PROCEDURE — G9899 SCRN MAM PERF RSLTS DOC: HCPCS | Performed by: INTERNAL MEDICINE

## 2020-09-14 PROCEDURE — 1090F PRES/ABSN URINE INCON ASSESS: CPT | Performed by: INTERNAL MEDICINE

## 2020-09-14 PROCEDURE — 99213 OFFICE O/P EST LOW 20 MIN: CPT | Performed by: INTERNAL MEDICINE

## 2020-09-14 PROCEDURE — 2022F DILAT RTA XM EVC RTNOPTHY: CPT | Performed by: INTERNAL MEDICINE

## 2020-09-14 PROCEDURE — 3051F HG A1C>EQUAL 7.0%<8.0%: CPT | Performed by: INTERNAL MEDICINE

## 2020-09-14 NOTE — PATIENT INSTRUCTIONS
[address] [facility] [place]. [address] [facility], [address] Tel.  [phone] Fax. [address] [facility] [address] [place], [address] [facility] Tel.  [phone] Fax. [phone] [address] Tel.  [phone] Fax. [phone] PROPER SLEEP HYGIENE What to avoid · Do not have drinks with caffeine, such as coffee or black tea, for 8 hours before bed. · Do not smoke or use other types of tobacco near bedtime. Nicotine is a stimulant and can keep you awake. · Avoid drinking alcohol late in the evening, because it can cause you to wake in the middle of the night. · Do not eat a big meal close to bedtime. If you are hungry, eat a light snack. · Do not drink a lot of water close to bedtime, because the need to urinate may wake you up during the night. · Do not read or watch TV in bed. Use the bed only for sleeping and sexual activity. What to try · Go to bed at the same time every night, and wake up at the same time every morning. Do not take naps during the day. · Keep your bedroom quiet, dark, and cool. · Get regular exercise, but not within 3 to 4 hours of your bedtime. Narda Bulls · Sleep on a comfortable pillow and mattress. · If watching the clock makes you anxious, turn it facing away from you so you cannot see the time. · If you worry when you lie down, start a worry book. Well before bedtime, write down your worries, and then set the book and your concerns aside. · Try meditation or other relaxation techniques before you go to bed. · If you cannot fall asleep, get up and go to another room until you feel sleepy. Do something relaxing. Repeat your bedtime routine before you go to bed again. · Make your house quiet and calm about an hour before bedtime. Turn down the lights, turn off the TV, log off the computer, and turn down the volume on music. This can help you relax after a busy day. Drowsy Driving [employer] cites drowsiness as a causing factor in more than 961,017 police reported crashes annually, resulting in 76,000 injuries and 1,500 deaths. Other surveys suggest 55% of people polled have driven while drowsy in the past year, 23% had fallen asleep but not crashed, 3% crashed, and 2% had and accident due to drowsy driving. Who is at risk? Young Drivers: One study of drowsy driving accidents states that 55% of the drivers were under 25 years. Of those, 75% were male. Shift Workers and Travelers: People who work overnight or travel across time zones frequently are at higher risk of experiencing Circadian Rhythm Disorders. They are trying to work and function when their body is programed to sleep. Sleep Deprived: Lack of sleep has a serious impact on your ability to pay attention or focus on a task. Consistently getting less than the average of 8 hours your body needs creates partial or cumulative sleep deprivation. Untreated Sleep Disorders: Sleep Apnea, Narcolepsy, R.L.S., and other sleep disorders (untreated) prevent a person from getting enough restful sleep. This leads to excessive daytime sleepiness and increases the risk for drowsy driving accidents by up to 7 times. Medications / Alcohol: Even over the counter medications can cause drowsiness. Medications that impair a drivers attention should have a warning label. Alcohol naturally makes you sleepy and on its own can cause accidents. Combined with excessive drowsiness its effects are amplified. Signs of Drowsy Driving: * You don't remember driving the last few miles * You may drift out of your sal * You are unable to focus and your thoughts wander * You may yawn more often than normal 
 * You have difficulty keeping your eyes open / nodding off * Missing traffic signs, speeding, or tailgating Prevention-  
Good sleep hygiene, lifestyle and behavioral choices have the most impact on drowsy driving. There is no substitute for sleep and the average person requires 8 hours nightly. If you find yourself driving drowsy, stop and sleep. Consider the sleep hygiene tips provided during your visit as well. Medication Refill Policy: Refills for all medications require 1 week advance notice. Please have your pharmacy fax a refill request. We are unable to fax, or call in \"controled substance\" medications and you will need to pick these prescriptions up from our office. MyChart Activation Thank you for requesting access to [employer]. Please follow the instructions below to securely access and download your online medical record. [employer] allows you to send messages to your doctor, view your test results, renew your prescriptions, schedule appointments, and more. How Do I Sign Up? 1. In your internet browser, go to [email or web address]. 2. Click on the First Time User? Click Here link in the Sign In box. You will see the New Member Sign Up page. 3. Enter your [employer] Access Code exactly as it appears below. You will not need to use this code after youve completed the sign-up process. If you do not sign up before the expiration date, you must request a new code. [employer] Access Code: [email or web address] Expires: 10/29/2020  8:33 AM (This is the date your [employer] access code will ) 4. Enter the last four digits of your Social Security Number (xxxx) and Date of Birth (mm/dd/yyyy) as indicated and click Submit. You will be taken to the next sign-up page. 5. Create a [employer] ID. This will be your [employer] login ID and cannot be changed, so think of one that is secure and easy to remember. 6. Create a [employer] password. You can change your password at any time. 7. Enter your Password Reset Question and Answer. This can be used at a later time if you forget your password. 8. Enter your e-mail address.  You will receive e-mail notification when new information is available in [employer]. 9. Click Sign Up. You can now view and download portions of your medical record. 10. Click the Download Summary menu link to download a portable copy of your medical information. Additional Information If you have questions, please call [phone]. Remember, [employer] is NOT to be used for urgent needs. For medical emergencies, dial 911.

## 2020-09-14 NOTE — PROGRESS NOTES
[address] [place]. [place], [address]  Tel.  [phone]  Fax. 100 [facility] [address]  [place], [address] [facility]  Tel.  [phone]  Fax. [phone] [address] [facility] Rosanna Armendariz   Tel.  [phone]  Fax. [phone]       Telemedicine visit performed with verbal consent of the patient. Patient called and identity confirmed with 2 patient identifers    Patient was seen at home  Anthony Ambriz is a 79 y.o. female who was seen by synchronous (real-time) audio-video technology on 9/14/2020. Consent:  She and/or her healthcare decision maker is aware that this patient-initiated Telehealth encounter is the equivalent to a face to face encounter in the sleep disorder center and has provided verbal consent to proceed: Yes    I was at home while conducting this encounter. S>Raina Underwood is a 79 y.o. female seen at this telemedicine visit for a positive airway pressure follow-up. She reports no problems using the device. She is 83% compliant over the past 30 days. The following problems are identified:    Drowsiness no Problems exhaling no   Snoring no Forget to put on no   Mask Comfortable yes  Can't fall asleep Yes she looks at clock and says it can be past one am   Dry Mouth no Mask falls off no   Air Leaking no Frequent awakenings Yes up for 30-45 minutes at night       Download reviewed. She admits that her sleep has improved on PAP therapy using nasal  mask and heated tubing.     Allergies   Allergen Reactions    Penicillins Hives     Other reaction(s): Hives    Protamine Anaphylaxis    Sulfa (Sulfonamide Antibiotics) Hives    Imdur [Isosorbide Mononitrate] Other (comments)     headache       She has a current medication list which includes the following prescription(s): rosuvastatin, fluticasone propionate, rabeprazole, famotidine, glimepiride, clopidogrel, nitroglycerin, metoprolol succinate, ranolazine er, triamterene-hydrochlorothiazide, januvia, amlodipine, cyclobenzaprine, gabapentin, mag carb/aluminum hydrox/algin, aspirin, calcium carb/vit d3/minerals, guaifenesin, glucose blood vi test strips, lancets, cyanocobalamin, blood-glucose meter, sucralfate, ibuprofen, coenzyme q-10, and cholecalciferol. .      She  has a past medical history of Allergic rhinitis, cause unspecified (2/20/2011), Angina, class III ([facility] [address] (12/13/2013), Anxiety, Arthritis, CAD (coronary artery disease), Colon polyps (2/22/2011), [facility] ([facility] Diarrhea, Encounter for long-term (current) use of other medications (2/20/2011), Eructation (8/9/2019), Esophageal dysmotility (10/10/2011), Essential hypertension, benign (2/20/2011), Gastric ulcer (8/11/2015), GERD (gastroesophageal reflux disease), Hypertension, Ill-defined condition, Mixed hyperlipidemia (2/20/2011), Nausea & vomiting, and Sleep apnea. [place] Sleepiness Score: 3   and     which reflect improved sleep quality over therapy time. O>      There were no vitals taken for this visit.       Vital Signs: (As obtained by patient/caregiver at home)        Constitutional: [x] Appears well-developed and well-nourished [x] No apparent distress      [] Abnormal -     Mental status: [x] Alert and awake  [x] Oriented to person/place/time [x] Able to follow commands    [] Abnormal -     Eyes:   EOM    [x]  Normal    [] Abnormal -   Sclera  [x]  Normal    [] Abnormal -          Discharge [x]  None visible   [] Abnormal -     HENT: [x] Normocephalic, atraumatic  [] Abnormal -     External Ears [x] Normal  [] Abnormal -    Neck: [x] No visualized mass [] Abnormal -     Pulmonary/Chest: [x] Respiratory effort normal   [x] No visualized signs of difficulty breathing or respiratory distress        [] Abnormal -       Neurological:        [x] No Facial Asymmetry (Cranial nerve 7 motor function) (limited exam due to video visit)          [x] No gaze palsy        [] Abnormal -          Skin:        [x] No significant exanthematous lesions or discoloration noted on facial skin         [] Abnormal -            Psychiatric:       [x] Normal Affect [] Abnormal -        Other pertinent observable physical exam findings:-            A>    ICD-10-CM ICD-9-CM    1. Obstructive sleep apnea (adult) (pediatric)  G47.33 327.23 AMB SUPPLY ORDER   2. Essential hypertension, benign  I10 401.1    3. Type 2 diabetes mellitus without complication, without long-term current use of insulin (HCC)  E11.9 250.00      AHI = 28 (3-15). On CPAP, Resmed :  7 cmH2O. Compliant:      yes    Therapeutic Response:  Positive    P>    she is compliant with PAP therapy and PAP continues to benefit patient and remains necessary for control of her sleep apnea. CPAP setting - continue 7 cmH20     * We have recommended a dedicated weight loss through appropriate diet and an exercise regimen as significant weight reduction has been shown to reduce severity of obstructive sleep apnea. *   Follow-up and Dispositions    · Return in about 1 year (around 9/14/2021). I have ordered replacement supplies  I have advised a regular sleep wake cycle. she  was advised to avoid looking at the clock during the night. Ideally, the clock face should be turned away. she was advised to minimize caffeine use and to avoid caffeine-containing beverages 8 hours prior to bedtime. Naps were discouraged. A regular exercise schedule, at least 3 hours before bedtime, would be beneficial to improving sleep quality. Watching TV, using laptops, tablets and smartphones in the evening was discouraged. she  was advised to keep the bedroom cool and dark. * She was asked to contact our office for any problems regarding PAP therapy. * Counseling was provided regarding the importance of regular PAP use and on proper sleep hygiene and safe driving. * Re-enforced proper and regular cleaning for the device. 2. Hypertension - she continues on her current regimen.   I have reviewed the relationship between hypertension as it relates to sleep-disordered breathing. 3. Type II diabetes - she continues on her current regimen. I have reviewed the relationship between sleep disordered breathing as it relates to diabetes. All of her questions were addressed. Pursuant to the emergency declaration under the [facility], [facility] waiver authority and the [employer] and [employer] Act, this Virtual  Visit was conducted, with patient's consent, to reduce the patient's risk of exposure to COVID-19 and provide continuity of care for an established patient. Services were provided through a video synchronous discussion virtually to substitute for in-person clinic visit.     Robe Dempsey MD    Electronically signed by    Jose Villarreal MD  Diplomate in Sleep Medicine  [facility]

## 2020-10-01 RX ORDER — AZITHROMYCIN 250 MG/1
TABLET, FILM COATED ORAL
Qty: 6 TAB | Refills: 0 | Status: SHIPPED | OUTPATIENT
Start: 2020-10-01 | End: 2020-10-06

## 2020-10-01 NOTE — TELEPHONE ENCOUNTER
Patient complaining of sinus infection for several weeks, has a headache, ear ache on and off, dizzy on and off, stuffy, congested. Requesting antibiotic , going on vacation this weekend.  Using flonase and mucinex

## 2020-10-13 ENCOUNTER — OFFICE VISIT (OUTPATIENT)
Dept: FAMILY MEDICINE CLINIC | Age: 70
End: 2020-10-13
Payer: MEDICARE

## 2020-10-13 ENCOUNTER — HOSPITAL ENCOUNTER (OUTPATIENT)
Dept: LAB | Age: 70
Discharge: HOME | End: 2020-10-13
Payer: MEDICARE

## 2020-10-13 VITALS
TEMPERATURE: 97.1 F | RESPIRATION RATE: 20 BRPM | HEART RATE: 70 BPM | OXYGEN SATURATION: 98 % | HEIGHT: 65 IN | DIASTOLIC BLOOD PRESSURE: 76 MMHG | SYSTOLIC BLOOD PRESSURE: 130 MMHG | BODY MASS INDEX: 33.19 KG/M2 | WEIGHT: 199.2 LBS

## 2020-10-13 DIAGNOSIS — M54.2 CERVICALGIA: ICD-10-CM

## 2020-10-13 DIAGNOSIS — M65.331 TRIGGER MIDDLE FINGER OF RIGHT HAND: ICD-10-CM

## 2020-10-13 DIAGNOSIS — I10 ESSENTIAL HYPERTENSION, BENIGN: ICD-10-CM

## 2020-10-13 DIAGNOSIS — K21.00 GASTROESOPHAGEAL REFLUX DISEASE WITH ESOPHAGITIS WITHOUT HEMORRHAGE: ICD-10-CM

## 2020-10-13 DIAGNOSIS — I25.10 CORONARY ARTERY DISEASE INVOLVING NATIVE CORONARY ARTERY OF NATIVE HEART WITHOUT ANGINA PECTORIS: ICD-10-CM

## 2020-10-13 DIAGNOSIS — Z99.89 OSA ON CPAP: ICD-10-CM

## 2020-10-13 DIAGNOSIS — R07.89 OTHER CHEST PAIN: ICD-10-CM

## 2020-10-13 DIAGNOSIS — E78.2 MIXED HYPERLIPIDEMIA: ICD-10-CM

## 2020-10-13 DIAGNOSIS — Z00.00 MEDICARE ANNUAL WELLNESS VISIT, SUBSEQUENT: Primary | ICD-10-CM

## 2020-10-13 DIAGNOSIS — K22.4 ESOPHAGEAL DYSMOTILITY: ICD-10-CM

## 2020-10-13 DIAGNOSIS — R06.02 SOB (SHORTNESS OF BREATH) ON EXERTION: ICD-10-CM

## 2020-10-13 DIAGNOSIS — E11.9 TYPE 2 DIABETES MELLITUS WITHOUT COMPLICATION, WITHOUT LONG-TERM CURRENT USE OF INSULIN (HCC): ICD-10-CM

## 2020-10-13 DIAGNOSIS — R00.2 PALPITATIONS: ICD-10-CM

## 2020-10-13 DIAGNOSIS — G47.33 OSA ON CPAP: ICD-10-CM

## 2020-10-13 DIAGNOSIS — Z95.5 PRESENCE OF STENT IN RIGHT CORONARY ARTERY: ICD-10-CM

## 2020-10-13 DIAGNOSIS — M25.511 CHRONIC RIGHT SHOULDER PAIN: ICD-10-CM

## 2020-10-13 DIAGNOSIS — Z23 NEEDS FLU SHOT: ICD-10-CM

## 2020-10-13 DIAGNOSIS — E11.21 TYPE 2 DIABETES WITH NEPHROPATHY (HCC): ICD-10-CM

## 2020-10-13 DIAGNOSIS — G89.29 CHRONIC RIGHT SHOULDER PAIN: ICD-10-CM

## 2020-10-13 LAB
GLUCOSE POC: 222 MG/DL
HBA1C MFR BLD HPLC: 7.8 %

## 2020-10-13 PROCEDURE — G8417 CALC BMI ABV UP PARAM F/U: HCPCS | Performed by: FAMILY MEDICINE

## 2020-10-13 PROCEDURE — G9899 SCRN MAM PERF RSLTS DOC: HCPCS | Performed by: FAMILY MEDICINE

## 2020-10-13 PROCEDURE — 36415 COLL VENOUS BLD VENIPUNCTURE: CPT | Performed by: FAMILY MEDICINE

## 2020-10-13 PROCEDURE — 1090F PRES/ABSN URINE INCON ASSESS: CPT | Performed by: FAMILY MEDICINE

## 2020-10-13 PROCEDURE — G8428 CUR MEDS NOT DOCUMENT: HCPCS | Performed by: FAMILY MEDICINE

## 2020-10-13 PROCEDURE — G8510 SCR DEP NEG, NO PLAN REQD: HCPCS | Performed by: FAMILY MEDICINE

## 2020-10-13 PROCEDURE — 3017F COLORECTAL CA SCREEN DOC REV: CPT | Performed by: FAMILY MEDICINE

## 2020-10-13 PROCEDURE — 99213 OFFICE O/P EST LOW 20 MIN: CPT | Performed by: FAMILY MEDICINE

## 2020-10-13 PROCEDURE — G8399 PT W/DXA RESULTS DOCUMENT: HCPCS | Performed by: FAMILY MEDICINE

## 2020-10-13 PROCEDURE — 90694 VACC AIIV4 NO PRSRV 0.5ML IM: CPT

## 2020-10-13 PROCEDURE — 80053 COMPREHEN METABOLIC PANEL: CPT

## 2020-10-13 PROCEDURE — G8754 DIAS BP LESS 90: HCPCS | Performed by: FAMILY MEDICINE

## 2020-10-13 PROCEDURE — 83036 HEMOGLOBIN GLYCOSYLATED A1C: CPT | Performed by: FAMILY MEDICINE

## 2020-10-13 PROCEDURE — 80061 LIPID PANEL: CPT

## 2020-10-13 PROCEDURE — G0439 PPPS, SUBSEQ VISIT: HCPCS | Performed by: FAMILY MEDICINE

## 2020-10-13 PROCEDURE — 2022F DILAT RTA XM EVC RTNOPTHY: CPT | Performed by: FAMILY MEDICINE

## 2020-10-13 PROCEDURE — G8536 NO DOC ELDER MAL SCRN: HCPCS | Performed by: FAMILY MEDICINE

## 2020-10-13 PROCEDURE — G0463 HOSPITAL OUTPT CLINIC VISIT: HCPCS | Performed by: FAMILY MEDICINE

## 2020-10-13 PROCEDURE — G8752 SYS BP LESS 140: HCPCS | Performed by: FAMILY MEDICINE

## 2020-10-13 PROCEDURE — 82947 ASSAY GLUCOSE BLOOD QUANT: CPT | Performed by: FAMILY MEDICINE

## 2020-10-13 PROCEDURE — 1101F PT FALLS ASSESS-DOCD LE1/YR: CPT | Performed by: FAMILY MEDICINE

## 2020-10-13 PROCEDURE — 3051F HG A1C>EQUAL 7.0%<8.0%: CPT | Performed by: FAMILY MEDICINE

## 2020-10-13 RX ORDER — GABAPENTIN 100 MG/1
100 CAPSULE ORAL 3 TIMES DAILY
Qty: 90 CAP | Refills: 5 | Status: SHIPPED | OUTPATIENT
Start: 2020-10-13 | End: 2021-03-31 | Stop reason: SDUPTHER

## 2020-10-13 RX ORDER — SUCRALFATE 1 G/1
1 TABLET ORAL
Qty: 90 TAB | Refills: 3 | Status: SHIPPED | OUTPATIENT
Start: 2020-10-13 | End: 2020-10-14

## 2020-10-13 RX ORDER — ALPRAZOLAM 0.25 MG/1
0.25 TABLET ORAL
Qty: 60 TAB | Refills: 2 | Status: SHIPPED | OUTPATIENT
Start: 2020-10-13 | End: 2020-11-06

## 2020-10-13 NOTE — PROGRESS NOTES
Chief Complaint   Patient presents with   [place] Annual Wellness Visit     medicare      1. Have you been to the ER, urgent care clinic since your last visit? Hospitalized since your last visit?no    2. Have you seen or consulted any other health care providers outside of the [address] [place] [address] since your last visit? Include any pap smears or colon screening.  no

## 2020-10-13 NOTE — PROGRESS NOTES
This is the Subsequent Medicare Annual Wellness Exam, performed 12 months or more after the Initial AWV or the last Subsequent AWV    I have reviewed the patient's medical history in detail and updated the computerized patient record.      History     Patient Active Problem List   Diagnosis Code    Allergic rhinitis J30.9    Esophageal reflux K21.9    Essential hypertension, benign I10    Anxiety state, unspecified F41.1    Encounter for long-term (current) use of other medications Z79.899    Mixed hyperlipidemia E78.2    Esophageal dysmotility K22.4    S/P cardiac cath Z98.890    Chronic total occlusion of coronary artery I25.82    Esophageal dysphagia R13.10    Esophageal motor disorder K22.4    Myalgia and myositis, unspecified [id number]    Presence of stent in right coronary artery Z95.5    Snoring R06.83    PHUC (obstructive sleep apnea) G47.33    CAD (coronary artery disease) I25.10    Diarrhea R19.7    Chest pain R07.9    Angina, class III (HCC) I20.9    Myocardial ischemia I25.9    S/P PTCA (percutaneous transluminal coronary angioplasty) Z98.61    Unstable angina (HCC) I20.0    Gastric ulcer K25.9    Routine adult health maintenance Z00.00    Diabetes mellitus (HCC) E11.9    Type 2 diabetes mellitus with diabetic neuropathy (HCC) E11.40    Eructation R14.2    Type 2 diabetes with nephropathy (HCC) E11.21     Past Medical History:   Diagnosis Date    Allergic rhinitis, cause unspecified 2/20/2011    Angina, class III ([facility] [address] 12/13/2013    as of 8/4/15 pt reports intermittent \"angina pain\" and GRAJEDA; followed by Dr Gerardo Brink Anxiety     Arthritis     neck - numbness of arm    CAD (coronary artery disease)     angina / Dr Eddie Fiore    Colon polyps 2/22/2011    Diabetes ([facility] [address]     Diarrhea     \"random\" per pt; followed by Dr Kenneth Sexton Encounter for long-term (current) use of other medications 2/20/2011    Eructation 8/9/2019    Esophageal dysmotility 10/10/2011    Essential hypertension, benign 2011    Gastric ulcer 2015    GERD (gastroesophageal reflux disease)     Hypertension     Ill-defined condition     torn MCL - left - no surgery    Mixed hyperlipidemia 2011    Nausea & vomiting     Sleep apnea     CPAP;  Dr Naa holly MD      Past Surgical History:   Procedure Laterality Date    CARDIAC CATHETERIZATION  2012         HX BUNIONECTOMY Right     HX CATARACT REMOVAL Bilateral     HX  SECTION      x3    HX CHOLECYSTECTOMY      HX COLONOSCOPY      HX GI      EGDs with dilatation    HX HEART CATHETERIZATION      catherization with 3 stents - states stents are blocked    HX HEART CATHETERIZATION  2015    unable to stent; tried to unblock stents but unable to/starting cardiac rehab Augu /has collateral circulation    AL EGD BALLOON DILATION ESOPHAGUS <30 MM DIAM  10/13/2010         AL EGD TRANSORAL BIOPSY SINGLE/MULTIPLE  10/13/2010         UPPER GI ENDOSCOPY,BALL DIL,30MM  3/30/2015         UPPER GI ENDOSCOPY,BALL DIL,30MM  2019         UPPER GI ENDOSCOPY,BIOPSY  3/30/2015         UPPER GI ENDOSCOPY,BIOPSY  2019          Current Outpatient Medications   Medication Sig Dispense Refill    gabapentin (NEURONTIN) 100 mg capsule Take 1 Cap by mouth three (3) times daily. 90 Cap 5    sucralfate (Carafate) 1 gram tablet Take 1 Tab by mouth four (4) times daily as needed for Pain. 90 Tab 3    dapagliflozin (Farxiga) 5 mg tab tablet Take 1 Tab by mouth daily. 30 Tab 5    rosuvastatin (CRESTOR) 10 mg tablet TAKE 1 TABLET BY MOUTH EVERY EVENING 90 Tab 1    fluticasone propionate (FLONASE) 50 mcg/actuation nasal spray USE 2 SPRAYS IN EACH NOSTRIL ONCE DAILY 16 g 11    RABEprazole (ACIPHEX) 20 mg tablet TK 1 T PO QD B MEALS      famotidine (PEPCID) 40 mg tablet TK 1 T PO BID UTD      glimepiride (AMARYL) 2 mg tablet Take 1.5 Tabs by mouth every morning.  45 Tab 11    clopidogreL (PLAVIX) 75 mg tab TAKE 1 TABLET BY MOUTH ONCE DAILY 90 Tab 4    metoprolol succinate (TOPROL-XL) 25 mg XL tablet TAKE 1 TABLET BY MOUTH ONCE DAILY 90 Tab 4    ranolazine ER (RANEXA) 1,000 mg take 1 tablet by mouth twice a day 180 Tab 4    triamterene-hydroCHLOROthiazide (MAXZIDE) 37.5-25 mg per tablet take 1 tablet by mouth every morning 90 Tab 3    JANUVIA 100 mg tablet take 1 tablet by mouth once daily 180 Tab 0    amLODIPine (NORVASC) 2.5 mg tablet take 1 tablet by mouth once daily 90 Tab 3    ASPIRIN PO Take 81 mg by mouth daily.  MINH/D3/MAG11/ZINC//RONEN/BOR (CALTRATE 600+D PLUS MINERALS PO) Take  by mouth. Takes one po once daily.  glucose blood VI test strips (BLOOD GLUCOSE TEST) strip by Does Not Apply route Daily Check three times daily . Dx. Code E11.9 150 Strip 11    Lancets misc Check Blood sugar 3 times daily Dx. Code E11.9 150 Each 11    cyanocobalamin (VITAMIN B-12) 1,000 mcg tablet Take 500 mcg by mouth daily.  Blood-Glucose Meter monitoring kit AS DIRECTED TWICE DAILY 1 Kit 0    ibuprofen (ADVIL) 200 mg tablet Take 400 mg by mouth as needed for Pain.  coenzyme Q-10 (CO Q-10) 200 mg capsule Take 1 Cap by mouth daily. Over the counter 30 Cap 11    cholecalciferol, vitamin d3, (VITAMIN D) 1,000 unit tablet Take 1,000 Units by mouth daily.  nitroglycerin (Nitrostat) 0.4 mg SL tablet place 1 tablet under the tongue every 5 minutes if needed 1 Bottle 1    cyclobenzaprine (FLEXERIL) 10 mg tablet Take  by mouth three (3) times daily as needed for Muscle Spasm(s).  MAG CARB/ALUMINUM HYDROX/ALGIN (GAVISCON EXTRA STRENGTH PO) Take  by mouth as needed.  GUAIFENESIN (MUCINEX PO) Take 1,200 mg by mouth as needed. Extra strength.        Allergies   Allergen Reactions    Penicillins Hives     Other reaction(s): Hives    Protamine Anaphylaxis    Sulfa (Sulfonamide Antibiotics) Hives    Imdur [Isosorbide Mononitrate] Other (comments)     headache       Family History   Problem Relation Age of Onset    Heart Disease Mother     Hypertension Mother     Heart Attack Mother     Heart Disease Father     Hypertension Father     Heart Surgery Father     Cancer Sister         lung    Cancer Brother         brain tumor - malignant    Breast Cancer Sister      Social History     Tobacco Use    Smoking status: Never Smoker    Smokeless tobacco: Never Used   Substance Use Topics    Alcohol use: Yes     Alcohol/week: 1.7 standard drinks     Types: 2 Glasses of wine per week     Comment: occasionally       Depression Risk Factor Screening:     3 most recent PHQ Screens 10/13/2020   Little interest or pleasure in doing things Not at all   Feeling down, depressed, irritable, or hopeless Not at all   Total Score PHQ 2 0       Alcohol Risk Screen   Do you average more than 1 drink per night or more than 7 drinks a week:  No    On any one occasion in the past three months have you have had more than 3 drinks containing alcohol:  No        Functional Ability and Level of Safety:   Hearing: Hearing is good. Activities of Daily Living: The home contains: handrails  Patient does total self care     Ambulation: with no difficulty     Fall Risk:  Fall Risk Assessment, last 12 mths 10/13/2020   Able to walk? Yes   Fall in past 12 months? No     Abuse Screen:  Patient is not abused       Cognitive Screening   Has your family/caregiver stated any concerns about your memory: no     Cognitive Screenin    Patient Care Team   Patient Care Team:  Pamela Swartz MD as PCP - General  Pamela Swartz MD as PCP - [facility] Empaneled Provider  Ramana Butler MD as Physician (Sleep Medicine)  Jaycee Opitz, MD as Physician (Cardiology)    Assessment/Plan   Education and counseling provided:      Diagnoses and all orders for this visit:    1. Medicare annual wellness visit, subsequent    2.  Type 2 diabetes mellitus without complication, without long-term current use of insulin ([facility])  -     AMB POC GLUCOSE, QUANTITATIVE, BLOOD  -     AMB POC HEMOGLOBIN A1C    3. Mixed hyperlipidemia  -     LIPID PANEL  -     sucralfate (Carafate) 1 gram tablet; Take 1 Tab by mouth four (4) times daily as needed for Pain. 4. Essential hypertension, benign  -     METABOLIC PANEL, COMPREHENSIVE  -     sucralfate (Carafate) 1 gram tablet; Take 1 Tab by mouth four (4) times daily as needed for Pain. 5. Coronary artery disease involving native coronary artery of native heart without angina pectoris    6. Esophageal dysmotility  -     sucralfate (Carafate) 1 gram tablet; Take 1 Tab by mouth four (4) times daily as needed for Pain. 7. Gastroesophageal reflux disease with esophagitis without hemorrhage    8. Type 2 diabetes with nephropathy (HCC)    9. Cervicalgia  -     gabapentin (NEURONTIN) 100 mg capsule; Take 1 Cap by mouth three (3) times daily. 10. Other chest pain  -     sucralfate (Carafate) 1 gram tablet; Take 1 Tab by mouth four (4) times daily as needed for Pain. 11. Palpitations  -     sucralfate (Carafate) 1 gram tablet; Take 1 Tab by mouth four (4) times daily as needed for Pain. 12. SOB (shortness of breath) on exertion  -     sucralfate (Carafate) 1 gram tablet; Take 1 Tab by mouth four (4) times daily as needed for Pain. 13. Presence of stent in right coronary artery  -     sucralfate (Carafate) 1 gram tablet; Take 1 Tab by mouth four (4) times daily as needed for Pain. 14. PHUC on CPAP  -     sucralfate (Carafate) 1 gram tablet; Take 1 Tab by mouth four (4) times daily as needed for Pain. 15. Needs flu shot  -     FLU (FLUAD QUAD INFLUENZA VACCINE,QUAD,ADJUVANTED)    Other orders  -     dapagliflozin (Farxiga) 5 mg tab tablet; Take 1 Tab by mouth daily.         Health Maintenance Due   Topic Date Due    Shingrix Vaccine Age 49> (1 of 2) 03/09/2000    Foot Exam Q1  02/21/2019    GLAUCOMA SCREENING Q2Y  10/19/2019    Eye Exam Retinal or Dilated  10/19/2019    Breast Cancer Screen Mammogram 04/30/2020    Flu Vaccine (1) 09/01/2020    Medicare Yearly Exam  09/11/2020    MICROALBUMIN Q1  09/11/2020

## 2020-10-14 LAB
ALBUMIN SERPL-MCNC: 4.4 G/DL (ref 3.8–4.8)
ALBUMIN/GLOB SERPL: 2 {RATIO} (ref 1.2–2.2)
ALP SERPL-CCNC: 104 IU/L (ref 39–117)
ALT SERPL-CCNC: 24 IU/L (ref 0–32)
AST SERPL-CCNC: 19 IU/L (ref 0–40)
BILIRUB SERPL-MCNC: 0.9 MG/DL (ref 0–1.2)
BUN SERPL-MCNC: 16 MG/DL (ref 8–27)
BUN/CREAT SERPL: 17 (ref 12–28)
CALCIUM SERPL-MCNC: 9.9 MG/DL (ref 8.7–10.3)
CHLORIDE SERPL-SCNC: 100 MMOL/L (ref 96–106)
CHOLEST SERPL-MCNC: 139 MG/DL (ref 100–199)
CO2 SERPL-SCNC: 25 MMOL/L (ref 20–29)
CREAT SERPL-MCNC: 0.92 MG/DL (ref 0.57–1)
GLOBULIN SER CALC-MCNC: 2.2 G/DL (ref 1.5–4.5)
GLUCOSE SERPL-MCNC: 217 MG/DL (ref 65–99)
HDLC SERPL-MCNC: 39 MG/DL
INTERPRETATION, 910389: NORMAL
LDLC SERPL CALC-MCNC: 58 MG/DL (ref 0–99)
POTASSIUM SERPL-SCNC: 3.8 MMOL/L (ref 3.5–5.2)
PROT SERPL-MCNC: 6.6 G/DL (ref 6–8.5)
SODIUM SERPL-SCNC: 140 MMOL/L (ref 134–144)
TRIGL SERPL-MCNC: 266 MG/DL (ref 0–149)
VLDLC SERPL CALC-MCNC: 42 MG/DL (ref 5–40)

## 2020-10-14 RX ORDER — SUCRALFATE 1 G/1
TABLET ORAL
Qty: 368 TAB | Refills: 1 | Status: SHIPPED | OUTPATIENT
Start: 2020-10-14

## 2020-10-14 NOTE — PROGRESS NOTES
HISTORY OF PRESENT ILLNESS      Jessy Staples is a 79 y.o. female c/o  Worsening chronic rt shoulder pain with no apparent injury,worseningtriggering of rt long finger. .F/U dm2,hbp, cad,chol gerd. Feeling ok,stable exertional chest discomfort  Diabetes   The history is provided by the patient. This is a chronic problem. The problem occurs daily. The problem has been gradually worsening. Pertinent negatives include no chest pain. Nothing relieves the symptoms. Hypertension    The history is provided by the patient. This is a chronic problem. The problem has been gradually improving. Associated symptoms include malaise/fatigue and neck pain. Pertinent negatives include no chest pain, no orthopnea and no palpitations. Shoulder Pain    The history is provided by the patient. There was no injury mechanism. The right shoulder is affected. The pain is at a severity of 5/10. The pain is moderate. The pain has been intermittent since onset. The pain does not radiate. There is no history of shoulder injury. She has no other injuries. There is no history of shoulder surgery. Pertinent negatives include no numbness. Fatigue   The history is provided by the patient. This is a chronic problem. The problem occurs daily. Pertinent negatives include no chest pain. Review of Systems   Constitutional: Positive for fatigue and malaise/fatigue. Negative for fever and weight loss. Respiratory: Negative for cough. Cardiovascular: Negative for chest pain, palpitations and orthopnea. Gastrointestinal: Positive for heartburn. Negative for blood in stool and constipation. Genitourinary: Negative for dysuria and frequency. Musculoskeletal: Positive for joint pain and neck pain. Neurological: Negative for numbness. Psychiatric/Behavioral: Negative for depression. The patient does not have insomnia. Diagnoses and all orders for this visit:    1.  Type 2 diabetes mellitus without complication, without long-term current use of insulin ([facility]),WILL INCREASE AMARYL TO 1.5 TABS DAILY    2. Chronic right shoulder pain  -     REFERRAL TO ORTHOPEDICS    3. Mixed hyperlipidemia    4. Coronary artery disease involving native coronary artery of native heart without angina pectoris    5. S/P PTCA (percutaneous transluminal coronary angioplasty)    6. Esophageal dysmotility    7. Gastroesophageal reflux disease with esophagitis    8. Essential hypertension, benign    9. Type 2 diabetes mellitus with other circulatory complication, without long-term current use of insulin ([facility])  -     glimepiride (AMARYL) 2 mg tablet; Take 1.5 Tabs by mouth every morning. Follow-up and Dispositions    · Return in about 3 months (around 1/13/2021). RTC 1 MO  Follow-up and Dispositions    · Return in about 3 months (around 1/13/2021). Doing well,continue current meds and treatments    Follow-up and Dispositions    · Return in about 3 months (around 1/13/2021). Due to this being a telehealth encounter (During  [address]),evaluation of the following organ systems was limited:Vitals/Constitutional/EENT,Respiratory,CV,GI,,MS,Neuro,Skin /Heme-Lymph-Imm  Pursuant to the emergency declaration under the [address] authority and the [facility] Appropriations Act,this Virtual Visit was conducted ,with patient consent,to reduce the patients risk of exposure to Covid 19 and to provide continuity of care  for an established patient. Services were provided through a video synchronous discussion virtually to substitute for in person appointment. This visit was completed using Doxy. me    Time in Virtual Visit:15      minutes    Physical Exam  Constitutional:       Appearance: Normal appearance. She is obese. HENT:      Head: Normocephalic and atraumatic. Neck:      Musculoskeletal: Normal range of motion and neck supple. Cardiovascular:      Rate and Rhythm: Normal rate and regular rhythm. Heart sounds: Normal heart sounds. Pulmonary:      Effort: Pulmonary effort is normal.      Breath sounds: Normal breath sounds. Musculoskeletal:      Right shoulder: She exhibits decreased range of motion, crepitus and decreased strength. Hands:       Comments: Triggering rt long finger   Lymphadenopathy:      Cervical: No cervical adenopathy. Neurological:      Mental Status: She is alert.

## 2020-10-22 RX ORDER — AMLODIPINE BESYLATE 2.5 MG/1
TABLET ORAL
Qty: 90 TAB | Refills: 3 | Status: SHIPPED | OUTPATIENT
Start: 2020-10-22 | End: 2021-10-26

## 2020-11-05 NOTE — PROGRESS NOTES
[address] [facility] [place] [address] [facility]  [phone]     Subjective:      Julia Parson is a 79 y.o. female is here for routine f/u. She has pmhx CAD, HTN, HLD, PHUC and DM. Last seen by us in 10/19. She reports fatigue, chronic. Able to perform activities of daily living. She is able to walk on flat surface with no issues, some ann when walking up steps but not new for her. No exertional cp. Has some leg pain with essentially normal leg studies in 2018. The patient denies chest pain, orthopnea, PND, LE edema, palpitations, syncope, or presyncope.        Patient Active Problem List    Diagnosis Date Noted    [facility] nephropathy [facility] 07/10/2020    Eructation 08/09/2019    Type 2 diabetes mellitus with diabetic neuropathy ([facility] 03/26/2018    Diabetes mellitus ([facility] 02/20/2018    Routine adult health maintenance 10/28/2015    Gastric ulcer 08/11/2015    Unstable angina ([address] 07/16/2015    Angina, class III ([address] 07/07/2015    Myocardial ischemia 07/07/2015    S/P PTCA (percutaneous transluminal coronary angioplasty) 07/07/2015    Chest pain 05/15/2015    Diarrhea 01/05/2015    CAD (coronary artery disease) 10/23/2014    Snoring 10/21/2014    PHUC (obstructive sleep apnea) 10/21/2014    Presence of stent in right coronary artery 12/30/2013    Myalgia and myositis, unspecified 02/04/2013    Esophageal dysphagia 10/14/2012    Esophageal motor disorder 10/14/2012    Chronic total occlusion of coronary artery 06/25/2012    S/P cardiac cath 03/07/2012    Esophageal dysmotility 10/10/2011    Allergic rhinitis 02/20/2011    Esophageal reflux 02/20/2011    Essential hypertension, benign 02/20/2011    Anxiety state, unspecified 02/20/2011    Encounter for long-term (current) use of other medications 02/20/2011    Mixed hyperlipidemia 02/20/2011      Pamela Swartz MD  Past Medical History:   Diagnosis Date    Allergic rhinitis, cause unspecified 2011    Angina, class III ([facility] 2013    as of 8/4/15 pt reports intermittent \"angina pain\" and GRAJEDA; followed by Dr Ruddy Denton Arthritis     neck - numbness of arm    CAD (coronary artery disease)     angina / Dr Lili Pacheco    Colon polyps 2011    Diabetes ([facility]     Diarrhea     \"random\" per pt; followed by Dr Floridalma Nguyen for long-term (current) use of other medications 2011    Eructation 2019    Esophageal dysmotility 10/10/2011    Essential hypertension, benign 2011    Gastric ulcer 2015    GERD (gastroesophageal reflux disease)     Hypertension     Ill-defined condition     torn MCL - left - no surgery    Mixed hyperlipidemia 2011    Nausea & vomiting     Sleep apnea     CPAP;  Dr Akua holly MD      Past Surgical History:   Procedure Laterality Date    CARDIAC CATHETERIZATION  2012         HX BUNIONECTOMY Right     HX CATARACT REMOVAL Bilateral     HX  SECTION      x3    HX CHOLECYSTECTOMY      HX COLONOSCOPY      HX GI      EGDs with dilatation    HX HEART CATHETERIZATION      catherization with 3 stents - states stents are blocked    HX HEART CATHETERIZATION  2015    unable to stent; tried to unblock stents but unable to/starting cardiac rehab Aug2015/has collateral circulation    IN EGD BALLOON DILATION ESOPHAGUS <30 MM DIAM  10/13/2010         IN EGD TRANSORAL BIOPSY SINGLE/MULTIPLE  10/13/2010         UPPER GI ENDOSCOPY,BALL DIL,30MM  3/30/2015         UPPER GI ENDOSCOPY,BALL DIL,30MM  2019         UPPER GI ENDOSCOPY,BIOPSY  3/30/2015         UPPER GI ENDOSCOPY,BIOPSY  2019          Allergies   Allergen Reactions    Penicillins Hives     Other reaction(s): Hives    Protamine Anaphylaxis    Sulfa (Sulfonamide Antibiotics) Hives    Imdur [Isosorbide Mononitrate] Other (comments)     headache      Family History   Problem Relation Age of Onset    Heart Disease Mother    Gina Efrem Hypertension Mother     Heart Attack Mother     Heart Disease Father     Hypertension Father     Heart Surgery Father     Cancer Sister         lung    Cancer Brother         brain tumor - malignant    Breast Cancer Sister       Social History     Socioeconomic History    Marital status:      Spouse name: Not on file    Number of children: Not on file    Years of education: Not on file    Highest education level: Not on file   Occupational History    Not on file   Social Needs    Financial resource strain: Not on file    Food insecurity     Worry: Not on file     Inability: Not on file    Transportation needs     Medical: Not on file     Non-medical: Not on file   Tobacco Use    Smoking status: Never Smoker    Smokeless tobacco: Never Used   Substance and Sexual Activity    Alcohol use:  Yes     Alcohol/week: 1.7 standard drinks     Types: 2 Glasses of wine per week     Comment: occasionally    Drug use: Never    Sexual activity: Yes     Partners: Male   Lifestyle    Physical activity     Days per week: Not on file     Minutes per session: Not on file    Stress: Not on file   Relationships    Social connections     Talks on phone: Not on file     Gets together: Not on file     Attends [employer] service: Not on file     Active member of club or organization: Not on file     Attends meetings of clubs or organizations: Not on file     Relationship status: Not on file    Intimate partner violence     Fear of current or ex partner: Not on file     Emotionally abused: Not on file     Physically abused: Not on file     Forced sexual activity: Not on file   Other Topics Concern    Not on file   Social History Narrative    Not on file      Current Outpatient Medications   Medication Sig    amLODIPine (NORVASC) 2.5 mg tablet TAKE 1 TABLET BY MOUTH ONCE DAILY    sucralfate (CARAFATE) 1 gram tablet TAKE 1 TABLET BY MOUTH FOUR TIMES DAILY AS NEEDED FOR PAIN    gabapentin (NEURONTIN) 100 mg capsule Take 1 Cap by mouth three (3) times daily. (Patient taking differently: Take 100 mg by mouth as needed.)    rosuvastatin (CRESTOR) 10 mg tablet TAKE 1 TABLET BY MOUTH EVERY EVENING    fluticasone propionate (FLONASE) 50 mcg/actuation nasal spray USE 2 SPRAYS IN EACH NOSTRIL ONCE DAILY    RABEprazole (ACIPHEX) 20 mg tablet TK 1 T PO QD B MEALS    famotidine (PEPCID) 40 mg tablet TK 1 T PO BID UTD    glimepiride (AMARYL) 2 mg tablet Take 1.5 Tabs by mouth every morning.  clopidogreL (PLAVIX) 75 mg tab TAKE 1 TABLET BY MOUTH ONCE DAILY    nitroglycerin (Nitrostat) 0.4 mg SL tablet place 1 tablet under the tongue every 5 minutes if needed    metoprolol succinate (TOPROL-XL) 25 mg XL tablet TAKE 1 TABLET BY MOUTH ONCE DAILY    ranolazine ER (RANEXA) 1,000 mg take 1 tablet by mouth twice a day    triamterene-hydroCHLOROthiazide (MAXZIDE) 37.5-25 mg per tablet take 1 tablet by mouth every morning    JANUVIA 100 mg tablet take 1 tablet by mouth once daily    cyclobenzaprine (FLEXERIL) 10 mg tablet Take  by mouth three (3) times daily as needed for Muscle Spasm(s).  MAG CARB/ALUMINUM HYDROX/ALGIN (GAVISCON EXTRA STRENGTH PO) Take  by mouth as needed.  ASPIRIN PO Take 81 mg by mouth daily.  MINH/D3/MAG11/ZINC//RONEN/BOR (CALTRATE 600+D PLUS MINERALS PO) Take  by mouth. Takes one po once daily.  GUAIFENESIN (MUCINEX PO) Take 1,200 mg by mouth as needed. Extra strength.  glucose blood VI test strips (BLOOD GLUCOSE TEST) strip by Does Not Apply route Daily Check three times daily . Dx. Code E11.9    Lancets misc Check Blood sugar 3 times daily Dx. Code E11.9    cyanocobalamin (VITAMIN B-12) 1,000 mcg tablet Take 500 mcg by mouth daily.  Blood-Glucose Meter monitoring kit AS DIRECTED TWICE DAILY    ibuprofen (ADVIL) 200 mg tablet Take 400 mg by mouth as needed for Pain.  coenzyme Q-10 (CO Q-10) 200 mg capsule Take 1 Cap by mouth daily.  Over the counter    cholecalciferol, vitamin d3, (VITAMIN D) 1,000 unit tablet Take 1,000 Units by mouth daily. No current facility-administered medications for this visit. Review of Symptoms:  11 systems reviewed, negative other than as stated in the HPI    Physical ExamPhysical Exam:    Vitals:    11/06/20 1358   BP: 130/80   Pulse: 65   Resp: 16   SpO2: 98%   Weight: 196 lb 9.6 oz (89.2 kg)   Height: 5' 5\" (1.651 m)     Body mass index is 32.72 kg/m². General PE  Gen:  NAD  Mental Status - Alert. General Appearance - Not in acute distress. HEENT:  PERRL, no carotid bruits or JVD  Chest and Lung Exam   Inspection: Accessory muscles - No use of accessory muscles in breathing. Auscultation:   Breath sounds: - Normal.   Cardiovascular   Inspection: Jugular vein - Bilateral - Inspection Normal.   Palpation/Percussion:   Apical Impulse: - Normal.   Auscultation: Rhythm - Regular. Heart Sounds - S1 WNL and S2 WNL. No S3 or S4. Murmurs & Other Heart Sounds: Auscultation of the heart reveals - No Murmurs. Peripheral Vascular   Upper Extremity: Inspection - Bilateral - No Cyanotic nailbeds or Digital clubbing. Lower Extremity:   Palpation: Edema - Bilateral - No edema. Abdomen:   Soft, non-tender, bowel sounds are active.   Neuro: A&O times 3, CN and motor grossly WNL    Labs:   Lab Results   Component Value Date/Time    Cholesterol, total 139 10/13/2020 12:20 PM    Cholesterol, total 137 07/10/2020 10:55 AM    Cholesterol, total 130 03/10/2020 10:22 AM    Cholesterol, total 148 12/11/2019 11:26 AM    Cholesterol, total 143 09/11/2019 11:51 AM    HDL Cholesterol 39 (L) 10/13/2020 12:20 PM    HDL Cholesterol 38 (L) 07/10/2020 10:55 AM    HDL Cholesterol 35 (L) 03/10/2020 10:22 AM    HDL Cholesterol 40 12/11/2019 11:26 AM    HDL Cholesterol 38 (L) 06/04/2019 10:00 AM    LDL, calculated 48 07/10/2020 10:55 AM    LDL, calculated 45 03/10/2020 10:22 AM    LDL, calculated 55 12/11/2019 11:26 AM    LDL, calculated 40 06/04/2019 10:00 AM    LDL, calculated 48 03/04/2019 12:06 PM    LDL Chol Calc (NIH) 58 10/13/2020 12:20 PM    Triglyceride 266 (H) 10/13/2020 12:20 PM    Triglyceride 254 (H) 07/10/2020 10:55 AM    Triglyceride 249 (H) 03/10/2020 10:22 AM    Triglyceride 264 (H) 12/11/2019 11:26 AM    Triglyceride 254 (H) 06/04/2019 10:00 AM    CHOL/HDL Ratio 3.0 12/13/2013 03:16 AM     Lab Results   Component Value Date/Time    CK 72 09/14/2012 10:20 PM     Lab Results   Component Value Date/Time    Sodium 140 10/13/2020 12:20 PM    Potassium 3.8 10/13/2020 12:20 PM    Chloride 100 10/13/2020 12:20 PM    CO2 25 10/13/2020 12:20 PM    Anion gap 3 (L) 07/17/2015 04:37 AM    Glucose 217 (H) 10/13/2020 12:20 PM    BUN 16 10/13/2020 12:20 PM    Creatinine 0.92 10/13/2020 12:20 PM    BUN/Creatinine ratio 17 10/13/2020 12:20 PM    GFR est AA 73 10/13/2020 12:20 PM    GFR est non-AA 63 10/13/2020 12:20 PM    Calcium 9.9 10/13/2020 12:20 PM    Bilirubin, total 0.9 10/13/2020 12:20 PM    Alk. phosphatase 104 10/13/2020 12:20 PM    Protein, total 6.6 10/13/2020 12:20 PM    Albumin 4.4 10/13/2020 12:20 PM    Globulin 3.6 09/14/2012 06:45 PM    A-G Ratio 2.0 10/13/2020 12:20 PM    ALT (SGPT) 24 10/13/2020 12:20 PM       EKG:  SR     Assessment:     Assessment:      1. Coronary artery disease involving native coronary artery of native heart without angina pectoris    2. Essential hypertension, benign    3. PHUC (obstructive sleep apnea)    4. Mixed hyperlipidemia    5. Chronic total occlusion of coronary artery    6. S/P PTCA (percutaneous transluminal coronary angioplasty)        Orders Placed This Encounter    AMB POC EKG ROUTINE W/ 12 LEADS, INTER & REP     Order Specific Question:   Reason for Exam:     Answer:   routine        Plan:       ASHD  2 unsuccessful attempts at PCI of  of RCA, last in 7/2015. Echo in 2015 NL EF and no valvular disease.   No ischemia per NST in 2/19.   Last myoview 5/2016 with improved inferior ischemia with suspectedly improved collaterals to  of RCA.    Continue ASA, Plavix: Given her  recommend she continue on Plavix.   Continue BB, CCB, Ranexa     HTN  Controlled with current therapy  Stable kidney fxn in 10/2020     HLD   10/2020 LDL 58 On statin. Lipids and labs followed by PCP     PHUC  On CPAP therapy, followed by Dr Sherrell Wren     DM  On oral agents    Leg fatigue / cramps  No occlusive disease per arterial duplex in 2018  She will call us if symptoms worsen and will repeat testing and order venous duplex    Fatigue  Has f/u with pcp next mos for labs    Hx S/p esophageal dilatation performed by Dr Rocael Clifton in 8/19. Counseled on diet and exercise- eventual goal of 30-60 minutes 5-7 times a week as per AHA guidelines. They recently bought a treadmill and I encouraged her to start with 5 to 10 minutes at a slow pace, gradually try to work up to 30 minutes.        Continue current care and f/u in 1 year, sooner if any new or concerning symptoms.         Ever MD Aidee

## 2020-11-06 ENCOUNTER — OFFICE VISIT (OUTPATIENT)
Dept: CARDIOLOGY CLINIC | Age: 70
End: 2020-11-06
Payer: MEDICARE

## 2020-11-06 VITALS
SYSTOLIC BLOOD PRESSURE: 130 MMHG | DIASTOLIC BLOOD PRESSURE: 80 MMHG | HEART RATE: 65 BPM | WEIGHT: 196.6 LBS | BODY MASS INDEX: 32.76 KG/M2 | RESPIRATION RATE: 16 BRPM | HEIGHT: 65 IN | OXYGEN SATURATION: 98 %

## 2020-11-06 DIAGNOSIS — Z98.61 S/P PTCA (PERCUTANEOUS TRANSLUMINAL CORONARY ANGIOPLASTY): ICD-10-CM

## 2020-11-06 DIAGNOSIS — I10 ESSENTIAL HYPERTENSION, BENIGN: ICD-10-CM

## 2020-11-06 DIAGNOSIS — I25.82 CHRONIC TOTAL OCCLUSION OF CORONARY ARTERY: ICD-10-CM

## 2020-11-06 DIAGNOSIS — I25.10 CORONARY ARTERY DISEASE INVOLVING NATIVE CORONARY ARTERY OF NATIVE HEART WITHOUT ANGINA PECTORIS: Primary | ICD-10-CM

## 2020-11-06 DIAGNOSIS — E78.2 MIXED HYPERLIPIDEMIA: ICD-10-CM

## 2020-11-06 DIAGNOSIS — G47.33 OSA (OBSTRUCTIVE SLEEP APNEA): ICD-10-CM

## 2020-11-06 PROCEDURE — 99214 OFFICE O/P EST MOD 30 MIN: CPT | Performed by: INTERNAL MEDICINE

## 2020-11-06 PROCEDURE — G9899 SCRN MAM PERF RSLTS DOC: HCPCS | Performed by: INTERNAL MEDICINE

## 2020-11-06 PROCEDURE — 93000 ELECTROCARDIOGRAM COMPLETE: CPT | Performed by: INTERNAL MEDICINE

## 2020-11-06 PROCEDURE — G8417 CALC BMI ABV UP PARAM F/U: HCPCS | Performed by: INTERNAL MEDICINE

## 2020-11-06 PROCEDURE — G8432 DEP SCR NOT DOC, RNG: HCPCS | Performed by: INTERNAL MEDICINE

## 2020-11-06 PROCEDURE — G8399 PT W/DXA RESULTS DOCUMENT: HCPCS | Performed by: INTERNAL MEDICINE

## 2020-11-06 PROCEDURE — G8752 SYS BP LESS 140: HCPCS | Performed by: INTERNAL MEDICINE

## 2020-11-06 PROCEDURE — G8754 DIAS BP LESS 90: HCPCS | Performed by: INTERNAL MEDICINE

## 2020-11-06 PROCEDURE — 3017F COLORECTAL CA SCREEN DOC REV: CPT | Performed by: INTERNAL MEDICINE

## 2020-11-06 PROCEDURE — 1090F PRES/ABSN URINE INCON ASSESS: CPT | Performed by: INTERNAL MEDICINE

## 2020-11-06 PROCEDURE — G8536 NO DOC ELDER MAL SCRN: HCPCS | Performed by: INTERNAL MEDICINE

## 2020-11-06 PROCEDURE — G8427 DOCREV CUR MEDS BY ELIG CLIN: HCPCS | Performed by: INTERNAL MEDICINE

## 2020-11-06 PROCEDURE — 1101F PT FALLS ASSESS-DOCD LE1/YR: CPT | Performed by: INTERNAL MEDICINE

## 2020-11-06 NOTE — LETTER
11/6/20 Patient: Jael Ferraro YOB: 1950 Date of Visit: 11/6/2020 Samantha Cheung MD 
[address] [place] [id number] VIA In Basket Dear Samantha Cheung MD, Thank you for referring Ms. Tyrell Sparks to [facility] for evaluation. My notes for this consultation are attached. If you have questions, please do not hesitate to call me. I look forward to following your patient along with you.  
 
 
Sincerely, 
 
Zeb Vega MD

## 2020-11-06 NOTE — PROGRESS NOTES
1. Have you been to the ER, urgent care clinic since your last visit? Hospitalized since your last visit? No.    2. Have you seen or consulted any other health care providers outside of the [address] [place] [address] since your last visit? Include any pap smears or colon screening.    No.      Chief Complaint   Patient presents with    Annual Exam     very fatigued, legs are painful,some swelling, soboe

## 2020-12-15 ENCOUNTER — OFFICE VISIT (OUTPATIENT)
Dept: FAMILY MEDICINE CLINIC | Age: 70
End: 2020-12-15
Payer: MEDICARE

## 2020-12-15 ENCOUNTER — TELEPHONE (OUTPATIENT)
Dept: FAMILY MEDICINE CLINIC | Age: 70
End: 2020-12-15

## 2020-12-15 VITALS
SYSTOLIC BLOOD PRESSURE: 124 MMHG | HEIGHT: 65 IN | OXYGEN SATURATION: 99 % | RESPIRATION RATE: 18 BRPM | WEIGHT: 194.8 LBS | TEMPERATURE: 97.3 F | BODY MASS INDEX: 32.46 KG/M2 | HEART RATE: 63 BPM | DIASTOLIC BLOOD PRESSURE: 82 MMHG

## 2020-12-15 DIAGNOSIS — I10 ESSENTIAL HYPERTENSION, BENIGN: ICD-10-CM

## 2020-12-15 DIAGNOSIS — E11.40 TYPE 2 DIABETES MELLITUS WITH DIABETIC NEUROPATHY, WITHOUT LONG-TERM CURRENT USE OF INSULIN (HCC): Primary | ICD-10-CM

## 2020-12-15 DIAGNOSIS — Z12.31 ENCOUNTER FOR SCREENING MAMMOGRAM FOR BREAST CANCER: ICD-10-CM

## 2020-12-15 DIAGNOSIS — E78.2 MIXED HYPERLIPIDEMIA: ICD-10-CM

## 2020-12-15 DIAGNOSIS — E55.9 VITAMIN D DEFICIENCY: ICD-10-CM

## 2020-12-15 DIAGNOSIS — I25.10 CORONARY ARTERY DISEASE INVOLVING NATIVE CORONARY ARTERY OF NATIVE HEART WITHOUT ANGINA PECTORIS: ICD-10-CM

## 2020-12-15 PROCEDURE — G8417 CALC BMI ABV UP PARAM F/U: HCPCS | Performed by: FAMILY MEDICINE

## 2020-12-15 PROCEDURE — G8754 DIAS BP LESS 90: HCPCS | Performed by: FAMILY MEDICINE

## 2020-12-15 PROCEDURE — 2022F DILAT RTA XM EVC RTNOPTHY: CPT | Performed by: FAMILY MEDICINE

## 2020-12-15 PROCEDURE — G8536 NO DOC ELDER MAL SCRN: HCPCS | Performed by: FAMILY MEDICINE

## 2020-12-15 PROCEDURE — 3017F COLORECTAL CA SCREEN DOC REV: CPT | Performed by: FAMILY MEDICINE

## 2020-12-15 PROCEDURE — G8752 SYS BP LESS 140: HCPCS | Performed by: FAMILY MEDICINE

## 2020-12-15 PROCEDURE — 1090F PRES/ABSN URINE INCON ASSESS: CPT | Performed by: FAMILY MEDICINE

## 2020-12-15 PROCEDURE — G0463 HOSPITAL OUTPT CLINIC VISIT: HCPCS | Performed by: FAMILY MEDICINE

## 2020-12-15 PROCEDURE — G8399 PT W/DXA RESULTS DOCUMENT: HCPCS | Performed by: FAMILY MEDICINE

## 2020-12-15 PROCEDURE — G9899 SCRN MAM PERF RSLTS DOC: HCPCS | Performed by: FAMILY MEDICINE

## 2020-12-15 PROCEDURE — 99213 OFFICE O/P EST LOW 20 MIN: CPT | Performed by: FAMILY MEDICINE

## 2020-12-15 PROCEDURE — 3051F HG A1C>EQUAL 7.0%<8.0%: CPT | Performed by: FAMILY MEDICINE

## 2020-12-15 PROCEDURE — G8510 SCR DEP NEG, NO PLAN REQD: HCPCS | Performed by: FAMILY MEDICINE

## 2020-12-15 PROCEDURE — 1101F PT FALLS ASSESS-DOCD LE1/YR: CPT | Performed by: FAMILY MEDICINE

## 2020-12-15 PROCEDURE — G8427 DOCREV CUR MEDS BY ELIG CLIN: HCPCS | Performed by: FAMILY MEDICINE

## 2020-12-15 NOTE — PROGRESS NOTES
Chief Complaint   Patient presents with    Diabetes     F/U on diabetes.  Hypertension     F/U on BP.  Cholesterol Problem     F/U on cholesterol. 1. Have you been to the ER, urgent care clinic since your last visit? Hospitalized since your last visit? No    2. Have you seen or consulted any other health care providers outside of the [address] [place] [address] since your last visit? Include any pap smears or colon screening.  No

## 2020-12-15 NOTE — TELEPHONE ENCOUNTER
Pt requesting a call back about her diabetic medications that were supposed to be changed today. She can be reached at [phone].

## 2020-12-15 NOTE — PROGRESS NOTES
HISTORY OF PRESENT ILLNESS  Rufino Herrera is a 79 y.o. female. f/u dm2,hbp, cad,chol gerd. Feeling ok,recently seen by Cardiology. Diabetes   The history is provided by the patient. This is a chronic problem. The problem occurs daily. The problem has not changed since onset. Pertinent negatives include no chest pain, no headaches and no shortness of breath. Nothing relieves the symptoms. Hypertension    The history is provided by the patient. This is a chronic problem. The problem has been gradually improving. Associated symptoms include malaise/fatigue and nausea. Pertinent negatives include no chest pain, no orthopnea, no palpitations, no headaches and no shortness of breath. Cholesterol Problem   The history is provided by the patient. This is a chronic problem. The problem occurs daily. The problem has been gradually improving. Pertinent negatives include no chest pain, no headaches and no shortness of breath. Fatigue   The history is provided by the patient. This is a chronic problem. The problem occurs daily. Pertinent negatives include no chest pain, no headaches and no shortness of breath. GERD   This is a chronic problem. The problem occurs daily. The problem has not changed since onset. Pertinent negatives include no chest pain, no headaches and no shortness of breath. Review of Systems   Constitutional: Positive for fatigue and malaise/fatigue. Negative for fever and weight loss. Respiratory: Negative for cough and shortness of breath. Cardiovascular: Negative for chest pain, palpitations and orthopnea. Gastrointestinal: Positive for heartburn and nausea. Negative for blood in stool and constipation. Genitourinary: Negative for dysuria and frequency. Musculoskeletal: Negative for back pain and myalgias. Neurological: Negative for headaches. Psychiatric/Behavioral: Negative for depression. The patient does not have insomnia. Physical Exam  Vitals signs reviewed. Constitutional:       Appearance: Normal appearance. She is well-developed. She is obese. HENT:      Head: Normocephalic and atraumatic. Right Ear: External ear normal.      Left Ear: External ear normal.      Nose: Mucosal edema and congestion present. No rhinorrhea. Mouth/Throat:      Pharynx: No posterior oropharyngeal erythema. Eyes:      Conjunctiva/sclera: Conjunctivae normal.      Pupils: Pupils are equal, round, and reactive to light. Neck:      Musculoskeletal: Normal range of motion and neck supple. Thyroid: No thyromegaly. Trachea: No tracheal deviation. Cardiovascular:      Rate and Rhythm: Normal rate and regular rhythm. Heart sounds: Normal heart sounds. No murmur. No gallop. Pulmonary:      Effort: Pulmonary effort is normal. No respiratory distress. Breath sounds: Normal breath sounds. Musculoskeletal: Normal range of motion. Skin:     General: Skin is warm and dry. Neurological:      Mental Status: She is alert and oriented to person, place, and time. Mental status is at baseline. Psychiatric:         Mood and Affect: Mood normal.         Behavior: Behavior normal.         Diagnoses and all orders for this visit:    1. Type 2 diabetes mellitus with diabetic neuropathy, without long-term current use of insulin (HCC)  -     HEMOGLOBIN A1C WITH EAG    2. Essential hypertension, benign  -     METABOLIC PANEL, COMPREHENSIVE    3. Mixed hyperlipidemia  -     LIPID PANEL    4. Coronary artery disease involving native coronary artery of native heart without angina pectoris    5. Encounter for screening mammogram for breast cancer    6. Vitamin D deficiency  -     VITAMIN D, 25 HYDROXY      Follow-up and Dispositions    · Return in about 3 months (around 3/15/2021).            Doing well,continue current meds and treatments

## 2021-01-05 ENCOUNTER — TRANSCRIBE ORDER (OUTPATIENT)
Dept: INTERNAL MEDICINE CLINIC | Age: 71
End: 2021-01-05

## 2021-01-15 ENCOUNTER — TELEPHONE (OUTPATIENT)
Dept: FAMILY MEDICINE CLINIC | Age: 71
End: 2021-01-15

## 2021-01-15 DIAGNOSIS — R53.82 CHRONIC FATIGUE: Primary | ICD-10-CM

## 2021-01-15 NOTE — TELEPHONE ENCOUNTER
Thyroid orders for [facility] was not on the orders sheet so please add  To orders and she will go back out to [facility] on Monday    Please call and notify patient when done    MsRisa  Chandler Parsons   [phone]

## 2021-01-18 ENCOUNTER — HOSPITAL ENCOUNTER (OUTPATIENT)
Dept: LAB | Age: 71
Discharge: HOME | End: 2021-01-18
Payer: MEDICARE

## 2021-01-18 PROCEDURE — 84436 ASSAY OF TOTAL THYROXINE: CPT

## 2021-01-18 PROCEDURE — 36415 COLL VENOUS BLD VENIPUNCTURE: CPT

## 2021-01-18 PROCEDURE — 80053 COMPREHEN METABOLIC PANEL: CPT

## 2021-01-18 PROCEDURE — 83036 HEMOGLOBIN GLYCOSYLATED A1C: CPT

## 2021-01-18 PROCEDURE — 80061 LIPID PANEL: CPT

## 2021-01-18 PROCEDURE — 84443 ASSAY THYROID STIM HORMONE: CPT

## 2021-01-18 PROCEDURE — 82306 VITAMIN D 25 HYDROXY: CPT

## 2021-01-19 LAB
25(OH)D3+25(OH)D2 SERPL-MCNC: 36.1 NG/ML (ref 30–100)
ALBUMIN SERPL-MCNC: 4.3 G/DL (ref 3.8–4.8)
ALBUMIN/GLOB SERPL: 1.5 {RATIO} (ref 1.2–2.2)
ALP SERPL-CCNC: 94 IU/L (ref 39–117)
ALT SERPL-CCNC: 20 IU/L (ref 0–32)
AST SERPL-CCNC: 16 IU/L (ref 0–40)
BILIRUB SERPL-MCNC: 1.1 MG/DL (ref 0–1.2)
BUN SERPL-MCNC: 19 MG/DL (ref 8–27)
BUN/CREAT SERPL: 17 (ref 12–28)
CALCIUM SERPL-MCNC: 10.5 MG/DL (ref 8.7–10.3)
CHLORIDE SERPL-SCNC: 102 MMOL/L (ref 96–106)
CHOLEST SERPL-MCNC: 149 MG/DL (ref 100–199)
CO2 SERPL-SCNC: 26 MMOL/L (ref 20–29)
CREAT SERPL-MCNC: 1.13 MG/DL (ref 0.57–1)
EST. AVERAGE GLUCOSE BLD GHB EST-MCNC: 177 MG/DL
GLOBULIN SER CALC-MCNC: 2.8 G/DL (ref 1.5–4.5)
GLUCOSE SERPL-MCNC: 216 MG/DL (ref 65–99)
HBA1C MFR BLD: 7.8 % (ref 4.8–5.6)
HDLC SERPL-MCNC: 39 MG/DL
INTERPRETATION, 910389: NORMAL
INTERPRETATION: NORMAL
LDLC SERPL CALC-MCNC: 64 MG/DL (ref 0–99)
Lab: NORMAL
PDF IMAGE, 910387: NORMAL
POTASSIUM SERPL-SCNC: 3.7 MMOL/L (ref 3.5–5.2)
PROT SERPL-MCNC: 7.1 G/DL (ref 6–8.5)
SODIUM SERPL-SCNC: 142 MMOL/L (ref 134–144)
T4 SERPL-MCNC: 6.9 UG/DL (ref 4.5–12)
TRIGL SERPL-MCNC: 287 MG/DL (ref 0–149)
TSH SERPL DL<=0.005 MIU/L-ACNC: 3.49 UIU/ML (ref 0.45–4.5)
VLDLC SERPL CALC-MCNC: 46 MG/DL (ref 5–40)

## 2021-01-22 RX ORDER — TRIAMTERENE/HYDROCHLOROTHIAZID 37.5-25 MG
TABLET ORAL
Qty: 90 TAB | Refills: 3 | Status: SHIPPED | OUTPATIENT
Start: 2021-01-22 | End: 2022-01-23

## 2021-01-22 NOTE — TELEPHONE ENCOUNTER
Last visit:12/15/20  Next visit:3/16/21  Previous refill 12/30/19(90+3R)    Requested Prescriptions     Pending Prescriptions Disp Refills    triamterene-hydroCHLOROthiazide (MAXZIDE) 37.5-25 mg per tablet 90 Tab 3     Sig: take 1 tablet by mouth every morning

## 2021-01-26 ENCOUNTER — OFFICE VISIT (OUTPATIENT)
Dept: FAMILY MEDICINE CLINIC | Age: 71
End: 2021-01-26
Payer: MEDICARE

## 2021-01-26 VITALS
HEIGHT: 65 IN | SYSTOLIC BLOOD PRESSURE: 126 MMHG | RESPIRATION RATE: 18 BRPM | DIASTOLIC BLOOD PRESSURE: 80 MMHG | TEMPERATURE: 96.4 F | OXYGEN SATURATION: 98 % | WEIGHT: 194 LBS | BODY MASS INDEX: 32.32 KG/M2 | HEART RATE: 68 BPM

## 2021-01-26 DIAGNOSIS — I10 ESSENTIAL HYPERTENSION, BENIGN: Primary | ICD-10-CM

## 2021-01-26 DIAGNOSIS — T50.Z95A SIDE EFFECTS OF VACCINATION, INITIAL ENCOUNTER: ICD-10-CM

## 2021-01-26 DIAGNOSIS — M25.511 ACUTE PAIN OF RIGHT SHOULDER: ICD-10-CM

## 2021-01-26 PROCEDURE — G9899 SCRN MAM PERF RSLTS DOC: HCPCS | Performed by: FAMILY MEDICINE

## 2021-01-26 PROCEDURE — 1090F PRES/ABSN URINE INCON ASSESS: CPT | Performed by: FAMILY MEDICINE

## 2021-01-26 PROCEDURE — G0463 HOSPITAL OUTPT CLINIC VISIT: HCPCS | Performed by: FAMILY MEDICINE

## 2021-01-26 PROCEDURE — G8536 NO DOC ELDER MAL SCRN: HCPCS | Performed by: FAMILY MEDICINE

## 2021-01-26 PROCEDURE — G8432 DEP SCR NOT DOC, RNG: HCPCS | Performed by: FAMILY MEDICINE

## 2021-01-26 PROCEDURE — G8417 CALC BMI ABV UP PARAM F/U: HCPCS | Performed by: FAMILY MEDICINE

## 2021-01-26 PROCEDURE — 3017F COLORECTAL CA SCREEN DOC REV: CPT | Performed by: FAMILY MEDICINE

## 2021-01-26 PROCEDURE — G8752 SYS BP LESS 140: HCPCS | Performed by: FAMILY MEDICINE

## 2021-01-26 PROCEDURE — G8754 DIAS BP LESS 90: HCPCS | Performed by: FAMILY MEDICINE

## 2021-01-26 PROCEDURE — 99213 OFFICE O/P EST LOW 20 MIN: CPT | Performed by: FAMILY MEDICINE

## 2021-01-26 PROCEDURE — G8428 CUR MEDS NOT DOCUMENT: HCPCS | Performed by: FAMILY MEDICINE

## 2021-01-26 PROCEDURE — 1101F PT FALLS ASSESS-DOCD LE1/YR: CPT | Performed by: FAMILY MEDICINE

## 2021-01-26 PROCEDURE — G8399 PT W/DXA RESULTS DOCUMENT: HCPCS | Performed by: FAMILY MEDICINE

## 2021-01-26 RX ORDER — HYDROCODONE BITARTRATE AND ACETAMINOPHEN 5; 325 MG/1; MG/1
1 TABLET ORAL
Qty: 20 TAB | Refills: 0 | Status: SHIPPED | OUTPATIENT
Start: 2021-01-26 | End: 2021-02-01 | Stop reason: SDUPTHER

## 2021-01-26 RX ORDER — PREDNISONE 10 MG/1
10 TABLET ORAL 2 TIMES DAILY
Qty: 10 TAB | Refills: 0 | Status: SHIPPED | OUTPATIENT
Start: 2021-01-26 | End: 2021-03-04 | Stop reason: ALTCHOICE

## 2021-01-26 NOTE — PROGRESS NOTES
Chief Complaint   Patient presents with    Shoulder Pain     R shoulder pain. 1. Have you been to the ER, urgent care clinic since your last visit? Hospitalized since your last visit? No    2. Have you seen or consulted any other health care providers outside of the [address] [place] [address] since your last visit? Include any pap smears or colon screening.  No

## 2021-01-26 NOTE — PROGRESS NOTES
HISTORY OF PRESENT ILLNESS  Ana Ramos is a 79 y.o. female. rt shoulder and chest pain x 1 wk,neuropathic pain with rash on upper back and rt breast.Pt had Pfizer vaccine 1/15/21. Pt had old shingles vaccine years ago. Shoulder Pain   The history is provided by the patient. The incident occurred more than 1 week ago. There was no injury mechanism. The right shoulder is affected. The pain is at a severity of 6/10. The pain is moderate. The pain has been intermittent since onset. There is no history of shoulder injury. There is no history of shoulder surgery. Associated symptoms include tingling. Rash   The history is provided by the patient. This is a new problem. The current episode started more than 1 week ago. The problem has not changed since onset. The rash is present on the right arm. The pain is at a severity of 6/10. The pain is moderate. The pain has been intermittent since onset. Associated symptoms include pain. Review of Systems   Constitutional: Negative for fever. Musculoskeletal: Positive for myalgias. Skin: Positive for rash. Neurological: Positive for tingling. Physical Exam  Constitutional:       Appearance: Normal appearance. She is obese. HENT:      Head: Normocephalic and atraumatic. Right Ear: Tympanic membrane normal.      Left Ear: Tympanic membrane normal.      Nose: Nose normal.   Cardiovascular:      Pulses: Normal pulses. Heart sounds: Normal heart sounds. Pulmonary:      Effort: Pulmonary effort is normal.      Breath sounds: Normal breath sounds. Abdominal:      General: Abdomen is flat. Palpations: Abdomen is soft. Skin:     General: Skin is warm and dry. Comments: Scattered maculopapules in clusters  Rt upper back,rt axilla,rt breast   Neurological:      Mental Status: She is alert and oriented to person, place, and time. Mental status is at baseline. ASSESSMENT and PLAN  Diagnoses and all orders for this visit:    1.  Essential hypertension, benign    2. Acute pain of right shoulder onset 4 days postvaccination    3. Side effects of vaccination, initial encounter, localized rash and shoulder pain  -     predniSONE (DELTASONE) 10 mg tablet; Take 10 mg by mouth two (2) times a day. -     HYDROcodone-acetaminophen (NORCO) 5-325 mg per tablet; Take 1 Tab by mouth every six (6) hours as needed for Pain for up to 3 days. Max Daily Amount: 4 Tabs.

## 2021-02-01 DIAGNOSIS — T50.Z95A SIDE EFFECTS OF VACCINATION, INITIAL ENCOUNTER: ICD-10-CM

## 2021-02-01 DIAGNOSIS — B02.9 HERPES ZOSTER WITHOUT COMPLICATION: Primary | ICD-10-CM

## 2021-02-01 RX ORDER — HYDROCODONE BITARTRATE AND ACETAMINOPHEN 5; 325 MG/1; MG/1
1 TABLET ORAL
Qty: 20 TAB | Refills: 0 | Status: SHIPPED | OUTPATIENT
Start: 2021-02-01 | End: 2021-02-05 | Stop reason: SDUPTHER

## 2021-02-01 RX ORDER — VALACYCLOVIR HYDROCHLORIDE 1 G/1
1000 TABLET, FILM COATED ORAL 3 TIMES DAILY
Qty: 21 TAB | Refills: 0 | Status: SHIPPED | OUTPATIENT
Start: 2021-02-01 | End: 2021-03-04 | Stop reason: ALTCHOICE

## 2021-02-05 DIAGNOSIS — B02.9 HERPES ZOSTER WITHOUT COMPLICATION: ICD-10-CM

## 2021-02-05 DIAGNOSIS — T50.Z95A SIDE EFFECTS OF VACCINATION, INITIAL ENCOUNTER: ICD-10-CM

## 2021-02-05 RX ORDER — HYDROCODONE BITARTRATE AND ACETAMINOPHEN 5; 325 MG/1; MG/1
1 TABLET ORAL
Qty: 20 TAB | Refills: 0 | Status: SHIPPED | OUTPATIENT
Start: 2021-02-05 | End: 2021-02-08

## 2021-02-05 NOTE — TELEPHONE ENCOUNTER
Patient states that Dr. Christy Hernandez told her to call today to get a refill on her hydrocodone.

## 2021-02-05 NOTE — TELEPHONE ENCOUNTER
Patient wants to get the medication HYDROcodone-acetaminophen (NORCO) 5-325 mg per tablet .   Please give her a call @ [phone]

## 2021-02-26 RX ORDER — ROSUVASTATIN CALCIUM 10 MG/1
TABLET, COATED ORAL
Qty: 90 TAB | Refills: 1 | Status: SHIPPED | OUTPATIENT
Start: 2021-02-26 | End: 2021-08-20

## 2021-03-04 ENCOUNTER — OFFICE VISIT (OUTPATIENT)
Dept: FAMILY MEDICINE CLINIC | Age: 71
End: 2021-03-04
Payer: MEDICARE

## 2021-03-04 VITALS
SYSTOLIC BLOOD PRESSURE: 130 MMHG | WEIGHT: 186.8 LBS | TEMPERATURE: 97.5 F | BODY MASS INDEX: 31.12 KG/M2 | RESPIRATION RATE: 18 BRPM | DIASTOLIC BLOOD PRESSURE: 76 MMHG | HEART RATE: 62 BPM | OXYGEN SATURATION: 99 % | HEIGHT: 65 IN

## 2021-03-04 DIAGNOSIS — I10 ESSENTIAL HYPERTENSION, BENIGN: ICD-10-CM

## 2021-03-04 DIAGNOSIS — B02.9 HERPES ZOSTER WITHOUT COMPLICATION: ICD-10-CM

## 2021-03-04 DIAGNOSIS — M25.511 ACUTE PAIN OF RIGHT SHOULDER: ICD-10-CM

## 2021-03-04 DIAGNOSIS — T50.Z95A SIDE EFFECTS OF VACCINATION, INITIAL ENCOUNTER: Primary | ICD-10-CM

## 2021-03-04 PROCEDURE — 1090F PRES/ABSN URINE INCON ASSESS: CPT | Performed by: FAMILY MEDICINE

## 2021-03-04 PROCEDURE — G8432 DEP SCR NOT DOC, RNG: HCPCS | Performed by: FAMILY MEDICINE

## 2021-03-04 PROCEDURE — 99212 OFFICE O/P EST SF 10 MIN: CPT | Performed by: FAMILY MEDICINE

## 2021-03-04 PROCEDURE — G9899 SCRN MAM PERF RSLTS DOC: HCPCS | Performed by: FAMILY MEDICINE

## 2021-03-04 PROCEDURE — 1101F PT FALLS ASSESS-DOCD LE1/YR: CPT | Performed by: FAMILY MEDICINE

## 2021-03-04 PROCEDURE — G8427 DOCREV CUR MEDS BY ELIG CLIN: HCPCS | Performed by: FAMILY MEDICINE

## 2021-03-04 PROCEDURE — G8399 PT W/DXA RESULTS DOCUMENT: HCPCS | Performed by: FAMILY MEDICINE

## 2021-03-04 PROCEDURE — G8752 SYS BP LESS 140: HCPCS | Performed by: FAMILY MEDICINE

## 2021-03-04 PROCEDURE — G8417 CALC BMI ABV UP PARAM F/U: HCPCS | Performed by: FAMILY MEDICINE

## 2021-03-04 PROCEDURE — G8754 DIAS BP LESS 90: HCPCS | Performed by: FAMILY MEDICINE

## 2021-03-04 PROCEDURE — 3017F COLORECTAL CA SCREEN DOC REV: CPT | Performed by: FAMILY MEDICINE

## 2021-03-04 PROCEDURE — G8536 NO DOC ELDER MAL SCRN: HCPCS | Performed by: FAMILY MEDICINE

## 2021-03-04 PROCEDURE — G0463 HOSPITAL OUTPT CLINIC VISIT: HCPCS | Performed by: FAMILY MEDICINE

## 2021-03-04 RX ORDER — GABAPENTIN 100 MG/1
100 CAPSULE ORAL 3 TIMES DAILY
Qty: 90 CAP | Refills: 1 | Status: SHIPPED | OUTPATIENT
Start: 2021-03-04 | End: 2021-11-08 | Stop reason: ALTCHOICE

## 2021-03-04 NOTE — PROGRESS NOTES
Chief Complaint   Patient presents with   [facility] Follow Up     F/U from [facility] emergency. 1. Have you been to the ER, urgent care clinic since your last visit? Hospitalized since your last visit? Yes, [facility] emergency on 2-9-21.    2. Have you seen or consulted any other health care providers outside of the [address] [place] [address] since your last visit? Include any pap smears or colon screening. Yes, [facility] emergency on 2-9-21.

## 2021-03-05 NOTE — PROGRESS NOTES
HISTORY OF PRESENT ILLNESS  Ana Ramos is a 79 y.o. female. f/u rt shoulder,arm,breast  pain and rash following covid vaccination. Sx and picture felt to be consistent with H Zoster. Pain has been sever,now slowly improvingPain is neuropathic. Rash slowly improving  Hospital Follow Up  The history is provided by the patient. This is a new problem. The current episode started more than 1 week ago. The problem has been gradually improving. Arm Pain   This is a new problem. The current episode started more than 1 week ago. The problem has been gradually improving. The pain is present in the right arm. The pain is at a severity of 5/10. The pain is moderate. Associated symptoms include tingling and itching. Rash   The history is provided by the patient. This is a new problem. The current episode started more than 1 week ago. The problem has been gradually improving. Associated symptoms include itching. Review of Systems   Musculoskeletal: Positive for myalgias. Skin: Positive for itching and rash. Neurological: Positive for tingling and sensory change. Physical Exam  Constitutional:       Appearance: Normal appearance. She is obese. HENT:      Head: Normocephalic and atraumatic. Cardiovascular:      Rate and Rhythm: Normal rate and regular rhythm. Pulses: Normal pulses. Heart sounds: Normal heart sounds. Pulmonary:      Effort: Pulmonary effort is normal.      Breath sounds: Normal breath sounds. Skin:     General: Skin is warm and dry. Findings: Rash present. Comments: Healing patches of erythema shoulder,arm,breat   Neurological:      General: No focal deficit present. Mental Status: She is alert. ASSESSMENT and PLAN  Diagnoses and all orders for this visit:    1. Side effects of vaccination, initial encounter,slowly improving    2. Herpes zoster without complication,try gabapentin  -     gabapentin (NEURONTIN) 100 mg capsule;  Take 1 Cap by mouth three (3) times daily. Max Daily Amount: 300 mg.    3. Essential hypertension, benign,controlled    4. Acute pain of right shoulder      Follow-up and Dispositions    · Return in about 4 weeks (around 4/1/2021).

## 2021-03-31 ENCOUNTER — OFFICE VISIT (OUTPATIENT)
Dept: FAMILY MEDICINE CLINIC | Age: 71
End: 2021-03-31
Payer: MEDICARE

## 2021-03-31 VITALS
HEIGHT: 65 IN | RESPIRATION RATE: 18 BRPM | BODY MASS INDEX: 31.42 KG/M2 | OXYGEN SATURATION: 99 % | SYSTOLIC BLOOD PRESSURE: 138 MMHG | HEART RATE: 82 BPM | WEIGHT: 188.6 LBS | TEMPERATURE: 97.5 F | DIASTOLIC BLOOD PRESSURE: 66 MMHG

## 2021-03-31 DIAGNOSIS — B02.9 HERPES ZOSTER WITHOUT COMPLICATION: ICD-10-CM

## 2021-03-31 DIAGNOSIS — E11.9 TYPE 2 DIABETES MELLITUS WITHOUT COMPLICATION, WITHOUT LONG-TERM CURRENT USE OF INSULIN (HCC): ICD-10-CM

## 2021-03-31 DIAGNOSIS — T50.Z95A SIDE EFFECTS OF VACCINATION, INITIAL ENCOUNTER: Primary | ICD-10-CM

## 2021-03-31 DIAGNOSIS — E78.2 MIXED HYPERLIPIDEMIA: ICD-10-CM

## 2021-03-31 DIAGNOSIS — I25.10 CORONARY ARTERY DISEASE INVOLVING NATIVE CORONARY ARTERY OF NATIVE HEART WITHOUT ANGINA PECTORIS: ICD-10-CM

## 2021-03-31 DIAGNOSIS — I10 ESSENTIAL HYPERTENSION, BENIGN: ICD-10-CM

## 2021-03-31 DIAGNOSIS — E11.40 TYPE 2 DIABETES MELLITUS WITH DIABETIC NEUROPATHY, WITHOUT LONG-TERM CURRENT USE OF INSULIN (HCC): ICD-10-CM

## 2021-03-31 PROCEDURE — 1101F PT FALLS ASSESS-DOCD LE1/YR: CPT | Performed by: FAMILY MEDICINE

## 2021-03-31 PROCEDURE — G8752 SYS BP LESS 140: HCPCS | Performed by: FAMILY MEDICINE

## 2021-03-31 PROCEDURE — G8536 NO DOC ELDER MAL SCRN: HCPCS | Performed by: FAMILY MEDICINE

## 2021-03-31 PROCEDURE — 3051F HG A1C>EQUAL 7.0%<8.0%: CPT | Performed by: FAMILY MEDICINE

## 2021-03-31 PROCEDURE — G0463 HOSPITAL OUTPT CLINIC VISIT: HCPCS | Performed by: FAMILY MEDICINE

## 2021-03-31 PROCEDURE — G8399 PT W/DXA RESULTS DOCUMENT: HCPCS | Performed by: FAMILY MEDICINE

## 2021-03-31 PROCEDURE — G8427 DOCREV CUR MEDS BY ELIG CLIN: HCPCS | Performed by: FAMILY MEDICINE

## 2021-03-31 PROCEDURE — 2022F DILAT RTA XM EVC RTNOPTHY: CPT | Performed by: FAMILY MEDICINE

## 2021-03-31 PROCEDURE — 99212 OFFICE O/P EST SF 10 MIN: CPT | Performed by: FAMILY MEDICINE

## 2021-03-31 PROCEDURE — 1090F PRES/ABSN URINE INCON ASSESS: CPT | Performed by: FAMILY MEDICINE

## 2021-03-31 PROCEDURE — 3017F COLORECTAL CA SCREEN DOC REV: CPT | Performed by: FAMILY MEDICINE

## 2021-03-31 PROCEDURE — G9899 SCRN MAM PERF RSLTS DOC: HCPCS | Performed by: FAMILY MEDICINE

## 2021-03-31 PROCEDURE — G8754 DIAS BP LESS 90: HCPCS | Performed by: FAMILY MEDICINE

## 2021-03-31 PROCEDURE — G8417 CALC BMI ABV UP PARAM F/U: HCPCS | Performed by: FAMILY MEDICINE

## 2021-03-31 PROCEDURE — G8510 SCR DEP NEG, NO PLAN REQD: HCPCS | Performed by: FAMILY MEDICINE

## 2021-03-31 NOTE — PROGRESS NOTES
Chief Complaint   Patient presents with    Shingles     F/U for shingles. 1. Have you been to the ER, urgent care clinic since your last visit? Hospitalized since your last visit? No    2. Have you seen or consulted any other health care providers outside of the [address] [place] [address] since your last visit? Include any pap smears or colon screening.  No

## 2021-03-31 NOTE — PROGRESS NOTES
HISTORY OF PRESENT ILLNESS  Nirav Gee is a 70 y.o. female. f/u rt shoulder,arm,breast  pain and rash following covid vaccination, felt to be consistent with H Zoster. Pain slowly improvingPain is neuropathic. Rash slowly improving  Arm Pain   The history is provided by the patient. This is a chronic problem. The current episode started more than 1 week ago. The problem has been gradually improving. The pain is present in the right arm. The pain is at a severity of 5/10. The pain is moderate. Associated symptoms include tingling, itching, back pain and neck pain. Rash   The history is provided by the patient. This is a new problem. The current episode started more than 1 week ago. The problem has been rapidly improving. Associated symptoms include itching. Review of Systems   Constitutional: Negative for fever and malaise/fatigue. Cardiovascular: Positive for chest pain. Musculoskeletal: Positive for back pain, myalgias and neck pain. Skin: Positive for itching and rash. Neurological: Positive for tingling and sensory change. Physical Exam  Vitals signs reviewed. Constitutional:       Appearance: Normal appearance. She is obese. HENT:      Head: Normocephalic and atraumatic. Right Ear: Tympanic membrane normal.      Left Ear: Tympanic membrane normal.      Nose: No congestion. Neck:      Musculoskeletal: Normal range of motion. Cardiovascular:      Rate and Rhythm: Normal rate and regular rhythm. Pulses: Normal pulses. Heart sounds: Normal heart sounds. Pulmonary:      Effort: Pulmonary effort is normal.      Breath sounds: Normal breath sounds. Musculoskeletal:      Right shoulder: She exhibits tenderness and decreased strength. She exhibits normal range of motion, no swelling and no crepitus. Skin:     General: Skin is warm and dry. Findings: Rash present.       Comments: Healing patches of erythema shoulder,arm,breat   Neurological:      General: No focal deficit present. Mental Status: She is alert. Diagnoses and all orders for this visit:    1. Side effects of vaccination, initial encounter    2. Herpes zoster without complication,gradually impoving phn.Continue current meds and treatments. 3. Essential hypertension, benign    4. Type 2 diabetes mellitus with diabetic neuropathy, without long-term current use of insulin ([facility]    5. Mixed hyperlipidemia    6. Coronary artery disease involving native coronary artery of native heart without angina pectoris    7. Type 2 diabetes mellitus without complication, without long-term current use of insulin ([facility]      Follow-up and Dispositions    · Return in about 4 weeks (around 4/28/2021). Follow-up and Dispositions    · Return in about 4 weeks (around 4/28/2021).

## 2021-05-05 ENCOUNTER — OFFICE VISIT (OUTPATIENT)
Dept: FAMILY MEDICINE CLINIC | Age: 71
End: 2021-05-05
Payer: MEDICARE

## 2021-05-05 VITALS
OXYGEN SATURATION: 99 % | SYSTOLIC BLOOD PRESSURE: 119 MMHG | BODY MASS INDEX: 30.82 KG/M2 | DIASTOLIC BLOOD PRESSURE: 66 MMHG | WEIGHT: 185 LBS | HEIGHT: 65 IN | HEART RATE: 66 BPM

## 2021-05-05 DIAGNOSIS — I10 ESSENTIAL HYPERTENSION, BENIGN: ICD-10-CM

## 2021-05-05 DIAGNOSIS — I25.10 CORONARY ARTERY DISEASE INVOLVING NATIVE CORONARY ARTERY OF NATIVE HEART WITHOUT ANGINA PECTORIS: ICD-10-CM

## 2021-05-05 DIAGNOSIS — B02.9 HERPES ZOSTER WITHOUT COMPLICATION: ICD-10-CM

## 2021-05-05 DIAGNOSIS — E11.40 TYPE 2 DIABETES MELLITUS WITH DIABETIC NEUROPATHY, WITHOUT LONG-TERM CURRENT USE OF INSULIN (HCC): Primary | ICD-10-CM

## 2021-05-05 DIAGNOSIS — E78.2 MIXED HYPERLIPIDEMIA: ICD-10-CM

## 2021-05-05 DIAGNOSIS — K22.4 ESOPHAGEAL DYSMOTILITY: ICD-10-CM

## 2021-05-05 DIAGNOSIS — T50.Z95A SIDE EFFECTS OF VACCINATION, INITIAL ENCOUNTER: ICD-10-CM

## 2021-05-05 LAB — HBA1C MFR BLD HPLC: 7.6 %

## 2021-05-05 PROCEDURE — G8399 PT W/DXA RESULTS DOCUMENT: HCPCS | Performed by: FAMILY MEDICINE

## 2021-05-05 PROCEDURE — G8510 SCR DEP NEG, NO PLAN REQD: HCPCS | Performed by: FAMILY MEDICINE

## 2021-05-05 PROCEDURE — G9899 SCRN MAM PERF RSLTS DOC: HCPCS | Performed by: FAMILY MEDICINE

## 2021-05-05 PROCEDURE — G0463 HOSPITAL OUTPT CLINIC VISIT: HCPCS | Performed by: FAMILY MEDICINE

## 2021-05-05 PROCEDURE — 1101F PT FALLS ASSESS-DOCD LE1/YR: CPT | Performed by: FAMILY MEDICINE

## 2021-05-05 PROCEDURE — 99213 OFFICE O/P EST LOW 20 MIN: CPT | Performed by: FAMILY MEDICINE

## 2021-05-05 PROCEDURE — G8417 CALC BMI ABV UP PARAM F/U: HCPCS | Performed by: FAMILY MEDICINE

## 2021-05-05 PROCEDURE — 3017F COLORECTAL CA SCREEN DOC REV: CPT | Performed by: FAMILY MEDICINE

## 2021-05-05 PROCEDURE — G8752 SYS BP LESS 140: HCPCS | Performed by: FAMILY MEDICINE

## 2021-05-05 PROCEDURE — G8754 DIAS BP LESS 90: HCPCS | Performed by: FAMILY MEDICINE

## 2021-05-05 PROCEDURE — 2022F DILAT RTA XM EVC RTNOPTHY: CPT | Performed by: FAMILY MEDICINE

## 2021-05-05 PROCEDURE — G8536 NO DOC ELDER MAL SCRN: HCPCS | Performed by: FAMILY MEDICINE

## 2021-05-05 PROCEDURE — 1090F PRES/ABSN URINE INCON ASSESS: CPT | Performed by: FAMILY MEDICINE

## 2021-05-05 PROCEDURE — 83036 HEMOGLOBIN GLYCOSYLATED A1C: CPT | Performed by: FAMILY MEDICINE

## 2021-05-05 PROCEDURE — G8427 DOCREV CUR MEDS BY ELIG CLIN: HCPCS | Performed by: FAMILY MEDICINE

## 2021-05-05 PROCEDURE — 3051F HG A1C>EQUAL 7.0%<8.0%: CPT | Performed by: FAMILY MEDICINE

## 2021-05-05 NOTE — PROGRESS NOTES
HISTORY OF PRESENT ILLNESS  Eileen Darnell is a 70 y.o. female. f/u dm2,hbp, cad,chol gerd,slowly improving PHN of rt shoulder and breast.Feeling ok,recently seen by Cardiology. Diabetes  The history is provided by the patient. This is a chronic problem. The problem occurs daily. The problem has not changed since onset. Nothing relieves the symptoms. Hypertension   The history is provided by the patient. This is a chronic problem. The problem has been gradually improving. Associated symptoms include malaise/fatigue. Pertinent negatives include no orthopnea, no palpitations and no nausea. Cholesterol Problem  The history is provided by the patient. This is a chronic problem. The problem occurs daily. The problem has been gradually improving. Fatigue  The history is provided by the patient. This is a chronic problem. The problem occurs daily. Review of Systems   Constitutional: Positive for fatigue and malaise/fatigue. Negative for fever and weight loss. Respiratory: Negative for cough. Cardiovascular: Negative for palpitations and orthopnea. Gastrointestinal: Positive for heartburn. Negative for blood in stool, constipation and nausea. Genitourinary: Negative for dysuria and frequency. Musculoskeletal: Negative for back pain and myalgias. Skin: Positive for itching. Neurological: Positive for tingling and sensory change. Psychiatric/Behavioral: Negative for depression. The patient does not have insomnia. Physical Exam  Vitals signs reviewed. Constitutional:       Appearance: Normal appearance. She is well-developed. She is obese. HENT:      Head: Normocephalic and atraumatic. Right Ear: External ear normal.      Left Ear: External ear normal.      Nose: Mucosal edema and congestion present. No rhinorrhea. Mouth/Throat:      Pharynx: No posterior oropharyngeal erythema.    Eyes:      Conjunctiva/sclera: Conjunctivae normal.      Pupils: Pupils are equal, round, and reactive to light. Neck:      Musculoskeletal: Normal range of motion and neck supple. Thyroid: No thyromegaly. Trachea: No tracheal deviation. Cardiovascular:      Rate and Rhythm: Normal rate and regular rhythm. Heart sounds: Normal heart sounds. No murmur. No gallop. Pulmonary:      Effort: Pulmonary effort is normal. No respiratory distress. Breath sounds: Normal breath sounds. Musculoskeletal: Normal range of motion. Skin:     General: Skin is warm and dry. Comments: Healing lesions rt shoulder and breast   Neurological:      Mental Status: She is alert and oriented to person, place, and time. Mental status is at baseline. Psychiatric:         Mood and Affect: Mood normal.         Behavior: Behavior normal.         Diagnoses and all orders for this visit:    1. Type 2 diabetes mellitus with diabetic neuropathy, without long-term current use of insulin ([facility])  -     METABOLIC PANEL, COMPREHENSIVE; Future  -     AMB POC HEMOGLOBIN A1C  -     MICROALBUMIN, UR, RAND W/ MICROALB/CREAT RATIO; Future    2. Essential hypertension, benign    3. Mixed hyperlipidemia  -     LIPID PANEL; Future    4. Coronary artery disease involving native coronary artery of native heart without angina pectoris    5. Esophageal dysmotility,stable,f/u with GI    6. Side effects of vaccination, initial encounter    7.  Herpes zoster without complication slowly improving ,to use gabapentin prn            Doing well,continue current meds and treatments

## 2021-05-05 NOTE — PROGRESS NOTES
Chief Complaint   Patient presents with    Diabetes     F/U on diabetes.  Hypertension     F/U on BP.  Cholesterol Problem     F/U on cholesterol. 1. Have you been to the ER, urgent care clinic since your last visit? Hospitalized since your last visit? No    2. Have you seen or consulted any other health care providers outside of the [address] [place] [address] since your last visit? Include any pap smears or colon screening.  No

## 2021-05-06 LAB
ALBUMIN SERPL-MCNC: 4.1 G/DL (ref 3.5–5)
ALBUMIN/GLOB SERPL: 1.3 {RATIO} (ref 1.1–2.2)
ALP SERPL-CCNC: 96 U/L (ref 45–117)
ALT SERPL-CCNC: 24 U/L (ref 12–78)
ANION GAP SERPL CALC-SCNC: 8 MMOL/L (ref 5–15)
AST SERPL-CCNC: 19 U/L (ref 15–37)
BILIRUB SERPL-MCNC: 0.9 MG/DL (ref 0.2–1)
BUN SERPL-MCNC: 15 MG/DL (ref 6–20)
BUN/CREAT SERPL: 17 (ref 12–20)
CALCIUM SERPL-MCNC: 10.1 MG/DL (ref 8.5–10.1)
CHLORIDE SERPL-SCNC: 104 MMOL/L (ref 97–108)
CHOLEST SERPL-MCNC: 159 MG/DL
CO2 SERPL-SCNC: 26 MMOL/L (ref 21–32)
CREAT SERPL-MCNC: 0.88 MG/DL (ref 0.55–1.02)
CREAT UR-MCNC: 52 MG/DL
GLOBULIN SER CALC-MCNC: 3.2 G/DL (ref 2–4)
GLUCOSE SERPL-MCNC: 163 MG/DL (ref 65–100)
HDLC SERPL-MCNC: 45 MG/DL
HDLC SERPL: 3.5 {RATIO} (ref 0–5)
LDLC SERPL CALC-MCNC: 56.4 MG/DL (ref 0–100)
LIPID PROFILE,FLP: ABNORMAL
MICROALBUMIN UR-MCNC: 0.71 MG/DL
MICROALBUMIN/CREAT UR-RTO: 14 MG/G (ref 0–30)
POTASSIUM SERPL-SCNC: 3.6 MMOL/L (ref 3.5–5.1)
PROT SERPL-MCNC: 7.3 G/DL (ref 6.4–8.2)
SODIUM SERPL-SCNC: 138 MMOL/L (ref 136–145)
TRIGL SERPL-MCNC: 288 MG/DL (ref ?–150)
VLDLC SERPL CALC-MCNC: 57.6 MG/DL

## 2021-06-06 NOTE — PROGRESS NOTES
HISTORY OF PRESENT ILLNESS  Lisha Ramos is a 79 y.o. female. f/u hbp,cad,chol,dm2. Cotinued gerd /dysphagic sx . Had EGD last week with no new findings or change in therapy  Diabetes   The history is provided by the patient. This is a chronic problem. The problem occurs daily. The problem has not changed since onset. Pertinent negatives include no chest pain, no abdominal pain and no headaches. Hypertension    This is a chronic problem. The problem has not changed since onset. Pertinent negatives include no chest pain, no orthopnea, no palpitations, no malaise/fatigue, no headaches and no peripheral edema. Cholesterol Problem   This is a chronic problem. The problem occurs daily. Pertinent negatives include no chest pain, no abdominal pain and no headaches. GERD   This is a chronic problem. The problem occurs daily. Pertinent negatives include no chest pain, no abdominal pain and no headaches. Review of Systems   Constitutional: Negative for fever and malaise/fatigue. Cardiovascular:Has occasional exertional  chest pain relieved by res or TNG, nopalpitations and orthopnea. Gastrointestinal: Positive for heartburn. Negative for abdominal pain, blood in stool, constipation and melena. Neurological: Negative for headaches. Physical Exam   Constitutional: She appears well-developed and well-nourished. HENT:   Head: Normocephalic and atraumatic. Right Ear: External ear normal.   Left Ear: External ear normal.   Nose: Nose normal.   Mouth/Throat: Oropharynx is clear and moist.   Eyes: Conjunctivae are normal. Pupils are equal, round, and reactive to light. Neck: Normal range of motion. Neck supple. No tracheal deviation present. No thyromegaly present. Cardiovascular: Normal rate, regular rhythm, normal heart sounds and intact distal pulses. Pulmonary/Chest: Effort normal and breath sounds normal. No respiratory distress. She has no wheezes. Abdominal: Soft.  Bowel sounds are normal. She exhibits no distension. Lymphadenopathy:     She has no cervical adenopathy. Neurological: She is alert. Skin: Skin is warm and dry. Psychiatric: She has a normal mood and affect. Vitals reviewed. ASSESSMENT and PLAN  Diagnoses and all orders for this visit:    1. Type 2 diabetes mellitus without complication, without long-term current use of insulin (HCC)  -     METABOLIC PANEL, COMPREHENSIVE  -     AMB POC HEMOGLOBIN A1C  -     AMB POC GLUCOSE, QUANTITATIVE, BLOOD    2. Mixed hyperlipidemia  -     LIPID PANEL    3. Essential hypertension, benign    4. S/P PTCA (percutaneous transluminal coronary angioplasty)    5. Esophageal motor disorder    6. Encounter for immunization  -     Influenza virus vaccine ([address]) 72 years and older ([address])      Follow-up Disposition:  Return in about 3 months (around 1/16/2018). Caridad Cole(Attending)

## 2021-06-10 DIAGNOSIS — E11.59 TYPE 2 DIABETES MELLITUS WITH OTHER CIRCULATORY COMPLICATION, WITHOUT LONG-TERM CURRENT USE OF INSULIN (HCC): ICD-10-CM

## 2021-06-10 RX ORDER — GLIMEPIRIDE 2 MG/1
TABLET ORAL
Qty: 45 TABLET | Refills: 11 | Status: SHIPPED | OUTPATIENT
Start: 2021-06-10 | End: 2022-06-27

## 2021-06-23 NOTE — TELEPHONE ENCOUNTER
Last visit:5/5/21  Next visit:8/6/21  Previous refill 1/5/21(30+5R)    Requested Prescriptions     Pending Prescriptions Disp Refills    dapagliflozin (Farxiga) 5 mg tab tablet 30 Tablet 5     Sig: Take 1 Tablet by mouth daily.

## 2021-08-06 ENCOUNTER — OFFICE VISIT (OUTPATIENT)
Dept: FAMILY MEDICINE CLINIC | Age: 71
End: 2021-08-06
Payer: MEDICARE

## 2021-08-06 VITALS
TEMPERATURE: 98.1 F | RESPIRATION RATE: 18 BRPM | WEIGHT: 181.4 LBS | HEIGHT: 65 IN | DIASTOLIC BLOOD PRESSURE: 76 MMHG | HEART RATE: 63 BPM | OXYGEN SATURATION: 98 % | BODY MASS INDEX: 30.22 KG/M2 | SYSTOLIC BLOOD PRESSURE: 122 MMHG

## 2021-08-06 DIAGNOSIS — K22.4 ESOPHAGEAL DYSMOTILITY: ICD-10-CM

## 2021-08-06 DIAGNOSIS — B02.29 PHN (POSTHERPETIC NEURALGIA): ICD-10-CM

## 2021-08-06 DIAGNOSIS — M25.511 CHRONIC RIGHT SHOULDER PAIN: ICD-10-CM

## 2021-08-06 DIAGNOSIS — G89.29 CHRONIC RIGHT SHOULDER PAIN: ICD-10-CM

## 2021-08-06 DIAGNOSIS — I25.10 CORONARY ARTERY DISEASE INVOLVING NATIVE CORONARY ARTERY OF NATIVE HEART WITHOUT ANGINA PECTORIS: ICD-10-CM

## 2021-08-06 DIAGNOSIS — E11.59 TYPE 2 DIABETES MELLITUS WITH OTHER CIRCULATORY COMPLICATION, WITHOUT LONG-TERM CURRENT USE OF INSULIN (HCC): Primary | ICD-10-CM

## 2021-08-06 DIAGNOSIS — E78.2 MIXED HYPERLIPIDEMIA: ICD-10-CM

## 2021-08-06 DIAGNOSIS — I10 ESSENTIAL HYPERTENSION, BENIGN: ICD-10-CM

## 2021-08-06 LAB
ALBUMIN SERPL-MCNC: 4 G/DL (ref 3.5–5)
ALBUMIN/GLOB SERPL: 1.3 {RATIO} (ref 1.1–2.2)
ALP SERPL-CCNC: 94 U/L (ref 45–117)
ALT SERPL-CCNC: 27 U/L (ref 12–78)
ANION GAP SERPL CALC-SCNC: 5 MMOL/L (ref 5–15)
AST SERPL-CCNC: 16 U/L (ref 15–37)
BILIRUB SERPL-MCNC: 0.9 MG/DL (ref 0.2–1)
BUN SERPL-MCNC: 23 MG/DL (ref 6–20)
BUN/CREAT SERPL: 20 (ref 12–20)
CALCIUM SERPL-MCNC: 9.6 MG/DL (ref 8.5–10.1)
CHLORIDE SERPL-SCNC: 104 MMOL/L (ref 97–108)
CO2 SERPL-SCNC: 30 MMOL/L (ref 21–32)
CREAT SERPL-MCNC: 1.15 MG/DL (ref 0.55–1.02)
EST. AVERAGE GLUCOSE BLD GHB EST-MCNC: 148 MG/DL
GLOBULIN SER CALC-MCNC: 3.2 G/DL (ref 2–4)
GLUCOSE SERPL-MCNC: 148 MG/DL (ref 65–100)
HBA1C MFR BLD: 6.8 % (ref 4–5.6)
POTASSIUM SERPL-SCNC: 3.7 MMOL/L (ref 3.5–5.1)
PROT SERPL-MCNC: 7.2 G/DL (ref 6.4–8.2)
SODIUM SERPL-SCNC: 139 MMOL/L (ref 136–145)

## 2021-08-06 PROCEDURE — G9899 SCRN MAM PERF RSLTS DOC: HCPCS | Performed by: FAMILY MEDICINE

## 2021-08-06 PROCEDURE — 3051F HG A1C>EQUAL 7.0%<8.0%: CPT | Performed by: FAMILY MEDICINE

## 2021-08-06 PROCEDURE — G8427 DOCREV CUR MEDS BY ELIG CLIN: HCPCS | Performed by: FAMILY MEDICINE

## 2021-08-06 PROCEDURE — G8754 DIAS BP LESS 90: HCPCS | Performed by: FAMILY MEDICINE

## 2021-08-06 PROCEDURE — G8417 CALC BMI ABV UP PARAM F/U: HCPCS | Performed by: FAMILY MEDICINE

## 2021-08-06 PROCEDURE — G8399 PT W/DXA RESULTS DOCUMENT: HCPCS | Performed by: FAMILY MEDICINE

## 2021-08-06 PROCEDURE — 3017F COLORECTAL CA SCREEN DOC REV: CPT | Performed by: FAMILY MEDICINE

## 2021-08-06 PROCEDURE — 1101F PT FALLS ASSESS-DOCD LE1/YR: CPT | Performed by: FAMILY MEDICINE

## 2021-08-06 PROCEDURE — 2022F DILAT RTA XM EVC RTNOPTHY: CPT | Performed by: FAMILY MEDICINE

## 2021-08-06 PROCEDURE — G0463 HOSPITAL OUTPT CLINIC VISIT: HCPCS | Performed by: FAMILY MEDICINE

## 2021-08-06 PROCEDURE — G8536 NO DOC ELDER MAL SCRN: HCPCS | Performed by: FAMILY MEDICINE

## 2021-08-06 PROCEDURE — G8510 SCR DEP NEG, NO PLAN REQD: HCPCS | Performed by: FAMILY MEDICINE

## 2021-08-06 PROCEDURE — 1090F PRES/ABSN URINE INCON ASSESS: CPT | Performed by: FAMILY MEDICINE

## 2021-08-06 PROCEDURE — G8752 SYS BP LESS 140: HCPCS | Performed by: FAMILY MEDICINE

## 2021-08-06 PROCEDURE — 99213 OFFICE O/P EST LOW 20 MIN: CPT | Performed by: FAMILY MEDICINE

## 2021-08-06 NOTE — PROGRESS NOTES
HISTORY OF PRESENT ILLNESS  Robert Burdick is a 70 y.o. female. f/u dm2,hbp, cad,chol gerd,slowly improving PHN of rt shoulder and breast.Feeling ok,recently seen by Cardiology. Diabetes  The history is provided by the patient. This is a chronic problem. The problem occurs daily. The problem has not changed since onset. Nothing relieves the symptoms. Hypertension   The history is provided by the patient. This is a chronic problem. The problem has been gradually improving. Associated symptoms include malaise/fatigue. Pertinent negatives include no orthopnea, no palpitations and no nausea. Cholesterol Problem  The history is provided by the patient. This is a chronic problem. The problem occurs daily. The problem has been gradually improving. Fatigue  The history is provided by the patient. This is a chronic problem. The problem occurs daily. Tingling  The history is provided by the patient. This is a chronic problem. The problem occurs daily. The problem has not changed since onset. Review of Systems   Constitutional: Positive for fatigue and malaise/fatigue. Negative for fever and weight loss. Respiratory: Negative for cough. Cardiovascular: Negative for palpitations and orthopnea. Gastrointestinal: Positive for heartburn. Negative for blood in stool, constipation and nausea. Genitourinary: Negative for dysuria and frequency. Musculoskeletal: Negative for back pain and myalgias. Skin: Positive for itching. Neurological: Positive for tingling and sensory change. Psychiatric/Behavioral: Negative for depression. The patient does not have insomnia. Physical Exam  Vitals reviewed. Constitutional:       Appearance: Normal appearance. She is well-developed. She is obese. HENT:      Head: Normocephalic and atraumatic. Right Ear: External ear normal.      Left Ear: External ear normal.      Nose: Mucosal edema and congestion present. No rhinorrhea.       Mouth/Throat:      Pharynx: No posterior oropharyngeal erythema. Eyes:      Conjunctiva/sclera: Conjunctivae normal.      Pupils: Pupils are equal, round, and reactive to light. Neck:      Thyroid: No thyromegaly. Trachea: No tracheal deviation. Cardiovascular:      Rate and Rhythm: Normal rate and regular rhythm. Heart sounds: Normal heart sounds. No murmur heard. No gallop. Pulmonary:      Effort: Pulmonary effort is normal. No respiratory distress. Breath sounds: Normal breath sounds. Abdominal:      Palpations: Abdomen is soft. Musculoskeletal:         General: Normal range of motion. Cervical back: Normal range of motion and neck supple. Skin:     General: Skin is warm and dry. Comments: Healing lesions rt shoulder and breast   Neurological:      Mental Status: She is alert and oriented to person, place, and time. Mental status is at baseline. Psychiatric:         Mood and Affect: Mood normal.         Behavior: Behavior normal.       Diagnoses and all orders for this visit:    1. Type 2 diabetes mellitus with other circulatory complication, without long-term current use of insulin (HCC)  -     HEMOGLOBIN A1C WITH EAG; Future    2. Essential hypertension, benign  -     METABOLIC PANEL, COMPREHENSIVE; Future    3. PHN (postherpetic neuralgia)    4. Mixed hyperlipidemia    5. Coronary artery disease involving native coronary artery of native heart without angina pectoris    6. Esophageal dysmotility    7. Chronic right shoulder pain,likely PHN,vaccination side effect      Follow-up and Dispositions    · Return in about 3 months (around 11/6/2021).                    Doing well,continue current meds and treatments

## 2021-08-06 NOTE — PROGRESS NOTES
Chief Complaint   Patient presents with    Diabetes     F/U on diabetes.  Hypertension     F/U on BP.  Cholesterol Problem     F/U on cholesterol. 1. Have you been to the ER, urgent care clinic since your last visit? Hospitalized since your last visit? No    2. Have you seen or consulted any other health care providers outside of the [address] [place] [address] since your last visit? Include any pap smears or colon screening.  No

## 2021-08-20 RX ORDER — ROSUVASTATIN CALCIUM 10 MG/1
TABLET, COATED ORAL
Qty: 90 TABLET | Refills: 1 | Status: SHIPPED | OUTPATIENT
Start: 2021-08-20 | End: 2021-11-17

## 2021-08-24 RX ORDER — FLUTICASONE PROPIONATE 50 MCG
SPRAY, SUSPENSION (ML) NASAL
Qty: 16 G | Refills: 11 | Status: SHIPPED | OUTPATIENT
Start: 2021-08-24 | End: 2022-09-05

## 2021-09-13 ENCOUNTER — OFFICE VISIT (OUTPATIENT)
Dept: SLEEP MEDICINE | Age: 71
End: 2021-09-13
Payer: MEDICARE

## 2021-09-13 VITALS
SYSTOLIC BLOOD PRESSURE: 118 MMHG | OXYGEN SATURATION: 96 % | DIASTOLIC BLOOD PRESSURE: 61 MMHG | BODY MASS INDEX: 29.69 KG/M2 | HEART RATE: 62 BPM | RESPIRATION RATE: 20 BRPM | WEIGHT: 178.4 LBS

## 2021-09-13 DIAGNOSIS — G47.00 INSOMNIA, UNSPECIFIED TYPE: ICD-10-CM

## 2021-09-13 DIAGNOSIS — I10 ESSENTIAL HYPERTENSION, BENIGN: ICD-10-CM

## 2021-09-13 DIAGNOSIS — G47.39 MIXED SLEEP APNEA: Primary | ICD-10-CM

## 2021-09-13 PROCEDURE — G9899 SCRN MAM PERF RSLTS DOC: HCPCS | Performed by: INTERNAL MEDICINE

## 2021-09-13 PROCEDURE — G8399 PT W/DXA RESULTS DOCUMENT: HCPCS | Performed by: INTERNAL MEDICINE

## 2021-09-13 PROCEDURE — 1101F PT FALLS ASSESS-DOCD LE1/YR: CPT | Performed by: INTERNAL MEDICINE

## 2021-09-13 PROCEDURE — G8427 DOCREV CUR MEDS BY ELIG CLIN: HCPCS | Performed by: INTERNAL MEDICINE

## 2021-09-13 PROCEDURE — G8536 NO DOC ELDER MAL SCRN: HCPCS | Performed by: INTERNAL MEDICINE

## 2021-09-13 PROCEDURE — G8754 DIAS BP LESS 90: HCPCS | Performed by: INTERNAL MEDICINE

## 2021-09-13 PROCEDURE — 3017F COLORECTAL CA SCREEN DOC REV: CPT | Performed by: INTERNAL MEDICINE

## 2021-09-13 PROCEDURE — 1090F PRES/ABSN URINE INCON ASSESS: CPT | Performed by: INTERNAL MEDICINE

## 2021-09-13 PROCEDURE — 99213 OFFICE O/P EST LOW 20 MIN: CPT | Performed by: INTERNAL MEDICINE

## 2021-09-13 PROCEDURE — G8752 SYS BP LESS 140: HCPCS | Performed by: INTERNAL MEDICINE

## 2021-09-13 PROCEDURE — G8432 DEP SCR NOT DOC, RNG: HCPCS | Performed by: INTERNAL MEDICINE

## 2021-09-13 PROCEDURE — G8417 CALC BMI ABV UP PARAM F/U: HCPCS | Performed by: INTERNAL MEDICINE

## 2021-09-13 NOTE — PROGRESS NOTES
[address] [facility] [place]. [place], [address]  Tel.  [phone]  Fax. [address] [facility] [address]  [place], [address]  Tel.  [phone]  Fax. [phone] [address] [facility] Rosanna Armendariz   Tel.  [phone]  Fax. [phone]     S>Raina Eaton is a 70 y.o. female seen for a positive airway pressure follow-up. She reports no problems using the device. The following problems are identified:    Drowsiness no Problems exhaling no   Snoring no Forget to put on no   Mask Comfortable yes Can't fall asleep yes   Dry Mouth no Mask falls off no   Air Leaking no Frequent awakenings yes     Download reviewed. She lost over 25 pounds when sick with shingles. She was in a lot of pain. She believes it was related to the covid vaccine (she got shingles 2 days after getting first dose). She did not get the second dose. She does not sleep well. She watches the clock. She tends to think about things going on in her life. She will stay in bed longer if she has been awake during the night. She naps occasionally  She admits that her sleep has improved. Therapy Apnea Index averaged over PAP use: 6 /hr which reflects improved sleep breathing condition.      Allergies   Allergen Reactions    Penicillins Hives     Other reaction(s): Hives    Protamine Anaphylaxis    Sulfa (Sulfonamide Antibiotics) Hives    Imdur [Isosorbide Mononitrate] Other (comments)     headache       She has a current medication list which includes the following prescription(s): fluticasone propionate, ranolazine er, rosuvastatin, clopidogrel, januvia, dapagliflozin, glimepiride, metoprolol succinate, gabapentin, triamterene-hydrochlorothiazide, amlodipine, sucralfate, rabeprazole, famotidine, nitroglycerin, cyclobenzaprine, mag carb/aluminum hydrox/algin, aspirin, calcium carb/vit d3/minerals, guaifenesin, glucose blood vi test strips, lancets, cyanocobalamin, blood-glucose meter, coenzyme q-10, and cholecalciferol. .      She  has a past medical history of Allergic rhinitis, cause unspecified (2/20/2011), Angina, class III ([facility] (12/13/2013), Anxiety, Arthritis, CAD (coronary artery disease), Colon polyps (2/22/2011), [facility] Diarrhea, Encounter for long-term (current) use of other medications (2/20/2011), Eructation (8/9/2019), Esophageal dysmotility (10/10/2011), Essential hypertension, benign (2/20/2011), Gastric ulcer (8/11/2015), GERD (gastroesophageal reflux disease), Hypertension, Ill-defined condition, Mixed hyperlipidemia (2/20/2011), Nausea & vomiting, and Sleep apnea. Henrietta Sleepiness Score: 7   and Modified F.O.S.Q. Score Total / 2: 16   which reflect improved sleep quality over therapy time. O>    Visit Vitals  /61 (BP 1 Location: Right arm, BP Patient Position: Sitting, BP Cuff Size: Large adult)   Pulse 62   Resp 20   Wt 178 lb 6.4 oz (80.9 kg)   SpO2 96%   BMI 29.69 kg/m²           General:   Alert, oriented, not in distress   Neck:   No JVD    Chest/Lungs:  symetrical lung expansion , no accessory muscle use    Extremities:  no obvious rashes , negative edema    Neuro:  No focal deficits ; No obvious tremor    Psych:  Normal affect ,  Normal countenance ;         A>    ICD-10-CM ICD-9-CM    1. Mixed sleep apnea  G47.39 327.29 SLEEP LAB (PAP TITRATION)      NOVEL CORONAVIRUS (COVID-19)   2. Insomnia, unspecified type  G47.00 780.52    3. Essential hypertension, benign  I10 401.1      AHI = 28. On CPAP :  7 cmH2O. Compliant:      yes    Therapeutic Response:  Positive    P>      * she has lost over 25 pounds. We will schedule titration to optimize pressure settings and order her a replacement PAP (her  still sees her stop breathing in her sleep whenever he sees her without PAP). * She was asked to contact our office for any problems regarding PAP therapy.     * Counseling was provided regarding the importance of regular PAP use and on proper sleep hygiene and safe driving. * Re-enforced proper and regular cleaning for the device. 2. Insomnia/Inadequate sleep hygiene - I have advised a regular sleep wake cycle. she  was advised to avoid looking at the clock during the night. Ideally, the clock face should be turned away. she was advised to minimize caffeine use and to avoid caffeine-containing beverages 8 hours prior to bedtime. Naps were discouraged. A regular exercise schedule, at least 3 hours before bedtime, would be beneficial to improving sleep quality. .  Watching TV, using laptops, tablets and smartphones in the evening was discouraged. she  was advised to keep the bedroom cool and dark. 3. Hypertension - she continues on her current regimen. I have reviewed the relationship between hypertension as it relates to sleep-disordered breathing.      Electronically signed by    Narciso Hansen MD  Diplomate in Sleep Medicine  [facility]   9/13/2021

## 2021-09-13 NOTE — PATIENT INSTRUCTIONS
217 [facility] [place]. [place], [address]  Tel.  [phone]  Fax. 100 [facility] 60  [place], [address]  Tel.  [phone]  Fax. [phone] [address]  Tel.  [phone]  Fax. [phone]     PROPER SLEEP HYGIENE    What to avoid  · Do not have drinks with caffeine, such as coffee or black tea, for 8 hours before bed. · Do not smoke or use other types of tobacco near bedtime. Nicotine is a stimulant and can keep you awake. · Avoid drinking alcohol late in the evening, because it can cause you to wake in the middle of the night. · Do not eat a big meal close to bedtime. If you are hungry, eat a light snack. · Do not drink a lot of water close to bedtime, because the need to urinate may wake you up during the night. · Do not read or watch TV in bed. Use the bed only for sleeping and sexual activity. What to try  · Go to bed at the same time every night, and wake up at the same time every morning. Do not take naps during the day. · Keep your bedroom quiet, dark, and cool. · Get regular exercise, but not within 3 to 4 hours of your bedtime. .  · Sleep on a comfortable pillow and mattress. · If watching the clock makes you anxious, turn it facing away from you so you cannot see the time. · If you worry when you lie down, start a worry book. Well before bedtime, write down your worries, and then set the book and your concerns aside. · Try meditation or other relaxation techniques before you go to bed. · If you cannot fall asleep, get up and go to another room until you feel sleepy. Do something relaxing. Repeat your bedtime routine before you go to bed again. · Make your house quiet and calm about an hour before bedtime. Turn down the lights, turn off the TV, log off the computer, and turn down the volume on music. This can help you relax after a busy day.     Drowsy Driving  The [address] [place] [address] Road Traffic Safety Administration cites drowsiness as a causing factor in more than 995,983 police reported crashes annually, resulting in 76,000 injuries and 1,500 deaths. Other surveys suggest 55% of people polled have driven while drowsy in the past year, 23% had fallen asleep but not crashed, 3% crashed, and 2% had and accident due to drowsy driving. Who is at risk? Young Drivers: One study of drowsy driving accidents states that 55% of the drivers were under 25 years. Of those, 75% were male. Shift Workers and Travelers: People who work overnight or travel across time zones frequently are at higher risk of experiencing Circadian Rhythm Disorders. They are trying to work and function when their body is programed to sleep. Sleep Deprived: Lack of sleep has a serious impact on your ability to pay attention or focus on a task. Consistently getting less than the average of 8 hours your body needs creates partial or cumulative sleep deprivation. Untreated Sleep Disorders: Sleep Apnea, Narcolepsy, R.L.S., and other sleep disorders (untreated) prevent a person from getting enough restful sleep. This leads to excessive daytime sleepiness and increases the risk for drowsy driving accidents by up to 7 times. Medications / Alcohol: Even over the counter medications can cause drowsiness. Medications that impair a drivers attention should have a warning label. Alcohol naturally makes you sleepy and on its own can cause accidents. Combined with excessive drowsiness its effects are amplified. Signs of Drowsy Driving:   * You don't remember driving the last few miles   * You may drift out of your sal   * You are unable to focus and your thoughts wander   * You may yawn more often than normal   * You have difficulty keeping your eyes open / nodding off   * Missing traffic signs, speeding, or tailgating  Prevention-   Good sleep hygiene, lifestyle and behavioral choices have the most impact on drowsy driving.  There is no substitute for sleep and the average person requires 8 hours nightly. If you find yourself driving drowsy, stop and sleep. Consider the sleep hygiene tips provided during your visit as well. Medication Refill Policy: Refills for all medications require 1 week advance notice. Please have your pharmacy fax a refill request. We are unable to fax, or call in \"controled substance\" medications and you will need to pick these prescriptions up from our office. [employer] Activation    Thank you for requesting access to Platform[employer]. Please follow the instructions below to securely access and download your online medical record. PlatformQ allows you to send messages to your doctor, view your test results, renew your prescriptions, schedule appointments, and more. How Do I Sign Up? 1. In your internet browser, go to [email or web address]. 2. Click on the First Time User? Click Here link in the Sign In box. You will see the New Member Sign Up page. 3. Enter your PlatformQ Access Code exactly as it appears below. You will not need to use this code after youve completed the sign-up process. If you do not sign up before the expiration date, you must request a new code. PlatformQ Access Code: Activation code not generated  Current PlatformQ Status: Active (This is the date your PlatformQ access code will )    4. Enter the last four digits of your Social Security Number (xxxx) and Date of Birth (mm/dd/yyyy) as indicated and click Submit. You will be taken to the next sign-up page. 5. Create a PlatformQ ID. This will be your PlatformQ login ID and cannot be changed, so think of one that is secure and easy to remember. 6. Create a PlatformQ password. You can change your password at any time. 7. Enter your Password Reset Question and Answer. This can be used at a later time if you forget your password. 8. Enter your e-mail address. You will receive e-mail notification when new information is available in [address]   9. Click Sign Up. You can now view and download portions of your medical record. 10. Click the Download Summary menu link to download a portable copy of your medical information. Additional Information    If you have questions, please call [phone]. Remember, [employer] is NOT to be used for urgent needs. For medical emergencies, dial 911.

## 2021-09-27 ENCOUNTER — CLINICAL SUPPORT (OUTPATIENT)
Dept: FAMILY MEDICINE CLINIC | Age: 71
End: 2021-09-27
Payer: MEDICARE

## 2021-09-27 DIAGNOSIS — Z23 NEEDS FLU SHOT: Primary | ICD-10-CM

## 2021-09-27 PROCEDURE — 90694 VACC AIIV4 NO PRSRV 0.5ML IM: CPT | Performed by: FAMILY MEDICINE

## 2021-10-13 ENCOUNTER — OFFICE VISIT (OUTPATIENT)
Dept: SLEEP MEDICINE | Age: 71
End: 2021-10-13

## 2021-10-13 DIAGNOSIS — G47.33 OSA (OBSTRUCTIVE SLEEP APNEA): Primary | ICD-10-CM

## 2021-10-15 LAB
SARS-COV-2, NAA 2 DAY TAT: NORMAL
SARS-COV-2, NAA: NOT DETECTED

## 2021-10-18 ENCOUNTER — HOSPITAL ENCOUNTER (OUTPATIENT)
Dept: SLEEP MEDICINE | Age: 71
Discharge: HOME | End: 2021-10-18
Payer: MEDICARE

## 2021-10-18 VITALS
HEART RATE: 64 BPM | WEIGHT: 177 LBS | DIASTOLIC BLOOD PRESSURE: 63 MMHG | OXYGEN SATURATION: 96 % | TEMPERATURE: 98 F | SYSTOLIC BLOOD PRESSURE: 150 MMHG | BODY MASS INDEX: 29.49 KG/M2 | HEIGHT: 65 IN

## 2021-10-18 PROCEDURE — 95811 POLYSOM 6/>YRS CPAP 4/> PARM: CPT | Performed by: INTERNAL MEDICINE

## 2021-10-24 ENCOUNTER — TELEPHONE (OUTPATIENT)
Dept: SLEEP MEDICINE | Age: 71
End: 2021-10-24

## 2021-10-24 DIAGNOSIS — G47.33 OBSTRUCTIVE SLEEP APNEA (ADULT) (PEDIATRIC): Primary | ICD-10-CM

## 2021-10-24 NOTE — TELEPHONE ENCOUNTER
Results of sleep study in R-[employer] English  Lead tech to convey results to patient    Titration done as she had not been sleeping well and had lost significant weight recently. PAP setting of 6-7 cmH2o controlled her apneas well. She did have some leg kicks but they did not seem to wake her. As discussed. I will order a PAP device for her home use. PAP continues to be an effective mode of therapy. APAP order attached. she should be seen in the sleep disorder center 4-6 weeks after initiating PAP therapy. The APAP will have modem access so she can call the sleep center if she  has questions/concerns regarding the initial PAP settings. Front staff to Order PAP and call patient and let them know which DME company they should be hearing from after results reviewed with lead support technologist.   Schedule for first adherence visit in 6 weeks.

## 2021-10-25 NOTE — TELEPHONE ENCOUNTER
Reviewed sleep study results with patient. She expressed understanding and is willing to proceed with a trial of APAP. Patient is fine with continuing to use [employer].

## 2021-10-26 ENCOUNTER — DOCUMENTATION ONLY (OUTPATIENT)
Dept: SLEEP MEDICINE | Age: 71
End: 2021-10-26

## 2021-10-26 RX ORDER — AMLODIPINE BESYLATE 2.5 MG/1
TABLET ORAL
Qty: 90 TABLET | Refills: 3 | Status: SHIPPED | OUTPATIENT
Start: 2021-10-26 | End: 2022-09-08

## 2021-10-27 LAB
COLONOSCOPY, EXTERNAL: NORMAL
MAMMOGRAPHY, EXTERNAL: NORMAL

## 2021-11-08 ENCOUNTER — OFFICE VISIT (OUTPATIENT)
Dept: FAMILY MEDICINE CLINIC | Age: 71
End: 2021-11-08
Payer: MEDICARE

## 2021-11-08 VITALS
BODY MASS INDEX: 30.06 KG/M2 | HEIGHT: 65 IN | OXYGEN SATURATION: 98 % | RESPIRATION RATE: 20 BRPM | WEIGHT: 180.4 LBS | SYSTOLIC BLOOD PRESSURE: 120 MMHG | DIASTOLIC BLOOD PRESSURE: 60 MMHG | TEMPERATURE: 97.8 F | HEART RATE: 63 BPM

## 2021-11-08 DIAGNOSIS — B02.29 PHN (POSTHERPETIC NEURALGIA): ICD-10-CM

## 2021-11-08 DIAGNOSIS — E11.59 TYPE 2 DIABETES MELLITUS WITH OTHER CIRCULATORY COMPLICATION, WITHOUT LONG-TERM CURRENT USE OF INSULIN (HCC): ICD-10-CM

## 2021-11-08 DIAGNOSIS — I10 ESSENTIAL HYPERTENSION, BENIGN: ICD-10-CM

## 2021-11-08 DIAGNOSIS — J34.0 NOSE CELLULITIS: ICD-10-CM

## 2021-11-08 DIAGNOSIS — E78.2 MIXED HYPERLIPIDEMIA: ICD-10-CM

## 2021-11-08 DIAGNOSIS — I25.10 CORONARY ARTERY DISEASE INVOLVING NATIVE CORONARY ARTERY OF NATIVE HEART WITHOUT ANGINA PECTORIS: ICD-10-CM

## 2021-11-08 DIAGNOSIS — K22.4 ESOPHAGEAL DYSMOTILITY: ICD-10-CM

## 2021-11-08 DIAGNOSIS — Z00.00 MEDICARE ANNUAL WELLNESS VISIT, SUBSEQUENT: Primary | ICD-10-CM

## 2021-11-08 LAB
ALBUMIN SERPL-MCNC: 3.7 G/DL (ref 3.5–5)
ALBUMIN/GLOB SERPL: 1.1 {RATIO} (ref 1.1–2.2)
ALP SERPL-CCNC: 75 U/L (ref 45–117)
ALT SERPL-CCNC: 26 U/L (ref 12–78)
ANION GAP SERPL CALC-SCNC: 6 MMOL/L (ref 5–15)
AST SERPL-CCNC: 13 U/L (ref 15–37)
BILIRUB SERPL-MCNC: 0.8 MG/DL (ref 0.2–1)
BUN SERPL-MCNC: 17 MG/DL (ref 6–20)
BUN/CREAT SERPL: 17 (ref 12–20)
CALCIUM SERPL-MCNC: 9.6 MG/DL (ref 8.5–10.1)
CHLORIDE SERPL-SCNC: 104 MMOL/L (ref 97–108)
CHOLEST SERPL-MCNC: 143 MG/DL
CO2 SERPL-SCNC: 30 MMOL/L (ref 21–32)
CREAT SERPL-MCNC: 1.03 MG/DL (ref 0.55–1.02)
GLOBULIN SER CALC-MCNC: 3.3 G/DL (ref 2–4)
GLUCOSE SERPL-MCNC: 98 MG/DL (ref 65–100)
HBA1C MFR BLD HPLC: 6.4 %
POTASSIUM SERPL-SCNC: 3.2 MMOL/L (ref 3.5–5.1)
PROT SERPL-MCNC: 7 G/DL (ref 6.4–8.2)
SODIUM SERPL-SCNC: 140 MMOL/L (ref 136–145)

## 2021-11-08 PROCEDURE — G8399 PT W/DXA RESULTS DOCUMENT: HCPCS | Performed by: FAMILY MEDICINE

## 2021-11-08 PROCEDURE — 99213 OFFICE O/P EST LOW 20 MIN: CPT | Performed by: FAMILY MEDICINE

## 2021-11-08 PROCEDURE — 3044F HG A1C LEVEL LT 7.0%: CPT | Performed by: FAMILY MEDICINE

## 2021-11-08 PROCEDURE — 3017F COLORECTAL CA SCREEN DOC REV: CPT | Performed by: FAMILY MEDICINE

## 2021-11-08 PROCEDURE — G8427 DOCREV CUR MEDS BY ELIG CLIN: HCPCS | Performed by: FAMILY MEDICINE

## 2021-11-08 PROCEDURE — G8536 NO DOC ELDER MAL SCRN: HCPCS | Performed by: FAMILY MEDICINE

## 2021-11-08 PROCEDURE — G0463 HOSPITAL OUTPT CLINIC VISIT: HCPCS | Performed by: FAMILY MEDICINE

## 2021-11-08 PROCEDURE — G8752 SYS BP LESS 140: HCPCS | Performed by: FAMILY MEDICINE

## 2021-11-08 PROCEDURE — G8510 SCR DEP NEG, NO PLAN REQD: HCPCS | Performed by: FAMILY MEDICINE

## 2021-11-08 PROCEDURE — G0439 PPPS, SUBSEQ VISIT: HCPCS | Performed by: FAMILY MEDICINE

## 2021-11-08 PROCEDURE — 1090F PRES/ABSN URINE INCON ASSESS: CPT | Performed by: FAMILY MEDICINE

## 2021-11-08 PROCEDURE — 2022F DILAT RTA XM EVC RTNOPTHY: CPT | Performed by: FAMILY MEDICINE

## 2021-11-08 PROCEDURE — 83036 HEMOGLOBIN GLYCOSYLATED A1C: CPT | Performed by: FAMILY MEDICINE

## 2021-11-08 PROCEDURE — 1101F PT FALLS ASSESS-DOCD LE1/YR: CPT | Performed by: FAMILY MEDICINE

## 2021-11-08 PROCEDURE — G8754 DIAS BP LESS 90: HCPCS | Performed by: FAMILY MEDICINE

## 2021-11-08 PROCEDURE — G8417 CALC BMI ABV UP PARAM F/U: HCPCS | Performed by: FAMILY MEDICINE

## 2021-11-08 PROCEDURE — G9899 SCRN MAM PERF RSLTS DOC: HCPCS | Performed by: FAMILY MEDICINE

## 2021-11-08 RX ORDER — CLINDAMYCIN HYDROCHLORIDE 300 MG/1
300 CAPSULE ORAL 3 TIMES DAILY
Qty: 15 CAPSULE | Refills: 0 | Status: SHIPPED | OUTPATIENT
Start: 2021-11-08 | End: 2021-11-13

## 2021-11-08 NOTE — PROGRESS NOTES
Chief Complaint   Patient presents with   Mike Cifuentes Annual Wellness Visit     medicare wellness     1. Have you been to the ER, urgent care clinic since your last visit? Hospitalized since your last visit?no    2. Have you seen or consulted any other health care providers outside of the [address] [place] [address] since your last visit? Include any pap smears or colon screening.  no

## 2021-11-08 NOTE — PROGRESS NOTES
HISTORY OF PRESENT ILLNESS  Melva Shelton is a 70 y.o. female. f/u dm2,hbp, cad,chol gerd, PHN of rt shoulder and breast.Feeling ok. Diabetes  The history is provided by the patient. This is a chronic problem. The problem occurs daily. The problem has not changed since onset. Nothing relieves the symptoms. Hypertension   The history is provided by the patient. This is a chronic problem. The problem has been gradually improving. Associated symptoms include malaise/fatigue. Pertinent negatives include no orthopnea, no palpitations and no nausea. Cholesterol Problem  The history is provided by the patient. This is a chronic problem. The problem occurs daily. The problem has been gradually improving. Fatigue  The history is provided by the patient. This is a chronic problem. The problem occurs daily. Tingling  The history is provided by the patient. This is a chronic problem. The problem occurs daily. The problem has not changed since onset. Review of Systems   Constitutional: Positive for fatigue and malaise/fatigue. Negative for fever and weight loss. Respiratory: Negative for cough. Cardiovascular: Negative for palpitations and orthopnea. Gastrointestinal: Positive for heartburn. Negative for blood in stool, constipation and nausea. Genitourinary: Negative for dysuria and frequency. Musculoskeletal: Negative for back pain and myalgias. Skin: Positive for itching. Neurological: Positive for tingling and sensory change. Psychiatric/Behavioral: Negative for depression. The patient does not have insomnia. Physical Exam  Vitals reviewed. Constitutional:       Appearance: Normal appearance. She is well-developed. She is obese. HENT:      Head: Normocephalic and atraumatic. Right Ear: External ear normal.      Left Ear: External ear normal.      Nose: Mucosal edema and congestion present. No rhinorrhea. Mouth/Throat:      Pharynx: No posterior oropharyngeal erythema. Eyes:      Conjunctiva/sclera: Conjunctivae normal.      Pupils: Pupils are equal, round, and reactive to light. Neck:      Thyroid: No thyromegaly. Trachea: No tracheal deviation. Cardiovascular:      Rate and Rhythm: Normal rate and regular rhythm. Heart sounds: Normal heart sounds. No murmur heard. No gallop. Pulmonary:      Effort: Pulmonary effort is normal. No respiratory distress. Breath sounds: Normal breath sounds. Abdominal:      Palpations: Abdomen is soft. Musculoskeletal:         General: Normal range of motion. Cervical back: Normal range of motion and neck supple. Skin:     General: Skin is warm and dry. Comments: Healing lesions rt shoulder and breast   Neurological:      Mental Status: She is alert and oriented to person, place, and time. Mental status is at baseline. Psychiatric:         Mood and Affect: Mood normal.         Behavior: Behavior normal.       Diagnoses and all orders for this visit:    1. Medicare annual wellness visit, subsequent    2. Type 2 diabetes mellitus with other circulatory complication, without long-term current use of insulin (HCC)  -     AMB POC HEMOGLOBIN A1C    3. Essential hypertension, benign  -     METABOLIC PANEL, COMPREHENSIVE; Future    4. PHN (postherpetic neuralgia)    5. Mixed hyperlipidemia  -     CHOLESTEROL, TOTAL; Future    6. Coronary artery disease involving native coronary artery of native heart without angina pectoris    7.  Esophageal dysmotility          Doing well,continue current meds and treatments                      Doing well,continue current meds and treatments

## 2021-11-08 NOTE — PROGRESS NOTES
This is the Subsequent Medicare Annual Wellness Exam, performed 12 months or more after the Initial AWV or the last Subsequent AWV    I have reviewed the patient's medical history in detail and updated the computerized patient record. Assessment/Plan   Education and counseling provided:  Are appropriate based on today's review and evaluation    1. Medicare annual wellness visit, subsequent  2. Type 2 diabetes mellitus with other circulatory complication, without long-term current use of insulin ([facility]  3. Essential hypertension, benign  4. PHN (postherpetic neuralgia)  5. Mixed hyperlipidemia  6. Coronary artery disease involving native coronary artery of native heart without angina pectoris  7. Esophageal dysmotility       Depression Risk Factor Screening     3 most recent PHQ Screens 2021   Little interest or pleasure in doing things Not at all   Feeling down, depressed, irritable, or hopeless Not at all   Total Score PHQ 2 0       Alcohol Risk Screen    Do you average more than 1 drink per night or more than 7 drinks a week:  No    On any one occasion in the past three months have you have had more than 3 drinks containing alcohol:  No        Functional Ability and Level of Safety    Hearing: Hearing is good. Activities of Daily Living: The home contains: handrails  Patient does total self care      Ambulation: with no difficulty     Fall Risk:  Fall Risk Assessment, last 12 mths 2021   Able to walk? Yes   Fall in past 12 months? 0   Do you feel unsteady?  0   Are you worried about falling 0      Abuse Screen:  Patient is not abused       Cognitive Screening    Has your family/caregiver stated any concerns about your memory: no     Cognitive Screenin    Health Maintenance Due     Health Maintenance Due   Topic Date Due    COVID-19 Vaccine (1) Never done    Shingrix Vaccine Age 50> (1 of 2) Never done    Foot Exam Q1  2019    Eye Exam Retinal or Dilated  10/19/2019    Breast Cancer Screen Mammogram  04/30/2020    Medicare Yearly Exam  10/14/2021       Patient Care Team   Patient Care Team:  Shwetha Torres MD as PCP - General  Shwetha Torres MD as PCP - [facility]  Juno Deleon MD as Physician (Sleep Medicine)  Aisha Dumont MD as Physician (Cardiology)    History     Patient Active Problem List   Diagnosis Code    Allergic rhinitis J30.9    Esophageal reflux K21.9    Essential hypertension, benign I10    Anxiety state, unspecified F41.1    Encounter for long-term (current) use of other medications Z79.899    Mixed hyperlipidemia E78.2    Esophageal dysmotility K22.4    S/P cardiac cath Z98.890    Chronic total occlusion of coronary artery I25.82    Esophageal dysphagia R13.19    Esophageal motor disorder K22.4    Myalgia and myositis, unspecified ZHE1512    Presence of stent in right coronary artery Z95.5    Snoring R06.83    PHUC (obstructive sleep apnea) G47.33    CAD (coronary artery disease) I25.10    Diarrhea R19.7    Chest pain R07.9    Angina, class III ([address] I20.9    Myocardial ischemia I25.9    S/P PTCA (percutaneous transluminal coronary angioplasty) Z98.61    Unstable angina (HCC) I20.0    Gastric ulcer K25.9    Routine adult health maintenance Z00.00    Diabetes mellitus ([address] E11.9    Type 2 diabetes mellitus with diabetic neuropathy ([address] E11.40    Eructation R14.2    Type 2 diabetes with nephropathy ([address] E11.21     Past Medical History:   Diagnosis Date    Allergic rhinitis, cause unspecified 2/20/2011    Angina, class III ([address] 12/13/2013    as of 8/4/15 pt reports intermittent \"angina pain\" and GRAJEDA; followed by Dr Birgit Goins Anxiety     Arthritis     neck - numbness of arm    CAD (coronary artery disease)     angina / Dr Jazzmine Mobley    Colon polyps 2/22/2011    Diabetes ([address]     Diarrhea     \"random\" per pt; followed by Dr David Benson Encounter for long-term (current) use of other medications 2011    Eructation 2019    Esophageal dysmotility 10/10/2011    Essential hypertension, benign 2011    Gastric ulcer 2015    GERD (gastroesophageal reflux disease)     Hypertension     Ill-defined condition     torn MCL - left - no surgery    Mixed hyperlipidemia 2011    Nausea & vomiting     Sleep apnea     CPAP;  Dr Lizy holly MD      Past Surgical History:   Procedure Laterality Date    CARDIAC CATHETERIZATION           HX BUNIONECTOMY Right     HX CATARACT REMOVAL Bilateral     HX  SECTION      x3    HX CHOLECYSTECTOMY      HX COLONOSCOPY      HX GI      EGDs with dilatation    HX HEART CATHETERIZATION      catherization with 3 stents - states stents are blocked    HX HEART CATHETERIZATION  2015    unable to stent; tried to unblock stents but unable to/starting cardiac rehab Aug2015/has collateral circulation    PA EGD BALLOON DILATION ESOPHAGUS <30 MM DIAM  10/13/2010         PA EGD TRANSORAL BIOPSY SINGLE/MULTIPLE  10/13/2010         UPPER GI ENDOSCOPY,BALL DIL,30MM  3/30/2015         UPPER GI ENDOSCOPY,BALL DIL,30MM  2019         UPPER GI ENDOSCOPY,BIOPSY  3/30/2015         UPPER GI ENDOSCOPY,BIOPSY  2019          Current Outpatient Medications   Medication Sig Dispense Refill    amLODIPine (NORVASC) 2.5 mg tablet TAKE 1 TABLET BY MOUTH ONCE DAILY 90 Tablet 3    fluticasone propionate (FLONASE) 50 mcg/actuation nasal spray SHAKE LIQUID AND USE 2 SPRAYS IN EACH NOSTRIL EVERY DAY 16 g 11    ranolazine ER (RANEXA) 1,000 mg TAKE 1 TABLET BY MOUTH TWICE DAILY 180 Tablet 0    rosuvastatin (CRESTOR) 10 mg tablet TAKE 1 TABLET BY MOUTH EVERY EVENING 90 Tablet 1    clopidogreL (PLAVIX) 75 mg tab TAKE 1 TABLET BY MOUTH EVERY DAY 90 Tablet 4    Januvia 100 mg tablet TAKE 1 TABLET BY MOUTH ONCE DAILY 180 Tablet 2    dapagliflozin (Farxiga) 5 mg tab tablet Take 1 Tablet by mouth daily.  30 Tablet 5    glimepiride (AMARYL) 2 mg tablet TAKE 1 AND 1/2 TABLETS BY MOUTH EVERY MORNING 45 Tablet 11    metoprolol succinate (TOPROL-XL) 25 mg XL tablet TAKE 1 TABLET BY MOUTH EVERY DAY 90 Tab 3    triamterene-hydroCHLOROthiazide (MAXZIDE) 37.5-25 mg per tablet take 1 tablet by mouth every morning 90 Tab 3    sucralfate (CARAFATE) 1 gram tablet TAKE 1 TABLET BY MOUTH FOUR TIMES DAILY AS NEEDED FOR PAIN 368 Tab 1    RABEprazole (ACIPHEX) 20 mg tablet TK 1 T PO QD B MEALS      famotidine (PEPCID) 40 mg tablet TK 1 T PO BID UTD      MAG CARB/ALUMINUM HYDROX/ALGIN (GAVISCON EXTRA STRENGTH PO) Take  by mouth as needed.  ASPIRIN PO Take 81 mg by mouth daily.  MINH/D3/MAG11/ZINC//RONEN/BOR (CALTRATE 600+D PLUS MINERALS PO) Take  by mouth. Takes one po once daily.  GUAIFENESIN (MUCINEX PO) Take 1,200 mg by mouth as needed. Extra strength.  glucose blood VI test strips (BLOOD GLUCOSE TEST) strip by Does Not Apply route Daily Check three times daily . Dx. Code E11.9 150 Strip 11    Lancets misc Check Blood sugar 3 times daily Dx. Code E11.9 150 Each 11    cyanocobalamin (VITAMIN B-12) 1,000 mcg tablet Take 500 mcg by mouth daily.  Blood-Glucose Meter monitoring kit AS DIRECTED TWICE DAILY 1 Kit 0    coenzyme Q-10 (CO Q-10) 200 mg capsule Take 1 Cap by mouth daily. Over the counter 30 Cap 11    cholecalciferol, vitamin d3, (VITAMIN D) 1,000 unit tablet Take 1,000 Units by mouth daily.       nitroglycerin (Nitrostat) 0.4 mg SL tablet place 1 tablet under the tongue every 5 minutes if needed (Patient not taking: Reported on 11/8/2021) 1 Bottle 1     Allergies   Allergen Reactions    Penicillins Hives     Other reaction(s): Hives    Protamine Anaphylaxis    Sulfa (Sulfonamide Antibiotics) Hives    Imdur [Isosorbide Mononitrate] Other (comments)     headache       Family History   Problem Relation Age of Onset    Heart Disease Mother     Hypertension Mother     Heart Attack Mother     Heart Disease Father    Gloria Hypertension Father     Heart Surgery Father     Cancer Sister         lung    Cancer Brother         brain tumor - malignant    Breast Cancer Sister      Social History     Tobacco Use    Smoking status: Never Smoker    Smokeless tobacco: Never Used   Substance Use Topics    Alcohol use:  Yes     Alcohol/week: 1.7 standard drinks     Types: 2 Glasses of wine per week     Comment: occasionally         Yara Isaac MD

## 2021-11-09 DIAGNOSIS — E87.6 HYPOKALEMIA: Primary | ICD-10-CM

## 2021-11-09 RX ORDER — POTASSIUM CHLORIDE 750 MG/1
20 TABLET, EXTENDED RELEASE ORAL DAILY
Qty: 60 TABLET | Refills: 5 | Status: SHIPPED | OUTPATIENT
Start: 2021-11-09 | End: 2022-07-13 | Stop reason: ALTCHOICE

## 2021-11-17 RX ORDER — ROSUVASTATIN CALCIUM 10 MG/1
TABLET, COATED ORAL
Qty: 90 TABLET | Refills: 1 | Status: SHIPPED | OUTPATIENT
Start: 2021-11-17 | End: 2022-06-28

## 2021-12-21 ENCOUNTER — OFFICE VISIT (OUTPATIENT)
Dept: FAMILY MEDICINE CLINIC | Age: 71
End: 2021-12-21
Payer: MEDICARE

## 2021-12-21 VITALS
SYSTOLIC BLOOD PRESSURE: 120 MMHG | HEIGHT: 65 IN | BODY MASS INDEX: 30.06 KG/M2 | RESPIRATION RATE: 20 BRPM | DIASTOLIC BLOOD PRESSURE: 84 MMHG | TEMPERATURE: 97.6 F | WEIGHT: 180.4 LBS | HEART RATE: 58 BPM | OXYGEN SATURATION: 98 %

## 2021-12-21 DIAGNOSIS — B02.29 PHN (POSTHERPETIC NEURALGIA): ICD-10-CM

## 2021-12-21 DIAGNOSIS — E11.59 TYPE 2 DIABETES MELLITUS WITH OTHER CIRCULATORY COMPLICATION, WITHOUT LONG-TERM CURRENT USE OF INSULIN (HCC): ICD-10-CM

## 2021-12-21 DIAGNOSIS — E87.6 HYPOKALEMIA: Primary | ICD-10-CM

## 2021-12-21 DIAGNOSIS — I10 ESSENTIAL HYPERTENSION, BENIGN: ICD-10-CM

## 2021-12-21 LAB
ALBUMIN SERPL-MCNC: 4 G/DL (ref 3.5–5)
ALBUMIN/GLOB SERPL: 1.2 {RATIO} (ref 1.1–2.2)
ALP SERPL-CCNC: 88 U/L (ref 45–117)
ALT SERPL-CCNC: 23 U/L (ref 12–78)
ANION GAP SERPL CALC-SCNC: 6 MMOL/L (ref 5–15)
AST SERPL-CCNC: 17 U/L (ref 15–37)
BILIRUB SERPL-MCNC: 0.9 MG/DL (ref 0.2–1)
BUN SERPL-MCNC: 19 MG/DL (ref 6–20)
BUN/CREAT SERPL: 16 (ref 12–20)
CALCIUM SERPL-MCNC: 10.4 MG/DL (ref 8.5–10.1)
CHLORIDE SERPL-SCNC: 103 MMOL/L (ref 97–108)
CO2 SERPL-SCNC: 28 MMOL/L (ref 21–32)
CREAT SERPL-MCNC: 1.18 MG/DL (ref 0.55–1.02)
GLOBULIN SER CALC-MCNC: 3.4 G/DL (ref 2–4)
GLUCOSE SERPL-MCNC: 84 MG/DL (ref 65–100)
POTASSIUM SERPL-SCNC: 3.4 MMOL/L (ref 3.5–5.1)
PROT SERPL-MCNC: 7.4 G/DL (ref 6.4–8.2)
SODIUM SERPL-SCNC: 137 MMOL/L (ref 136–145)

## 2021-12-21 PROCEDURE — G8510 SCR DEP NEG, NO PLAN REQD: HCPCS | Performed by: FAMILY MEDICINE

## 2021-12-21 PROCEDURE — 1101F PT FALLS ASSESS-DOCD LE1/YR: CPT | Performed by: FAMILY MEDICINE

## 2021-12-21 PROCEDURE — 3044F HG A1C LEVEL LT 7.0%: CPT | Performed by: FAMILY MEDICINE

## 2021-12-21 PROCEDURE — G0463 HOSPITAL OUTPT CLINIC VISIT: HCPCS | Performed by: FAMILY MEDICINE

## 2021-12-21 PROCEDURE — G8427 DOCREV CUR MEDS BY ELIG CLIN: HCPCS | Performed by: FAMILY MEDICINE

## 2021-12-21 PROCEDURE — G8417 CALC BMI ABV UP PARAM F/U: HCPCS | Performed by: FAMILY MEDICINE

## 2021-12-21 PROCEDURE — 1090F PRES/ABSN URINE INCON ASSESS: CPT | Performed by: FAMILY MEDICINE

## 2021-12-21 PROCEDURE — G8754 DIAS BP LESS 90: HCPCS | Performed by: FAMILY MEDICINE

## 2021-12-21 PROCEDURE — G8536 NO DOC ELDER MAL SCRN: HCPCS | Performed by: FAMILY MEDICINE

## 2021-12-21 PROCEDURE — 99214 OFFICE O/P EST MOD 30 MIN: CPT | Performed by: FAMILY MEDICINE

## 2021-12-21 PROCEDURE — G9899 SCRN MAM PERF RSLTS DOC: HCPCS | Performed by: FAMILY MEDICINE

## 2021-12-21 PROCEDURE — 3017F COLORECTAL CA SCREEN DOC REV: CPT | Performed by: FAMILY MEDICINE

## 2021-12-21 PROCEDURE — G8752 SYS BP LESS 140: HCPCS | Performed by: FAMILY MEDICINE

## 2021-12-21 PROCEDURE — 2022F DILAT RTA XM EVC RTNOPTHY: CPT | Performed by: FAMILY MEDICINE

## 2021-12-21 PROCEDURE — G8399 PT W/DXA RESULTS DOCUMENT: HCPCS | Performed by: FAMILY MEDICINE

## 2021-12-21 NOTE — PROGRESS NOTES
Chief Complaint   Patient presents with    Diabetes     follow up    Hypertension     follow up    Cholesterol Problem     follow up     1. Have you been to the ER, urgent care clinic since your last visit? Hospitalized since your last visit?no    2. Have you seen or consulted any other health care providers outside of the [address] [place] [address] since your last visit? Include any pap smears or colon screening.  no

## 2021-12-22 ENCOUNTER — TELEPHONE (OUTPATIENT)
Dept: CARDIOLOGY CLINIC | Age: 71
End: 2021-12-22

## 2021-12-22 NOTE — PROGRESS NOTES
HISTORY OF PRESENT ILLNESS  Heide Ervin is a 70 y.o. female. f/u dm2,hbp, cad,chol gerd, PHN of rt shoulder and breast.To schedule lap hysterectomy with Dr Gray Gee for postmenopausal bleeding. Diabetes  The history is provided by the patient. This is a chronic problem. The problem occurs daily. The problem has not changed since onset. Nothing relieves the symptoms. Hypertension   The history is provided by the patient. This is a chronic problem. The problem has been gradually improving. Pertinent negatives include no orthopnea, no palpitations, no malaise/fatigue and no nausea. Cholesterol Problem  The history is provided by the patient. This is a chronic problem. The problem occurs daily. The problem has been gradually improving. Fatigue  The history is provided by the patient. This is a chronic problem. The problem occurs daily. Abnormal Lab Results  This is a new problem. The current episode started more than 1 week ago. The problem occurs daily. Review of Systems   Constitutional: Positive for fatigue. Negative for fever, malaise/fatigue and weight loss. Respiratory: Negative for cough. Cardiovascular: Negative for palpitations and orthopnea. Gastrointestinal: Positive for heartburn. Negative for blood in stool, constipation and nausea. Genitourinary: Negative for dysuria and frequency. Musculoskeletal: Negative for back pain and myalgias. Skin: Negative for itching. Neurological: Negative for sensory change. Psychiatric/Behavioral: Negative for depression. The patient does not have insomnia. Physical Exam  Vitals reviewed. Constitutional:       Appearance: Normal appearance. She is well-developed. She is obese. HENT:      Head: Normocephalic and atraumatic. Right Ear: External ear normal.      Left Ear: External ear normal.      Nose: Mucosal edema and congestion present. No rhinorrhea. Mouth/Throat:      Pharynx: No posterior oropharyngeal erythema. Eyes:      Conjunctiva/sclera: Conjunctivae normal.      Pupils: Pupils are equal, round, and reactive to light. Neck:      Thyroid: No thyromegaly. Trachea: No tracheal deviation. Cardiovascular:      Rate and Rhythm: Normal rate and regular rhythm. Heart sounds: Normal heart sounds. No murmur heard. No gallop. Pulmonary:      Effort: Pulmonary effort is normal. No respiratory distress. Breath sounds: Normal breath sounds. Abdominal:      Palpations: Abdomen is soft. Musculoskeletal:         General: Normal range of motion. Cervical back: Normal range of motion and neck supple. Skin:     General: Skin is warm and dry. Comments: Healing lesions rt shoulder and breast   Neurological:      Mental Status: She is alert and oriented to person, place, and time. Mental status is at baseline. Psychiatric:         Mood and Affect: Mood normal.         Behavior: Behavior normal.       Diagnoses and all orders for this visit:    1. Hypokalemia  -     METABOLIC PANEL, COMPREHENSIVE; Future    2. Type 2 diabetes mellitus with other circulatory complication, without long-term current use of insulin ([facility]    3. Essential hypertension, benign    4. PHN (postherpetic neuralgia)      Follow-up and Dispositions    · Return in about 3 months (around 3/21/2022).

## 2021-12-22 NOTE — TELEPHONE ENCOUNTER
Patient scheduled for hysterectomy in Feb will need surgical clearance and approval to hold Plavix prior to surgery.  [place] 1/28/22

## 2021-12-29 ENCOUNTER — CLINICAL SUPPORT (OUTPATIENT)
Dept: FAMILY MEDICINE CLINIC | Age: 71
End: 2021-12-29

## 2021-12-29 DIAGNOSIS — J20.9 ACUTE BRONCHITIS, UNSPECIFIED ORGANISM: Primary | ICD-10-CM

## 2021-12-29 DIAGNOSIS — Z20.822 EXPOSURE TO COVID-19 VIRUS: ICD-10-CM

## 2021-12-30 DIAGNOSIS — J20.9 ACUTE BRONCHITIS, UNSPECIFIED ORGANISM: Primary | ICD-10-CM

## 2021-12-30 RX ORDER — PROMETHAZINE HYDROCHLORIDE AND DEXTROMETHORPHAN HYDROBROMIDE 6.25; 15 MG/5ML; MG/5ML
5 SYRUP ORAL
Qty: 180 ML | Refills: 1 | Status: SHIPPED | OUTPATIENT
Start: 2021-12-30 | End: 2022-01-06

## 2021-12-30 RX ORDER — AZITHROMYCIN 250 MG/1
TABLET, FILM COATED ORAL
Qty: 6 TABLET | Refills: 0 | Status: SHIPPED | OUTPATIENT
Start: 2021-12-30 | End: 2022-01-28 | Stop reason: SDUPTHER

## 2021-12-31 LAB
SARS-COV-2, NAA 2 DAY TAT: NORMAL
SARS-COV-2, NAA: DETECTED

## 2022-01-14 ENCOUNTER — TELEPHONE (OUTPATIENT)
Dept: SLEEP MEDICINE | Age: 72
End: 2022-01-14

## 2022-01-14 NOTE — TELEPHONE ENCOUNTER
Patient left voicemail with supply concerns. Returned patient call and addressed patients concerns regarding DME and supplies. Patient was switched from [place] to [place] [address] in November. Patient was provided [employer]'s telephone number to check order status.

## 2022-01-23 RX ORDER — TRIAMTERENE/HYDROCHLOROTHIAZID 37.5-25 MG
TABLET ORAL
Qty: 90 TABLET | Refills: 3 | Status: SHIPPED | OUTPATIENT
Start: 2022-01-23

## 2022-01-28 ENCOUNTER — OFFICE VISIT (OUTPATIENT)
Dept: CARDIOLOGY CLINIC | Age: 72
End: 2022-01-28
Payer: MEDICARE

## 2022-01-28 VITALS
WEIGHT: 176 LBS | BODY MASS INDEX: 29.32 KG/M2 | HEART RATE: 66 BPM | SYSTOLIC BLOOD PRESSURE: 124 MMHG | RESPIRATION RATE: 18 BRPM | OXYGEN SATURATION: 100 % | HEIGHT: 65 IN | DIASTOLIC BLOOD PRESSURE: 64 MMHG

## 2022-01-28 DIAGNOSIS — G47.33 OSA (OBSTRUCTIVE SLEEP APNEA): ICD-10-CM

## 2022-01-28 DIAGNOSIS — I25.10 CORONARY ARTERY DISEASE INVOLVING NATIVE CORONARY ARTERY OF NATIVE HEART WITHOUT ANGINA PECTORIS: Primary | ICD-10-CM

## 2022-01-28 DIAGNOSIS — Z98.61 S/P PTCA (PERCUTANEOUS TRANSLUMINAL CORONARY ANGIOPLASTY): ICD-10-CM

## 2022-01-28 DIAGNOSIS — I25.82 CHRONIC TOTAL OCCLUSION OF CORONARY ARTERY: ICD-10-CM

## 2022-01-28 DIAGNOSIS — E78.2 MIXED HYPERLIPIDEMIA: ICD-10-CM

## 2022-01-28 DIAGNOSIS — I10 ESSENTIAL HYPERTENSION, BENIGN: ICD-10-CM

## 2022-01-28 PROCEDURE — 1090F PRES/ABSN URINE INCON ASSESS: CPT | Performed by: INTERNAL MEDICINE

## 2022-01-28 PROCEDURE — G8510 SCR DEP NEG, NO PLAN REQD: HCPCS | Performed by: INTERNAL MEDICINE

## 2022-01-28 PROCEDURE — G8536 NO DOC ELDER MAL SCRN: HCPCS | Performed by: INTERNAL MEDICINE

## 2022-01-28 PROCEDURE — 93005 ELECTROCARDIOGRAM TRACING: CPT | Performed by: INTERNAL MEDICINE

## 2022-01-28 PROCEDURE — 3017F COLORECTAL CA SCREEN DOC REV: CPT | Performed by: INTERNAL MEDICINE

## 2022-01-28 PROCEDURE — 1101F PT FALLS ASSESS-DOCD LE1/YR: CPT | Performed by: INTERNAL MEDICINE

## 2022-01-28 PROCEDURE — G8754 DIAS BP LESS 90: HCPCS | Performed by: INTERNAL MEDICINE

## 2022-01-28 PROCEDURE — 99214 OFFICE O/P EST MOD 30 MIN: CPT | Performed by: INTERNAL MEDICINE

## 2022-01-28 PROCEDURE — G8752 SYS BP LESS 140: HCPCS | Performed by: INTERNAL MEDICINE

## 2022-01-28 PROCEDURE — G8417 CALC BMI ABV UP PARAM F/U: HCPCS | Performed by: INTERNAL MEDICINE

## 2022-01-28 PROCEDURE — G9899 SCRN MAM PERF RSLTS DOC: HCPCS | Performed by: INTERNAL MEDICINE

## 2022-01-28 PROCEDURE — 93010 ELECTROCARDIOGRAM REPORT: CPT | Performed by: INTERNAL MEDICINE

## 2022-01-28 PROCEDURE — G8399 PT W/DXA RESULTS DOCUMENT: HCPCS | Performed by: INTERNAL MEDICINE

## 2022-01-28 PROCEDURE — G8427 DOCREV CUR MEDS BY ELIG CLIN: HCPCS | Performed by: INTERNAL MEDICINE

## 2022-01-28 PROCEDURE — G0463 HOSPITAL OUTPT CLINIC VISIT: HCPCS | Performed by: INTERNAL MEDICINE

## 2022-01-28 NOTE — PROGRESS NOTES
[address], [address] [facility]  [phone]     Subjective:      Inga Stokes is a 70 y.o. female is here for routine f/u. She has pmhx CAD, HTN, HLD, PHUC and DM. Last seen by us in 11/2020. \"She reports fatigue, chronic. Able to perform activities of daily living. She is able to walk on flat surface with no issues, some ann when walking up steps but not new for her. No exertional cp. Has some leg pain with essentially normal leg studies in 2018. \"    She feels like she has some difficulty taking a deep breath. No chest pain. No need for nitroglycerin. Mild shortness of breath, but notes that she did test positive for Covid about a month ago. She is here for preoperative clearance for removal of a complex ovarian cyst and needs NATHAN/BSO. The patient denies chest pain, orthopnea, PND, LE edema, palpitations, syncope, or presyncope.        Patient Active Problem List    Diagnosis Date Noted    Type 2 diabetes with nephropathy ([facility] [address] 07/10/2020    Eructation 08/09/2019    Type 2 diabetes mellitus with diabetic neuropathy ([address] 03/26/2018    Diabetes mellitus ([facility] [address] [facility] 02/20/2018    Routine adult health maintenance 10/28/2015    Gastric ulcer 08/11/2015    Unstable angina ([address] 07/16/2015    Angina, class III ([address] 07/07/2015    Myocardial ischemia 07/07/2015    S/P PTCA (percutaneous transluminal coronary angioplasty) 07/07/2015    Chest pain 05/15/2015    Diarrhea 01/05/2015    CAD (coronary artery disease) 10/23/2014    Snoring 10/21/2014    PHUC (obstructive sleep apnea) 10/21/2014    Presence of stent in right coronary artery 12/30/2013    Myalgia and myositis, unspecified 02/04/2013    Esophageal dysphagia 10/14/2012    Esophageal motor disorder 10/14/2012    Chronic total occlusion of coronary artery 06/25/2012    S/P cardiac cath 03/07/2012    Esophageal dysmotility 10/10/2011    Allergic rhinitis 02/20/2011    Esophageal reflux 02/20/2011    Essential hypertension, benign 2011    Anxiety state, unspecified 2011    Encounter for long-term (current) use of other medications 2011    Mixed hyperlipidemia 2011      Pamela Mcleod MD  Past Medical History:   Diagnosis Date    Allergic rhinitis, cause unspecified 2011    Angina, class III ([facility] 2013    as of 8/4/15 pt reports intermittent \"angina pain\" and GRAJEDA; followed by Dr Veronica Craig Arthritis     neck - numbness of arm    CAD (coronary artery disease)     angina / Dr Ardeth Dance    Colon polyps 2011    Diabetes ([facility] [address]     Diarrhea     \"random\" per pt; followed by Dr Dong Kenneyd Encounter for long-term (current) use of other medications 2011    Eructation 2019    Esophageal dysmotility 10/10/2011    Essential hypertension, benign 2011    Gastric ulcer 2015    GERD (gastroesophageal reflux disease)     Hypertension     Ill-defined condition     torn MCL - left - no surgery    Mixed hyperlipidemia 2011    Nausea & vomiting     Sleep apnea     CPAP;  Dr Dory holly MD      Past Surgical History:   Procedure Laterality Date    CARDIAC CATHETERIZATION  2012         HX BUNIONECTOMY Right     HX CATARACT REMOVAL Bilateral     HX  SECTION      x3    HX CHOLECYSTECTOMY      HX COLONOSCOPY      HX GI      EGDs with dilatation    HX HEART CATHETERIZATION      catherization with 3 stents - states stents are blocked    HX HEART CATHETERIZATION  2015    unable to stent; tried to unblock stents but unable to/starting cardiac rehab Augu 2015/has collateral circulation    NC EGD BALLOON DILATION ESOPHAGUS <30 MM DIAM  10/13/2010         NC EGD TRANSORAL BIOPSY SINGLE/MULTIPLE  10/13/2010         UPPER GI ENDOSCOPY,BALL DIL,30MM  3/30/2015         UPPER GI ENDOSCOPY,BALL DIL,30MM  2019         UPPER GI ENDOSCOPY,BIOPSY  3/30/2015         UPPER GI ENDOSCOPY,BIOPSY  2019 Allergies   Allergen Reactions    Penicillins Hives     Other reaction(s): Hives    Protamine Anaphylaxis    Sulfa (Sulfonamide Antibiotics) Hives    Imdur [Isosorbide Mononitrate] Other (comments)     headache      Family History   Problem Relation Age of Onset    Heart Disease Mother     Hypertension Mother     Heart Attack Mother     Heart Disease Father     Hypertension Father     Heart Surgery Father     Cancer Sister         lung    Cancer Brother         brain tumor - malignant    Breast Cancer Sister       Social History     Socioeconomic History    Marital status:      Spouse name: Not on file    Number of children: Not on file    Years of education: Not on file    Highest education level: Not on file   Occupational History    Not on file   Tobacco Use    Smoking status: Never Smoker    Smokeless tobacco: Never Used   Vaping Use    Vaping Use: Never used   Substance and Sexual Activity    Alcohol use: Yes     Alcohol/week: 1.7 standard drinks     Types: 2 Glasses of wine per week     Comment: occasionally    Drug use: Never    Sexual activity: Yes     Partners: Male   Other Topics Concern    Not on file   Social History Narrative    Not on file     Social Determinants of Health     Financial Resource Strain:     Difficulty of Paying Living Expenses: Not on file   Food Insecurity:     Worried About Running Out of Food in the Last Year: Not on file    Cam of Food in the Last Year: Not on file   Transportation Needs:     Lack of Transportation (Medical): Not on file    Lack of Transportation (Non-Medical):  Not on file   Physical Activity:     Days of Exercise per Week: Not on file    Minutes of Exercise per Session: Not on file   Stress:     Feeling of Stress : Not on file   Social Connections:     Frequency of Communication with Friends and Family: Not on file    Frequency of Social Gatherings with Friends and Family: Not on file    Attends Baptism Services: Not on file    Active Member of Clubs or Organizations: Not on file    Attends Club or Organization Meetings: Not on file    Marital Status: Not on file   Intimate Partner Violence:     Fear of Current or Ex-Partner: Not on file    Emotionally Abused: Not on file    Physically Abused: Not on file    Sexually Abused: Not on file   Housing Stability:     Unable to Pay for Housing in the Last Year: Not on file    Number of Places Lived in the Last Year: Not on file    Unstable Housing in the Last Year: Not on file      Current Outpatient Medications   Medication Sig    triamterene-hydroCHLOROthiazide (MAXZIDE) 37.5-25 mg per tablet TAKE 1 TABLET BY MOUTH EVERY MORNING    ranolazine ER (RANEXA) 1,000 mg TAKE 1 TABLET BY MOUTH TWICE DAILY    rosuvastatin (CRESTOR) 10 mg tablet TAKE 1 TABLET BY MOUTH EVERY EVENING    amLODIPine (NORVASC) 2.5 mg tablet TAKE 1 TABLET BY MOUTH ONCE DAILY    fluticasone propionate (FLONASE) 50 mcg/actuation nasal spray SHAKE LIQUID AND USE 2 SPRAYS IN EACH NOSTRIL EVERY DAY    clopidogreL (PLAVIX) 75 mg tab TAKE 1 TABLET BY MOUTH EVERY DAY    Januvia 100 mg tablet TAKE 1 TABLET BY MOUTH ONCE DAILY    dapagliflozin (Farxiga) 5 mg tab tablet Take 1 Tablet by mouth daily.  glimepiride (AMARYL) 2 mg tablet TAKE 1 AND 1/2 TABLETS BY MOUTH EVERY MORNING    metoprolol succinate (TOPROL-XL) 25 mg XL tablet TAKE 1 TABLET BY MOUTH EVERY DAY    sucralfate (CARAFATE) 1 gram tablet TAKE 1 TABLET BY MOUTH FOUR TIMES DAILY AS NEEDED FOR PAIN    RABEprazole (ACIPHEX) 20 mg tablet TK 1 T PO QD B MEALS    famotidine (PEPCID) 40 mg tablet TK 1 T PO BID UTD    nitroglycerin (Nitrostat) 0.4 mg SL tablet place 1 tablet under the tongue every 5 minutes if needed    MAG CARB/ALUMINUM HYDROX/ALGIN (GAVISCON EXTRA STRENGTH PO) Take  by mouth as needed.  ASPIRIN PO Take 81 mg by mouth daily.  MINH/D3/MAG11/ZINC//RONEN/BOR (CALTRATE 600+D PLUS MINERALS PO) Take  by mouth. Takes one po once daily.  GUAIFENESIN (MUCINEX PO) Take 1,200 mg by mouth as needed. Extra strength.  glucose blood VI test strips (BLOOD GLUCOSE TEST) strip by Does Not Apply route Daily Check three times daily . Dx. Code E11.9    Lancets misc Check Blood sugar 3 times daily Dx. Code E11.9    cyanocobalamin (VITAMIN B-12) 1,000 mcg tablet Take 500 mcg by mouth daily.  Blood-Glucose Meter monitoring kit AS DIRECTED TWICE DAILY    coenzyme Q-10 (CO Q-10) 200 mg capsule Take 1 Cap by mouth daily. Over the counter    cholecalciferol, vitamin d3, (VITAMIN D) 1,000 unit tablet Take 1,000 Units by mouth daily.  potassium chloride (KLOR-CON) 10 mEq tablet Take 2 Tablets by mouth daily. (Patient not taking: Reported on 1/28/2022)     No current facility-administered medications for this visit. Review of Symptoms:  11 systems reviewed, negative other than as stated in the HPI    Physical ExamPhysical Exam:    Vitals:    01/28/22 1136   BP: 124/64   Pulse: 66   Resp: 18   SpO2: 100%   Weight: 176 lb (79.8 kg)   Height: 5' 5\" (1.651 m)     Body mass index is 29.29 kg/m². General PE  Gen:  NAD  Mental Status - Alert. General Appearance - Not in acute distress. HEENT:  PERRL, no carotid bruits or JVD  Chest and Lung Exam   Inspection: Accessory muscles - No use of accessory muscles in breathing. Auscultation:   Breath sounds: - Normal.   Cardiovascular   Inspection: Jugular vein - Bilateral - Inspection Normal.   Palpation/Percussion:   Apical Impulse: - Normal.   Auscultation: Rhythm - Regular. Heart Sounds - S1 WNL and S2 WNL. No S3 or S4. Murmurs & Other Heart Sounds: Auscultation of the heart reveals - No Murmurs. Peripheral Vascular   Upper Extremity: Inspection - Bilateral - No Cyanotic nailbeds or Digital clubbing. Lower Extremity:   Palpation: Edema - Bilateral - No edema. Abdomen:   Soft, non-tender, bowel sounds are active.   Neuro: A&O times 3, CN and motor grossly WNL    Labs:   Lab Results   Component Value Date/Time    Cholesterol, total 143 11/08/2021 02:30 PM    Cholesterol, total 159 05/05/2021 03:56 PM    Cholesterol, total 149 01/18/2021 09:03 AM    Cholesterol, total 139 10/13/2020 12:20 PM    Cholesterol, total 137 07/10/2020 10:55 AM    HDL Cholesterol 45 05/05/2021 03:56 PM    HDL Cholesterol 39 (L) 01/18/2021 09:03 AM    HDL Cholesterol 39 (L) 10/13/2020 12:20 PM    HDL Cholesterol 38 (L) 07/10/2020 10:55 AM    HDL Cholesterol 35 (L) 03/10/2020 10:22 AM    LDL, calculated 56.4 05/05/2021 03:56 PM    LDL, calculated 64 01/18/2021 09:03 AM    LDL, calculated 58 10/13/2020 12:20 PM    LDL, calculated 48 07/10/2020 10:55 AM    LDL, calculated 45 03/10/2020 10:22 AM    LDL, calculated 55 12/11/2019 11:26 AM    LDL, calculated 40 06/04/2019 10:00 AM    Triglyceride 288 (H) 05/05/2021 03:56 PM    Triglyceride 287 (H) 01/18/2021 09:03 AM    Triglyceride 266 (H) 10/13/2020 12:20 PM    Triglyceride 254 (H) 07/10/2020 10:55 AM    Triglyceride 249 (H) 03/10/2020 10:22 AM    CHOL/HDL Ratio 3.5 05/05/2021 03:56 PM    CHOL/HDL Ratio 3.0 12/13/2013 03:16 AM     Lab Results   Component Value Date/Time    CK 72 09/14/2012 10:20 PM     Lab Results   Component Value Date/Time    Sodium 137 12/21/2021 03:54 PM    Potassium 3.4 (L) 12/21/2021 03:54 PM    Chloride 103 12/21/2021 03:54 PM    CO2 28 12/21/2021 03:54 PM    Anion gap 6 12/21/2021 03:54 PM    Glucose 84 12/21/2021 03:54 PM    BUN 19 12/21/2021 03:54 PM    Creatinine 1.18 (H) 12/21/2021 03:54 PM    BUN/Creatinine ratio 16 12/21/2021 03:54 PM    GFR est AA 55 (L) 12/21/2021 03:54 PM    GFR est non-AA 45 (L) 12/21/2021 03:54 PM    Calcium 10.4 (H) 12/21/2021 03:54 PM    Bilirubin, total 0.9 12/21/2021 03:54 PM    Alk.  phosphatase 88 12/21/2021 03:54 PM    Protein, total 7.4 12/21/2021 03:54 PM    Albumin 4.0 12/21/2021 03:54 PM    Globulin 3.4 12/21/2021 03:54 PM    A-G Ratio 1.2 12/21/2021 03:54 PM    ALT (SGPT) 23 12/21/2021 03:54 PM EKG:  SR     Assessment:     Assessment:      1. Coronary artery disease involving native coronary artery of native heart without angina pectoris    2. Essential hypertension, benign    3. Chronic total occlusion of coronary artery    4. Mixed hyperlipidemia    5. S/P PTCA (percutaneous transluminal coronary angioplasty)    6. PHUC (obstructive sleep apnea)        Orders Placed This Encounter    AMB POC EKG ROUTINE W/ 12 LEADS, INTER & REP     Order Specific Question:   Reason for Exam:     Answer:   routine        Plan:     ASHD/preoperative cardiovascular risk assessment:  2 unsuccessful attempts at PCI of  of RCA, last in 7/2015. Echo in 2015 NL EF and no valvular disease.   No ischemia per NST in 2/19. Last myoview 5/2016 with improved inferior ischemia with suspectedly improved collaterals to  of RCA.    Continue ASA, Plavix: Given her  recommend she continue on Plavix.   Continue BB, CCB, Ranexa  Will repeat stress Myoview and echo now in light of some dyspnea on exertion, not significantly changed, to rule out new areas of ischemia. If only mild inferior ischemia as expected, will be at low cardiac risk to proceed with NATHAN/BSO, and can hold aspirin and Plavix for 5 to 7 days prior, restart when felt to be safe from a surgical standpoint. Will recommend that she does continue beta-blocker perioperatively.     HTN  Controlled with current therapy  Stable kidney fxn in 12/2021     HLD   May 2021 LDL 56. On statin. Lipids and labs followed by PCP     PHUC  On CPAP therapy, followed by Dr Liz Flowers     DM  On oral agents    Leg fatigue / cramps  No occlusive disease per arterial duplex in 2018  She will call us if symptoms worsen and will repeat testing and order venous duplex      Hx S/p esophageal dilatation performed by Dr Meli Reynoso in 8/19. Now that Dr. Meli Reynoso has retired, she requests the name of a new gastroenterologist and I have recommended Dr. Agus Pérez. She will call.       Counseled on diet and exercise- eventual goal of 30-60 minutes 5-7 times a week as per AHA guidelines. They previously bought a treadmill but not using it as much as she would like recently.       Continue current care and f/u in 1 year, sooner if significantly abnormal testing.         Mehnaz Hicks MD

## 2022-01-28 NOTE — LETTER
1/28/2022    Patient: Dhruv Guillory   YOB: 1950   Date of Visit: 1/28/2022     Loni Currie, [address] [facility]  [address] [place]    Dear Loni Currie MD,      Thank you for referring Ms. Ely Perrin to [address] [place] [address] [facility] for evaluation. My notes for this consultation are attached. If you have questions, please do not hesitate to call me. I look forward to following your patient along with you.       Sincerely,    Zackary English MD

## 2022-01-28 NOTE — PROGRESS NOTES
Chief Complaint   Patient presents with    Coronary Artery Disease     annual follow up     Surgical Clearance     [facility] - Dr Vanesa Paul will be performing procedure     Shortness of Breath     sometimes feels she cant get a deep breath - positive for Covid 12/28/21     1. Have you been to the ER, urgent care clinic since your last visit? Hospitalized since your last visit? NO     2. Have you seen or consulted any other health care providers outside of the [address] [place] [address] since your last visit? Include any pap smears or colon screening.   Yes Dr Connor Ba

## 2022-02-09 ENCOUNTER — ANCILLARY PROCEDURE (OUTPATIENT)
Dept: CARDIOLOGY CLINIC | Age: 72
End: 2022-02-09
Payer: MEDICARE

## 2022-02-09 VITALS
DIASTOLIC BLOOD PRESSURE: 64 MMHG | HEIGHT: 65 IN | BODY MASS INDEX: 29.32 KG/M2 | WEIGHT: 176 LBS | SYSTOLIC BLOOD PRESSURE: 124 MMHG

## 2022-02-09 DIAGNOSIS — R06.09 DOE (DYSPNEA ON EXERTION): ICD-10-CM

## 2022-02-09 PROCEDURE — 93306 TTE W/DOPPLER COMPLETE: CPT | Performed by: INTERNAL MEDICINE

## 2022-02-10 ENCOUNTER — ANCILLARY PROCEDURE (OUTPATIENT)
Dept: CARDIOLOGY CLINIC | Age: 72
End: 2022-02-10
Payer: MEDICARE

## 2022-02-10 VITALS
HEIGHT: 65 IN | BODY MASS INDEX: 28.66 KG/M2 | WEIGHT: 172 LBS | DIASTOLIC BLOOD PRESSURE: 78 MMHG | SYSTOLIC BLOOD PRESSURE: 136 MMHG

## 2022-02-10 DIAGNOSIS — R06.09 DOE (DYSPNEA ON EXERTION): ICD-10-CM

## 2022-02-10 LAB
NUC STRESS EJECTION FRACTION: 82 %
STRESS BASELINE DIAS BP: 78 MMHG
STRESS BASELINE HR: 63 BPM
STRESS BASELINE ST DEPRESSION: 0 MM
STRESS BASELINE SYS BP: 136 MMHG
STRESS PEAK DIAS BP: 76 MMHG
STRESS PEAK SYS BP: 138 MMHG
STRESS PERCENT HR ACHIEVED: 50 %
STRESS POST PEAK HR: 75 BPM
STRESS RATE PRESSURE PRODUCT: NORMAL BPM*MMHG
STRESS TARGET HR: 149 BPM

## 2022-02-10 PROCEDURE — A9502 TC99M TETROFOSMIN: HCPCS

## 2022-02-10 PROCEDURE — 93018 CV STRESS TEST I&R ONLY: CPT | Performed by: INTERNAL MEDICINE

## 2022-02-10 PROCEDURE — 93017 CV STRESS TEST TRACING ONLY: CPT | Performed by: INTERNAL MEDICINE

## 2022-02-10 PROCEDURE — 93016 CV STRESS TEST SUPVJ ONLY: CPT | Performed by: INTERNAL MEDICINE

## 2022-02-10 PROCEDURE — 78452 HT MUSCLE IMAGE SPECT MULT: CPT | Performed by: INTERNAL MEDICINE

## 2022-02-10 RX ADMIN — REGADENOSON 0.4 MG: 0.08 INJECTION, SOLUTION INTRAVENOUS at 13:50

## 2022-02-10 RX ADMIN — TETROFOSMIN 26.6 MILLICURIE: 1.38 INJECTION, POWDER, LYOPHILIZED, FOR SOLUTION INTRAVENOUS at 13:50

## 2022-02-10 RX ADMIN — TETROFOSMIN 8.2 MILLICURIE: 1.38 INJECTION, POWDER, LYOPHILIZED, FOR SOLUTION INTRAVENOUS at 13:05

## 2022-02-10 NOTE — TELEPHONE ENCOUNTER
----- Message from Keila Wilson sent at 9/30/2020  4:14 PM EDT -----  Regarding: Dr. Raynette Felty telephone  Caller's first and last name: Pt  Reason for call: Pt has had persistent sinus infection and is requesting medication   Callback required yes/no and why: yes   Best contact number(s): [phone] or [phone]  Details to clarify the request: Pt would like to speak with nurse Clarita Rousseau) if Dr. Horvath Gip is not available Appt scheduled for 3/7.

## 2022-02-10 NOTE — PROGRESS NOTES
Please advise patient stress test without evidence of flow-limiting blockages in the arteries of the heart.

## 2022-02-11 ENCOUNTER — TELEPHONE (OUTPATIENT)
Dept: CARDIOLOGY CLINIC | Age: 72
End: 2022-02-11

## 2022-02-11 LAB
ECHO AO ASC DIAM: 3.1 CM
ECHO AO ASCENDING AORTA INDEX: 1.66 CM/M2
ECHO AO ROOT DIAM: 2.9 CM
ECHO AO ROOT INDEX: 1.55 CM/M2
ECHO AV AREA PEAK VELOCITY: 2.1 CM2
ECHO AV AREA/BSA PEAK VELOCITY: 1.1 CM2/M2
ECHO AV PEAK GRADIENT: 8 MMHG
ECHO AV PEAK VELOCITY: 1.4 M/S
ECHO LA DIAMETER INDEX: 1.87 CM/M2
ECHO LA DIAMETER: 3.5 CM
ECHO LA TO AORTIC ROOT RATIO: 1.21
ECHO LA VOL 2C: 45 ML (ref 22–52)
ECHO LA VOL 4C: 56 ML (ref 22–52)
ECHO LA VOLUME AREA LENGTH: 55 ML
ECHO LA VOLUME INDEX A2C: 24 ML/M2 (ref 16–34)
ECHO LA VOLUME INDEX A4C: 30 ML/M2 (ref 16–34)
ECHO LA VOLUME INDEX AREA LENGTH: 29 ML/M2 (ref 16–34)
ECHO LV E' LATERAL VELOCITY: 9 CM/S
ECHO LV E' SEPTAL VELOCITY: 10 CM/S
ECHO LV FRACTIONAL SHORTENING: 27 % (ref 28–44)
ECHO LV INTERNAL DIMENSION DIASTOLE INDEX: 2.41 CM/M2
ECHO LV INTERNAL DIMENSION DIASTOLIC: 4.5 CM (ref 3.9–5.3)
ECHO LV INTERNAL DIMENSION SYSTOLIC INDEX: 1.76 CM/M2
ECHO LV INTERNAL DIMENSION SYSTOLIC: 3.3 CM
ECHO LV IVSD: 1.1 CM (ref 0.6–0.9)
ECHO LV MASS 2D: 175 G (ref 67–162)
ECHO LV MASS INDEX 2D: 93.6 G/M2 (ref 43–95)
ECHO LV POSTERIOR WALL DIASTOLIC: 1.1 CM (ref 0.6–0.9)
ECHO LV RELATIVE WALL THICKNESS RATIO: 0.49
ECHO LVOT AREA: 3.1 CM2
ECHO LVOT DIAM: 2 CM
ECHO LVOT MEAN GRADIENT: 2 MMHG
ECHO LVOT PEAK GRADIENT: 3 MMHG
ECHO LVOT PEAK GRADIENT: 3 MMHG
ECHO LVOT PEAK VELOCITY: 0.9 M/S
ECHO LVOT PEAK VELOCITY: 0.9 M/S
ECHO LVOT STROKE VOLUME INDEX: 38.6 ML/M2
ECHO LVOT SV: 72.2 ML
ECHO LVOT VTI: 23 CM
ECHO MV A VELOCITY: 1.29 M/S
ECHO MV AREA PHT: 2.6 CM2
ECHO MV E DECELERATION TIME (DT): 288.2 MS
ECHO MV E VELOCITY: 1.17 M/S
ECHO MV E/A RATIO: 0.91
ECHO MV E/E' LATERAL: 13
ECHO MV E/E' RATIO (AVERAGED): 12.35
ECHO MV E/E' SEPTAL: 11.7
ECHO MV PRESSURE HALF TIME (PHT): 83.6 MS
ECHO RV TAPSE: 2.1 CM (ref 1.5–2)

## 2022-02-11 PROCEDURE — 93306 TTE W/DOPPLER COMPLETE: CPT | Performed by: INTERNAL MEDICINE

## 2022-02-11 NOTE — TELEPHONE ENCOUNTER
----- Message from Faiza Lizama NP sent at 2/10/2022  4:58 PM EST -----  Please advise patient stress test without evidence of flow-limiting blockages in the arteries of the heart.

## 2022-02-11 NOTE — TELEPHONE ENCOUNTER
Gregg Brown., NP  Kelvin Ocasio LPN  will be at low cardiac risk to proceed with NATHAN/BSO, and can hold aspirin and Plavix for 5 to 7 days prior, restart when felt to be safe from a surgical standpoint. Will recommend that she does continue beta-blocker perioperatively.

## 2022-02-14 ENCOUNTER — TELEPHONE (OUTPATIENT)
Dept: CARDIOLOGY CLINIC | Age: 72
End: 2022-02-14

## 2022-02-14 NOTE — TELEPHONE ENCOUNTER
----- Message from Keagan Camara NP sent at 2/12/2022  4:10 PM EST -----  NORMAL squeezing function. No significant valve problems.

## 2022-03-09 ENCOUNTER — TELEPHONE (OUTPATIENT)
Dept: FAMILY MEDICINE CLINIC | Age: 72
End: 2022-03-09

## 2022-03-09 NOTE — TELEPHONE ENCOUNTER
SHEREEN Donaldson [place]. Lorie Stroud    Female, 67 y.o., 1950  Weight:   172 lb (78 kg)  Phone:   [phone] Kimberley Winchester  PCP:   MD ZANE TuckerI Nov 2012 - Flowsheet Error    MRN:   [id number]  MyChart: Active  Next Appt:   03/16/2022                  Message  Received: Today  Brenda Joseph [facility] Nurse Pool  Subject: Referral Request     QUESTIONS   Reason for referral request? bw   Has the physician seen you for this condition before? No   Preferred Specialist (if applicable)? Do you already have an appointment scheduled? Yes   Select Scheduled Date? 2022-03-16   Select Scheduled Physician? Additional Information for Provider?   ---------------------------------------------------------------------------   --------------   CALL BACK INFO   What is the best way for the office to contact you? OK to leave message on   voicemail   Preferred Call Back Phone Number?  [phone]

## 2022-03-16 ENCOUNTER — OFFICE VISIT (OUTPATIENT)
Dept: FAMILY MEDICINE CLINIC | Age: 72
End: 2022-03-16
Payer: MEDICARE

## 2022-03-16 VITALS
SYSTOLIC BLOOD PRESSURE: 120 MMHG | HEART RATE: 68 BPM | OXYGEN SATURATION: 99 % | WEIGHT: 177 LBS | TEMPERATURE: 97.5 F | BODY MASS INDEX: 29.49 KG/M2 | HEIGHT: 65 IN | DIASTOLIC BLOOD PRESSURE: 70 MMHG

## 2022-03-16 DIAGNOSIS — E78.2 MIXED HYPERLIPIDEMIA: ICD-10-CM

## 2022-03-16 DIAGNOSIS — E11.59 TYPE 2 DIABETES MELLITUS WITH OTHER CIRCULATORY COMPLICATION, WITHOUT LONG-TERM CURRENT USE OF INSULIN (HCC): ICD-10-CM

## 2022-03-16 DIAGNOSIS — I10 ESSENTIAL HYPERTENSION, BENIGN: ICD-10-CM

## 2022-03-16 DIAGNOSIS — I25.10 CORONARY ARTERY DISEASE INVOLVING NATIVE CORONARY ARTERY OF NATIVE HEART WITHOUT ANGINA PECTORIS: ICD-10-CM

## 2022-03-16 DIAGNOSIS — K22.4 ESOPHAGEAL DYSMOTILITY: ICD-10-CM

## 2022-03-16 DIAGNOSIS — Z00.00 MEDICARE ANNUAL WELLNESS VISIT, SUBSEQUENT: Primary | ICD-10-CM

## 2022-03-16 LAB
ALBUMIN SERPL-MCNC: 4 G/DL (ref 3.5–5)
ALBUMIN/GLOB SERPL: 1.2 {RATIO} (ref 1.1–2.2)
ALP SERPL-CCNC: 91 U/L (ref 45–117)
ALT SERPL-CCNC: 22 U/L (ref 12–78)
ANION GAP SERPL CALC-SCNC: 4 MMOL/L (ref 5–15)
AST SERPL-CCNC: 16 U/L (ref 15–37)
BILIRUB SERPL-MCNC: 0.9 MG/DL (ref 0.2–1)
BUN SERPL-MCNC: 20 MG/DL (ref 6–20)
BUN/CREAT SERPL: 19 (ref 12–20)
CALCIUM SERPL-MCNC: 10 MG/DL (ref 8.5–10.1)
CHLORIDE SERPL-SCNC: 105 MMOL/L (ref 97–108)
CHOLEST SERPL-MCNC: 166 MG/DL
CO2 SERPL-SCNC: 30 MMOL/L (ref 21–32)
CREAT SERPL-MCNC: 1.06 MG/DL (ref 0.55–1.02)
GLOBULIN SER CALC-MCNC: 3.3 G/DL (ref 2–4)
GLUCOSE SERPL-MCNC: 177 MG/DL (ref 65–100)
HBA1C MFR BLD HPLC: 6.8 %
HDLC SERPL-MCNC: 49 MG/DL
HDLC SERPL: 3.4 {RATIO} (ref 0–5)
LDLC SERPL CALC-MCNC: 65.2 MG/DL (ref 0–100)
POTASSIUM SERPL-SCNC: 3.8 MMOL/L (ref 3.5–5.1)
PROT SERPL-MCNC: 7.3 G/DL (ref 6.4–8.2)
SODIUM SERPL-SCNC: 139 MMOL/L (ref 136–145)
TRIGL SERPL-MCNC: 259 MG/DL (ref ?–150)
VLDLC SERPL CALC-MCNC: 51.8 MG/DL

## 2022-03-16 PROCEDURE — G8536 NO DOC ELDER MAL SCRN: HCPCS | Performed by: FAMILY MEDICINE

## 2022-03-16 PROCEDURE — G8417 CALC BMI ABV UP PARAM F/U: HCPCS | Performed by: FAMILY MEDICINE

## 2022-03-16 PROCEDURE — 3017F COLORECTAL CA SCREEN DOC REV: CPT | Performed by: FAMILY MEDICINE

## 2022-03-16 PROCEDURE — 3044F HG A1C LEVEL LT 7.0%: CPT | Performed by: FAMILY MEDICINE

## 2022-03-16 PROCEDURE — 1101F PT FALLS ASSESS-DOCD LE1/YR: CPT | Performed by: FAMILY MEDICINE

## 2022-03-16 PROCEDURE — G0439 PPPS, SUBSEQ VISIT: HCPCS | Performed by: FAMILY MEDICINE

## 2022-03-16 PROCEDURE — G9899 SCRN MAM PERF RSLTS DOC: HCPCS | Performed by: FAMILY MEDICINE

## 2022-03-16 PROCEDURE — 83036 HEMOGLOBIN GLYCOSYLATED A1C: CPT | Performed by: FAMILY MEDICINE

## 2022-03-16 PROCEDURE — 2022F DILAT RTA XM EVC RTNOPTHY: CPT | Performed by: FAMILY MEDICINE

## 2022-03-16 PROCEDURE — G0463 HOSPITAL OUTPT CLINIC VISIT: HCPCS | Performed by: FAMILY MEDICINE

## 2022-03-16 PROCEDURE — G8427 DOCREV CUR MEDS BY ELIG CLIN: HCPCS | Performed by: FAMILY MEDICINE

## 2022-03-16 PROCEDURE — G8510 SCR DEP NEG, NO PLAN REQD: HCPCS | Performed by: FAMILY MEDICINE

## 2022-03-16 PROCEDURE — G8399 PT W/DXA RESULTS DOCUMENT: HCPCS | Performed by: FAMILY MEDICINE

## 2022-03-16 PROCEDURE — 1090F PRES/ABSN URINE INCON ASSESS: CPT | Performed by: FAMILY MEDICINE

## 2022-03-16 PROCEDURE — G8752 SYS BP LESS 140: HCPCS | Performed by: FAMILY MEDICINE

## 2022-03-16 PROCEDURE — G8754 DIAS BP LESS 90: HCPCS | Performed by: FAMILY MEDICINE

## 2022-03-16 PROCEDURE — 99213 OFFICE O/P EST LOW 20 MIN: CPT | Performed by: FAMILY MEDICINE

## 2022-03-16 NOTE — PROGRESS NOTES
Cachorro Zhao is a 67 y.o. female  Chief Complaint   Patient presents with    Follow-up     1. Have you been to the ER,  no urgent care clinic since your last visit?  no Hospitalized since your last visit? Yes [facility]  2. Have you seen or consulted any other health care providers outside of the [address] [place] [address] since your last visit? Yes Include any pap smears or colon screening.

## 2022-03-16 NOTE — PROGRESS NOTES
This is the Subsequent Medicare Annual Wellness Exam, performed 12 months or more after the Initial AWV or the last Subsequent AWV    I have reviewed the patient's medical history in detail and updated the computerized patient record. Assessment/Plan   Education and counseling provided:  Are appropriate based on today's review and evaluation    1. Medicare annual wellness visit, subsequent  2. Essential hypertension, benign  -     METABOLIC PANEL, COMPREHENSIVE; Future  3. Type 2 diabetes mellitus with other circulatory complication, without long-term current use of insulin (HCC)  -     AMB POC HEMOGLOBIN A1C  4. Mixed hyperlipidemia  -     LIPID PANEL; Future  5. Coronary artery disease involving native coronary artery of native heart without angina pectoris  6. Esophageal dysmotility       Depression Risk Factor Screening     3 most recent PHQ Screens 3/16/2022   Little interest or pleasure in doing things Not at all   Feeling down, depressed, irritable, or hopeless Not at all   Total Score PHQ 2 0       Alcohol & Drug Abuse Risk Screen    Do you average more than 1 drink per night or more than 7 drinks a week:  No    On any one occasion in the past three months have you have had more than 3 drinks containing alcohol:  No          Functional Ability and Level of Safety    Hearing: The patient wears hearing aids. Activities of Daily Living: The home contains: handrails  Patient does total self care      Ambulation: with no difficulty     Fall Risk:  Fall Risk Assessment, last 12 mths 3/16/2022   Able to walk? Yes   Fall in past 12 months? 0   Do you feel unsteady?  0   Are you worried about falling 0      Abuse Screen:  Patient is not abused       Cognitive Screening    Has your family/caregiver stated any concerns about your memory: no     Cognitive Screenin    Health Maintenance Due     Health Maintenance Due   Topic Date Due    COVID-19 Vaccine (1) Never done    Shingrix Vaccine Age 50> (1 of 2) Never done    Foot Exam Q1  02/21/2019    Eye Exam Retinal or Dilated  10/19/2019    Breast Cancer Screen Mammogram  04/30/2020    Medicare Yearly Exam  10/14/2021       Patient Care Team   Patient Care Team:  Nelson Walker MD as PCP - [facility]  Nelson Walker MD as PCP - [address] [place], [place] [address]jaylin Cowan Provider  Johann Khan MD as Physician (Sleep Medicine)  Kenrick Rowell MD as Physician (Cardiology)  Karla Chen MD as Physician (Gynecologic Oncology)    History     Patient Active Problem List   Diagnosis Code    Allergic rhinitis J30.9    Esophageal reflux K21.9    Essential hypertension, benign I10    Anxiety state, unspecified F41.1    Encounter for long-term (current) use of other medications Z79.899    Mixed hyperlipidemia E78.2    Esophageal dysmotility K22.4    S/P cardiac cath Z98.890    Chronic total occlusion of coronary artery I25.82    Esophageal dysphagia R13.19    Esophageal motor disorder K22.4    Myalgia and myositis, unspecified [id number]    Presence of stent in right coronary artery Z95.5    Snoring R06.83    PHUC (obstructive sleep apnea) G47.33    CAD (coronary artery disease) I25.10    Diarrhea R19.7    Chest pain R07.9    Angina, class III ([address] I20.9    Myocardial ischemia I25.9    S/P PTCA (percutaneous transluminal coronary angioplasty) Z98.61    Unstable angina (HCC) I20.0    Gastric ulcer K25.9    Routine adult health maintenance Z00.00    Diabetes mellitus ([address] E11.9    Type 2 diabetes mellitus with diabetic neuropathy ([address] E11.40    Eructation R14.2    Type 2 diabetes with nephropathy ([address] E11.21     Past Medical History:   Diagnosis Date    Allergic rhinitis, cause unspecified 2/20/2011    Angina, class III ([address] 12/13/2013    as of 8/4/15 pt reports intermittent \"angina pain\" and GRAJEDA; followed by Dr Randolph Hairston Anxiety     Arthritis     neck - numbness of arm    CAD (coronary artery disease)     angina / Dr Perez Kos polyps 2011    Diabetes ([facility]     Diarrhea     \"random\" per pt; followed by Dr Lashawn Baird Encounter for long-term (current) use of other medications 2011    Eructation 2019    Esophageal dysmotility 10/10/2011    Essential hypertension, benign 2011    Gastric ulcer 2015    GERD (gastroesophageal reflux disease)     Hypertension     Ill-defined condition     torn MCL - left - no surgery    Mixed hyperlipidemia 2011    Nausea & vomiting     Sleep apnea     CPAP;  Dr Lexie holly MD      Past Surgical History:   Procedure Laterality Date    CARDIAC CATHETERIZATION           HX BUNIONECTOMY Right     HX CATARACT REMOVAL Bilateral     HX  SECTION      x3    HX CHOLECYSTECTOMY      HX COLONOSCOPY      HX GI      EGDs with dilatation    HX HEART CATHETERIZATION      catherization with 3 stents - states stents are blocked    HX HEART CATHETERIZATION  2015    unable to stent; tried to unblock stents but unable to/starting cardiac rehab Aug2015/has collateral circulation    AR EGD BALLOON DILATION ESOPHAGUS <30 MM DIAM  10/13/2010         AR EGD TRANSORAL BIOPSY SINGLE/MULTIPLE  10/13/2010         UPPER GI ENDOSCOPY,BALL DIL,30MM  3/30/2015         UPPER GI ENDOSCOPY,BALL DIL,30MM  2019         UPPER GI ENDOSCOPY,BIOPSY  3/30/2015         UPPER GI ENDOSCOPY,BIOPSY  2019          Current Outpatient Medications   Medication Sig Dispense Refill    triamterene-hydroCHLOROthiazide (MAXZIDE) 37.5-25 mg per tablet TAKE 1 TABLET BY MOUTH EVERY MORNING 90 Tablet 3    ranolazine ER (RANEXA) 1,000 mg TAKE 1 TABLET BY MOUTH TWICE DAILY 180 Tablet 0    rosuvastatin (CRESTOR) 10 mg tablet TAKE 1 TABLET BY MOUTH EVERY EVENING 90 Tablet 1    amLODIPine (NORVASC) 2.5 mg tablet TAKE 1 TABLET BY MOUTH ONCE DAILY 90 Tablet 3    fluticasone propionate (FLONASE) 50 mcg/actuation nasal spray SHAKE LIQUID AND USE 2 SPRAYS IN EACH NOSTRIL EVERY DAY 16 g 11  clopidogreL (PLAVIX) 75 mg tab TAKE 1 TABLET BY MOUTH EVERY DAY 90 Tablet 4    Januvia 100 mg tablet TAKE 1 TABLET BY MOUTH ONCE DAILY 180 Tablet 2    dapagliflozin (Farxiga) 5 mg tab tablet Take 1 Tablet by mouth daily. 30 Tablet 5    glimepiride (AMARYL) 2 mg tablet TAKE 1 AND 1/2 TABLETS BY MOUTH EVERY MORNING 45 Tablet 11    metoprolol succinate (TOPROL-XL) 25 mg XL tablet TAKE 1 TABLET BY MOUTH EVERY DAY 90 Tab 3    sucralfate (CARAFATE) 1 gram tablet TAKE 1 TABLET BY MOUTH FOUR TIMES DAILY AS NEEDED FOR PAIN 368 Tab 1    RABEprazole (ACIPHEX) 20 mg tablet TK 1 T PO QD B MEALS      famotidine (PEPCID) 40 mg tablet TK 1 T PO BID UTD      nitroglycerin (Nitrostat) 0.4 mg SL tablet place 1 tablet under the tongue every 5 minutes if needed 1 Bottle 1    MAG CARB/ALUMINUM HYDROX/ALGIN (GAVISCON EXTRA STRENGTH PO) Take  by mouth as needed.  ASPIRIN PO Take 81 mg by mouth daily.  MINH/D3/MAG11/ZINC//RONEN/BOR (CALTRATE 600+D PLUS MINERALS PO) Take  by mouth. Takes one po once daily.  GUAIFENESIN (MUCINEX PO) Take 1,200 mg by mouth as needed. Extra strength.  glucose blood VI test strips (BLOOD GLUCOSE TEST) strip by Does Not Apply route Daily Check three times daily . Dx. Code E11.9 150 Strip 11    Lancets misc Check Blood sugar 3 times daily Dx. Code E11.9 150 Each 11    cyanocobalamin (VITAMIN B-12) 1,000 mcg tablet Take 500 mcg by mouth daily.  Blood-Glucose Meter monitoring kit AS DIRECTED TWICE DAILY 1 Kit 0    coenzyme Q-10 (CO Q-10) 200 mg capsule Take 1 Cap by mouth daily. Over the counter 30 Cap 11    cholecalciferol, vitamin d3, (VITAMIN D) 1,000 unit tablet Take 1,000 Units by mouth daily.  potassium chloride (KLOR-CON) 10 mEq tablet Take 2 Tablets by mouth daily.  (Patient not taking: Reported on 1/28/2022) 60 Tablet 5     Allergies   Allergen Reactions    Penicillins Hives     Other reaction(s): Hives    Protamine Anaphylaxis    Sulfa (Sulfonamide Antibiotics) Hives    Imdur [Isosorbide Mononitrate] Other (comments)     headache       Family History   Problem Relation Age of Onset    Heart Disease Mother     Hypertension Mother     Heart Attack Mother     Heart Disease Father     Hypertension Father     Heart Surgery Father     Cancer Sister         lung    Cancer Brother         brain tumor - malignant    Breast Cancer Sister      Social History     Tobacco Use    Smoking status: Never Smoker    Smokeless tobacco: Never Used   Substance Use Topics    Alcohol use:  Yes     Alcohol/week: 1.7 standard drinks     Types: 2 Glasses of wine per week     Comment: occasionally         Amanda Mar MD

## 2022-03-16 NOTE — PROGRESS NOTES
HISTORY OF PRESENT ILLNESS  Damian Hernandez is a 67 y.o. female. f/u dm2,hbp, cad,chol gerd, PHN of rt shoulder and breast.S/P lap hysterectomy with Dr Anita Matt for postmenopausal bleeding,recovering nicely. Diabetes  The history is provided by the patient. This is a chronic problem. The problem occurs daily. The problem has not changed since onset. Nothing relieves the symptoms. Hypertension   The history is provided by the patient. This is a chronic problem. The problem has been gradually improving. Pertinent negatives include no orthopnea, no palpitations, no malaise/fatigue, no blurred vision and no nausea. Cholesterol Problem  The history is provided by the patient. This is a chronic problem. The problem occurs daily. The problem has been gradually improving. Follow-up  The history is provided by the patient. This is a new problem. The problem occurs daily. The problem has been gradually improving. Review of Systems   Constitutional: Negative for fever, malaise/fatigue and weight loss. HENT: Negative for hearing loss. Eyes: Negative for blurred vision. Respiratory: Negative for cough. Cardiovascular: Negative for palpitations and orthopnea. Gastrointestinal: Negative for blood in stool, constipation, heartburn and nausea. Genitourinary: Negative for dysuria and frequency. Musculoskeletal: Negative for back pain and myalgias. Skin: Negative for itching. Neurological: Negative for sensory change. Psychiatric/Behavioral: Negative for depression. The patient does not have insomnia. Physical Exam  Vitals reviewed. Constitutional:       Appearance: Normal appearance. She is well-developed. She is obese. HENT:      Head: Normocephalic and atraumatic. Right Ear: External ear normal.      Left Ear: External ear normal.      Nose: Mucosal edema and congestion present. No rhinorrhea. Mouth/Throat:      Pharynx: No posterior oropharyngeal erythema.    Eyes: Conjunctiva/sclera: Conjunctivae normal.      Pupils: Pupils are equal, round, and reactive to light. Neck:      Thyroid: No thyromegaly. Trachea: No tracheal deviation. Cardiovascular:      Rate and Rhythm: Normal rate and regular rhythm. Heart sounds: Normal heart sounds. No murmur heard. No gallop. Pulmonary:      Effort: Pulmonary effort is normal. No respiratory distress. Breath sounds: Normal breath sounds. Abdominal:      Palpations: Abdomen is soft. Musculoskeletal:         General: Normal range of motion. Cervical back: Normal range of motion and neck supple. Skin:     General: Skin is warm and dry. Comments: Small movable soft sub cut mass l anterior leg 1x2 cm   Neurological:      Mental Status: She is alert and oriented to person, place, and time. Mental status is at baseline. Psychiatric:         Mood and Affect: Mood normal.         Behavior: Behavior normal.       Diagnoses and all orders for this visit:    1. Medicare annual wellness visit, subsequent    2. Essential hypertension, benign  -     METABOLIC PANEL, COMPREHENSIVE; Future    3. Type 2 diabetes mellitus with other circulatory complication, without long-term current use of insulin (HCC)  -     AMB POC HEMOGLOBIN A1C    4. Mixed hyperlipidemia  -     LIPID PANEL; Future    5. Coronary artery disease involving native coronary artery of native heart without angina pectoris    6. Esophageal dysmotility      Follow-up and Dispositions    · Return in about 4 months (around 7/16/2022).

## 2022-03-18 PROBLEM — E11.40 TYPE 2 DIABETES MELLITUS WITH DIABETIC NEUROPATHY (HCC): Status: ACTIVE | Noted: 2018-03-26

## 2022-03-18 PROBLEM — E11.21 TYPE 2 DIABETES WITH NEPHROPATHY (HCC): Status: ACTIVE | Noted: 2020-07-10

## 2022-03-19 PROBLEM — R14.2 ERUCTATION: Status: ACTIVE | Noted: 2019-08-09

## 2022-03-20 PROBLEM — E11.9 DIABETES MELLITUS (HCC): Status: ACTIVE | Noted: 2018-02-20

## 2022-04-21 ENCOUNTER — OFFICE VISIT (OUTPATIENT)
Dept: SLEEP MEDICINE | Age: 72
End: 2022-04-21

## 2022-04-21 DIAGNOSIS — G47.33 OSA (OBSTRUCTIVE SLEEP APNEA): Primary | ICD-10-CM

## 2022-04-21 NOTE — PROGRESS NOTES
Patient came in today for a download with her machine per her DME company. Download is in patient's media. Patient stated that her machine is no longer transmitting to the DME company and was inquiring about the updates to fix the problem. Patient was referred to her DME company.

## 2022-04-25 RX ORDER — METOPROLOL SUCCINATE 25 MG/1
TABLET, EXTENDED RELEASE ORAL
Qty: 90 TABLET | Refills: 3 | Status: SHIPPED | OUTPATIENT
Start: 2022-04-25

## 2022-06-27 DIAGNOSIS — E11.59 TYPE 2 DIABETES MELLITUS WITH OTHER CIRCULATORY COMPLICATION, WITHOUT LONG-TERM CURRENT USE OF INSULIN (HCC): ICD-10-CM

## 2022-06-27 RX ORDER — GLIMEPIRIDE 2 MG/1
TABLET ORAL
Qty: 45 TABLET | Refills: 11 | Status: SHIPPED | OUTPATIENT
Start: 2022-06-27

## 2022-06-28 RX ORDER — ROSUVASTATIN CALCIUM 10 MG/1
TABLET, COATED ORAL
Qty: 90 TABLET | Refills: 1 | Status: SHIPPED | OUTPATIENT
Start: 2022-06-28

## 2022-07-12 PROBLEM — N18.30 CHRONIC RENAL DISEASE, STAGE III (HCC): Status: ACTIVE | Noted: 2022-07-12

## 2022-07-13 ENCOUNTER — OFFICE VISIT (OUTPATIENT)
Dept: FAMILY MEDICINE CLINIC | Age: 72
End: 2022-07-13
Payer: MEDICARE

## 2022-07-13 VITALS
WEIGHT: 180.4 LBS | HEART RATE: 61 BPM | RESPIRATION RATE: 18 BRPM | OXYGEN SATURATION: 95 % | SYSTOLIC BLOOD PRESSURE: 135 MMHG | BODY MASS INDEX: 30.06 KG/M2 | TEMPERATURE: 98.2 F | HEIGHT: 65 IN | DIASTOLIC BLOOD PRESSURE: 80 MMHG

## 2022-07-13 DIAGNOSIS — I25.10 CORONARY ARTERY DISEASE INVOLVING NATIVE CORONARY ARTERY OF NATIVE HEART WITHOUT ANGINA PECTORIS: ICD-10-CM

## 2022-07-13 DIAGNOSIS — K22.4 ESOPHAGEAL DYSMOTILITY: ICD-10-CM

## 2022-07-13 DIAGNOSIS — I10 ESSENTIAL HYPERTENSION, BENIGN: ICD-10-CM

## 2022-07-13 DIAGNOSIS — G47.33 OSA ON CPAP: ICD-10-CM

## 2022-07-13 DIAGNOSIS — Z99.89 OSA ON CPAP: ICD-10-CM

## 2022-07-13 DIAGNOSIS — E11.59 TYPE 2 DIABETES MELLITUS WITH OTHER CIRCULATORY COMPLICATION, WITHOUT LONG-TERM CURRENT USE OF INSULIN (HCC): Primary | ICD-10-CM

## 2022-07-13 DIAGNOSIS — E78.2 MIXED HYPERLIPIDEMIA: ICD-10-CM

## 2022-07-13 DIAGNOSIS — B02.29 PHN (POSTHERPETIC NEURALGIA): ICD-10-CM

## 2022-07-13 LAB — HBA1C MFR BLD HPLC: 6.7 %

## 2022-07-13 PROCEDURE — G8427 DOCREV CUR MEDS BY ELIG CLIN: HCPCS | Performed by: FAMILY MEDICINE

## 2022-07-13 PROCEDURE — 83036 HEMOGLOBIN GLYCOSYLATED A1C: CPT | Performed by: FAMILY MEDICINE

## 2022-07-13 PROCEDURE — G8510 SCR DEP NEG, NO PLAN REQD: HCPCS | Performed by: FAMILY MEDICINE

## 2022-07-13 PROCEDURE — 3017F COLORECTAL CA SCREEN DOC REV: CPT | Performed by: FAMILY MEDICINE

## 2022-07-13 PROCEDURE — 1123F ACP DISCUSS/DSCN MKR DOCD: CPT | Performed by: FAMILY MEDICINE

## 2022-07-13 PROCEDURE — G9899 SCRN MAM PERF RSLTS DOC: HCPCS | Performed by: FAMILY MEDICINE

## 2022-07-13 PROCEDURE — G8417 CALC BMI ABV UP PARAM F/U: HCPCS | Performed by: FAMILY MEDICINE

## 2022-07-13 PROCEDURE — 1090F PRES/ABSN URINE INCON ASSESS: CPT | Performed by: FAMILY MEDICINE

## 2022-07-13 PROCEDURE — 2022F DILAT RTA XM EVC RTNOPTHY: CPT | Performed by: FAMILY MEDICINE

## 2022-07-13 PROCEDURE — G8754 DIAS BP LESS 90: HCPCS | Performed by: FAMILY MEDICINE

## 2022-07-13 PROCEDURE — 99214 OFFICE O/P EST MOD 30 MIN: CPT | Performed by: FAMILY MEDICINE

## 2022-07-13 PROCEDURE — G8399 PT W/DXA RESULTS DOCUMENT: HCPCS | Performed by: FAMILY MEDICINE

## 2022-07-13 PROCEDURE — 1101F PT FALLS ASSESS-DOCD LE1/YR: CPT | Performed by: FAMILY MEDICINE

## 2022-07-13 PROCEDURE — G8536 NO DOC ELDER MAL SCRN: HCPCS | Performed by: FAMILY MEDICINE

## 2022-07-13 PROCEDURE — 3044F HG A1C LEVEL LT 7.0%: CPT | Performed by: FAMILY MEDICINE

## 2022-07-13 PROCEDURE — G0463 HOSPITAL OUTPT CLINIC VISIT: HCPCS | Performed by: FAMILY MEDICINE

## 2022-07-13 PROCEDURE — G8752 SYS BP LESS 140: HCPCS | Performed by: FAMILY MEDICINE

## 2022-07-13 NOTE — PROGRESS NOTES
HISTORY OF PRESENT ILLNESS  Noe Blake is a 67 y.o. female. f/u dm2,hbp, cad,chol gerd, PHN of rt shoulder and breast.Feeling pretty well. Follow-up  The history is provided by the patient. This is a new problem. The problem occurs daily. The problem has been gradually improving. Diabetes  The history is provided by the patient. This is a chronic problem. The problem occurs daily. The problem has not changed since onset. Nothing relieves the symptoms. Hypertension   The history is provided by the patient. This is a chronic problem. The problem has been gradually improving. Pertinent negatives include no orthopnea, no palpitations, no malaise/fatigue, no blurred vision and no nausea. Cholesterol Problem  The history is provided by the patient. This is a chronic problem. The problem occurs daily. The problem has been gradually improving. Review of Systems   Constitutional: Negative for fever, malaise/fatigue and weight loss. HENT: Negative for hearing loss. Eyes: Negative for blurred vision. Respiratory: Negative for cough. Cardiovascular: Negative for palpitations and orthopnea. Gastrointestinal: Negative for blood in stool, constipation, heartburn and nausea. Genitourinary: Negative for dysuria and frequency. Musculoskeletal: Negative for back pain and myalgias. Skin: Negative for itching. Neurological: Negative for sensory change. Psychiatric/Behavioral: Negative for depression. The patient does not have insomnia. Physical Exam  Vitals reviewed. Constitutional:       Appearance: Normal appearance. She is well-developed. She is obese. HENT:      Head: Normocephalic and atraumatic. Right Ear: External ear normal.      Left Ear: External ear normal.      Nose: Mucosal edema and congestion present. No rhinorrhea. Mouth/Throat:      Pharynx: No posterior oropharyngeal erythema.    Eyes:      Conjunctiva/sclera: Conjunctivae normal.      Pupils: Pupils are equal, round, and reactive to light. Neck:      Thyroid: No thyromegaly. Trachea: No tracheal deviation. Cardiovascular:      Rate and Rhythm: Normal rate and regular rhythm. Heart sounds: Normal heart sounds. No murmur heard. No gallop. Pulmonary:      Effort: Pulmonary effort is normal. No respiratory distress. Breath sounds: Normal breath sounds. Abdominal:      Palpations: Abdomen is soft. Musculoskeletal:         General: Normal range of motion. Cervical back: Normal range of motion and neck supple. Skin:     General: Skin is warm and dry. Comments: Small movable soft sub cut mass l anterior leg 1x2 cm   Neurological:      Mental Status: She is alert and oriented to person, place, and time. Mental status is at baseline. Psychiatric:         Mood and Affect: Mood normal.         Behavior: Behavior normal.       Diagnoses and all orders for this visit:    1. Type 2 diabetes mellitus with other circulatory complication, without long-term current use of insulin (HCC),well conrolled  -     AMB POC HEMOGLOBIN A1C    2. Essential hypertension, benign,controlled  -     METABOLIC PANEL, COMPREHENSIVE; Future    3. Mixed hyperlipidemia    4. Coronary artery disease involving native coronary artery of native heart without angina pectoris    5. Esophageal dysmotility    6. PHN (postherpetic neuralgia)    7.  PHUC on CPAP    Doing well,continue current meds and treatments

## 2022-07-13 NOTE — PROGRESS NOTES
Patient is accompanied by patient I have received verbal consent from Jewell Maharaj to discuss any/all medical information while they are present in the room. Chief Complaint   Patient presents with    Diabetes     4 mo follow up        Visit Vitals  /80   Pulse 61   Temp 98.2 °F (36.8 °C) (Oral)   Resp 18   Ht 5' 5\" (1.651 m)   Wt 180 lb 6.4 oz (81.8 kg)   SpO2 95%   BMI 30.02 kg/m²       Results for orders placed or performed in visit on 07/13/22   AMB POC HEMOGLOBIN A1C   Result Value Ref Range    Hemoglobin A1c (POC) 6.7 %         1. Have you been to the ER, urgent care clinic since your last visit? Hospitalized since your last visit? No    2. Have you seen or consulted any other health care providers outside of the [address] [place] [address] since your last visit? Include any pap smears or colon screening.  No

## 2022-07-14 LAB
ALBUMIN SERPL-MCNC: 3.7 G/DL (ref 3.5–5)
ALBUMIN/GLOB SERPL: 1.1 {RATIO} (ref 1.1–2.2)
ALP SERPL-CCNC: 88 U/L (ref 45–117)
ALT SERPL-CCNC: 19 U/L (ref 12–78)
ANION GAP SERPL CALC-SCNC: 5 MMOL/L (ref 5–15)
AST SERPL-CCNC: 12 U/L (ref 15–37)
BILIRUB SERPL-MCNC: 0.8 MG/DL (ref 0.2–1)
BUN SERPL-MCNC: 20 MG/DL (ref 6–20)
BUN/CREAT SERPL: 17 (ref 12–20)
CALCIUM SERPL-MCNC: 9.7 MG/DL (ref 8.5–10.1)
CHLORIDE SERPL-SCNC: 105 MMOL/L (ref 97–108)
CO2 SERPL-SCNC: 29 MMOL/L (ref 21–32)
CREAT SERPL-MCNC: 1.19 MG/DL (ref 0.55–1.02)
GLOBULIN SER CALC-MCNC: 3.3 G/DL (ref 2–4)
GLUCOSE SERPL-MCNC: 163 MG/DL (ref 65–100)
POTASSIUM SERPL-SCNC: 3.4 MMOL/L (ref 3.5–5.1)
PROT SERPL-MCNC: 7 G/DL (ref 6.4–8.2)
SODIUM SERPL-SCNC: 139 MMOL/L (ref 136–145)

## 2022-08-22 RX ORDER — CLOPIDOGREL BISULFATE 75 MG/1
TABLET ORAL
Qty: 90 TABLET | Refills: 1 | Status: SHIPPED | OUTPATIENT
Start: 2022-08-22

## 2022-08-22 NOTE — TELEPHONE ENCOUNTER
PCP: Toby Estes MD    Last appt: 1/28/2022  Future Appointments   Date Time Provider Halina Chin   10/19/2022  2:00 PM Toby Estes MD [facility] BS AMB   2/8/2023  1:00 PM MD PHIL Feng BS AMB       Requested Prescriptions     Signed Prescriptions Disp Refills    clopidogreL (PLAVIX) 75 mg tab 90 Tablet 1     Sig: TAKE 1 TABLET BY MOUTH EVERY DAY     Authorizing Provider: Aquiles Bravo     Ordering User: Leandro Dozier         Other Comments:  Message sent to Kaelyn Hennessy NP regarding last CBC: 2/9/2021  Verbal order per provider. Order (medication, dose, route, frequency, amount, refills) repeated and verified twice.

## 2022-09-05 RX ORDER — FLUTICASONE PROPIONATE 50 MCG
SPRAY, SUSPENSION (ML) NASAL
Qty: 16 G | Refills: 11 | Status: SHIPPED | OUTPATIENT
Start: 2022-09-05

## 2022-09-08 RX ORDER — AMLODIPINE BESYLATE 2.5 MG/1
TABLET ORAL
Qty: 90 TABLET | Refills: 3 | Status: SHIPPED | OUTPATIENT
Start: 2022-09-08

## 2022-09-08 NOTE — TELEPHONE ENCOUNTER
Refill Request Received for the Following Medication     Requested Prescriptions     Pending Prescriptions Disp Refills    amLODIPine (NORVASC) 2.5 mg tablet [Pharmacy Med Name: AMLODIPINE BESYLATE 2.5MG TABLETS] 90 Tablet 3     Sig: TAKE 1 TABLET BY MOUTH EVERY DAY       Last Prescribed:10-26-21    Last Appointment With Me:  08-21-22    Future Appointments:  Future Appointments   Date Time Provider Halina Chin   10/19/2022  2:00 PM [place]ina [place] [facility] [place]   2/8/2023  1:00 PM MD PHIL Rolle BS AMB

## 2022-10-19 ENCOUNTER — OFFICE VISIT (OUTPATIENT)
Dept: FAMILY MEDICINE CLINIC | Age: 72
End: 2022-10-19
Payer: MEDICARE

## 2022-10-19 VITALS
WEIGHT: 180.4 LBS | DIASTOLIC BLOOD PRESSURE: 63 MMHG | HEART RATE: 57 BPM | SYSTOLIC BLOOD PRESSURE: 142 MMHG | BODY MASS INDEX: 30.06 KG/M2 | RESPIRATION RATE: 17 BRPM | OXYGEN SATURATION: 98 % | HEIGHT: 65 IN

## 2022-10-19 DIAGNOSIS — I10 ESSENTIAL HYPERTENSION, BENIGN: ICD-10-CM

## 2022-10-19 DIAGNOSIS — I25.10 CORONARY ARTERY DISEASE INVOLVING NATIVE CORONARY ARTERY OF NATIVE HEART WITHOUT ANGINA PECTORIS: ICD-10-CM

## 2022-10-19 DIAGNOSIS — E78.2 MIXED HYPERLIPIDEMIA: ICD-10-CM

## 2022-10-19 DIAGNOSIS — E11.59 TYPE 2 DIABETES MELLITUS WITH OTHER CIRCULATORY COMPLICATION, WITHOUT LONG-TERM CURRENT USE OF INSULIN (HCC): Primary | ICD-10-CM

## 2022-10-19 DIAGNOSIS — Z23 NEEDS FLU SHOT: ICD-10-CM

## 2022-10-19 LAB — HBA1C MFR BLD HPLC: 7.1 %

## 2022-10-19 PROCEDURE — G0463 HOSPITAL OUTPT CLINIC VISIT: HCPCS | Performed by: FAMILY MEDICINE

## 2022-10-19 PROCEDURE — G8536 NO DOC ELDER MAL SCRN: HCPCS | Performed by: FAMILY MEDICINE

## 2022-10-19 PROCEDURE — G8754 DIAS BP LESS 90: HCPCS | Performed by: FAMILY MEDICINE

## 2022-10-19 PROCEDURE — 99214 OFFICE O/P EST MOD 30 MIN: CPT | Performed by: FAMILY MEDICINE

## 2022-10-19 PROCEDURE — 1101F PT FALLS ASSESS-DOCD LE1/YR: CPT | Performed by: FAMILY MEDICINE

## 2022-10-19 PROCEDURE — G9899 SCRN MAM PERF RSLTS DOC: HCPCS | Performed by: FAMILY MEDICINE

## 2022-10-19 PROCEDURE — G8427 DOCREV CUR MEDS BY ELIG CLIN: HCPCS | Performed by: FAMILY MEDICINE

## 2022-10-19 PROCEDURE — 2022F DILAT RTA XM EVC RTNOPTHY: CPT | Performed by: FAMILY MEDICINE

## 2022-10-19 PROCEDURE — 3051F HG A1C>EQUAL 7.0%<8.0%: CPT | Performed by: FAMILY MEDICINE

## 2022-10-19 PROCEDURE — G8399 PT W/DXA RESULTS DOCUMENT: HCPCS | Performed by: FAMILY MEDICINE

## 2022-10-19 PROCEDURE — 83036 HEMOGLOBIN GLYCOSYLATED A1C: CPT | Performed by: FAMILY MEDICINE

## 2022-10-19 PROCEDURE — 3017F COLORECTAL CA SCREEN DOC REV: CPT | Performed by: FAMILY MEDICINE

## 2022-10-19 PROCEDURE — 1123F ACP DISCUSS/DSCN MKR DOCD: CPT | Performed by: FAMILY MEDICINE

## 2022-10-19 PROCEDURE — 1090F PRES/ABSN URINE INCON ASSESS: CPT | Performed by: FAMILY MEDICINE

## 2022-10-19 PROCEDURE — G8417 CALC BMI ABV UP PARAM F/U: HCPCS | Performed by: FAMILY MEDICINE

## 2022-10-19 PROCEDURE — G8753 SYS BP > OR = 140: HCPCS | Performed by: FAMILY MEDICINE

## 2022-10-19 PROCEDURE — G8510 SCR DEP NEG, NO PLAN REQD: HCPCS | Performed by: FAMILY MEDICINE

## 2022-10-19 RX ORDER — PREDNISONE 10 MG/1
10 TABLET ORAL DAILY
Qty: 7 TABLET | Refills: 0 | Status: SHIPPED | OUTPATIENT
Start: 2022-10-19

## 2022-10-19 NOTE — PROGRESS NOTES
HISTORY OF PRESENT ILLNESS  Ana Lilia Flowers is a 67 y.o. female. f/u dm2,hbp, cad,chol gerd, PHN of rt shoulder. Having seasonal allergy issues  Follow-up  The history is provided by the Patient. This is a new problem. The problem occurs daily. The problem has been gradually improving. Diabetes  The history is provided by the Patient. This is a chronic problem. The problem occurs daily. The problem has not changed since onset. Nothing relieves the symptoms. Hypertension   The history is provided by the Patient. This is a chronic problem. The problem has been gradually improving. Pertinent negatives include no orthopnea, no palpitations, no malaise/fatigue, no blurred vision and no nausea. Cholesterol Problem  The history is provided by the Patient. This is a chronic problem. The problem occurs daily. The problem has been gradually improving. Nasal Congestion   This is a new problem. The problem has not changed since onset. The pain is moderate. The pain has been Intermittent since onset. Associated symptoms include congestion and sinus pressure. Pertinent negatives include no cough. Review of Systems   Constitutional:  Negative for fever, malaise/fatigue and weight loss. HENT:  Positive for congestion and sinus pressure. Negative for hearing loss. Eyes:  Negative for blurred vision. Respiratory:  Negative for cough. Cardiovascular:  Negative for palpitations and orthopnea. Gastrointestinal:  Negative for blood in stool, constipation, heartburn and nausea. Genitourinary:  Negative for dysuria and frequency. Musculoskeletal:  Negative for back pain and myalgias. Skin:  Negative for itching. Neurological:  Negative for sensory change. Psychiatric/Behavioral:  Negative for depression. The patient does not have insomnia. Physical Exam  Vitals reviewed. Constitutional:       Appearance: Normal appearance. She is well-developed. She is obese.    HENT:      Head: Normocephalic and atraumatic. Right Ear: External ear normal.      Left Ear: External ear normal.      Nose: Mucosal edema and congestion present. No rhinorrhea. Mouth/Throat:      Pharynx: No posterior oropharyngeal erythema. Eyes:      Conjunctiva/sclera: Conjunctivae normal.      Pupils: Pupils are equal, round, and reactive to light. Neck:      Thyroid: No thyromegaly. Trachea: No tracheal deviation. Cardiovascular:      Rate and Rhythm: Normal rate and regular rhythm. Heart sounds: Normal heart sounds. No murmur heard. No gallop. Pulmonary:      Effort: Pulmonary effort is normal. No respiratory distress. Breath sounds: Normal breath sounds. Abdominal:      Palpations: Abdomen is soft. Musculoskeletal:         General: Normal range of motion. Cervical back: Normal range of motion and neck supple. Skin:     General: Skin is warm and dry. Comments: Small movable soft sub cut mass l anterior leg 1x2 cm   Neurological:      Mental Status: She is alert and oriented to person, place, and time. Mental status is at baseline. Psychiatric:         Mood and Affect: Mood normal.         Behavior: Behavior normal.     Diagnoses and all orders for this visit:    1. Type 2 diabetes mellitus with other circulatory complication, without long-term current use of insulin (HCC)  -     AMB POC HEMOGLOBIN A1C    2. Essential hypertension, benign  -     METABOLIC PANEL, COMPREHENSIVE; Future    3. Mixed hyperlipidemia  -     CHOLESTEROL, TOTAL; Future    4. Coronary artery disease involving native coronary artery of native heart without angina pectoris    5. Needs flu shot    Other orders  -     predniSONE (DELTASONE) 10 mg tablet; Take 10 mg by mouth daily. Follow-up and Dispositions    Return in about 3 months (around 1/19/2023).

## 2022-10-19 NOTE — PROGRESS NOTES
Patient identified with two identification factors, Name and Date of Birth. Chief Complaint   Patient presents with    Diabetes    Cholesterol Problem    Hypertension     3 month follow-up. Visit Vitals  BP (!) 142/63 (BP 1 Location: Left arm, BP Patient Position: Sitting, BP Cuff Size: Adult)   Pulse (!) 57   Resp 17   Ht 5' 5\" (1.651 m)   Wt 180 lb 6.4 oz (81.8 kg)   SpO2 98%   BMI 30.02 kg/m²       Health Maintenance Due   Topic    COVID-19 Vaccine (1)    Shingrix Vaccine Age 50> (1 of 2)    Foot Exam Q1     Eye Exam Retinal or Dilated     MICROALBUMIN Q1     Flu Vaccine (1)    Breast Cancer Screen Mammogram         1. \"Have you been to the ER, urgent care clinic since your last visit? Hospitalized since your last visit? \" No    2. \"Have you seen or consulted any other health care providers outside of the [address] [place] [address] since your last visit? \" No     3. For patients aged 39-70: Has the patient had a colonoscopy / FIT/ Cologuard? Yes - no Care Gap present      If the patient is female:    4. For patients aged 41-77: Has the patient had a mammogram within the past 2 years?  Yes - no Care Gap present

## 2022-10-20 LAB
ALBUMIN SERPL-MCNC: 3.9 G/DL (ref 3.5–5)
ALBUMIN/GLOB SERPL: 1.2 {RATIO} (ref 1.1–2.2)
ALP SERPL-CCNC: 96 U/L (ref 45–117)
ALT SERPL-CCNC: 25 U/L (ref 12–78)
ANION GAP SERPL CALC-SCNC: 7 MMOL/L (ref 5–15)
AST SERPL-CCNC: 15 U/L (ref 15–37)
BILIRUB SERPL-MCNC: 0.8 MG/DL (ref 0.2–1)
BUN SERPL-MCNC: 16 MG/DL (ref 6–20)
BUN/CREAT SERPL: 15 (ref 12–20)
CALCIUM SERPL-MCNC: 10 MG/DL (ref 8.5–10.1)
CHLORIDE SERPL-SCNC: 103 MMOL/L (ref 97–108)
CHOLEST SERPL-MCNC: 150 MG/DL
CO2 SERPL-SCNC: 29 MMOL/L (ref 21–32)
COMMENT, HOLDF: NORMAL
CREAT SERPL-MCNC: 1.04 MG/DL (ref 0.55–1.02)
GLOBULIN SER CALC-MCNC: 3.2 G/DL (ref 2–4)
GLUCOSE SERPL-MCNC: 138 MG/DL (ref 65–100)
POTASSIUM SERPL-SCNC: 3.3 MMOL/L (ref 3.5–5.1)
PROT SERPL-MCNC: 7.1 G/DL (ref 6.4–8.2)
SAMPLES BEING HELD,HOLD: NORMAL
SODIUM SERPL-SCNC: 139 MMOL/L (ref 136–145)

## 2022-12-09 RX ORDER — TRIAMTERENE/HYDROCHLOROTHIAZID 37.5-25 MG
TABLET ORAL
Qty: 30 TABLET | Refills: 0 | Status: SHIPPED | OUTPATIENT
Start: 2022-12-09

## 2022-12-20 DIAGNOSIS — G89.29 CHRONIC RIGHT SHOULDER PAIN: Primary | ICD-10-CM

## 2022-12-20 DIAGNOSIS — M25.511 CHRONIC RIGHT SHOULDER PAIN: Primary | ICD-10-CM

## 2022-12-20 RX ORDER — CYCLOBENZAPRINE HCL 10 MG
10 TABLET ORAL
Qty: 30 TABLET | Refills: 2 | Status: SHIPPED | OUTPATIENT
Start: 2022-12-20

## 2023-01-12 RX ORDER — DAPAGLIFLOZIN 5 MG/1
TABLET, FILM COATED ORAL
Qty: 30 TABLET | Refills: 5 | Status: SHIPPED | OUTPATIENT
Start: 2023-01-12

## 2023-01-20 ENCOUNTER — OFFICE VISIT (OUTPATIENT)
Dept: FAMILY MEDICINE CLINIC | Age: 73
End: 2023-01-20
Payer: MEDICARE

## 2023-01-20 ENCOUNTER — TELEPHONE (OUTPATIENT)
Dept: FAMILY MEDICINE CLINIC | Age: 73
End: 2023-01-20

## 2023-01-20 VITALS
HEART RATE: 63 BPM | BODY MASS INDEX: 30.22 KG/M2 | WEIGHT: 181.4 LBS | DIASTOLIC BLOOD PRESSURE: 63 MMHG | OXYGEN SATURATION: 96 % | HEIGHT: 65 IN | SYSTOLIC BLOOD PRESSURE: 141 MMHG

## 2023-01-20 DIAGNOSIS — I25.10 CORONARY ARTERY DISEASE INVOLVING NATIVE CORONARY ARTERY OF NATIVE HEART WITHOUT ANGINA PECTORIS: ICD-10-CM

## 2023-01-20 DIAGNOSIS — E78.2 MIXED HYPERLIPIDEMIA: ICD-10-CM

## 2023-01-20 DIAGNOSIS — I10 ESSENTIAL HYPERTENSION, BENIGN: Primary | ICD-10-CM

## 2023-01-20 DIAGNOSIS — E87.6 HYPOKALEMIA: ICD-10-CM

## 2023-01-20 DIAGNOSIS — Z99.89 OSA ON CPAP: ICD-10-CM

## 2023-01-20 DIAGNOSIS — G47.33 OSA ON CPAP: ICD-10-CM

## 2023-01-20 DIAGNOSIS — E11.59 TYPE 2 DIABETES MELLITUS WITH OTHER CIRCULATORY COMPLICATION, WITHOUT LONG-TERM CURRENT USE OF INSULIN (HCC): ICD-10-CM

## 2023-01-20 DIAGNOSIS — Z12.31 ENCOUNTER FOR SCREENING MAMMOGRAM FOR BREAST CANCER: ICD-10-CM

## 2023-01-20 LAB — HBA1C MFR BLD HPLC: 7.4 %

## 2023-01-20 NOTE — TELEPHONE ENCOUNTER
Patient would like a call from [facility] regarding getting the flu shot she can be reached @ [phone]

## 2023-01-20 NOTE — PROGRESS NOTES
HISTORY OF PRESENT ILLNESS  Oma Otto is a 67 y.o. female. f/u dm2,hbp, cad,chol gerd, PHN . Has stable angina with cardiology appt upcoming. Having seasonal allergy issues  Diabetes  The history is provided by the Patient. This is a chronic problem. The problem occurs daily. The problem has not changed since onset. Associated symptoms include chest pain. Nothing relieves the symptoms. Hypertension   The history is provided by the Patient. This is a chronic problem. The problem has been gradually improving. Associated symptoms include chest pain. Pertinent negatives include no orthopnea, no palpitations, no malaise/fatigue, no blurred vision and no nausea. Cholesterol Problem  The history is provided by the Patient. This is a chronic problem. The problem occurs daily. The problem has been gradually improving. Associated symptoms include chest pain. Nasal Congestion   This is a new problem. The problem has not changed since onset. The pain is moderate. The pain has been Intermittent since onset. Associated symptoms include congestion, sinus pressure and chest pain. Pertinent negatives include no cough. Chest Pain   The history is provided by the Patient. This is a chronic problem. The problem has not changed since onset. The problem occurs rarely. The quality of the pain is described as pressure-like. The pain does not radiate. Pertinent negatives include no back pain, no cough, no fever, no malaise/fatigue, no nausea, no orthopnea and no palpitations. Review of Systems   Constitutional:  Negative for fever, malaise/fatigue and weight loss. HENT:  Positive for congestion and sinus pressure. Negative for hearing loss. Eyes:  Negative for blurred vision. Respiratory:  Negative for cough. Cardiovascular:  Positive for chest pain. Negative for palpitations and orthopnea. Gastrointestinal:  Negative for blood in stool, constipation, heartburn and nausea.    Genitourinary:  Negative for dysuria and frequency. Musculoskeletal:  Negative for back pain and myalgias. Skin:  Negative for itching. Neurological:  Negative for sensory change. Psychiatric/Behavioral:  Negative for depression. The patient does not have insomnia. Physical Exam  Vitals reviewed. Constitutional:       Appearance: Normal appearance. She is well-developed. She is obese. HENT:      Head: Normocephalic and atraumatic. Right Ear: External ear normal.      Left Ear: External ear normal.      Nose: Mucosal edema and congestion present. No rhinorrhea. Mouth/Throat:      Pharynx: No posterior oropharyngeal erythema. Eyes:      Conjunctiva/sclera: Conjunctivae normal.      Pupils: Pupils are equal, round, and reactive to light. Neck:      Thyroid: No thyromegaly. Trachea: No tracheal deviation. Cardiovascular:      Rate and Rhythm: Normal rate and regular rhythm. Heart sounds: Normal heart sounds. No murmur heard. No gallop. Pulmonary:      Effort: Pulmonary effort is normal. No respiratory distress. Breath sounds: Normal breath sounds. Abdominal:      Palpations: Abdomen is soft. Musculoskeletal:         General: Normal range of motion. Cervical back: Normal range of motion and neck supple. Skin:     General: Skin is warm and dry. Comments: Small movable soft sub cut mass l anterior leg 1x2 cm   Neurological:      Mental Status: She is alert and oriented to person, place, and time. Mental status is at baseline. Psychiatric:         Mood and Affect: Mood normal.         Behavior: Behavior normal.     Diagnoses and all orders for this visit:    1. Essential hypertension, benign,controlled  -     METABOLIC PANEL, COMPREHENSIVE; Future    2. Type 2 diabetes mellitus with other circulatory complication, without long-term current use of insulin (HCC,controlled  -     AMB POC HEMOGLOBIN A1C    3. Mixed hyperlipidemia  -     CHOLESTEROL, TOTAL; Future    4.  Coronary artery disease involving native coronary artery of native heart without angina pectoris    5. PHUC on CPAP    6. Encounter for screening mammogram for breast cancer    7.  Hypokalemia    Doing well,continue current meds and treatments

## 2023-01-20 NOTE — PROGRESS NOTES
Chief Complaint   Patient presents with    Follow-up     3 month follow up     1. \"Have you been to the ER, urgent care clinic since your last visit? Hospitalized since your last visit? \" No    2. \"Have you seen or consulted any other health care providers outside of the [address] [place] [address] since your last visit? \" No     3. For patients aged 39-70: Has the patient had a colonoscopy / FIT/ Cologuard? Yes - no Care Gap present      If the patient is female:    4. For patients aged 41-77: Has the patient had a mammogram within the past 2 years? No last one 2021      5. For patients aged 21-65: Has the patient had a pap smear?  NA - based on age or sex

## 2023-01-21 LAB
ALBUMIN SERPL-MCNC: 3.9 G/DL (ref 3.5–5)
ALBUMIN/GLOB SERPL: 1.2 (ref 1.1–2.2)
ALP SERPL-CCNC: 84 U/L (ref 45–117)
ALT SERPL-CCNC: 23 U/L (ref 12–78)
ANION GAP SERPL CALC-SCNC: 3 MMOL/L (ref 5–15)
AST SERPL-CCNC: 15 U/L (ref 15–37)
BILIRUB SERPL-MCNC: 1 MG/DL (ref 0.2–1)
BUN SERPL-MCNC: 23 MG/DL (ref 6–20)
BUN/CREAT SERPL: 21 (ref 12–20)
CALCIUM SERPL-MCNC: 10 MG/DL (ref 8.5–10.1)
CHLORIDE SERPL-SCNC: 105 MMOL/L (ref 97–108)
CHOLEST SERPL-MCNC: 151 MG/DL
CO2 SERPL-SCNC: 29 MMOL/L (ref 21–32)
CREAT SERPL-MCNC: 1.07 MG/DL (ref 0.55–1.02)
GLOBULIN SER CALC-MCNC: 3.3 G/DL (ref 2–4)
GLUCOSE SERPL-MCNC: 195 MG/DL (ref 65–100)
POTASSIUM SERPL-SCNC: 3.6 MMOL/L (ref 3.5–5.1)
PROT SERPL-MCNC: 7.2 G/DL (ref 6.4–8.2)
SODIUM SERPL-SCNC: 137 MMOL/L (ref 136–145)

## 2023-01-23 NOTE — TELEPHONE ENCOUNTER
Pt requesting a call back to see if you recommend that she get the flu shot or not since she had a bad case of the shingles after getting the COVID vaccine.   She can be reached at [phone]

## 2023-02-15 ENCOUNTER — TELEPHONE (OUTPATIENT)
Dept: FAMILY MEDICINE CLINIC | Age: 73
End: 2023-02-15

## 2023-02-15 DIAGNOSIS — J20.9 ACUTE BRONCHITIS, UNSPECIFIED ORGANISM: Primary | ICD-10-CM

## 2023-02-15 RX ORDER — AZITHROMYCIN 250 MG/1
TABLET, FILM COATED ORAL
Qty: 6 TABLET | Refills: 0 | Status: SHIPPED | OUTPATIENT
Start: 2023-02-15

## 2023-02-15 RX ORDER — BENZONATATE 200 MG/1
200 CAPSULE ORAL
Qty: 30 CAPSULE | Refills: 1 | Status: SHIPPED | OUTPATIENT
Start: 2023-02-15 | End: 2023-02-22

## 2023-02-15 RX ORDER — PREDNISONE 10 MG/1
10 TABLET ORAL 2 TIMES DAILY
Qty: 10 TABLET | Refills: 0 | Status: SHIPPED | OUTPATIENT
Start: 2023-02-15

## 2023-02-15 NOTE — TELEPHONE ENCOUNTER
----- Message from Fam Curranroselinenathalie sent at 2/15/2023 10:38 AM EST -----  Subject: Message to Provider    QUESTIONS  Information for Provider? Patient is calling to ask for a medication to be   sent the the pharmacy, chest congestion, cough, body aches, diarrhea ,   same as . Have been taking Mucinex.  ---------------------------------------------------------------------------  --------------  Maximo Dallas [id number]  [phone]; OK to leave message on voicemail  ---------------------------------------------------------------------------  --------------  SCRIPT ANSWERS  Relationship to Patient?  Self

## 2023-03-08 RX ORDER — TRIAMTERENE/HYDROCHLOROTHIAZID 37.5-25 MG
TABLET ORAL
Qty: 30 TABLET | Refills: 0 | Status: SHIPPED | OUTPATIENT
Start: 2023-03-08 | End: 2023-03-10

## 2023-03-09 RX ORDER — METOPROLOL SUCCINATE 25 MG/1
TABLET, EXTENDED RELEASE ORAL
Qty: 30 TABLET | Refills: 0 | Status: SHIPPED | OUTPATIENT
Start: 2023-03-09

## 2023-03-09 NOTE — TELEPHONE ENCOUNTER
Refill Request Received for the Following Medication     Requested Prescriptions     Pending Prescriptions Disp Refills    metoprolol succinate (TOPROL-XL) 25 mg XL tablet [Pharmacy Med Name: METOPROLOL ER SUCCINATE 25MG TABS] 90 Tablet 3     Sig: TAKE 1 TABLET BY MOUTH EVERY DAY       Last Prescribed:04-    Last Appointment With Me:  01-    Future Appointments:  Future Appointments   Date Time Provider Halina Chin   3/21/2023  2:20 PM MD PHIL Valladares BS AMB   5/22/2023 11:00 AM Siomara Barbosa MD [facility] BS AMB

## 2023-03-10 RX ORDER — TRIAMTERENE/HYDROCHLOROTHIAZID 37.5-25 MG
TABLET ORAL
Qty: 90 TABLET | Refills: 1 | Status: SHIPPED | OUTPATIENT
Start: 2023-03-10

## 2023-03-21 ENCOUNTER — OFFICE VISIT (OUTPATIENT)
Dept: CARDIOLOGY CLINIC | Age: 73
End: 2023-03-21
Payer: MEDICARE

## 2023-03-21 VITALS
HEIGHT: 65 IN | WEIGHT: 178 LBS | OXYGEN SATURATION: 98 % | RESPIRATION RATE: 16 BRPM | DIASTOLIC BLOOD PRESSURE: 70 MMHG | HEART RATE: 68 BPM | SYSTOLIC BLOOD PRESSURE: 110 MMHG | BODY MASS INDEX: 29.66 KG/M2

## 2023-03-21 DIAGNOSIS — R06.09 DOE (DYSPNEA ON EXERTION): ICD-10-CM

## 2023-03-21 DIAGNOSIS — I25.10 CORONARY ARTERY DISEASE INVOLVING NATIVE CORONARY ARTERY OF NATIVE HEART WITHOUT ANGINA PECTORIS: Primary | ICD-10-CM

## 2023-03-21 DIAGNOSIS — I25.82 CHRONIC TOTAL OCCLUSION OF CORONARY ARTERY: ICD-10-CM

## 2023-03-21 DIAGNOSIS — G47.33 OSA (OBSTRUCTIVE SLEEP APNEA): ICD-10-CM

## 2023-03-21 DIAGNOSIS — Z98.61 S/P PTCA (PERCUTANEOUS TRANSLUMINAL CORONARY ANGIOPLASTY): ICD-10-CM

## 2023-03-21 DIAGNOSIS — I20.9 ANGINA, CLASS III (HCC): ICD-10-CM

## 2023-03-21 DIAGNOSIS — E78.2 MIXED HYPERLIPIDEMIA: ICD-10-CM

## 2023-03-21 DIAGNOSIS — I10 ESSENTIAL HYPERTENSION, BENIGN: ICD-10-CM

## 2023-03-21 PROCEDURE — 3017F COLORECTAL CA SCREEN DOC REV: CPT | Performed by: INTERNAL MEDICINE

## 2023-03-21 PROCEDURE — G8536 NO DOC ELDER MAL SCRN: HCPCS | Performed by: INTERNAL MEDICINE

## 2023-03-21 PROCEDURE — 1090F PRES/ABSN URINE INCON ASSESS: CPT | Performed by: INTERNAL MEDICINE

## 2023-03-21 PROCEDURE — G8417 CALC BMI ABV UP PARAM F/U: HCPCS | Performed by: INTERNAL MEDICINE

## 2023-03-21 PROCEDURE — 93005 ELECTROCARDIOGRAM TRACING: CPT | Performed by: INTERNAL MEDICINE

## 2023-03-21 PROCEDURE — 93010 ELECTROCARDIOGRAM REPORT: CPT | Performed by: INTERNAL MEDICINE

## 2023-03-21 PROCEDURE — 99214 OFFICE O/P EST MOD 30 MIN: CPT | Performed by: INTERNAL MEDICINE

## 2023-03-21 PROCEDURE — 3078F DIAST BP <80 MM HG: CPT | Performed by: INTERNAL MEDICINE

## 2023-03-21 PROCEDURE — G8432 DEP SCR NOT DOC, RNG: HCPCS | Performed by: INTERNAL MEDICINE

## 2023-03-21 PROCEDURE — G9899 SCRN MAM PERF RSLTS DOC: HCPCS | Performed by: INTERNAL MEDICINE

## 2023-03-21 PROCEDURE — 3074F SYST BP LT 130 MM HG: CPT | Performed by: INTERNAL MEDICINE

## 2023-03-21 PROCEDURE — G8399 PT W/DXA RESULTS DOCUMENT: HCPCS | Performed by: INTERNAL MEDICINE

## 2023-03-21 PROCEDURE — 1101F PT FALLS ASSESS-DOCD LE1/YR: CPT | Performed by: INTERNAL MEDICINE

## 2023-03-21 PROCEDURE — 1123F ACP DISCUSS/DSCN MKR DOCD: CPT | Performed by: INTERNAL MEDICINE

## 2023-03-21 PROCEDURE — G0463 HOSPITAL OUTPT CLINIC VISIT: HCPCS | Performed by: INTERNAL MEDICINE

## 2023-03-21 PROCEDURE — G8427 DOCREV CUR MEDS BY ELIG CLIN: HCPCS | Performed by: INTERNAL MEDICINE

## 2023-03-21 RX ORDER — CLOPIDOGREL BISULFATE 75 MG/1
75 TABLET ORAL DAILY
Qty: 90 TABLET | Refills: 4 | Status: SHIPPED | OUTPATIENT
Start: 2023-03-21

## 2023-03-21 RX ORDER — RABEPRAZOLE SODIUM 20 MG/1
TABLET, DELAYED RELEASE ORAL
COMMUNITY
Start: 2023-01-10

## 2023-03-21 NOTE — PROGRESS NOTES
[address], [address] [place] [address]  [phone]    [address] [facility], [facility] [address]      Subjective:      Robb Santamaria is a 68 y.o. female is here for routine f/u. The patient was last seen by us in January 2022 for preoperative clearance for removal of complex ovarian cyst.  Dr. Ana Wright did a complex surgery unfortunately all of the pathology was reportedly benign. She had normal stress test and echo prior to that surgery. Today, the patient denies chest pain/ shortness of breath, orthopnea, PND, LE edema, palpitations, syncope, or presyncope.        Patient Active Problem List    Diagnosis Date Noted    Chronic renal disease, stage III 07/12/2022    Type 2 diabetes with nephropathy ([facility] [address] 07/10/2020    Eructation 08/09/2019    Type 2 diabetes mellitus with diabetic neuropathy ([address] 03/26/2018    Diabetes mellitus ([address] 02/20/2018    Routine adult health maintenance 10/28/2015    Gastric ulcer 08/11/2015    Unstable angina ([address] 07/16/2015    Angina, class III ([address] 07/07/2015    Myocardial ischemia 07/07/2015    S/P PTCA (percutaneous transluminal coronary angioplasty) 07/07/2015    Chest pain 05/15/2015    Diarrhea 01/05/2015    CAD (coronary artery disease) 10/23/2014    Snoring 10/21/2014    PHUC (obstructive sleep apnea) 10/21/2014    Presence of stent in right coronary artery 12/30/2013    Myalgia and myositis, unspecified 02/04/2013    Esophageal dysphagia 10/14/2012    Esophageal motor disorder 10/14/2012    Chronic total occlusion of coronary artery 06/25/2012    S/P cardiac cath 03/07/2012    Esophageal dysmotility 10/10/2011    Allergic rhinitis 02/20/2011    Esophageal reflux 02/20/2011    Essential hypertension, benign 02/20/2011    Anxiety state, unspecified 02/20/2011    Encounter for long-term (current) use of other medications 02/20/2011    Mixed hyperlipidemia 02/20/2011      Ruddy Gaytan MD  Past Medical History:   Diagnosis Date    Allergic rhinitis, cause unspecified 2011    Angina, class III ([facility] [address] 2013    as of 8/4/15 pt reports intermittent \"angina pain\" and GRAJEDA; followed by Dr Judit Mix     Arthritis     neck - numbness of arm    CAD (coronary artery disease)     angina / Dr Malinda Gandhi polyps 2011    Diabetes ([facility] [address]     Diarrhea     \"random\" per pt; followed by Dr Rosibel Spears    Encounter for long-term (current) use of other medications 2011    Eructation 2019    Esophageal dysmotility 10/10/2011    Essential hypertension, benign 2011    Gastric ulcer 2015    GERD (gastroesophageal reflux disease)     Hypertension     Ill-defined condition     torn MCL - left - no surgery    Mixed hyperlipidemia 2011    Nausea & vomiting     Sleep apnea     CPAP;  Dr Golden Hassan  sleep MD      Past Surgical History:   Procedure Laterality Date    CARDIAC CATHETERIZATION  2012         HX BUNIONECTOMY Right     HX CATARACT REMOVAL Bilateral     HX  SECTION      x3    HX CHOLECYSTECTOMY      HX COLONOSCOPY      HX GI      EGDs with dilatation    HX HEART CATHETERIZATION      catherization with 3 stents - states stents are blocked    HX HEART CATHETERIZATION  2015    unable to stent; tried to unblock stents but unable to/starting cardiac rehab Aug2015/has collateral circulation    PA EGD BALLOON DILATION ESOPHAGUS <30 MM DIAM  10/13/2010         PA EGD TRANSORAL BIOPSY SINGLE/MULTIPLE  10/13/2010         UPPER GI ENDOSCOPY,BALL DIL,30MM  3/30/2015         UPPER GI ENDOSCOPY,BALL DIL,30MM  2019         UPPER GI ENDOSCOPY,BIOPSY  3/30/2015         UPPER GI ENDOSCOPY,BIOPSY  2019          Allergies   Allergen Reactions    Penicillins Hives     Other reaction(s): Hives    Protamine Anaphylaxis    Sulfa (Sulfonamide Antibiotics) Hives    Imdur [Isosorbide Mononitrate] Other (comments)     headache      Family History   Problem Relation Age of Onset    Heart Disease Mother     Hypertension Mother Heart Attack Mother     Heart Disease Father     Hypertension Father     Heart Surgery Father     Cancer Sister         lung    Cancer Brother         brain tumor - malignant    Breast Cancer Sister       Social History     Socioeconomic History    Marital status:      Spouse name: Not on file    Number of children: Not on file    Years of education: Not on file    Highest education level: Not on file   Occupational History    Not on file   Tobacco Use    Smoking status: Never    Smokeless tobacco: Never   Vaping Use    Vaping Use: Never used   Substance and Sexual Activity    Alcohol use: Yes     Alcohol/week: 1.7 standard drinks     Types: 2 Glasses of wine per week     Comment: occasionally    Drug use: Never    Sexual activity: Yes     Partners: Male   Other Topics Concern    Not on file   Social History Narrative    Not on file     Social Determinants of Health     Financial Resource Strain: Not on file   Food Insecurity: Not on file   Transportation Needs: Not on file   Physical Activity: Not on file   Stress: Not on file   Social Connections: Not on file   Intimate Partner Violence: Not on file   Housing Stability: Not on file      Current Outpatient Medications   Medication Sig    RABEprazole (ACIPHEX) 20 mg TbEC TAKE 1 TABLET BY MOUTH EVERY DAY BEFORE MEALS    triamterene-hydroCHLOROthiazide (MAXZIDE) 37.5-25 mg per tablet TAKE 1 TABLET BY MOUTH EVERY MORNING    metoprolol succinate (TOPROL-XL) 25 mg XL tablet TAKE 1 TABLET BY MOUTH EVERY DAY    azithromycin (ZITHROMAX) 250 mg tablet Take 2 tablets today, then take 1 tablet daily    Farxiga 5 mg tab tablet TAKE 1 TABLET BY MOUTH DAILY    cyclobenzaprine (FLEXERIL) 10 mg tablet Take 1 Tablet by mouth three (3) times daily as needed for Muscle Spasm(s).     Januvia 100 mg tablet TAKE 1 TABLET BY MOUTH ONCE DAILY    ranolazine ER (RANEXA) 1,000 mg TAKE 1 TABLET BY MOUTH TWICE DAILY    amLODIPine (NORVASC) 2.5 mg tablet TAKE 1 TABLET BY MOUTH EVERY DAY fluticasone propionate (FLONASE) 50 mcg/actuation nasal spray SHAKE LIQUID AND USE 2 SPRAYS IN EACH NOSTRIL EVERY DAY    clopidogreL (PLAVIX) 75 mg tab TAKE 1 TABLET BY MOUTH EVERY DAY    rosuvastatin (CRESTOR) 10 mg tablet TAKE 1 TABLET BY MOUTH EVERY EVENING    glimepiride (AMARYL) 2 mg tablet TAKE 1 AND 1/2 TABLETS BY MOUTH EVERY MORNING    sucralfate (CARAFATE) 1 gram tablet TAKE 1 TABLET BY MOUTH FOUR TIMES DAILY AS NEEDED FOR PAIN    famotidine (PEPCID) 40 mg tablet TK 1 T PO BID UTD    MAG CARB/ALUMINUM HYDROX/ALGIN (GAVISCON EXTRA STRENGTH PO) Take  by mouth as needed. ASPIRIN PO Take 81 mg by mouth daily. MINH/D3/MAG11/ZINC//RONEN/BOR (CALTRATE 600+D PLUS MINERALS PO) Take  by mouth. Takes one po once daily. GUAIFENESIN (MUCINEX PO) Take 1,200 mg by mouth as needed. Extra strength. glucose blood VI test strips (BLOOD GLUCOSE TEST) strip by Does Not Apply route Daily Check three times daily . Dx. Code E11.9    Lancets misc Check Blood sugar 3 times daily Dx. Code E11.9    cyanocobalamin 1,000 mcg tablet Take 500 mcg by mouth daily. Blood-Glucose Meter monitoring kit AS DIRECTED TWICE DAILY    coenzyme Q-10 (CO Q-10) 200 mg capsule Take 1 Cap by mouth daily. Over the counter    cholecalciferol (VITAMIN D3) (1000 Units /25 mcg) tablet Take 1,000 Units by mouth daily. predniSONE (DELTASONE) 10 mg tablet Take 10 mg by mouth two (2) times a day. (Patient not taking: Reported on 3/21/2023)    nitroglycerin (Nitrostat) 0.4 mg SL tablet place 1 tablet under the tongue every 5 minutes if needed     No current facility-administered medications for this visit. Review of Symptoms:  11 systems reviewed, negative other than as stated in the HPI    Physical ExamPhysical Exam:    Vitals:    03/21/23 1504   BP: 110/70   Pulse: 68   Resp: 16   SpO2: 98%   Weight: 178 lb (80.7 kg)   Height: 5' 5\" (1.651 m)     Body mass index is 29.62 kg/m². General PE  Gen:  NAD  Mental Status - Alert.  General Appearance - Not in acute distress. HEENT:  PERRL, no carotid bruits or JVD  Chest and Lung Exam   Inspection: Accessory muscles - No use of accessory muscles in breathing. Auscultation:   Breath sounds: - Normal.   Cardiovascular   Inspection: Jugular vein - Bilateral - Inspection Normal.   Palpation/Percussion:   Apical Impulse: - Normal.   Auscultation: Rhythm - Regular. Heart Sounds - S1 WNL and S2 WNL. No S3 or S4. Murmurs & Other Heart Sounds: Auscultation of the heart reveals - No Murmurs. Peripheral Vascular   Upper Extremity: Inspection - Bilateral - No Cyanotic nailbeds or Digital clubbing. Lower Extremity:   Palpation: Edema - Bilateral - No edema. Abdomen:   Soft, non-tender, bowel sounds are active.   Neuro: A&O times 3, CN and motor grossly WNL    Labs:   Lab Results   Component Value Date/Time    Cholesterol, total 151 01/20/2023 02:51 PM    Cholesterol, total 150 10/19/2022 02:24 PM    Cholesterol, total 166 03/16/2022 10:39 AM    Cholesterol, total 143 11/08/2021 02:30 PM    Cholesterol, total 159 05/05/2021 03:56 PM    HDL Cholesterol 49 03/16/2022 10:39 AM    HDL Cholesterol 45 05/05/2021 03:56 PM    HDL Cholesterol 39 (L) 01/18/2021 09:03 AM    HDL Cholesterol 39 (L) 10/13/2020 12:20 PM    HDL Cholesterol 38 (L) 07/10/2020 10:55 AM    LDL, calculated 65.2 03/16/2022 10:39 AM    LDL, calculated 56.4 05/05/2021 03:56 PM    LDL, calculated 64 01/18/2021 09:03 AM    LDL, calculated 58 10/13/2020 12:20 PM    LDL, calculated 48 07/10/2020 10:55 AM    LDL, calculated 45 03/10/2020 10:22 AM    LDL, calculated 55 12/11/2019 11:26 AM    Triglyceride 259 (H) 03/16/2022 10:39 AM    Triglyceride 288 (H) 05/05/2021 03:56 PM    Triglyceride 287 (H) 01/18/2021 09:03 AM    Triglyceride 266 (H) 10/13/2020 12:20 PM    Triglyceride 254 (H) 07/10/2020 10:55 AM    CHOL/HDL Ratio 3.4 03/16/2022 10:39 AM    CHOL/HDL Ratio 3.5 05/05/2021 03:56 PM    CHOL/HDL Ratio 3.0 12/13/2013 03:16 AM     Lab Results Component Value Date/Time    CK 72 09/14/2012 10:20 PM     Lab Results   Component Value Date/Time    Sodium 137 01/20/2023 02:51 PM    Potassium 3.6 01/20/2023 02:51 PM    Chloride 105 01/20/2023 02:51 PM    CO2 29 01/20/2023 02:51 PM    Anion gap 3 (L) 01/20/2023 02:51 PM    Glucose 195 (H) 01/20/2023 02:51 PM    BUN 23 (H) 01/20/2023 02:51 PM    Creatinine 1.07 (H) 01/20/2023 02:51 PM    BUN/Creatinine ratio 21 (H) 01/20/2023 02:51 PM    GFR est AA 54 (L) 07/13/2022 02:23 PM    GFR est non-AA 45 (L) 07/13/2022 02:23 PM    Calcium 10.0 01/20/2023 02:51 PM    Bilirubin, total 1.0 01/20/2023 02:51 PM    Alk. phosphatase 84 01/20/2023 02:51 PM    Protein, total 7.2 01/20/2023 02:51 PM    Albumin 3.9 01/20/2023 02:51 PM    Globulin 3.3 01/20/2023 02:51 PM    A-G Ratio 1.2 01/20/2023 02:51 PM    ALT (SGPT) 23 01/20/2023 02:51 PM       EKG:  NSR     02/09/22    ECHO ADULT COMPLETE 02/11/2022 2/11/2022    Interpretation Summary    Left Ventricle: Left ventricle size is normal. Moderate basal septal thickening. Normal wall motion. Normal left ventricular systolic function with a visually estimated EF of 55 - 60%. Normal diastolic function. Signed by: Terry Lemos MD on 2/11/2022  5:35 PM    02/10/22    NUCLEAR CARDIAC STRESS TEST 02/10/2022 2/11/2022    Interpretation Summary    Nuclear Findings: LVEF measures 82%. LV perfusion is normal. There is no evidence of inducible ischemia. Nuclear Findings: Normal left ventricular systolic function post-stress. LVEF measures 82%. Nuclear Findings: Normal pharmacological myocardial perfusion study. Findings suggest a low risk of myocardial ischemia. ECG: Resting ECG demonstrates normal sinus rhythm. Stress Test: A pharmacological stress test was performed using regadenoson. Hemodynamics are non-diagnostic due to medication. Blood pressure demonstrated a normal response and heart rate demonstrated a normal response to stress.     Signed by: Genia Fernandez Aracelis Wallace MD on 2/10/2022  4:26 PM         Assessment:          ICD-10-CM ICD-9-CM    1. Coronary artery disease involving native coronary artery of native heart without angina pectoris  I25.10 414.01 AMB POC EKG ROUTINE W/ 12 LEADS, INTER & REP      2. S/P PTCA (percutaneous transluminal coronary angioplasty)  Z98.61 V45.82       3. Angina, class III (HCC)  I20.9 413.9       4. Essential hypertension, benign  I10 401.1       5. PHUC (obstructive sleep apnea)  G47.33 327.23       6. Chronic total occlusion of coronary artery  I25.82 414.00      414.2       7. GRAJEDA (dyspnea on exertion)  R06.09 786.09       8. Mixed hyperlipidemia  E78.2 272.2           Orders Placed This Encounter    AMB POC EKG ROUTINE W/ 12 LEADS, INTER & REP     Order Specific Question:   Reason for Exam:     Answer:   ROUTINE    RABEprazole (ACIPHEX) 20 mg TbEC     Sig: TAKE 1 TABLET BY MOUTH EVERY DAY BEFORE MEALS        Plan:     ASHD/preoperative cardiovascular risk assessment:  2 unsuccessful attempts at PCI of  of RCA, last in 7/2015. Normal echo and stress Myoview in 2022, likely attributable to improved collateral blood flow to the RCA territory  Continue ASA, Plavix: Given her  recommend she continue on Plavix. Check CBC. Low threshold to stop Plavix if signs of bleeding or significant anemia. Continue BB, CCB, Ranexa     HTN  Controlled with current therapy  Stable kidney fxn in 12/2021     HLD     Lab Results   Component Value Date/Time    Cholesterol, total 151 01/20/2023 02:51 PM    HDL Cholesterol 49 03/16/2022 10:39 AM    LDL, calculated 65.2 03/16/2022 10:39 AM    VLDL, calculated 51.8 03/16/2022 10:39 AM    Triglyceride 259 (H) 03/16/2022 10:39 AM    CHOL/HDL Ratio 3.4 03/16/2022 10:39 AM     Lab Results   Component Value Date/Time    ALT (SGPT) 23 01/20/2023 02:51 PM    AST (SGOT) 15 01/20/2023 02:51 PM    Alk. phosphatase 84 01/20/2023 02:51 PM    Bilirubin, total 1.0 01/20/2023 02:51 PM     On statin.  Lipids and labs followed by PCP     PHUC  On CPAP therapy, followed by Dr Cameron Pantoja     DM  On oral agents     Leg fatigue / cramps  No occlusive disease per arterial duplex in 2018  She will call us if symptoms worsen and will repeat testing and order venous duplex        Hx S/p esophageal dilatation performed by Dr Jefry Givens in 8/19. Now that Dr. Jefry Givens has retired, she requests the name of a new gastroenterologist and I have previously recommended Dr. Dex Adhikari. She will call. Counseled on diet and exercise- eventual goal of 30-60 minutes 5-7 times a week as per AHA guidelines. They previously bought a treadmill but not using it as much as she would like recently. Continue current care and f/u in 1 year, sooner as needed.            Cornell Sandifer, MD

## 2023-03-21 NOTE — LETTER
3/21/2023    Patient: Pro Vasquez   YOB: 1950   Date of Visit: 3/21/2023     Fortino Murray, [address] [address]  [address] [place]    Dear Fortino Murray MD,      Thank you for referring Ms. Anselmo Trujillo to [address] [place] [address] for evaluation. My notes for this consultation are attached. If you have questions, please do not hesitate to call me. I look forward to following your patient along with you.       Sincerely,    Pollo Clark MD

## 2023-03-23 ENCOUNTER — DOCUMENTATION ONLY (OUTPATIENT)
Dept: FAMILY MEDICINE CLINIC | Age: 73
End: 2023-03-23

## 2023-03-31 ENCOUNTER — TELEPHONE (OUTPATIENT)
Dept: CARDIOLOGY CLINIC | Age: 73
End: 2023-03-31

## 2023-03-31 NOTE — TELEPHONE ENCOUNTER
----- Message from Aline Bethea MD sent at 3/31/2023 12:37 PM EDT -----  Please advise blood count is stable, continue current medications including Plavix.

## 2023-03-31 NOTE — TELEPHONE ENCOUNTER
TC to pt, ID verified. Advised of lab result and to continue current meds including Plavix. No further questions.

## 2023-04-17 RX ORDER — ROSUVASTATIN CALCIUM 10 MG/1
TABLET, COATED ORAL
Qty: 90 TABLET | Refills: 1 | Status: SHIPPED | OUTPATIENT
Start: 2023-04-17

## 2023-04-19 RX ORDER — RANOLAZINE 1000 MG/1
TABLET, EXTENDED RELEASE ORAL
Qty: 180 TABLET | Refills: 3 | Status: SHIPPED | OUTPATIENT
Start: 2023-04-19

## 2023-04-19 NOTE — TELEPHONE ENCOUNTER
Refill Request Received for the Following Medication     Requested Prescriptions     Pending Prescriptions Disp Refills    ranolazine ER (RANEXA) 1,000 mg [Pharmacy Med Name: RANOLAZINE 1000MG ER TABLETS] 180 Tablet 0     Sig: TAKE 1 TABLET BY MOUTH TWICE DAILY       Last Prescribed:10-11-22    Last Appointment With Me:  3/21/2023     Future Appointments:  Future Appointments   Date Time Provider Halina Chin   5/22/2023 11:00 AM Antonia Jeter MD [facility] BS AMB   3/18/2024 11:00 AM Sanjay Matt MD [facility] BS AMB

## 2023-05-04 ENCOUNTER — TELEPHONE (OUTPATIENT)
Dept: FAMILY MEDICINE CLINIC | Age: 73
End: 2023-05-04

## 2023-05-05 ENCOUNTER — OFFICE VISIT (OUTPATIENT)
Dept: FAMILY MEDICINE CLINIC | Age: 73
End: 2023-05-05

## 2023-05-05 VITALS
BODY MASS INDEX: 29.82 KG/M2 | RESPIRATION RATE: 18 BRPM | SYSTOLIC BLOOD PRESSURE: 144 MMHG | DIASTOLIC BLOOD PRESSURE: 84 MMHG | TEMPERATURE: 97.5 F | HEART RATE: 61 BPM | HEIGHT: 65 IN | WEIGHT: 179 LBS | OXYGEN SATURATION: 98 %

## 2023-05-05 DIAGNOSIS — R31.0 GROSS HEMATURIA: Primary | ICD-10-CM

## 2023-05-05 DIAGNOSIS — I10 ESSENTIAL HYPERTENSION, BENIGN: ICD-10-CM

## 2023-05-05 DIAGNOSIS — E11.9 TYPE 2 DIABETES MELLITUS WITHOUT COMPLICATION, WITHOUT LONG-TERM CURRENT USE OF INSULIN (HCC): ICD-10-CM

## 2023-05-05 PROBLEM — E11.22 TYPE 2 DIABETES MELLITUS WITH CHRONIC KIDNEY DISEASE (HCC): Status: ACTIVE | Noted: 2023-05-05

## 2023-05-05 LAB
APPEARANCE UR: CLEAR
BACTERIA URNS QL MICRO: ABNORMAL /HPF
BILIRUB UR QL STRIP: NEGATIVE
BILIRUB UR QL: NEGATIVE
COLOR UR: ABNORMAL
EPITH CASTS URNS QL MICRO: ABNORMAL /LPF
GLUCOSE UR STRIP.AUTO-MCNC: >1000 MG/DL
GLUCOSE UR-MCNC: NORMAL MG/DL
HGB UR QL STRIP: ABNORMAL
HYALINE CASTS URNS QL MICRO: ABNORMAL /LPF (ref 0–5)
KETONES P FAST UR STRIP-MCNC: NEGATIVE MG/DL
KETONES UR QL STRIP.AUTO: NEGATIVE MG/DL
LEUKOCYTE ESTERASE UR QL STRIP.AUTO: ABNORMAL
NITRITE UR QL STRIP.AUTO: NEGATIVE
PH UR STRIP: 5.5 [PH] (ref 4.6–8)
PH UR STRIP: 6 (ref 5–8)
PROT UR QL STRIP: NEGATIVE
PROT UR STRIP-MCNC: NEGATIVE MG/DL
RBC #/AREA URNS HPF: ABNORMAL /HPF (ref 0–5)
SP GR UR REFRACTOMETRY: 1.03 (ref 1–1.03)
SP GR UR STRIP: 1.02 (ref 1–1.03)
UA UROBILINOGEN AMB POC: NORMAL (ref 0.2–1)
URINALYSIS CLARITY POC: CLEAR
URINALYSIS COLOR POC: YELLOW
URINE BLOOD POC: NORMAL
URINE LEUKOCYTES POC: NORMAL
URINE NITRITES POC: NEGATIVE
UROBILINOGEN UR QL STRIP.AUTO: 1 EU/DL (ref 0.2–1)
WBC URNS QL MICRO: >100 /HPF (ref 0–4)

## 2023-05-05 RX ORDER — NITROFURANTOIN 25; 75 MG/1; MG/1
100 CAPSULE ORAL 2 TIMES DAILY
Qty: 10 CAPSULE | Refills: 0 | Status: SHIPPED | OUTPATIENT
Start: 2023-05-05

## 2023-05-17 ENCOUNTER — TELEPHONE (OUTPATIENT)
Age: 73
End: 2023-05-17

## 2023-05-17 NOTE — TELEPHONE ENCOUNTER
----- Message from [facility] sent at 5/17/2023  2:06 PM EDT -----  Subject: Message to Provider    QUESTIONS  Information for Provider? Amador Coleman is supposed to bring in a urine sample on   5/19/23. She has an appt with Dr. Chandu Rascon on 5/22/23. She is wanting to know   if she can just give a urine sample at the time of her appt on 5/22/23. Please call to discuss. ---------------------------------------------------------------------------  --------------  Zora Bosworth YDNT  [phone]; OK to leave message on voicemail  ---------------------------------------------------------------------------  --------------  SCRIPT ANSWERS  Relationship to Patient?  Self

## 2023-05-22 ENCOUNTER — OFFICE VISIT (OUTPATIENT)
Age: 73
End: 2023-05-22
Payer: MEDICARE

## 2023-05-22 VITALS
TEMPERATURE: 97.7 F | HEIGHT: 65 IN | WEIGHT: 178.6 LBS | BODY MASS INDEX: 29.76 KG/M2 | HEART RATE: 60 BPM | SYSTOLIC BLOOD PRESSURE: 145 MMHG | OXYGEN SATURATION: 99 % | DIASTOLIC BLOOD PRESSURE: 65 MMHG | RESPIRATION RATE: 18 BRPM

## 2023-05-22 DIAGNOSIS — G89.29 CHRONIC RIGHT SHOULDER PAIN: ICD-10-CM

## 2023-05-22 DIAGNOSIS — Z12.31 ENCOUNTER FOR SCREENING MAMMOGRAM FOR BREAST CANCER: ICD-10-CM

## 2023-05-22 DIAGNOSIS — I10 ESSENTIAL (PRIMARY) HYPERTENSION: ICD-10-CM

## 2023-05-22 DIAGNOSIS — E11.9 TYPE 2 DIABETES MELLITUS WITHOUT COMPLICATION, WITHOUT LONG-TERM CURRENT USE OF INSULIN (HCC): ICD-10-CM

## 2023-05-22 DIAGNOSIS — R31.0 GROSS HEMATURIA: Primary | ICD-10-CM

## 2023-05-22 DIAGNOSIS — Z78.0 POSTMENOPAUSAL: ICD-10-CM

## 2023-05-22 DIAGNOSIS — E78.2 MIXED HYPERLIPIDEMIA: ICD-10-CM

## 2023-05-22 DIAGNOSIS — I25.10 ATHEROSCLEROSIS OF NATIVE CORONARY ARTERY OF NATIVE HEART WITHOUT ANGINA PECTORIS: ICD-10-CM

## 2023-05-22 DIAGNOSIS — M25.511 CHRONIC RIGHT SHOULDER PAIN: ICD-10-CM

## 2023-05-22 LAB
BILIRUBIN, URINE, POC: NEGATIVE
BLOOD URINE, POC: ABNORMAL
GLUCOSE URINE, POC: ABNORMAL
HBA1C MFR BLD: 6.6 %
KETONES, URINE, POC: NEGATIVE
LEUKOCYTE ESTERASE, URINE, POC: NEGATIVE
NITRITE, URINE, POC: NEGATIVE
PH, URINE, POC: 6 (ref 4.6–8)
PROTEIN,URINE, POC: NEGATIVE
SPECIFIC GRAVITY, URINE, POC: 1.01 (ref 1–1.03)
URINALYSIS CLARITY, POC: CLEAR
URINALYSIS COLOR, POC: YELLOW
UROBILINOGEN, POC: NORMAL

## 2023-05-22 PROCEDURE — 81001 URINALYSIS AUTO W/SCOPE: CPT | Performed by: FAMILY MEDICINE

## 2023-05-22 PROCEDURE — 4004F PT TOBACCO SCREEN RCVD TLK: CPT | Performed by: FAMILY MEDICINE

## 2023-05-22 PROCEDURE — G8400 PT W/DXA NO RESULTS DOC: HCPCS | Performed by: FAMILY MEDICINE

## 2023-05-22 PROCEDURE — G8427 DOCREV CUR MEDS BY ELIG CLIN: HCPCS | Performed by: FAMILY MEDICINE

## 2023-05-22 PROCEDURE — 99213 OFFICE O/P EST LOW 20 MIN: CPT | Performed by: FAMILY MEDICINE

## 2023-05-22 PROCEDURE — 2022F DILAT RTA XM EVC RTNOPTHY: CPT | Performed by: FAMILY MEDICINE

## 2023-05-22 PROCEDURE — 3017F COLORECTAL CA SCREEN DOC REV: CPT | Performed by: FAMILY MEDICINE

## 2023-05-22 PROCEDURE — 83036 HEMOGLOBIN GLYCOSYLATED A1C: CPT | Performed by: FAMILY MEDICINE

## 2023-05-22 PROCEDURE — 3078F DIAST BP <80 MM HG: CPT | Performed by: FAMILY MEDICINE

## 2023-05-22 PROCEDURE — G8419 CALC BMI OUT NRM PARAM NOF/U: HCPCS | Performed by: FAMILY MEDICINE

## 2023-05-22 PROCEDURE — 3046F HEMOGLOBIN A1C LEVEL >9.0%: CPT | Performed by: FAMILY MEDICINE

## 2023-05-22 PROCEDURE — 3077F SYST BP >= 140 MM HG: CPT | Performed by: FAMILY MEDICINE

## 2023-05-22 PROCEDURE — 1123F ACP DISCUSS/DSCN MKR DOCD: CPT | Performed by: FAMILY MEDICINE

## 2023-05-22 PROCEDURE — PBSHW AMB POC URINALYSIS DIP STICK AUTO W/ MICRO: Performed by: FAMILY MEDICINE

## 2023-05-22 PROCEDURE — G0439 PPPS, SUBSEQ VISIT: HCPCS | Performed by: FAMILY MEDICINE

## 2023-05-22 PROCEDURE — PBSHW AMB POC HEMOGLOBIN A1C: Performed by: FAMILY MEDICINE

## 2023-05-22 PROCEDURE — 1090F PRES/ABSN URINE INCON ASSESS: CPT | Performed by: FAMILY MEDICINE

## 2023-05-22 RX ORDER — CITALOPRAM 20 MG/1
20 TABLET ORAL DAILY
COMMUNITY
End: 2023-05-22

## 2023-05-22 RX ORDER — BACILLUS COAGULANS/INULIN 1B-250 MG
CAPSULE ORAL
COMMUNITY

## 2023-05-22 RX ORDER — BENZONATATE 200 MG/1
CAPSULE ORAL
COMMUNITY
Start: 2023-02-15 | End: 2023-05-22

## 2023-05-22 RX ORDER — NITROFURANTOIN 25; 75 MG/1; MG/1
100 CAPSULE ORAL 2 TIMES DAILY
COMMUNITY
Start: 2023-05-05 | End: 2023-05-22

## 2023-05-22 RX ORDER — FAMOTIDINE 20 MG/1
TABLET, FILM COATED ORAL
COMMUNITY
End: 2023-05-22

## 2023-05-22 RX ORDER — RABEPRAZOLE SODIUM 20 MG/1
TABLET, DELAYED RELEASE ORAL
COMMUNITY
Start: 2023-04-10

## 2023-05-22 RX ORDER — OMEGA-3/DHA/EPA/FISH OIL 300-1000MG
CAPSULE ORAL
COMMUNITY
End: 2023-05-22

## 2023-05-22 RX ORDER — ALUMINUM HYDROXIDE AND MAGNESIUM CARBONATE 160; 105 MG/1; MG/1
TABLET, CHEWABLE ORAL
COMMUNITY
Start: 2016-05-02

## 2023-05-22 RX ORDER — IBUPROFEN 200 MG
TABLET ORAL
COMMUNITY
Start: 2016-05-02

## 2023-05-22 RX ORDER — CLOTRIMAZOLE AND BETAMETHASONE DIPROPIONATE 10; .64 MG/G; MG/G
CREAM TOPICAL
COMMUNITY
Start: 2023-03-23 | End: 2023-05-22

## 2023-05-22 RX ORDER — FLUCONAZOLE 150 MG/1
TABLET ORAL
COMMUNITY
Start: 2023-03-23 | End: 2023-05-22

## 2023-05-22 RX ORDER — LORAZEPAM 1 MG/1
1 TABLET ORAL EVERY 6 HOURS PRN
COMMUNITY

## 2023-05-22 RX ORDER — OMEPRAZOLE/SODIUM BICARBONATE 20MG-1.1G
20 CAPSULE ORAL DAILY
COMMUNITY

## 2023-05-22 SDOH — ECONOMIC STABILITY: FOOD INSECURITY: WITHIN THE PAST 12 MONTHS, YOU WORRIED THAT YOUR FOOD WOULD RUN OUT BEFORE YOU GOT MONEY TO BUY MORE.: NEVER TRUE

## 2023-05-22 SDOH — ECONOMIC STABILITY: FOOD INSECURITY: WITHIN THE PAST 12 MONTHS, THE FOOD YOU BOUGHT JUST DIDN'T LAST AND YOU DIDN'T HAVE MONEY TO GET MORE.: NEVER TRUE

## 2023-05-22 SDOH — ECONOMIC STABILITY: INCOME INSECURITY: HOW HARD IS IT FOR YOU TO PAY FOR THE VERY BASICS LIKE FOOD, HOUSING, MEDICAL CARE, AND HEATING?: NOT HARD AT ALL

## 2023-05-22 SDOH — ECONOMIC STABILITY: HOUSING INSECURITY
IN THE LAST 12 MONTHS, WAS THERE A TIME WHEN YOU DID NOT HAVE A STEADY PLACE TO SLEEP OR SLEPT IN A SHELTER (INCLUDING NOW)?: NO

## 2023-05-22 ASSESSMENT — ENCOUNTER SYMPTOMS
SHORTNESS OF BREATH: 0
COUGH: 0
RHINORRHEA: 1
CHEST TIGHTNESS: 0

## 2023-05-22 ASSESSMENT — PATIENT HEALTH QUESTIONNAIRE - PHQ9
SUM OF ALL RESPONSES TO PHQ QUESTIONS 1-9: 0
1. LITTLE INTEREST OR PLEASURE IN DOING THINGS: 0
SUM OF ALL RESPONSES TO PHQ9 QUESTIONS 1 & 2: 0
SUM OF ALL RESPONSES TO PHQ QUESTIONS 1-9: 0
2. FEELING DOWN, DEPRESSED OR HOPELESS: 0

## 2023-05-22 ASSESSMENT — LIFESTYLE VARIABLES
HOW MANY STANDARD DRINKS CONTAINING ALCOHOL DO YOU HAVE ON A TYPICAL DAY: 1 OR 2
HOW OFTEN DO YOU HAVE A DRINK CONTAINING ALCOHOL: MONTHLY OR LESS

## 2023-05-22 NOTE — PROGRESS NOTES
Medicare Annual Wellness Visit    Romario Florez is here for Medicare AWV (Patient is here today for her annual wellness exam and 4 month follow up. Patient states that her legs are hurting more than normal. )    Assessment & Plan   Gross hematuria  Essential (primary) hypertension  Type 2 diabetes mellitus without complication, without long-term current use of insulin (HCC)  Mixed hyperlipidemia  Atherosclerosis of native coronary artery of native heart without angina pectoris  Chronic right shoulder pain  Encounter for screening mammogram for breast cancer  Postmenopausal      Recommendations for Preventive Services Due: see orders and patient instructions/AVS.  Recommended screening schedule for the next 5-10 years is provided to the patient in written form: see Patient Instructions/AVS.     No follow-ups on file. Subjective     Patient's complete Health Risk Assessment and screening values have been reviewed and are found in Flowsheets. The following problems were reviewed today and where indicated follow up appointments were made and/or referrals ordered. Positive Risk Factor Screenings with Interventions:                       Advanced Directives:  Do you have a Living Will?: (!) No    Intervention:  has NO advanced directive - not interested in additional information                       Objective   Vitals:    05/22/23 1119   BP: (!) 145/65   Site: Left Upper Arm   Position: Sitting   Cuff Size: Large Adult   Pulse: 60   Resp: 18   Temp: 97.7 °F (36.5 °C)   TempSrc: Oral   SpO2: 99%   Weight: 178 lb 9.6 oz (81 kg)   Height: 5' 5\" (1.651 m)      Body mass index is 29.72 kg/m². Allergies   Allergen Reactions    Isosorbide Nitrate Other (See Comments) and Rash     headache  headache      Penicillins Hives and Rash     Other reaction(s): Hives    Protamine Anaphylaxis and Headaches    Sulfa Antibiotics Hives and Rash     Prior to Visit Medications    Medication Sig Taking?  Authorizing

## 2023-05-22 NOTE — PROGRESS NOTES
Chief Complaint   Patient presents with    Medicare AWV     Patient is here today for her annual wellness exam and 4 month follow up. Patient states that her legs are hurting more than normal.      1. Have you been to the ER, urgent care clinic since your last visit? Hospitalized since your last visit? No    2. Have you seen or consulted any other health care providers outside of the [address] [place] [address] since your last visit? Include any pap smears or colon screening.  No

## 2023-05-22 NOTE — PROGRESS NOTES
Subjective:      Patient ID: Romario Florez is a 68 y.o. female. f/u painless  gross hematuria,now resolved. DM ,CAD,HBP,Chol. Feeling well    Hemat      Hematuria  This is a new problem. The current episode started 1 to 4 weeks ago. The problem has been resolved since onset. She describes the hematuria as gross hematuria. She reports no clotting in her urine stream. She is experiencing no pain. Irritative symptoms include frequency and urgency. Pertinent negatives include no chills or fever. Diabetes  The history is provided by the Patient. This is a chronic problem. The problem occurs daily. The problem has not changed since onset. Hypertension   The history is provided by the Patient. This is a chronic problem. The problem has not changed since onset. Associated symptoms include malaise/fatigue. Pertinent negatives include no peripheral edema. Cholesterol Problem  The history is provided by the Patient. This is a chronic problem. The problem occurs daily. Follow-up  The history is provided by the Patient. This is a chronic problem. The problem occurs daily. Nasal Congestion   This is a new problem. The current episode started more than 2 days ago. The problem has been gradually worsening. There has been no fever. Associated symptoms include congestion, sore throat and cough. Pertinent negatives include no chills. Review of Systems   Constitutional:  Negative for activity change, chills and fever. HENT:  Positive for rhinorrhea. Respiratory:  Negative for cough, chest tightness and shortness of breath. Endocrine: Negative for cold intolerance. Genitourinary:  Positive for frequency, hematuria and urgency. Objective:   Physical Exam  Constitutional:       Appearance: Normal appearance. She is obese. HENT:      Head: Normocephalic and atraumatic.       Right Ear: Tympanic membrane normal.      Left Ear: Tympanic membrane normal.      Mouth/Throat:      Mouth: Mucous membranes are moist.

## 2023-05-26 DIAGNOSIS — Z78.0 POSTMENOPAUSAL: Primary | ICD-10-CM

## 2023-05-26 DIAGNOSIS — J01.00 SUBACUTE MAXILLARY SINUSITIS: ICD-10-CM

## 2023-05-26 RX ORDER — AZITHROMYCIN 250 MG/1
250 TABLET, FILM COATED ORAL SEE ADMIN INSTRUCTIONS
Qty: 6 TABLET | Refills: 0 | Status: SHIPPED | OUTPATIENT
Start: 2023-05-26 | End: 2023-05-31

## 2023-06-19 LAB — MAMMOGRAPHY, EXTERNAL: NORMAL

## 2023-06-21 ENCOUNTER — HOSPITAL ENCOUNTER (OUTPATIENT)
Facility: HOSPITAL | Age: 73
Discharge: HOME | End: 2023-06-24
Attending: FAMILY MEDICINE
Payer: MEDICARE

## 2023-06-21 DIAGNOSIS — Z78.0 POSTMENOPAUSAL: ICD-10-CM

## 2023-06-21 PROCEDURE — 77080 DXA BONE DENSITY AXIAL: CPT

## 2023-07-24 RX ORDER — METOPROLOL SUCCINATE 25 MG/1
TABLET, EXTENDED RELEASE ORAL
Qty: 90 TABLET | Refills: 1 | Status: SHIPPED | OUTPATIENT
Start: 2023-07-24

## 2023-07-24 NOTE — TELEPHONE ENCOUNTER
Requested Prescriptions     Signed Prescriptions Disp Refills    metoprolol succinate (TOPROL XL) 25 MG extended release tablet 90 tablet 1     Sig: TAKE 1 TABLET BY MOUTH EVERY DAY     Authorizing Provider: Luc Engle     Ordering User: Elise Wheeler per MD    Future Appointments   Date Time Provider 4600  [address]   8/22/2023 11:20 AM Robby Velazquez MD [facility] BS AMB   3/18/2024 11:00 AM Luc Engle MD [facility] BS AMB

## 2023-07-25 ENCOUNTER — TELEPHONE (OUTPATIENT)
Age: 73
End: 2023-07-25

## 2023-07-25 RX ORDER — FAMOTIDINE 40 MG/1
TABLET, FILM COATED ORAL
Qty: 180 TABLET | Refills: 3 | Status: SHIPPED | OUTPATIENT
Start: 2023-07-25

## 2023-07-25 RX ORDER — SUCRALFATE 1 G/1
TABLET ORAL
Qty: 120 TABLET | Refills: 1 | Status: SHIPPED | OUTPATIENT
Start: 2023-07-25

## 2023-07-25 NOTE — TELEPHONE ENCOUNTER
Patient would like a call from Deya Dewey regarding getting some RX filled she can be reached @ [phone]

## 2023-07-28 RX ORDER — SUCRALFATE 1 G/1
TABLET ORAL
Qty: 360 TABLET | OUTPATIENT
Start: 2023-07-28

## 2023-08-22 ENCOUNTER — OFFICE VISIT (OUTPATIENT)
Age: 73
End: 2023-08-22
Payer: MEDICARE

## 2023-08-22 VITALS
HEART RATE: 59 BPM | TEMPERATURE: 98.8 F | DIASTOLIC BLOOD PRESSURE: 58 MMHG | HEIGHT: 65 IN | RESPIRATION RATE: 18 BRPM | SYSTOLIC BLOOD PRESSURE: 122 MMHG | WEIGHT: 176.2 LBS | OXYGEN SATURATION: 99 % | BODY MASS INDEX: 29.36 KG/M2

## 2023-08-22 DIAGNOSIS — M25.511 CHRONIC RIGHT SHOULDER PAIN: ICD-10-CM

## 2023-08-22 DIAGNOSIS — G89.29 CHRONIC RIGHT SHOULDER PAIN: ICD-10-CM

## 2023-08-22 DIAGNOSIS — E78.2 MIXED HYPERLIPIDEMIA: ICD-10-CM

## 2023-08-22 DIAGNOSIS — Z12.31 ENCOUNTER FOR SCREENING MAMMOGRAM FOR BREAST CANCER: ICD-10-CM

## 2023-08-22 DIAGNOSIS — E11.9 TYPE 2 DIABETES MELLITUS WITHOUT COMPLICATION, WITHOUT LONG-TERM CURRENT USE OF INSULIN (HCC): ICD-10-CM

## 2023-08-22 DIAGNOSIS — I25.10 ATHEROSCLEROSIS OF NATIVE CORONARY ARTERY OF NATIVE HEART WITHOUT ANGINA PECTORIS: ICD-10-CM

## 2023-08-22 DIAGNOSIS — I10 ESSENTIAL (PRIMARY) HYPERTENSION: Primary | ICD-10-CM

## 2023-08-22 LAB — HBA1C MFR BLD: 6.9 %

## 2023-08-22 PROCEDURE — 83036 HEMOGLOBIN GLYCOSYLATED A1C: CPT | Performed by: FAMILY MEDICINE

## 2023-08-22 PROCEDURE — 99213 OFFICE O/P EST LOW 20 MIN: CPT | Performed by: FAMILY MEDICINE

## 2023-08-22 ASSESSMENT — ENCOUNTER SYMPTOMS
CHEST TIGHTNESS: 0
ABDOMINAL PAIN: 0
STRIDOR: 0
SHORTNESS OF BREATH: 0

## 2023-08-22 ASSESSMENT — PATIENT HEALTH QUESTIONNAIRE - PHQ9
1. LITTLE INTEREST OR PLEASURE IN DOING THINGS: 0
SUM OF ALL RESPONSES TO PHQ QUESTIONS 1-9: 0
SUM OF ALL RESPONSES TO PHQ QUESTIONS 1-9: 0
SUM OF ALL RESPONSES TO PHQ9 QUESTIONS 1 & 2: 0
2. FEELING DOWN, DEPRESSED OR HOPELESS: 0
SUM OF ALL RESPONSES TO PHQ QUESTIONS 1-9: 0
SUM OF ALL RESPONSES TO PHQ QUESTIONS 1-9: 0

## 2023-08-22 NOTE — PROGRESS NOTES
Chief Complaint   Patient presents with    Follow-up     Patient is here today for a 3 month follow up. 1. Have you been to the ER, urgent care clinic since your last visit? Hospitalized since your last visit? No    2. Have you seen or consulted any other health care providers outside of the [address] [place] [address] since your last visit? Include any pap smears or colon screening.  No

## 2023-08-23 LAB
ALBUMIN SERPL-MCNC: 3.8 G/DL (ref 3.5–5)
ALBUMIN/GLOB SERPL: 1.2 (ref 1.1–2.2)
ALP SERPL-CCNC: 84 U/L (ref 45–117)
ALT SERPL-CCNC: 24 U/L (ref 12–78)
ANION GAP SERPL CALC-SCNC: 4 MMOL/L (ref 5–15)
AST SERPL-CCNC: 14 U/L (ref 15–37)
BILIRUB SERPL-MCNC: 1.1 MG/DL (ref 0.2–1)
BUN SERPL-MCNC: 15 MG/DL (ref 6–20)
BUN/CREAT SERPL: 12 (ref 12–20)
CALCIUM SERPL-MCNC: 9.5 MG/DL (ref 8.5–10.1)
CHLORIDE SERPL-SCNC: 104 MMOL/L (ref 97–108)
CHOLEST SERPL-MCNC: 130 MG/DL
CO2 SERPL-SCNC: 30 MMOL/L (ref 21–32)
CREAT SERPL-MCNC: 1.28 MG/DL (ref 0.55–1.02)
CREAT UR-MCNC: 98.9 MG/DL
GLOBULIN SER CALC-MCNC: 3.2 G/DL (ref 2–4)
GLUCOSE SERPL-MCNC: 145 MG/DL (ref 65–100)
MICROALBUMIN UR-MCNC: 0.64 MG/DL
MICROALBUMIN/CREAT UR-RTO: 6 MG/G (ref 0–30)
POTASSIUM SERPL-SCNC: 3.4 MMOL/L (ref 3.5–5.1)
PROT SERPL-MCNC: 7 G/DL (ref 6.4–8.2)
SODIUM SERPL-SCNC: 138 MMOL/L (ref 136–145)

## 2023-09-05 RX ORDER — GLIMEPIRIDE 2 MG/1
TABLET ORAL
Qty: 45 TABLET | OUTPATIENT
Start: 2023-09-05

## 2023-09-05 RX ORDER — GLIMEPIRIDE 2 MG/1
TABLET ORAL
Qty: 135 TABLET | Refills: 3 | Status: SHIPPED | OUTPATIENT
Start: 2023-09-05

## 2023-09-05 NOTE — TELEPHONE ENCOUNTER
Duplicate      For Pharmacy Admin Tracking Only    Program: Medication Refill  CPA in place:    Recommendation Provided To:    Intervention Detail: Discontinued Rx: 1, reason: Duplicate Therapy  Intervention Accepted By:   Arin Harris Closed?:    Time Spent (min): 5

## 2023-09-05 NOTE — TELEPHONE ENCOUNTER
Last appointment: 8/22/23  Next appointment: 12/20/23  Previous refill encounter(s): 6/27/22 #45 with 11 refills    Requested Prescriptions     Pending Prescriptions Disp Refills    glimepiride (AMARYL) 2 MG tablet [Pharmacy Med Name: GLIMEPIRIDE 2MG TABLETS] 135 tablet 3     Sig: TAKE 1 AND 1/2 TABLETS BY MOUTH EVERY MORNING         For Pharmacy Admin Tracking Only    Program: Medication Refill  CPA in place:    Recommendation Provided To:    Intervention Detail: New Rx: 1, reason: Patient Preference  Intervention Accepted By:   Ainsley Murillo Closed?:    Time Spent (min): 5

## 2023-09-06 NOTE — TELEPHONE ENCOUNTER
Refill Request Received for the Following Medication     Requested Prescriptions     Pending Prescriptions Disp Refills    amLODIPine (NORVASC) 2.5 MG tablet [Pharmacy Med Name: AMLODIPINE BESYLATE 2.5MG TABLETS] 90 tablet      Sig: TAKE 1 TABLET BY MOUTH EVERY DAY     Last Prescribed:09-    Last Appointment With Me:  3/21/2023     Future Appointments:  Future Appointments   Date Time Provider 4600  [facility]   12/20/2023 11:00 AM Robert Edge MD [facility] BS AMB   3/18/2024 11:00 AM Rigoberto Burciaga MD BS BS AMB

## 2023-09-07 RX ORDER — AMLODIPINE BESYLATE 2.5 MG/1
TABLET ORAL
Qty: 90 TABLET | Refills: 1 | Status: SHIPPED | OUTPATIENT
Start: 2023-09-07

## 2023-10-03 NOTE — TELEPHONE ENCOUNTER
Last appointment: 8/22/23  Next appointment: 12/20/23  Previous refill encounter(s): 4/17/23 Crestor #90 with 1 refill, 3/10/23 Maxzide #90 with 1 refill, 1/12/23 Yinka Heft #30 with 5 refills, 9/5/22 Flonase #1 with 11 refills    Requested Prescriptions     Pending Prescriptions Disp Refills    FARXIGA 5 MG tablet [Pharmacy Med Name: FARXIGA 5MG TABLETS] 90 tablet 3     Sig: TAKE 1 TABLET BY MOUTH DAILY    fluticasone (FLONASE) 50 MCG/ACT nasal spray [Pharmacy Med Name: FLUTICASONE 50MCG NASAL SP (120) RX] 48 g 3     Sig: SHAKE LIQUID AND USE 2 SPRAYS IN EACH NOSTRIL EVERY DAY    rosuvastatin (CRESTOR) 10 MG tablet [Pharmacy Med Name: ROSUVASTATIN 10MG TABLETS] 90 tablet 3     Sig: TAKE 1 TABLET BY MOUTH EVERY EVENING    triamterene-hydroCHLOROthiazide (MAXZIDE-25) 37.5-25 MG per tablet [Pharmacy Med Name: TRIAMTERENE 37.5MG/ VESV63UG TABS] 90 tablet 3     Sig: TAKE 1 TABLET BY MOUTH [address] [facility] Only    Program: Medication Refill  CPA in place:    Recommendation Provided To:    Intervention Detail: New Rx: 4, reason: Patient Preference  Intervention Accepted By:   Olivia Delgadillo Closed?:    Time Spent (min): 5

## 2023-10-04 RX ORDER — DAPAGLIFLOZIN 5 MG/1
5 TABLET, FILM COATED ORAL DAILY
Qty: 90 TABLET | Refills: 3 | Status: SHIPPED | OUTPATIENT
Start: 2023-10-04

## 2023-10-04 RX ORDER — ROSUVASTATIN CALCIUM 10 MG/1
TABLET, COATED ORAL
Qty: 90 TABLET | Refills: 3 | Status: SHIPPED | OUTPATIENT
Start: 2023-10-04

## 2023-10-04 RX ORDER — FLUTICASONE PROPIONATE 50 MCG
SPRAY, SUSPENSION (ML) NASAL
Qty: 48 G | Refills: 3 | Status: SHIPPED | OUTPATIENT
Start: 2023-10-04

## 2023-10-04 RX ORDER — TRIAMTERENE AND HYDROCHLOROTHIAZIDE 37.5; 25 MG/1; MG/1
TABLET ORAL
Qty: 90 TABLET | Refills: 3 | Status: SHIPPED | OUTPATIENT
Start: 2023-10-04

## 2023-11-11 ASSESSMENT — SLEEP AND FATIGUE QUESTIONNAIRES
HOW LIKELY ARE YOU TO NOD OFF OR FALL ASLEEP WHILE SITTING INACTIVE IN A PUBLIC PLACE: WOULD NEVER DOZE
HOW LIKELY ARE YOU TO NOD OFF OR FALL ASLEEP WHILE SITTING AND READING: 1
HOW LIKELY ARE YOU TO NOD OFF OR FALL ASLEEP WHILE LYING DOWN TO REST IN THE AFTERNOON WHEN CIRCUMSTANCES PERMIT: MODERATE CHANCE OF DOZING
DO YOU GENERALLY HAVE DIFFICULTY REMEMBERING THINGS BECAUSE YOU ARE SLEEPY OR TIRED: NO
DO YOU HAVE DIFFICULTY OPERATING A MOTOR VEHICLE FOR LONG DISTANCES (GREATER THAN 100 MILES) BECAUSE YOU BECOME SLEEPY: YES, A LITTLE
DO YOU HAVE DIFFICULTY BEING AS ACTIVE AS YOU WANT TO BE IN THE EVENING BECAUSE YOU ARE SLEEPY OR TIRED: NO
DO YOU HAVE DIFFICULTY CONCENTRATING ON THE THINGS YOU DO BECAUSE YOU ARE SLEEPY OR TIRED: YES, A LITTLE
HOW LIKELY ARE YOU TO NOD OFF OR FALL ASLEEP WHILE SITTING INACTIVE IN A PUBLIC PLACE: 0
HOW LIKELY ARE YOU TO NOD OFF OR FALL ASLEEP WHILE WATCHING TV: SLIGHT CHANCE OF DOZING
DO YOU HAVE DIFFICULTY OPERATING A MOTOR VEHICLE FOR SHORT DISTANCES (LESS THAN 100 MILES) BECAUSE YOU BECOME SLEEPY: NO
HAS YOUR RELATIONSHIP WITH FAMILY, FRIENDS OR WORK COLLEAGUES BEEN AFFECTED BECAUSE YOU ARE SLEEPY OR TIRED: NO
HOW LIKELY ARE YOU TO NOD OFF OR FALL ASLEEP WHILE SITTING AND READING: SLIGHT CHANCE OF DOZING
HOW LIKELY ARE YOU TO NOD OFF OR FALL ASLEEP IN A CAR, WHILE STOPPED FOR A FEW MINUTES IN TRAFFIC: WOULD NEVER DOZE
HOW LIKELY ARE YOU TO NOD OFF OR FALL ASLEEP WHEN YOU ARE A PASSENGER IN A CAR FOR AN HOUR WITHOUT A BREAK: WOULD NEVER DOZE
HOW LIKELY ARE YOU TO NOD OFF OR FALL ASLEEP WHILE LYING DOWN TO REST IN THE AFTERNOON WHEN CIRCUMSTANCES PERMIT: 2
HOW LIKELY ARE YOU TO NOD OFF OR FALL ASLEEP IN A CAR, WHILE STOPPED FOR A FEW MINUTES IN TRAFFIC: 0
HOW LIKELY ARE YOU TO NOD OFF OR FALL ASLEEP WHEN YOU ARE A PASSENGER IN A CAR FOR AN HOUR WITHOUT A BREAK: 0
HOW LIKELY ARE YOU TO NOD OFF OR FALL ASLEEP WHILE SITTING AND TALKING TO SOMEONE: 0
HOW LIKELY ARE YOU TO NOD OFF OR FALL ASLEEP WHILE SITTING QUIETLY AFTER LUNCH WITHOUT ALCOHOL: 1
HOW LIKELY ARE YOU TO NOD OFF OR FALL ASLEEP WHILE SITTING QUIETLY AFTER LUNCH WITHOUT ALCOHOL: SLIGHT CHANCE OF DOZING
ESS TOTAL SCORE: 5
HAS YOUR MOOD BEEN AFFECTED BECAUSE YOU ARE SLEEPY OR TIRED: YES, LITTLE
DO YOU HAVE DIFFICULTY WATCHING A MOVIE OR VIDEO BECAUSE YOU BECOME SLEEPY OR TIRED: YES, A LITTLE
HOW LIKELY ARE YOU TO NOD OFF OR FALL ASLEEP WHILE WATCHING TV: 1
HOW LIKELY ARE YOU TO NOD OFF OR FALL ASLEEP WHILE SITTING AND TALKING TO SOMEONE: WOULD NEVER DOZE
DO YOU HAVE DIFFICULTY BEING AS ACTIVE AS YOU WANT TO BE IN THE MORNING BECAUSE YOU ARE SLEEPY OR TIRED: YES, MODERATE
DO YOU HAVE DIFFICULTY VISITING YOUR FAMILY OR FRIENDS IN THEIR HOME BECAUSE YOU BECOME SLEEPY OR TIRED: NO
FOSQ SCORE: 17

## 2023-11-14 ENCOUNTER — OFFICE VISIT (OUTPATIENT)
Age: 73
End: 2023-11-14
Payer: MEDICARE

## 2023-11-14 VITALS
SYSTOLIC BLOOD PRESSURE: 145 MMHG | WEIGHT: 175.7 LBS | HEIGHT: 65 IN | DIASTOLIC BLOOD PRESSURE: 68 MMHG | HEART RATE: 63 BPM | TEMPERATURE: 98 F | OXYGEN SATURATION: 97 % | BODY MASS INDEX: 29.27 KG/M2

## 2023-11-14 DIAGNOSIS — G47.33 OBSTRUCTIVE SLEEP APNEA (ADULT) (PEDIATRIC): Primary | ICD-10-CM

## 2023-11-14 DIAGNOSIS — I10 ESSENTIAL HYPERTENSION, BENIGN: ICD-10-CM

## 2023-11-14 PROCEDURE — 1036F TOBACCO NON-USER: CPT | Performed by: INTERNAL MEDICINE

## 2023-11-14 PROCEDURE — 99214 OFFICE O/P EST MOD 30 MIN: CPT | Performed by: INTERNAL MEDICINE

## 2023-11-14 PROCEDURE — G8427 DOCREV CUR MEDS BY ELIG CLIN: HCPCS | Performed by: INTERNAL MEDICINE

## 2023-11-14 PROCEDURE — 3077F SYST BP >= 140 MM HG: CPT | Performed by: INTERNAL MEDICINE

## 2023-11-14 PROCEDURE — G8484 FLU IMMUNIZE NO ADMIN: HCPCS | Performed by: INTERNAL MEDICINE

## 2023-11-14 PROCEDURE — G8399 PT W/DXA RESULTS DOCUMENT: HCPCS | Performed by: INTERNAL MEDICINE

## 2023-11-14 PROCEDURE — 1090F PRES/ABSN URINE INCON ASSESS: CPT | Performed by: INTERNAL MEDICINE

## 2023-11-14 PROCEDURE — 3078F DIAST BP <80 MM HG: CPT | Performed by: INTERNAL MEDICINE

## 2023-11-14 PROCEDURE — 3017F COLORECTAL CA SCREEN DOC REV: CPT | Performed by: INTERNAL MEDICINE

## 2023-11-14 PROCEDURE — G8419 CALC BMI OUT NRM PARAM NOF/U: HCPCS | Performed by: INTERNAL MEDICINE

## 2023-11-14 PROCEDURE — 1123F ACP DISCUSS/DSCN MKR DOCD: CPT | Performed by: INTERNAL MEDICINE

## 2023-11-14 NOTE — PATIENT INSTRUCTIONS
[address] [facility] [place], [address]  Tel.  [phone]  Fax. [address] [facility], [address] [facility]  Tel.  [phone]  Fax. [phone] [facility], [address] [facility]  Tel.  [phone]  Fax. [phone]     PROPER SLEEP HYGIENE    What to avoid  Do not have drinks with caffeine, such as coffee or black tea, for 8 hours before bed. Do not smoke or use other types of tobacco near bedtime. Nicotine is a stimulant and can keep you awake. Avoid drinking alcohol late in the evening, because it can cause you to wake in the middle of the night. Do not eat a big meal close to bedtime. If you are hungry, eat a light snack. Do not drink a lot of water close to bedtime, because the need to urinate may wake you up during the night. Do not read or watch TV in bed. Use the bed only for sleeping and sexual activity. What to try  Go to bed at the same time every night, and wake up at the same time every morning. Do not take naps during the day. Keep your bedroom quiet, dark, and cool. Get regular exercise, but not within 3 to 4 hours of your bedtime. .  Sleep on a comfortable pillow and mattress. If watching the clock makes you anxious, turn it facing away from you so you cannot see the time. If you worry when you lie down, start a worry book. Well before bedtime, write down your worries, and then set the book and your concerns aside. Try meditation or other relaxation techniques before you go to bed. If you cannot fall asleep, get up and go to another room until you feel sleepy. Do something relaxing. Repeat your bedtime routine before you go to bed again. Make your house quiet and calm about an hour before bedtime. Turn down the lights, turn off the TV, log off the computer, and turn down the volume on music. This can help you relax after a busy day.     Drowsy Driving  The [address] [place] [address] Traffic Safety Administration cites drowsiness as a

## 2023-11-14 NOTE — PROGRESS NOTES
[address] [facility].[place], [address]  Tel.  [phone]  Fax. [address] [facility], [address] [place] [address]  Tel.  [phone]  Fax. [phone] [facility], [address] [facility]  Tel.  [phone]  Fax. [phone]     S>Alice Pantoja is a 68 y.o. female seen for a positive airway pressure follow-up. She reports no problems using the device. The following problems are identified:    Drowsiness no Problems exhaling no   Snoring no Forget to put on no   Mask Comfortable yes Can't fall asleep no   Dry Mouth Yes Mask falls off no   Air Leaking Yes, blowing in eyes (uses nasal mask) Frequent awakenings no     Estimated AHI flow from download shows 6/hour. + significant leak  Averaging 6.5/hours use on nights used  Days with usage 93%  Adherence 90%       She admits that her sleep has improved. Allergies   Allergen Reactions    Isosorbide Nitrate Other (See Comments) and Rash     headache  headache      Penicillins Hives and Rash     Other reaction(s): Hives    Protamine Anaphylaxis and Headaches    Sulfa Antibiotics Hives and Rash       She has a current medication list which includes the following prescription(s): farxiga, fluticasone, rosuvastatin, triamterene-hydrochlorothiazide, amlodipine, glimepiride, famotidine, sucralfate, metoprolol succinate, rabeprazole, ibuprofen, calcium carbonate, probiotic, gaviscon extra strength, aspirin, cyanocobalamin, lancets, clopidogrel, coenzyme q10, cyclobenzaprine, nitroglycerin, ranolazine, and sitagliptin. .      She  has a past medical history of Allergic rhinitis, cause unspecified, Angina, class III ([address]), Anxiety, Arthritis, CAD (coronary artery disease), Colon polyps, Diabetes ([address]), Diarrhea, Encounter for long-term (current) use of other medications, Eructation, Esophageal dysmotility, Essential hypertension, benign, Gastric ulcer, GERD (gastroesophageal reflux disease), Hypertension, Ill-defined

## 2023-11-30 ENCOUNTER — CLINICAL DOCUMENTATION (OUTPATIENT)
Age: 73
End: 2023-11-30

## 2023-12-01 RX ORDER — METOPROLOL SUCCINATE 25 MG/1
TABLET, EXTENDED RELEASE ORAL
Qty: 90 TABLET | Refills: 1 | OUTPATIENT
Start: 2023-12-01

## 2023-12-01 RX ORDER — SITAGLIPTIN 100 MG/1
100 TABLET, FILM COATED ORAL DAILY
Qty: 90 TABLET | Refills: 3 | Status: SHIPPED | OUTPATIENT
Start: 2023-12-01

## 2023-12-01 NOTE — TELEPHONE ENCOUNTER
Last appointment: 8/22/23  Next appointment: 12/20/23  Previous refill encounter(s): 11/8/22    Requested Prescriptions     Pending Prescriptions Disp Refills    JANUVIA 100 MG tablet [Pharmacy Med Name: JANUVIA 100MG TABLETS] 90 tablet 3     Sig: TAKE 1 TABLET BY MOUTH ONCE DAILY         For Pharmacy Admin Tracking Only    Program: Medication Refill  CPA in place:    Recommendation Provided To:    Intervention Detail: New Rx: 1, reason: Patient Preference  Intervention Accepted By:   Keshav Weber Closed?:    Time Spent (min): 5

## 2023-12-11 ENCOUNTER — TELEPHONE (OUTPATIENT)
Age: 73
End: 2023-12-11

## 2023-12-11 DIAGNOSIS — J01.00 SUBACUTE MAXILLARY SINUSITIS: Primary | ICD-10-CM

## 2023-12-11 RX ORDER — AZITHROMYCIN 250 MG/1
250 TABLET, FILM COATED ORAL SEE ADMIN INSTRUCTIONS
Qty: 6 TABLET | Refills: 0 | Status: SHIPPED | OUTPATIENT
Start: 2023-12-11 | End: 2023-12-16

## 2023-12-11 NOTE — TELEPHONE ENCOUNTER
----- Message from Johnie Clarkuli sent at 12/11/2023 11:52 AM EST -----  Subject: Message to Provider    QUESTIONS  Information for Provider? Patient has appointment on Thursday for her cold   and sinus but she is asking if something can be called in for her please   call back to assist.    [address]  [phone]; OK to leave message on voicemail    SCRIPT ANSWERS  Relationship to Patient? Self  -------------------------------------------------------------------------------------------------------    Per the pt she has a little cough and pressure in the her head and Rear. Her COVID test was negative this morning. She can be reached at [phone].

## 2023-12-11 NOTE — TELEPHONE ENCOUNTER
----- Message from Flaco Banks sent at 12/11/2023 11:52 AM EST -----  Subject: Message to Provider    QUESTIONS  Information for Provider? Patient has appointment on Thursday for her cold   and sinus but she is asking if something can be called in for her please   call back to assist.  ---------------------------------------------------------------------------  --------------  [address]  [phone]; OK to leave message on voicemail  ---------------------------------------------------------------------------  --------------  SCRIPT ANSWERS  Relationship to Patient?  Self

## 2023-12-27 ENCOUNTER — TELEPHONE (OUTPATIENT)
Age: 73
End: 2023-12-27

## 2023-12-27 DIAGNOSIS — J20.9 ACUTE BRONCHITIS, UNSPECIFIED ORGANISM: Primary | ICD-10-CM

## 2023-12-27 RX ORDER — AZITHROMYCIN 250 MG/1
250 TABLET, FILM COATED ORAL SEE ADMIN INSTRUCTIONS
Qty: 6 TABLET | Refills: 0 | Status: SHIPPED | OUTPATIENT
Start: 2023-12-27 | End: 2024-01-01

## 2024-01-02 ENCOUNTER — TELEPHONE (OUTPATIENT)
Age: 74
End: 2024-01-02

## 2024-01-02 RX ORDER — GLIMEPIRIDE 2 MG/1
TABLET ORAL
Qty: 45 TABLET | Refills: 3 | Status: SHIPPED | OUTPATIENT
Start: 2024-01-02

## 2024-01-02 NOTE — TELEPHONE ENCOUNTER
Patient requesting a call back , recently discharged from Hospital with COVID. Jeri has an appointment on 1/4/24 and wants to know should her and her  come  in or have a phone call. Please at [phone].

## 2024-01-29 RX ORDER — METOPROLOL SUCCINATE 25 MG/1
TABLET, EXTENDED RELEASE ORAL
Qty: 90 TABLET | Refills: 0 | Status: SHIPPED | OUTPATIENT
Start: 2024-01-29

## 2024-01-29 NOTE — TELEPHONE ENCOUNTER
Refill Request Received for the Following Medication     Requested Prescriptions     Pending Prescriptions Disp Refills    metoprolol succinate (TOPROL XL) 25 MG extended release tablet [Pharmacy Med Name: METOPROLOL ER SUCCINATE 25MG TABS] 90 tablet 1     Sig: TAKE 1 TABLET BY MOUTH EVERY DAY       Last Prescribed: 07-      Last Appointment With Me:  3/21/2023     Future Appointments:  Future Appointments   Date Time Provider Department Center   3/18/2024 11:00 AM Morgan Interiano MD BS BS AMB   3/20/2024 11:20 AM Derrell Harvey MD [facility] BS AMB

## 2024-03-20 ENCOUNTER — OFFICE VISIT (OUTPATIENT)
Age: 74
End: 2024-03-20
Payer: MEDICARE

## 2024-03-20 VITALS
HEIGHT: 65 IN | TEMPERATURE: 98.4 F | OXYGEN SATURATION: 99 % | SYSTOLIC BLOOD PRESSURE: 155 MMHG | DIASTOLIC BLOOD PRESSURE: 74 MMHG | HEART RATE: 61 BPM | WEIGHT: 176.4 LBS | BODY MASS INDEX: 29.39 KG/M2 | RESPIRATION RATE: 18 BRPM

## 2024-03-20 DIAGNOSIS — I25.10 ATHEROSCLEROSIS OF NATIVE CORONARY ARTERY OF NATIVE HEART WITHOUT ANGINA PECTORIS: ICD-10-CM

## 2024-03-20 DIAGNOSIS — F43.21 GRIEF ASSOCIATED WITH LOSS OF FETUS: ICD-10-CM

## 2024-03-20 DIAGNOSIS — I10 ESSENTIAL (PRIMARY) HYPERTENSION: ICD-10-CM

## 2024-03-20 DIAGNOSIS — E78.2 MIXED HYPERLIPIDEMIA: ICD-10-CM

## 2024-03-20 DIAGNOSIS — E11.9 TYPE 2 DIABETES MELLITUS WITHOUT COMPLICATION, WITHOUT LONG-TERM CURRENT USE OF INSULIN (HCC): Primary | ICD-10-CM

## 2024-03-20 DIAGNOSIS — Z12.31 ENCOUNTER FOR SCREENING MAMMOGRAM FOR BREAST CANCER: ICD-10-CM

## 2024-03-20 LAB
ALBUMIN SERPL-MCNC: 4.1 G/DL (ref 3.5–5)
ALBUMIN/GLOB SERPL: 1.2 (ref 1.1–2.2)
ALP SERPL-CCNC: 84 U/L (ref 45–117)
ALT SERPL-CCNC: 21 U/L (ref 12–78)
ANION GAP SERPL CALC-SCNC: 5 MMOL/L (ref 5–15)
AST SERPL-CCNC: 11 U/L (ref 15–37)
BASOPHILS # BLD: 0.1 K/UL (ref 0–0.1)
BASOPHILS NFR BLD: 1 % (ref 0–1)
BILIRUB SERPL-MCNC: 1.2 MG/DL (ref 0.2–1)
BUN SERPL-MCNC: 17 MG/DL (ref 6–20)
BUN/CREAT SERPL: 16 (ref 12–20)
CALCIUM SERPL-MCNC: 10.4 MG/DL (ref 8.5–10.1)
CHLORIDE SERPL-SCNC: 105 MMOL/L (ref 97–108)
CHOLEST SERPL-MCNC: 150 MG/DL
CO2 SERPL-SCNC: 31 MMOL/L (ref 21–32)
CREAT SERPL-MCNC: 1.06 MG/DL (ref 0.55–1.02)
DIFFERENTIAL METHOD BLD: ABNORMAL
EOSINOPHIL # BLD: 0.1 K/UL (ref 0–0.4)
EOSINOPHIL NFR BLD: 1 % (ref 0–7)
ERYTHROCYTE [DISTWIDTH] IN BLOOD BY AUTOMATED COUNT: 12.7 % (ref 11.5–14.5)
GLOBULIN SER CALC-MCNC: 3.5 G/DL (ref 2–4)
GLUCOSE SERPL-MCNC: 169 MG/DL (ref 65–100)
HBA1C MFR BLD: 6.5 %
HCT VFR BLD AUTO: 46.6 % (ref 35–47)
HDLC SERPL-MCNC: 48 MG/DL
HDLC SERPL: 3.1 (ref 0–5)
HGB BLD-MCNC: 15.9 G/DL (ref 11.5–16)
IMM GRANULOCYTES # BLD AUTO: 0 K/UL (ref 0–0.04)
IMM GRANULOCYTES NFR BLD AUTO: 1 % (ref 0–0.5)
LDLC SERPL CALC-MCNC: 57.4 MG/DL (ref 0–100)
LYMPHOCYTES # BLD: 2.1 K/UL (ref 0.8–3.5)
LYMPHOCYTES NFR BLD: 28 % (ref 12–49)
MCH RBC QN AUTO: 30.5 PG (ref 26–34)
MCHC RBC AUTO-ENTMCNC: 34.1 G/DL (ref 30–36.5)
MCV RBC AUTO: 89.3 FL (ref 80–99)
MONOCYTES # BLD: 0.7 K/UL (ref 0–1)
MONOCYTES NFR BLD: 9 % (ref 5–13)
NEUTS SEG # BLD: 4.7 K/UL (ref 1.8–8)
NEUTS SEG NFR BLD: 60 % (ref 32–75)
NRBC # BLD: 0 K/UL (ref 0–0.01)
NRBC BLD-RTO: 0 PER 100 WBC
PLATELET # BLD AUTO: 264 K/UL (ref 150–400)
PMV BLD AUTO: 9.8 FL (ref 8.9–12.9)
POTASSIUM SERPL-SCNC: 3.5 MMOL/L (ref 3.5–5.1)
PROT SERPL-MCNC: 7.6 G/DL (ref 6.4–8.2)
RBC # BLD AUTO: 5.22 M/UL (ref 3.8–5.2)
SODIUM SERPL-SCNC: 141 MMOL/L (ref 136–145)
TRIGL SERPL-MCNC: 223 MG/DL
VLDLC SERPL CALC-MCNC: 44.6 MG/DL
WBC # BLD AUTO: 7.7 K/UL (ref 3.6–11)

## 2024-03-20 PROCEDURE — 99213 OFFICE O/P EST LOW 20 MIN: CPT | Performed by: FAMILY MEDICINE

## 2024-03-20 PROCEDURE — 3046F HEMOGLOBIN A1C LEVEL >9.0%: CPT | Performed by: FAMILY MEDICINE

## 2024-03-20 PROCEDURE — 3077F SYST BP >= 140 MM HG: CPT | Performed by: FAMILY MEDICINE

## 2024-03-20 PROCEDURE — 83036 HEMOGLOBIN GLYCOSYLATED A1C: CPT | Performed by: FAMILY MEDICINE

## 2024-03-20 PROCEDURE — 2022F DILAT RTA XM EVC RTNOPTHY: CPT | Performed by: FAMILY MEDICINE

## 2024-03-20 PROCEDURE — G8419 CALC BMI OUT NRM PARAM NOF/U: HCPCS | Performed by: FAMILY MEDICINE

## 2024-03-20 PROCEDURE — 1123F ACP DISCUSS/DSCN MKR DOCD: CPT | Performed by: FAMILY MEDICINE

## 2024-03-20 PROCEDURE — PBSHW AMB POC HEMOGLOBIN A1C: Performed by: FAMILY MEDICINE

## 2024-03-20 PROCEDURE — 1090F PRES/ABSN URINE INCON ASSESS: CPT | Performed by: FAMILY MEDICINE

## 2024-03-20 PROCEDURE — 3017F COLORECTAL CA SCREEN DOC REV: CPT | Performed by: FAMILY MEDICINE

## 2024-03-20 PROCEDURE — G8427 DOCREV CUR MEDS BY ELIG CLIN: HCPCS | Performed by: FAMILY MEDICINE

## 2024-03-20 PROCEDURE — G8399 PT W/DXA RESULTS DOCUMENT: HCPCS | Performed by: FAMILY MEDICINE

## 2024-03-20 PROCEDURE — G8484 FLU IMMUNIZE NO ADMIN: HCPCS | Performed by: FAMILY MEDICINE

## 2024-03-20 PROCEDURE — 1036F TOBACCO NON-USER: CPT | Performed by: FAMILY MEDICINE

## 2024-03-20 PROCEDURE — 3078F DIAST BP <80 MM HG: CPT | Performed by: FAMILY MEDICINE

## 2024-03-20 RX ORDER — ALPRAZOLAM 0.25 MG/1
0.25 TABLET ORAL 3 TIMES DAILY PRN
Qty: 30 TABLET | Refills: 0 | Status: SHIPPED | OUTPATIENT
Start: 2024-03-20 | End: 2024-04-19

## 2024-03-20 RX ORDER — CALCIUM CARBONATE/VITAMIN D3 600 MG-20
TABLET,CHEWABLE ORAL
COMMUNITY

## 2024-03-20 RX ORDER — MOLNUPIRAVIR 200 MG/1
CAPSULE ORAL
COMMUNITY
Start: 2023-12-30

## 2024-03-20 RX ORDER — METFORMIN HYDROCHLORIDE 500 MG/1
TABLET, EXTENDED RELEASE ORAL
COMMUNITY

## 2024-03-20 RX ORDER — CITALOPRAM 20 MG/1
30 TABLET ORAL
COMMUNITY

## 2024-03-20 ASSESSMENT — PATIENT HEALTH QUESTIONNAIRE - PHQ9
SUM OF ALL RESPONSES TO PHQ QUESTIONS 1-9: 0
1. LITTLE INTEREST OR PLEASURE IN DOING THINGS: NOT AT ALL
SUM OF ALL RESPONSES TO PHQ QUESTIONS 1-9: 0
SUM OF ALL RESPONSES TO PHQ QUESTIONS 1-9: 0
SUM OF ALL RESPONSES TO PHQ9 QUESTIONS 1 & 2: 0
2. FEELING DOWN, DEPRESSED OR HOPELESS: NOT AT ALL
SUM OF ALL RESPONSES TO PHQ QUESTIONS 1-9: 0

## 2024-03-20 ASSESSMENT — ENCOUNTER SYMPTOMS
SHORTNESS OF BREATH: 0
ABDOMINAL DISTENTION: 0
CHEST TIGHTNESS: 0

## 2024-03-20 NOTE — PROGRESS NOTES
Chief Complaint   Patient presents with    Follow-up     Patient is here today for a 3 month follow up.      1. Have you been to the ER, urgent care clinic since your last visit?  Hospitalized since your last visit? 12/29/23 for Covid at [facility]     2. Have you seen or consulted any other health care providers outside of the [facility] since your last visit?  Include any pap smears or colon screening. No

## 2024-03-20 NOTE — PROGRESS NOTES
Subjective:      Patient ID: Alice Morocho is a 74 y.o. female.f/u dm2 ,chol,gerd,cad.Grand chils suffered still birth last pm;very discouraged    Depression  Visit Type: initial  Onset of symptoms: in the past 7 days  Patient presents with the following symptoms: decreased concentration, insomnia and nervousness/anxiety.  Patient is not experiencing: shortness of breath.  Frequency of symptoms: constantly        Diabetes  The history is provided by the Patient. This is a chronic problem. The problem occurs daily. The problem has not changed since onset.  Hypertension   The history is provided by the Patient. This is a chronic problem. The problem has not changed since onset.Associated symptoms include malaise/fatigue. Pertinent negatives include no peripheral edema.   Cholesterol Problem  The history is provided by the Patient. This is a chronic problem. The problem occurs daily.   Follow-up  The history is provided by the Patient. This is a chronic problem. The problem occurs daily.  Review of Systems   Constitutional:  Negative for activity change.   HENT:  Negative for congestion.    Respiratory:  Negative for chest tightness and shortness of breath.    Gastrointestinal:  Negative for abdominal distention.   Neurological:  Positive for light-headedness.   Psychiatric/Behavioral:  Positive for decreased concentration and depression. The patient is nervous/anxious and has insomnia.      Objective:   Physical Exam  Constitutional:       Appearance: Normal appearance.   HENT:      Head: Normocephalic and atraumatic.   Cardiovascular:      Rate and Rhythm: Normal rate and regular rhythm.   Pulmonary:      Effort: Pulmonary effort is normal.      Breath sounds: Normal breath sounds.   Abdominal:      General: Abdomen is flat.      Palpations: Abdomen is soft.   Musculoskeletal:         General: Normal range of motion.   Skin:     General: Skin is warm and dry.   Neurological:      Mental Status: She is alert and

## 2024-03-27 ASSESSMENT — SLEEP AND FATIGUE QUESTIONNAIRES
HOW LIKELY ARE YOU TO NOD OFF OR FALL ASLEEP WHILE WATCHING TV: SLIGHT CHANCE OF DOZING
HOW LIKELY ARE YOU TO NOD OFF OR FALL ASLEEP WHILE LYING DOWN TO REST IN THE AFTERNOON WHEN CIRCUMSTANCES PERMIT: SLIGHT CHANCE OF DOZING
HOW LIKELY ARE YOU TO NOD OFF OR FALL ASLEEP IN A CAR, WHILE STOPPED FOR A FEW MINUTES IN TRAFFIC: WOULD NEVER DOZE
HOW LIKELY ARE YOU TO NOD OFF OR FALL ASLEEP WHEN YOU ARE A PASSENGER IN A CAR FOR AN HOUR WITHOUT A BREAK: WOULD NEVER DOZE
HOW LIKELY ARE YOU TO NOD OFF OR FALL ASLEEP WHILE SITTING AND READING: WOULD NEVER DOZE
HOW LIKELY ARE YOU TO NOD OFF OR FALL ASLEEP WHILE SITTING AND TALKING TO SOMEONE: WOULD NEVER DOZE
HOW LIKELY ARE YOU TO NOD OFF OR FALL ASLEEP WHILE SITTING INACTIVE IN A PUBLIC PLACE: WOULD NEVER DOZE
ESS TOTAL SCORE: 2
HOW LIKELY ARE YOU TO NOD OFF OR FALL ASLEEP WHILE SITTING QUIETLY AFTER LUNCH WITHOUT ALCOHOL: WOULD NEVER DOZE

## 2024-03-28 ENCOUNTER — TELEMEDICINE (OUTPATIENT)
Age: 74
End: 2024-03-28
Payer: MEDICARE

## 2024-03-28 DIAGNOSIS — G47.33 OSA (OBSTRUCTIVE SLEEP APNEA): Primary | ICD-10-CM

## 2024-03-28 DIAGNOSIS — I10 PRIMARY HYPERTENSION: ICD-10-CM

## 2024-03-28 PROCEDURE — G8427 DOCREV CUR MEDS BY ELIG CLIN: HCPCS | Performed by: NURSE PRACTITIONER

## 2024-03-28 PROCEDURE — 3017F COLORECTAL CA SCREEN DOC REV: CPT | Performed by: NURSE PRACTITIONER

## 2024-03-28 PROCEDURE — G8484 FLU IMMUNIZE NO ADMIN: HCPCS | Performed by: NURSE PRACTITIONER

## 2024-03-28 PROCEDURE — G8399 PT W/DXA RESULTS DOCUMENT: HCPCS | Performed by: NURSE PRACTITIONER

## 2024-03-28 PROCEDURE — G8419 CALC BMI OUT NRM PARAM NOF/U: HCPCS | Performed by: NURSE PRACTITIONER

## 2024-03-28 PROCEDURE — 99213 OFFICE O/P EST LOW 20 MIN: CPT | Performed by: NURSE PRACTITIONER

## 2024-03-28 PROCEDURE — 1036F TOBACCO NON-USER: CPT | Performed by: NURSE PRACTITIONER

## 2024-03-28 PROCEDURE — 1090F PRES/ABSN URINE INCON ASSESS: CPT | Performed by: NURSE PRACTITIONER

## 2024-03-28 PROCEDURE — 1123F ACP DISCUSS/DSCN MKR DOCD: CPT | Performed by: NURSE PRACTITIONER

## 2024-03-28 NOTE — PROGRESS NOTES
Medical Assistant/Nurse notes reviewed and accepted.   Problem List Reviewed.  Skin system in problem list updated.     Subjective:  Simone Chaves presents for inspection of surgical site(s)  following skin surgery.  He notes no problems at the surgical site(s).    Objective:  No significant redness, induration, tenderness or drainage noted at the surgical site(s).    Assessment:  Healing well following skin surgery, with no evidence of infection or other complication.    Plan:   Non-absorbable sutures removed.  Continue topical wound care until fully healed.  Long term followup for skin exam with Dr. Rod.               
Patient presents to clinic for 2 weeks suture removal from MOHS site: Left medial inferior pretibial leg.   Patient's home site care included application of polysporin.    Follow-up scheduled with primary dermatologist Dr. Rod: 5/29/19    Any problems/issues at home: No  Pain: No  Bleeding: No  Drainage: No  Following wound care as directed: Yes    Nursing assessment:  Sign of infection: No  Well approximated: Yes  Crusting: Yes  Redness: Minimal  Swelling: No    Site cleansed with alcohol wipe, sutures removed, site again cleansed with alcohol wipe, Polysporin applied to site.  Instructed patient to apply Polysporin to site for another 3-5 days.    Patient agreeable, verbalized understanding and has no further questions at this time.    Scheduled for 7/3/19    
Visit was conducted with patient's (and/or legal guardian's) consent, to reduce the patient's risk of exposure to COVID-19 and provide necessary medical care. The patient (or guardian if applicable) is aware that this is a billable service, which includes applicable copays.     Patient identification was verified at the start of the visit: Yes using name and date of birth. Patient's phone number [phone] (home)  was confirmed for accuracy.  She gives permission for messages regarding results and appointments to be left at that number.    Services were provided through a video synchronous discussion virtually to substitute for in-person clinic visit.  I was  at home  while conducting this encounter, patient located at their home or alternate location of their choice.    Alice ANTWON Morocho, was evaluated through a synchronous (real-time) audio-video encounter. The patient (or guardian if applicable) is aware that this is a billable service, which includes applicable co-pays. This Virtual Visit was conducted with patient's (and/or legal guardian's) consent. Patient identification was verified, and a caregiver was present when appropriate.   The patient was located at Home: [address] [place] [id number]  Provider was located at Home (Appt [place] State): [place]        --DEIRDRE Atkinson NP on 3/28/2024 at 3:34 PM    An electronic signature was used to authenticate this note.

## 2024-03-28 NOTE — PATIENT INSTRUCTIONS
[address]  [place] [address]  Tel.  [phone]  Fax. [phone] [address]  [place] [address]  Tel.  [phone]  Fax. [phone] [address] [place] [address]  Tel.  [phone]  Fax. [phone]     Learning About CPAP for Sleep Apnea  What is CPAP?              CPAP is a small machine that you use at home every night while you sleep. It increases air pressure in your throat to keep your airway open. When you have sleep apnea, this can help you sleep better so you feel much better. CPAP stands for \"continuous positive airway pressure.\"  The CPAP machine will have one of the following:  A mask that covers your nose and mouth  Prongs that fit into your nose  A mask that covers your nose only, the most common type. This type is called NCPAP. The N stands for \"nasal.\"  Why is it done?  CPAP is usually the best treatment for obstructive sleep apnea. It is the first treatment choice and the most widely used. Your doctor may suggest CPAP if you have:  Moderate to severe sleep apnea.  Sleep apnea and coronary artery disease (CAD) or heart failure.  How does it help?  CPAP can help you have more normal sleep, so you feel less sleepy and more alert during the daytime.  CPAP may help keep heart failure or other heart problems from getting worse.  NCPAP may help lower your blood pressure.  If you use CPAP, your bed partner may also sleep better because you are not snoring or restless.  What are the side effects?  Some people who use CPAP have:  A dry or stuffy nose and a sore throat.  Irritated skin on the face.  Sore eyes.  Bloating.  If you have any of these problems, work with your doctor to fix them. Here are some things you can try:  Be sure the mask or nasal prongs fit well.  See if your doctor can adjust the pressure of your CPAP.  If your nose is dry, try a humidifier.  If your nose is runny or stuffy, try decongestant medicine or a steroid

## 2024-05-14 RX ORDER — AMLODIPINE BESYLATE 2.5 MG/1
TABLET ORAL
Qty: 90 TABLET | Refills: 4 | Status: SHIPPED | OUTPATIENT
Start: 2024-05-14

## 2024-05-14 RX ORDER — CLOPIDOGREL BISULFATE 75 MG/1
75 TABLET ORAL DAILY
Qty: 90 TABLET | Refills: 3 | Status: SHIPPED | OUTPATIENT
Start: 2024-05-14

## 2024-05-14 RX ORDER — METOPROLOL SUCCINATE 25 MG/1
TABLET, EXTENDED RELEASE ORAL
Qty: 90 TABLET | Refills: 4 | Status: SHIPPED | OUTPATIENT
Start: 2024-05-14

## 2024-05-14 NOTE — TELEPHONE ENCOUNTER
Refill Request Received for the Following Medication     Requested Prescriptions     Pending Prescriptions Disp Refills    metoprolol succinate (TOPROL XL) 25 MG extended release tablet [Pharmacy Med Name: METOPROLOL ER SUCCINATE 25MG TABS] 90 tablet 0     Sig: TAKE 1 TABLET BY MOUTH EVERY DAY    amLODIPine (NORVASC) 2.5 MG tablet [Pharmacy Med Name: AMLODIPINE BESYLATE 2.5MGTABLETS] 90 tablet 1     Sig: TAKE 1 TABLET BY MOUTH EVERY DAY    clopidogrel (PLAVIX) 75 MG tablet [Pharmacy Med Name: CLOPIDOGREL 75MG TABLETS] 90 tablet      Sig: TAKE 1 TABLET BY MOUTH DAILY       Last Prescribed:    Last Appointment With Me:  3/18/2024     Future Appointments:  Future Appointments   Date Time Provider Department Center   6/21/2024 11:20 AM Derrell Harvey MD [facility]   3/28/2025  1:40 PM Morgan Interiano MD [facility] BS AMB   4/17/2025 11:10 AM Marlene Saini, APRN - NP [facility] BS AMB

## 2024-05-17 RX ORDER — GLIMEPIRIDE 2 MG/1
3 TABLET ORAL EVERY MORNING
Qty: 135 TABLET | Refills: 2 | Status: SHIPPED | OUTPATIENT
Start: 2024-05-17

## 2024-05-17 NOTE — TELEPHONE ENCOUNTER
Last appointment: 3/20/24  Next appointment: 6/21/24  Previous refill encounter(s): 1/2/24 #45 with 3 refills    Requested Prescriptions     Pending Prescriptions Disp Refills    glimepiride (AMARYL) 2 MG tablet [Pharmacy Med Name: GLIMEPIRIDE 2MG TABLETS] 135 tablet 3     Sig: Take 1.5 tablets by mouth every morning         For Pharmacy Admin Tracking Only    Program: Medication Refill  CPA in place:    Recommendation Provided To:   Intervention Detail: New Rx: 1, reason: Patient Preference  Intervention Accepted By:   Gap Closed?:    Time Spent (min): 5

## 2024-07-23 NOTE — PROGRESS NOTES
Catheter removed intact. Subjective:      Patient ID: Patsy Tomas is a 68 y.o. female. f/u dm2,icm,chol, chronic shoulder pain. Doing well    HPI      Diabetes  The history is provided by the Patient. This is a chronic problem. The problem occurs daily. The problem has not changed since onset. Hypertension   The history is provided by the Patient. This is a chronic problem. The problem has not changed since onset. Associated symptoms include malaise/fatigue. Pertinent negatives include no peripheral edema. Cholesterol Problem  The history is provided by the Patient. This is a chronic problem. The problem occurs daily. Follow-up  The history is provided by the Patient. This is a chronic problem. The problem occurs daily. Review of Systems   Constitutional:  Negative for activity change and fatigue. HENT:  Positive for congestion. Respiratory:  Negative for chest tightness, shortness of breath and stridor. Cardiovascular:  Negative for chest pain, palpitations and leg swelling. Gastrointestinal:  Negative for abdominal pain. Musculoskeletal:  Positive for myalgias. Psychiatric/Behavioral:  Negative for agitation. The patient is not nervous/anxious. Objective:   Physical Exam  Constitutional:       Appearance: Normal appearance. Cardiovascular:      Rate and Rhythm: Normal rate and regular rhythm. Pulses: Normal pulses. Heart sounds: Normal heart sounds. Pulmonary:      Effort: Pulmonary effort is normal.      Breath sounds: Normal breath sounds. Abdominal:      General: Abdomen is flat. Palpations: Abdomen is soft. There is no mass. Tenderness: There is no guarding. Musculoskeletal:      Cervical back: Normal range of motion and neck supple. Right lower leg: No edema. Left lower leg: No edema. Skin:     General: Skin is warm and dry. Neurological:      Mental Status: She is alert and oriented to person, place, and time.    Psychiatric:         Mood and Affect: Mood

## 2024-07-28 SDOH — ECONOMIC STABILITY: TRANSPORTATION INSECURITY
IN THE PAST 12 MONTHS, HAS LACK OF TRANSPORTATION KEPT YOU FROM MEETINGS, WORK, OR FROM GETTING THINGS NEEDED FOR DAILY LIVING?: NO

## 2024-07-28 SDOH — ECONOMIC STABILITY: FOOD INSECURITY: WITHIN THE PAST 12 MONTHS, YOU WORRIED THAT YOUR FOOD WOULD RUN OUT BEFORE YOU GOT MONEY TO BUY MORE.: NEVER TRUE

## 2024-07-28 SDOH — ECONOMIC STABILITY: INCOME INSECURITY: HOW HARD IS IT FOR YOU TO PAY FOR THE VERY BASICS LIKE FOOD, HOUSING, MEDICAL CARE, AND HEATING?: NOT HARD AT ALL

## 2024-07-29 ENCOUNTER — OFFICE VISIT (OUTPATIENT)
Age: 74
End: 2024-07-29
Payer: MEDICARE

## 2024-07-29 VITALS
SYSTOLIC BLOOD PRESSURE: 118 MMHG | WEIGHT: 171.8 LBS | RESPIRATION RATE: 18 BRPM | HEART RATE: 55 BPM | OXYGEN SATURATION: 99 % | HEIGHT: 65 IN | BODY MASS INDEX: 28.62 KG/M2 | TEMPERATURE: 98 F | DIASTOLIC BLOOD PRESSURE: 53 MMHG

## 2024-07-29 DIAGNOSIS — I25.10 ATHEROSCLEROSIS OF NATIVE CORONARY ARTERY OF NATIVE HEART WITHOUT ANGINA PECTORIS: ICD-10-CM

## 2024-07-29 DIAGNOSIS — E11.9 TYPE 2 DIABETES MELLITUS WITHOUT COMPLICATION, WITHOUT LONG-TERM CURRENT USE OF INSULIN (HCC): Primary | ICD-10-CM

## 2024-07-29 DIAGNOSIS — G47.33 OSA (OBSTRUCTIVE SLEEP APNEA): ICD-10-CM

## 2024-07-29 DIAGNOSIS — R22.42 MASS OF LEFT LOWER LEG: ICD-10-CM

## 2024-07-29 DIAGNOSIS — Z12.31 ENCOUNTER FOR SCREENING MAMMOGRAM FOR BREAST CANCER: ICD-10-CM

## 2024-07-29 DIAGNOSIS — E78.2 MIXED HYPERLIPIDEMIA: ICD-10-CM

## 2024-07-29 DIAGNOSIS — I10 ESSENTIAL (PRIMARY) HYPERTENSION: ICD-10-CM

## 2024-07-29 LAB
ALBUMIN SERPL-MCNC: 3.8 G/DL (ref 3.5–5)
ALBUMIN/GLOB SERPL: 1.1 (ref 1.1–2.2)
ALP SERPL-CCNC: 87 U/L (ref 45–117)
ALT SERPL-CCNC: 22 U/L (ref 12–78)
ANION GAP SERPL CALC-SCNC: 5 MMOL/L (ref 5–15)
AST SERPL-CCNC: 13 U/L (ref 15–37)
BASOPHILS # BLD: 0.1 K/UL (ref 0–0.1)
BASOPHILS NFR BLD: 1 % (ref 0–1)
BILIRUB SERPL-MCNC: 1.1 MG/DL (ref 0.2–1)
BUN SERPL-MCNC: 19 MG/DL (ref 6–20)
BUN/CREAT SERPL: 19 (ref 12–20)
CALCIUM SERPL-MCNC: 10.4 MG/DL (ref 8.5–10.1)
CHLORIDE SERPL-SCNC: 104 MMOL/L (ref 97–108)
CHOLEST SERPL-MCNC: 152 MG/DL
CO2 SERPL-SCNC: 33 MMOL/L (ref 21–32)
CREAT SERPL-MCNC: 1.02 MG/DL (ref 0.55–1.02)
DIFFERENTIAL METHOD BLD: ABNORMAL
EOSINOPHIL # BLD: 0.1 K/UL (ref 0–0.4)
EOSINOPHIL NFR BLD: 2 % (ref 0–7)
ERYTHROCYTE [DISTWIDTH] IN BLOOD BY AUTOMATED COUNT: 12.6 % (ref 11.5–14.5)
GLOBULIN SER CALC-MCNC: 3.4 G/DL (ref 2–4)
GLUCOSE SERPL-MCNC: 115 MG/DL (ref 65–100)
HBA1C MFR BLD: 7.2 %
HCT VFR BLD AUTO: 46.3 % (ref 35–47)
HGB BLD-MCNC: 15 G/DL (ref 11.5–16)
IMM GRANULOCYTES # BLD AUTO: 0.1 K/UL (ref 0–0.04)
IMM GRANULOCYTES NFR BLD AUTO: 1 % (ref 0–0.5)
LYMPHOCYTES # BLD: 2 K/UL (ref 0.8–3.5)
LYMPHOCYTES NFR BLD: 25 % (ref 12–49)
MCH RBC QN AUTO: 29.2 PG (ref 26–34)
MCHC RBC AUTO-ENTMCNC: 32.4 G/DL (ref 30–36.5)
MCV RBC AUTO: 90.3 FL (ref 80–99)
MONOCYTES # BLD: 0.7 K/UL (ref 0–1)
MONOCYTES NFR BLD: 9 % (ref 5–13)
NEUTS SEG # BLD: 5.1 K/UL (ref 1.8–8)
NEUTS SEG NFR BLD: 62 % (ref 32–75)
NRBC # BLD: 0 K/UL (ref 0–0.01)
NRBC BLD-RTO: 0 PER 100 WBC
PLATELET # BLD AUTO: 254 K/UL (ref 150–400)
PMV BLD AUTO: 9.9 FL (ref 8.9–12.9)
POTASSIUM SERPL-SCNC: 3.4 MMOL/L (ref 3.5–5.1)
PROT SERPL-MCNC: 7.2 G/DL (ref 6.4–8.2)
RBC # BLD AUTO: 5.13 M/UL (ref 3.8–5.2)
SODIUM SERPL-SCNC: 142 MMOL/L (ref 136–145)
WBC # BLD AUTO: 8.1 K/UL (ref 3.6–11)

## 2024-07-29 PROCEDURE — PBSHW AMB POC HEMOGLOBIN A1C: Performed by: FAMILY MEDICINE

## 2024-07-29 PROCEDURE — 99213 OFFICE O/P EST LOW 20 MIN: CPT | Performed by: FAMILY MEDICINE

## 2024-07-29 PROCEDURE — 3046F HEMOGLOBIN A1C LEVEL >9.0%: CPT | Performed by: FAMILY MEDICINE

## 2024-07-29 PROCEDURE — 1090F PRES/ABSN URINE INCON ASSESS: CPT | Performed by: FAMILY MEDICINE

## 2024-07-29 PROCEDURE — G8419 CALC BMI OUT NRM PARAM NOF/U: HCPCS | Performed by: FAMILY MEDICINE

## 2024-07-29 PROCEDURE — G8427 DOCREV CUR MEDS BY ELIG CLIN: HCPCS | Performed by: FAMILY MEDICINE

## 2024-07-29 PROCEDURE — 1036F TOBACCO NON-USER: CPT | Performed by: FAMILY MEDICINE

## 2024-07-29 PROCEDURE — 83036 HEMOGLOBIN GLYCOSYLATED A1C: CPT | Performed by: FAMILY MEDICINE

## 2024-07-29 PROCEDURE — 3017F COLORECTAL CA SCREEN DOC REV: CPT | Performed by: FAMILY MEDICINE

## 2024-07-29 PROCEDURE — G8399 PT W/DXA RESULTS DOCUMENT: HCPCS | Performed by: FAMILY MEDICINE

## 2024-07-29 PROCEDURE — 1123F ACP DISCUSS/DSCN MKR DOCD: CPT | Performed by: FAMILY MEDICINE

## 2024-07-29 PROCEDURE — 2022F DILAT RTA XM EVC RTNOPTHY: CPT | Performed by: FAMILY MEDICINE

## 2024-07-29 PROCEDURE — 3078F DIAST BP <80 MM HG: CPT | Performed by: FAMILY MEDICINE

## 2024-07-29 PROCEDURE — 3074F SYST BP LT 130 MM HG: CPT | Performed by: FAMILY MEDICINE

## 2024-07-29 RX ORDER — ALPRAZOLAM 0.25 MG/1
TABLET ORAL
COMMUNITY
Start: 2024-03-20

## 2024-07-29 ASSESSMENT — PATIENT HEALTH QUESTIONNAIRE - PHQ9
1. LITTLE INTEREST OR PLEASURE IN DOING THINGS: NOT AT ALL
SUM OF ALL RESPONSES TO PHQ QUESTIONS 1-9: 0
SUM OF ALL RESPONSES TO PHQ QUESTIONS 1-9: 0
2. FEELING DOWN, DEPRESSED OR HOPELESS: NOT AT ALL
SUM OF ALL RESPONSES TO PHQ QUESTIONS 1-9: 0
SUM OF ALL RESPONSES TO PHQ9 QUESTIONS 1 & 2: 0
SUM OF ALL RESPONSES TO PHQ QUESTIONS 1-9: 0

## 2024-07-29 ASSESSMENT — ENCOUNTER SYMPTOMS
CONSTIPATION: 0
CHEST TIGHTNESS: 0

## 2024-07-30 NOTE — PROGRESS NOTES
Chief Complaint   Patient presents with    Follow-up     Patient is here today for a 3 month follow up.      1. Have you been to the ER, urgent care clinic since your last visit?  Hospitalized since your last visit?No    2. Have you seen or consulted any other health care providers outside of the [facility] since your last visit?  Include any pap smears or colon screening. No    
      PHYSICAL EXAM:  General:     Alert, cooperative, no distress, appears stated age.     Head:    Normocephalic, without obvious abnormality, atraumatic.  Ears:   External canals WNL TMs WNL   Eyes:    Conjunctivae/corneas clear.  PERRLA  Nose:   Nares normal. No drainage or sinus tenderness.  Throat:     Lips, mucosa, and tongue normal.  No Thrush  Neck:   Supple, symmetrical,  no adenopathy, thyroid: non tender     no carotid bruit and no JVD.  Back:     Symmetric,  No CVA tenderness.  Lungs:    Clear to auscultation bilaterally.  No Wheezing or Rhonchi. No rales.  Heart:    Regular rate and rhythm,  no murmur, rub or gallop.  Abdomen:    Soft, non-tender. Not distended.  Bowel sounds normal. No massese  Extremities:  Extremities normal, atraumatic, No cyanosis.  No edema. No clubbing  Neurologic:  Normal strength, Alert and oriented X 3.   Skin:                No rash      Lab Data Reviewed:    Recent Results (from the past 24 hour(s))   AMB POC HEMOGLOBIN A1C    Collection Time: 07/29/24 12:05 PM   Result Value Ref Range    Hemoglobin A1C, POC 7.2 %         Alice was seen today for follow-up.    Diagnoses and all orders for this visit:    Type 2 diabetes mellitus without complication, without long-term current use of insulin (HCC),well controlled  -     AMB POC HEMOGLOBIN A1C    Essential (primary) hypertension,controlled  -     CBC with Auto Differential; Future  -     Comprehensive Metabolic Panel; Future  -     Comprehensive Metabolic Panel  -     CBC with Auto Differential    Mixed hyperlipidemia  -     Cholesterol, Total; Future  -     Cholesterol, Total    Atherosclerosis of native coronary artery of native heart without angina pectoris    Encounter for screening mammogram for breast cancer    EFRAIN (obstructive sleep apnea)    Mass of left lower leg        Attending Physician: Derrell Harvey MD

## 2024-08-06 ENCOUNTER — TELEPHONE (OUTPATIENT)
Age: 74
End: 2024-08-06

## 2024-08-06 NOTE — TELEPHONE ENCOUNTER
Patient would like a call back about  getting a PA on Farxiga 5 mg. Patient an be reached at [phone] or [phone]. Pharmacy is [facility]  [phone].

## 2024-08-15 ENCOUNTER — TELEPHONE (OUTPATIENT)
Age: 74
End: 2024-08-15

## 2024-08-15 DIAGNOSIS — F43.21 GRIEF ASSOCIATED WITH LOSS OF FETUS: Primary | ICD-10-CM

## 2024-08-15 DIAGNOSIS — K21.00 GASTROESOPHAGEAL REFLUX DISEASE WITH ESOPHAGITIS WITHOUT HEMORRHAGE: ICD-10-CM

## 2024-08-15 NOTE — TELEPHONE ENCOUNTER
Patient would like a call back at [phone] or [phone] to discuss Farxiga 5 mg. Patient reports that her insurance informed her that a new prescription for Farxiga 5 mg with ICD 10 code on prescription. Patient will be out of medication in 3 days.

## 2024-08-16 RX ORDER — DAPAGLIFLOZIN 5 MG/1
5 TABLET, FILM COATED ORAL DAILY
Qty: 90 TABLET | Refills: 3 | Status: SHIPPED | OUTPATIENT
Start: 2024-08-16

## 2024-08-16 RX ORDER — SUCRALFATE 1 G/1
TABLET ORAL
Qty: 120 TABLET | Refills: 1 | OUTPATIENT
Start: 2024-08-16

## 2024-08-16 NOTE — TELEPHONE ENCOUNTER
Last appointment: 7/29/24  Next appointment: 12/4/24  Previous refill encounter(s): 3/20/24 Xanax #30, 7/25/23 Carafate    Requested Prescriptions     Pending Prescriptions Disp Refills    sucralfate (CARAFATE) 1 GM tablet [Pharmacy Med Name: SUCRALFATE 1GM TABLETS] 120 tablet 1     Sig: TAKE 1 TABLET BY MOUTH FOUR TIMES DAILY AS NEEDED FOR PAIN    ALPRAZolam (XANAX) 0.25 MG tablet [Pharmacy Med Name: ALPRAZOLAM 0.25MG TABLETS] 30 tablet 0     Sig: TAKE 1 TABLET BY MOUTH THREE TIMES DAILY AS NEEDED FOR ANXIETY. MAX DAILY AMOUNT: 0.75 MG         For Pharmacy Admin Tracking Only    Program: Medication Refill  CPA in place:    Recommendation Provided To:   Intervention Detail: New Rx: 2, reason: Patient Preference  Intervention Accepted By:   Gap Closed?:    Time Spent (min): 5

## 2024-08-17 RX ORDER — SUCRALFATE 1 G/1
TABLET ORAL
Qty: 120 TABLET | Refills: 5 | Status: SHIPPED | OUTPATIENT
Start: 2024-08-17

## 2024-08-17 RX ORDER — ALPRAZOLAM 0.25 MG/1
TABLET ORAL
Qty: 30 TABLET | Refills: 2 | Status: SHIPPED | OUTPATIENT
Start: 2024-08-17 | End: 2024-11-15

## 2024-08-28 RX ORDER — TRIAMTERENE/HYDROCHLOROTHIAZID 37.5-25 MG
TABLET ORAL
Qty: 90 TABLET | Refills: 3 | Status: SHIPPED | OUTPATIENT
Start: 2024-08-28

## 2024-08-28 NOTE — TELEPHONE ENCOUNTER
Last appointment: 7/29/24  Next appointment: 12/4/24  Previous refill encounter(s): 10/4/23 #90 with 3 refills    Requested Prescriptions     Pending Prescriptions Disp Refills    triamterene-hydroCHLOROthiazide (MAXZIDE-25) 37.5-25 MG per tablet [Pharmacy Med Name: TRIAMTERENE 37.5MG/ ONSS25AZ TABS] 90 tablet 3     Sig: TAKE 1 TABLET BY MOUTH EVERY MORNING         For Pharmacy Admin Tracking Only    Program: Medication Refill  CPA in place:    Recommendation Provided To:   Intervention Detail: New Rx: 1, reason: Patient Preference  Intervention Accepted By:   Gap Closed?:    Time Spent (min): 5

## 2024-09-11 ENCOUNTER — TELEPHONE (OUTPATIENT)
Age: 74
End: 2024-09-11

## 2024-09-18 ENCOUNTER — TELEPHONE (OUTPATIENT)
Age: 74
End: 2024-09-18

## 2024-10-03 RX ORDER — ROSUVASTATIN CALCIUM 10 MG/1
TABLET, COATED ORAL
Qty: 30 TABLET | Refills: 0 | Status: SHIPPED | OUTPATIENT
Start: 2024-10-03

## 2024-10-03 RX ORDER — ROSUVASTATIN CALCIUM 10 MG/1
TABLET, COATED ORAL
Qty: 90 TABLET | OUTPATIENT
Start: 2024-10-03

## 2024-10-03 NOTE — TELEPHONE ENCOUNTER
Last appointment: 7/29/24  Next appointment: 12/4/24  Previous refill encounter(s): 10/4/23    Requested Prescriptions     Pending Prescriptions Disp Refills    rosuvastatin (CRESTOR) 10 MG tablet [Pharmacy Med Name: ROSUVASTATIN 10MG TABLETS] 30 tablet 0     Sig: TAKE 1 TABLET BY MOUTH EVERY EVENING         For Pharmacy Admin Tracking Only    Program: Medication Refill  CPA in place:    Recommendation Provided To:   Intervention Detail: New Rx: 1, reason: Patient Preference  Intervention Accepted By:   Gap Closed?:    Time Spent (min): 5

## 2024-10-07 RX ORDER — ROSUVASTATIN CALCIUM 10 MG/1
TABLET, COATED ORAL
Qty: 90 TABLET | Refills: 3 | OUTPATIENT
Start: 2024-10-07

## 2024-10-28 RX ORDER — FAMOTIDINE 40 MG/1
TABLET, FILM COATED ORAL
Qty: 180 TABLET | Refills: 3 | Status: SHIPPED | OUTPATIENT
Start: 2024-10-28

## 2024-10-28 NOTE — TELEPHONE ENCOUNTER
Last appointment: 7/29/24  Next appointment: 12/4/24  Previous refill encounter(s): 7/25/23    Requested Prescriptions     Pending Prescriptions Disp Refills    famotidine (PEPCID) 40 MG tablet [Pharmacy Med Name: FAMOTIDINE 40MG TABLETS] 180 tablet 3     Sig: TAKE 1 TABLET BY MOUTH TWICE DAILY         For Pharmacy Admin Tracking Only    Program: Medication Refill  CPA in place:    Recommendation Provided To:   Intervention Detail: New Rx: 1, reason: Patient Preference  Intervention Accepted By:   Gap Closed?:    Time Spent (min): 5

## 2024-11-13 ENCOUNTER — TELEPHONE (OUTPATIENT)
Age: 74
End: 2024-11-13

## 2024-11-13 NOTE — TELEPHONE ENCOUNTER
Talked to the pt and she was scheduled with Dr. Harvey for Thursday, 11/14/24 at 10 am.  Pt accepted the appointment.

## 2024-11-14 ENCOUNTER — OFFICE VISIT (OUTPATIENT)
Age: 74
End: 2024-11-14
Payer: MEDICARE

## 2024-11-14 VITALS
BODY MASS INDEX: 28.52 KG/M2 | WEIGHT: 171.4 LBS | RESPIRATION RATE: 16 BRPM | SYSTOLIC BLOOD PRESSURE: 132 MMHG | DIASTOLIC BLOOD PRESSURE: 64 MMHG | HEART RATE: 57 BPM | OXYGEN SATURATION: 99 % | TEMPERATURE: 97.4 F

## 2024-11-14 DIAGNOSIS — I25.10 ATHEROSCLEROSIS OF NATIVE CORONARY ARTERY OF NATIVE HEART WITHOUT ANGINA PECTORIS: ICD-10-CM

## 2024-11-14 DIAGNOSIS — E78.2 MIXED HYPERLIPIDEMIA: ICD-10-CM

## 2024-11-14 DIAGNOSIS — K21.00 GASTROESOPHAGEAL REFLUX DISEASE WITH ESOPHAGITIS WITHOUT HEMORRHAGE: ICD-10-CM

## 2024-11-14 DIAGNOSIS — E11.59 TYPE 2 DIABETES MELLITUS WITH OTHER CIRCULATORY COMPLICATIONS (HCC): ICD-10-CM

## 2024-11-14 DIAGNOSIS — I10 ESSENTIAL (PRIMARY) HYPERTENSION: Primary | ICD-10-CM

## 2024-11-14 DIAGNOSIS — I10 ESSENTIAL (PRIMARY) HYPERTENSION: ICD-10-CM

## 2024-11-14 DIAGNOSIS — G47.33 OSA (OBSTRUCTIVE SLEEP APNEA): ICD-10-CM

## 2024-11-14 LAB
ALBUMIN SERPL-MCNC: 3.7 G/DL (ref 3.5–5)
ALBUMIN/GLOB SERPL: 1.1 (ref 1.1–2.2)
ALP SERPL-CCNC: 77 U/L (ref 45–117)
ALT SERPL-CCNC: 21 U/L (ref 12–78)
ANION GAP SERPL CALC-SCNC: 9 MMOL/L (ref 2–12)
AST SERPL-CCNC: 13 U/L (ref 15–37)
BASOPHILS # BLD: 0.1 K/UL (ref 0–0.1)
BASOPHILS NFR BLD: 1 % (ref 0–1)
BILIRUB SERPL-MCNC: 1.5 MG/DL (ref 0.2–1)
BUN SERPL-MCNC: 17 MG/DL (ref 6–20)
BUN/CREAT SERPL: 16 (ref 12–20)
CALCIUM SERPL-MCNC: 9.6 MG/DL (ref 8.5–10.1)
CHLORIDE SERPL-SCNC: 104 MMOL/L (ref 97–108)
CHOLEST SERPL-MCNC: 148 MG/DL
CO2 SERPL-SCNC: 27 MMOL/L (ref 21–32)
CREAT SERPL-MCNC: 1.05 MG/DL (ref 0.55–1.02)
DIFFERENTIAL METHOD BLD: ABNORMAL
EOSINOPHIL # BLD: 0.1 K/UL (ref 0–0.4)
EOSINOPHIL NFR BLD: 1 % (ref 0–7)
ERYTHROCYTE [DISTWIDTH] IN BLOOD BY AUTOMATED COUNT: 12.3 % (ref 11.5–14.5)
GLOBULIN SER CALC-MCNC: 3.3 G/DL (ref 2–4)
GLUCOSE SERPL-MCNC: 179 MG/DL (ref 65–100)
HBA1C MFR BLD: 7.1 %
HCT VFR BLD AUTO: 46.5 % (ref 35–47)
HDLC SERPL-MCNC: 49 MG/DL
HDLC SERPL: 3 (ref 0–5)
HGB BLD-MCNC: 15.5 G/DL (ref 11.5–16)
IMM GRANULOCYTES # BLD AUTO: 0.1 K/UL (ref 0–0.04)
IMM GRANULOCYTES NFR BLD AUTO: 1 % (ref 0–0.5)
LDLC SERPL CALC-MCNC: 56.6 MG/DL (ref 0–100)
LYMPHOCYTES # BLD: 2.1 K/UL (ref 0.8–3.5)
LYMPHOCYTES NFR BLD: 26 % (ref 12–49)
MCH RBC QN AUTO: 29.8 PG (ref 26–34)
MCHC RBC AUTO-ENTMCNC: 33.3 G/DL (ref 30–36.5)
MCV RBC AUTO: 89.3 FL (ref 80–99)
MONOCYTES # BLD: 0.7 K/UL (ref 0–1)
MONOCYTES NFR BLD: 8 % (ref 5–13)
NEUTS SEG # BLD: 5.1 K/UL (ref 1.8–8)
NEUTS SEG NFR BLD: 63 % (ref 32–75)
NRBC # BLD: 0 K/UL (ref 0–0.01)
NRBC BLD-RTO: 0 PER 100 WBC
PLATELET # BLD AUTO: 247 K/UL (ref 150–400)
PMV BLD AUTO: 9.6 FL (ref 8.9–12.9)
POTASSIUM SERPL-SCNC: 3.5 MMOL/L (ref 3.5–5.1)
PROT SERPL-MCNC: 7 G/DL (ref 6.4–8.2)
RBC # BLD AUTO: 5.21 M/UL (ref 3.8–5.2)
SODIUM SERPL-SCNC: 140 MMOL/L (ref 136–145)
TRIGL SERPL-MCNC: 212 MG/DL
VLDLC SERPL CALC-MCNC: 42.4 MG/DL
WBC # BLD AUTO: 8.1 K/UL (ref 3.6–11)

## 2024-11-14 PROCEDURE — 83036 HEMOGLOBIN GLYCOSYLATED A1C: CPT | Performed by: FAMILY MEDICINE

## 2024-11-14 PROCEDURE — 99214 OFFICE O/P EST MOD 30 MIN: CPT | Performed by: FAMILY MEDICINE

## 2024-11-14 SDOH — ECONOMIC STABILITY: INCOME INSECURITY: HOW HARD IS IT FOR YOU TO PAY FOR THE VERY BASICS LIKE FOOD, HOUSING, MEDICAL CARE, AND HEATING?: NOT VERY HARD

## 2024-11-14 SDOH — ECONOMIC STABILITY: FOOD INSECURITY: WITHIN THE PAST 12 MONTHS, THE FOOD YOU BOUGHT JUST DIDN'T LAST AND YOU DIDN'T HAVE MONEY TO GET MORE.: NEVER TRUE

## 2024-11-14 SDOH — ECONOMIC STABILITY: FOOD INSECURITY: WITHIN THE PAST 12 MONTHS, YOU WORRIED THAT YOUR FOOD WOULD RUN OUT BEFORE YOU GOT MONEY TO BUY MORE.: NEVER TRUE

## 2024-11-14 ASSESSMENT — PATIENT HEALTH QUESTIONNAIRE - PHQ9
SUM OF ALL RESPONSES TO PHQ QUESTIONS 1-9: 0
2. FEELING DOWN, DEPRESSED OR HOPELESS: NOT AT ALL
SUM OF ALL RESPONSES TO PHQ QUESTIONS 1-9: 0
SUM OF ALL RESPONSES TO PHQ QUESTIONS 1-9: 0
1. LITTLE INTEREST OR PLEASURE IN DOING THINGS: NOT AT ALL
SUM OF ALL RESPONSES TO PHQ QUESTIONS 1-9: 0
SUM OF ALL RESPONSES TO PHQ9 QUESTIONS 1 & 2: 0

## 2024-11-14 ASSESSMENT — ENCOUNTER SYMPTOMS
WHEEZING: 0
BACK PAIN: 1
ABDOMINAL PAIN: 0
CHEST TIGHTNESS: 0
SHORTNESS OF BREATH: 0

## 2024-11-14 NOTE — PROGRESS NOTES
Chief Complaint   Patient presents with    3 Month Follow-Up       \"Have you been to the ER, urgent care clinic since your last visit?  Hospitalized since your last visit?\"    NO    “Have you seen or consulted any other health care providers outside of [facility] since your last visit?”    NO            Click Here for Release of Records Request       There were no vitals filed for this visit.   Health Maintenance Due   Topic Date Due    Respiratory Syncytial Virus (RSV) Pregnant or age 60 yrs+ (1 - 1-dose 60+ series) Never done    Shingles vaccine (2 of 3) 2016    Diabetic retinal exam  10/19/2018    Diabetic foot exam  2019    Annual Wellness Visit (Medicare)  2024    Flu vaccine (1) 2024    Diabetic Alb to Cr ratio (uACR) test  2024    COVID-19 Vaccine (2 - - season) 2024        The patient, Alice Morocho, identity was verified by name and .

## 2024-11-14 NOTE — PROGRESS NOTES
NAME:  Alice Morocho   :   1950   MRN:   [id number]     Date/Time:  2024 8:58 AM  Subjective:   HPI   Diabetes  The history is provided by the Patient. This is a chronic problem. The problem occurs daily. The problem has not changed since onset.  Hypertension   The history is provided by the Patient. This is a chronic problem. The problem has not changed since onset.Associated symptoms include malaise/fatigue. Pertinent negatives include no peripheral edema.   Cholesterol Problem  The history is provided by the Patient. This is a chronic problem. The problem occurs daily.   Follow-up  The history is provided by the Patient. This is a chronic problem. The problem occurs daily.    Review of Systems   Constitutional:  Positive for fatigue. Negative for activity change.   HENT:  Negative for congestion.    Respiratory:  Negative for chest tightness, shortness of breath and wheezing.    Cardiovascular:  Negative for chest pain and palpitations.   Gastrointestinal:  Negative for abdominal pain.   Musculoskeletal:  Positive for arthralgias, back pain and gait problem.   Psychiatric/Behavioral:  Negative for agitation.              Medications reviewed:  Current Outpatient Medications   Medication Sig    famotidine (PEPCID) 40 MG tablet TAKE 1 TABLET BY MOUTH TWICE DAILY    rosuvastatin (CRESTOR) 10 MG tablet TAKE 1 TABLET BY MOUTH EVERY EVENING    triamterene-hydroCHLOROthiazide (MAXZIDE-25) 37.5-25 MG per tablet TAKE 1 TABLET BY MOUTH EVERY MORNING    sucralfate (CARAFATE) 1 GM tablet TAKE 1 TABLET BY MOUTH FOUR TIMES DAILY AS NEEDED FOR PAIN    ALPRAZolam (XANAX) 0.25 MG tablet TAKE 1 TABLET BY MOUTH THREE TIMES DAILY AS NEEDED FOR ANXIETY. MAX DAILY AMOUNT: 0.75 MG    dapagliflozin (FARXIGA) 5 MG tablet Take 1 tablet by mouth daily Dx Code: E11.9, E11.22    ranolazine (RANEXA) 1000 MG extended release tablet TAKE 1 TABLET BY MOUTH TWICE DAILY    glimepiride (AMARYL) 2 MG tablet Take 1.5 tablets by

## 2024-11-19 RX ORDER — ROSUVASTATIN CALCIUM 10 MG/1
TABLET, COATED ORAL
Qty: 90 TABLET | Refills: 3 | Status: SHIPPED | OUTPATIENT
Start: 2024-11-19

## 2024-11-19 NOTE — TELEPHONE ENCOUNTER
Last appointment: 11/14/24  Next appointment: 2/17/25  Previous refill encounter(s): 10/3/24 #30    Requested Prescriptions     Pending Prescriptions Disp Refills    rosuvastatin (CRESTOR) 10 MG tablet [Pharmacy Med Name: ROSUVASTATIN 10MG TABLETS] 90 tablet 3     Sig: TAKE 1 TABLET BY MOUTH EVERY EVENING         For Pharmacy Admin Tracking Only    Program: Medication Refill  CPA in place:    Recommendation Provided To:   Intervention Detail: New Rx: 1, reason: Patient Preference  Intervention Accepted By:   Gap Closed?:    Time Spent (min): 5

## 2024-11-26 ENCOUNTER — TELEPHONE (OUTPATIENT)
Age: 74
End: 2024-11-26

## 2024-11-26 NOTE — TELEPHONE ENCOUNTER
Pre-Procedure Cardiac Clearance Request:    Facility: [facility]    Procedure Date: January 21    Procedure: Colonoscopy    Surgeon: dr. Nayak    Anesthesia: Propofol    Request for Interruption of Anticoagulants: plavix & ASA    May stop anticoagulant how many days prior and when to resume    Is patient cleared from a cardiac standpoint to proceed with upcoming procedure. Please advise.

## 2024-11-29 NOTE — TELEPHONE ENCOUNTER
The patient is at low cardiac risk to proceed with the procedure, and can interrupt blood thinners if necessary, for the following length of time individualized for each blood thinner, with the shorter and or longer end of the duration depending on surgical bleeding risk as per the proceduralist/surgeon:    If the surgeon believes the procedure is low bleeding risk, recommend continuation of aspirin 81 mg if possible, as even cardiac surgery is performed on this dose of aspirin.    Resume blood thinners as per the surgeon/proceduralist when felt to be safe from a surgical/procedural standpoint.

## 2024-11-29 NOTE — TELEPHONE ENCOUNTER
Clearance note, recent progress note and EKG faxed to [facility] office at [phone].

## 2024-12-19 RX ORDER — SITAGLIPTIN 100 MG/1
100 TABLET, FILM COATED ORAL DAILY
Qty: 90 TABLET | Refills: 3 | Status: SHIPPED | OUTPATIENT
Start: 2024-12-19

## 2024-12-19 NOTE — TELEPHONE ENCOUNTER
Last appointment: 11/14/24  Next appointment: 2/17/25  Previous refill encounter(s): 12/1/23    Requested Prescriptions     Pending Prescriptions Disp Refills    JANUVIA 100 MG tablet [Pharmacy Med Name: JANUVIA 100MG TABLETS] 90 tablet 3     Sig: TAKE 1 TABLET BY MOUTH EVERY DAY         For Pharmacy Admin Tracking Only    Program: Medication Refill  CPA in place:    Recommendation Provided To:   Intervention Detail: New Rx: 1, reason: Patient Preference  Intervention Accepted By:   Gap Closed?:    Time Spent (min): 5

## 2025-01-09 ENCOUNTER — TELEPHONE (OUTPATIENT)
Age: 75
End: 2025-01-09

## 2025-01-09 DIAGNOSIS — J20.9 ACUTE BRONCHITIS, UNSPECIFIED ORGANISM: Primary | ICD-10-CM

## 2025-01-09 RX ORDER — DEXTROMETHORPHAN HYDROBROMIDE AND PROMETHAZINE HYDROCHLORIDE 15; 6.25 MG/5ML; MG/5ML
5 SYRUP ORAL 4 TIMES DAILY PRN
Qty: 240 ML | Refills: 0 | Status: SHIPPED | OUTPATIENT
Start: 2025-01-09 | End: 2025-01-21

## 2025-01-09 RX ORDER — AZITHROMYCIN 250 MG/1
TABLET, FILM COATED ORAL
Qty: 6 TABLET | Refills: 0 | Status: SHIPPED | OUTPATIENT
Start: 2025-01-09 | End: 2025-01-19

## 2025-01-09 NOTE — TELEPHONE ENCOUNTER
Pt would like to get a call back from Dr. Harvey himself regarding her having a cough, runny nose, and chest pressure for the past two days. She can be reached at [phone]

## 2025-01-13 ENCOUNTER — TELEPHONE (OUTPATIENT)
Age: 75
End: 2025-01-13

## 2025-01-13 NOTE — TELEPHONE ENCOUNTER
Pt requesting a call back because she went to urgent care on 1/10/25 and tested positive for the flu.  She is not feeling any better and wants a call back from Dr. Harvey.  Pt states her  is sick also.  Message for him in his chart.

## 2025-01-13 NOTE — TELEPHONE ENCOUNTER
jaylin calling medicine for [facility] jaylin mosqueda [id number] wants amelia to call them [phone] or [phone]

## 2025-02-04 ENCOUNTER — OFFICE VISIT (OUTPATIENT)
Age: 75
End: 2025-02-04
Payer: MEDICARE

## 2025-02-04 VITALS
HEIGHT: 65 IN | BODY MASS INDEX: 27.63 KG/M2 | DIASTOLIC BLOOD PRESSURE: 70 MMHG | OXYGEN SATURATION: 99 % | WEIGHT: 165.8 LBS | SYSTOLIC BLOOD PRESSURE: 132 MMHG | HEART RATE: 63 BPM

## 2025-02-04 DIAGNOSIS — I73.9 CLAUDICATION (HCC): ICD-10-CM

## 2025-02-04 DIAGNOSIS — I10 ESSENTIAL (PRIMARY) HYPERTENSION: ICD-10-CM

## 2025-02-04 DIAGNOSIS — Z95.5 PRESENCE OF STENT IN RIGHT CORONARY ARTERY: ICD-10-CM

## 2025-02-04 DIAGNOSIS — Z98.61 CORONARY ANGIOPLASTY STATUS: ICD-10-CM

## 2025-02-04 DIAGNOSIS — R06.09 DOE (DYSPNEA ON EXERTION): Primary | ICD-10-CM

## 2025-02-04 DIAGNOSIS — I25.10 ATHEROSCLEROSIS OF NATIVE CORONARY ARTERY OF NATIVE HEART WITHOUT ANGINA PECTORIS: ICD-10-CM

## 2025-02-04 DIAGNOSIS — I20.9 ANGINA PECTORIS, UNSPECIFIED (HCC): ICD-10-CM

## 2025-02-04 DIAGNOSIS — E78.2 MIXED HYPERLIPIDEMIA: ICD-10-CM

## 2025-02-04 DIAGNOSIS — E11.22 TYPE 2 DIABETES MELLITUS WITH DIABETIC CHRONIC KIDNEY DISEASE, UNSPECIFIED CKD STAGE, UNSPECIFIED WHETHER LONG TERM INSULIN USE (HCC): ICD-10-CM

## 2025-02-04 DIAGNOSIS — R06.02 SHORTNESS OF BREATH: ICD-10-CM

## 2025-02-04 PROCEDURE — G8427 DOCREV CUR MEDS BY ELIG CLIN: HCPCS | Performed by: INTERNAL MEDICINE

## 2025-02-04 PROCEDURE — 3075F SYST BP GE 130 - 139MM HG: CPT | Performed by: INTERNAL MEDICINE

## 2025-02-04 PROCEDURE — 3078F DIAST BP <80 MM HG: CPT | Performed by: INTERNAL MEDICINE

## 2025-02-04 PROCEDURE — 1159F MED LIST DOCD IN RCRD: CPT | Performed by: INTERNAL MEDICINE

## 2025-02-04 PROCEDURE — G2211 COMPLEX E/M VISIT ADD ON: HCPCS | Performed by: INTERNAL MEDICINE

## 2025-02-04 PROCEDURE — G8399 PT W/DXA RESULTS DOCUMENT: HCPCS | Performed by: INTERNAL MEDICINE

## 2025-02-04 PROCEDURE — G8419 CALC BMI OUT NRM PARAM NOF/U: HCPCS | Performed by: INTERNAL MEDICINE

## 2025-02-04 PROCEDURE — 1126F AMNT PAIN NOTED NONE PRSNT: CPT | Performed by: INTERNAL MEDICINE

## 2025-02-04 PROCEDURE — 93005 ELECTROCARDIOGRAM TRACING: CPT | Performed by: INTERNAL MEDICINE

## 2025-02-04 PROCEDURE — 99214 OFFICE O/P EST MOD 30 MIN: CPT | Performed by: INTERNAL MEDICINE

## 2025-02-04 PROCEDURE — 2022F DILAT RTA XM EVC RTNOPTHY: CPT | Performed by: INTERNAL MEDICINE

## 2025-02-04 PROCEDURE — 3017F COLORECTAL CA SCREEN DOC REV: CPT | Performed by: INTERNAL MEDICINE

## 2025-02-04 PROCEDURE — 1090F PRES/ABSN URINE INCON ASSESS: CPT | Performed by: INTERNAL MEDICINE

## 2025-02-04 PROCEDURE — 1123F ACP DISCUSS/DSCN MKR DOCD: CPT | Performed by: INTERNAL MEDICINE

## 2025-02-04 PROCEDURE — 93010 ELECTROCARDIOGRAM REPORT: CPT | Performed by: INTERNAL MEDICINE

## 2025-02-04 PROCEDURE — 3046F HEMOGLOBIN A1C LEVEL >9.0%: CPT | Performed by: INTERNAL MEDICINE

## 2025-02-04 PROCEDURE — 1036F TOBACCO NON-USER: CPT | Performed by: INTERNAL MEDICINE

## 2025-02-04 ASSESSMENT — PATIENT HEALTH QUESTIONNAIRE - PHQ9
SUM OF ALL RESPONSES TO PHQ9 QUESTIONS 1 & 2: 0
SUM OF ALL RESPONSES TO PHQ QUESTIONS 1-9: 0
SUM OF ALL RESPONSES TO PHQ QUESTIONS 1-9: 0
1. LITTLE INTEREST OR PLEASURE IN DOING THINGS: NOT AT ALL
2. FEELING DOWN, DEPRESSED OR HOPELESS: NOT AT ALL
SUM OF ALL RESPONSES TO PHQ QUESTIONS 1-9: 0
SUM OF ALL RESPONSES TO PHQ QUESTIONS 1-9: 0

## 2025-02-04 NOTE — PROGRESS NOTES
1. Have you been to the ER, urgent care clinic since your last visit?  Hospitalized since your last visit? Yes, 1/10/25, [facility], flu    2. Have you seen or consulted any other health care providers outside of the [facility] since your last visit?  Include any pap smears or colon screening. No

## 2025-02-04 NOTE — PATIENT INSTRUCTIONS
You will be scheduled for a Nuclear Stress Test after your appointment today.    Nuclear stress testing evaluates blood flow to your heart muscle and assesses cardiac function. There are 2 parts (Rest/Stress) to this procedure and will include either an exercise on a treadmill or an IV administration of a stressing medication called Lexiscan. Your cardiologist will determine which type of testing is best for you. This test can be performed in one day unless it is determined that better quality images will be obtained by performing the test over two days.     *Please arrive 15 minutes prior to your appointment time    Test Duration:    -One day testing will take 4 hours   -Two day testing will take 2 hours each day. Your second day(resting images) will be scheduled after the first day is completed    Day of testing instructions:    NO CAFFEINE (not even decaffeinated products) 24 HOURS PRIOR TO TESTING. This includes coffee, soda, tea, chocolate, multivitamins, and migraine medication, like Excedrin or Fioricet that contains caffeine.  Nothing to eat or drink 4 HOURS prior to testing  NO NICOTINE 12 hours prior to testing  It is recommended you hold Ranexa 24 hours prior to your test. DIABETIC PATIENTS: Take half of your insulin with a light meal 4 hours before your test.  Wear comfortable clothes and shoes (Shirts with no metal, shorts or pants, tennis shoes, no heels or flip flops)    IMPORTANT: This testing involves a cardiac tracer ordered specifically for you. If you are unable to make your appointment, please call to cancel/reschedule AT LEAST 24 hours prior to your appointment so your tracer can be cancelled. [phone].

## 2025-02-04 NOTE — PROGRESS NOTES
[address] [place] [address] [phone] [address], [place] [address]     Subjective:        Alice Morocho is a 74 y.o. female is here for a symptom based appointment for shortness of breath noted by her children.  Patient notes that she is breathing harder when she goes up stairs or carries things, and her daughter notes that she is more short of breath than usual when walking.  Sometimes she feels like she cannot take a deep breath.  She notes that she feels hot in her face and neck when she is walking sometimes.  Legs hurt at times when she walks.  She also notes she has not been sleeping well, sleeping for about 4 hours and then laying awake in bed for 2 or 3 hours at a time.  Her daughter notes that these things seem to have gotten worse since COVID in 2023.  Of note, she also had influenza in January 2025, but was having these symptoms prior to that.  The patient denies chest pain, orthopnea, PND, LE edema, palpitations, syncope, presyncope or fatigue.    Patient Active Problem List    Diagnosis Date Noted    Type 2 diabetes mellitus with chronic kidney disease ([facility]) 05/05/2023    Chronic renal disease, stage III ([facility]) 07/12/2022    Type 2 diabetes with nephropathy ([facility]) 07/10/2020    Eructation 08/09/2019    Type 2 diabetes mellitus with diabetic neuropathy ([facility]) 03/26/2018    Diabetes mellitus ([facility]) 02/20/2018    Routine adult health maintenance 10/28/2015    Gastric ulcer 08/11/2015    Unstable angina ([facility]) 07/16/2015    Myocardial ischemia 07/07/2015    S/P PTCA (percutaneous transluminal coronary angioplasty) 07/07/2015    Angina, class III ([facility]) 07/07/2015    Chest pain 05/15/2015    Diarrhea 01/05/2015    CAD (coronary artery disease) 10/23/2014    EFRAIN (obstructive sleep apnea) 10/21/2014    Snoring 10/21/2014    Presence of stent in right coronary artery 12/30/2013    Esophageal motor disorder 10/14/2012    Esophageal dysphagia 10/14/2012    Chronic

## 2025-02-13 NOTE — PROGRESS NOTES
NAME:  Alice Morocho   :   1950   MRN:   [id number]     Date/Time:  2025 7:49 AM  Subjective:   HPI   Diabetes  The history is provided by the Patient. This is a chronic problem. The problem occurs daily. The problem has not changed since onset.  Hypertension   The history is provided by the Patient. This is a chronic problem. The problem has not changed since onset.Associated symptoms include malaise/fatigue. Pertinent negatives include no peripheral edema.   Cholesterol Problem  The history is provided by the Patient. This is a chronic problem. The problem occurs daily.   Follow-up  The history is provided by the Patient. This is a chronic problem. The problem occurs daily.    Review of Systems   Constitutional:  Positive for fatigue. Negative for activity change and fever.   HENT:  Positive for congestion.    Respiratory:  Positive for cough and stridor.    Cardiovascular:  Negative for chest pain and palpitations.   Gastrointestinal:  Negative for abdominal pain.   Musculoskeletal:  Negative for arthralgias.             Medications reviewed:  Current Outpatient Medications   Medication Sig    JANUVIA 100 MG tablet TAKE 1 TABLET BY MOUTH EVERY DAY    rosuvastatin (CRESTOR) 10 MG tablet TAKE 1 TABLET BY MOUTH EVERY EVENING    famotidine (PEPCID) 40 MG tablet TAKE 1 TABLET BY MOUTH TWICE DAILY    triamterene-hydroCHLOROthiazide (MAXZIDE-25) 37.5-25 MG per tablet TAKE 1 TABLET BY MOUTH EVERY MORNING    sucralfate (CARAFATE) 1 GM tablet TAKE 1 TABLET BY MOUTH FOUR TIMES DAILY AS NEEDED FOR PAIN    dapagliflozin (FARXIGA) 5 MG tablet Take 1 tablet by mouth daily Dx Code: E11.9, E11.22    ranolazine (RANEXA) 1000 MG extended release tablet TAKE 1 TABLET BY MOUTH TWICE DAILY    glimepiride (AMARYL) 2 MG tablet Take 1.5 tablets by mouth every morning    metoprolol succinate (TOPROL XL) 25 MG extended release tablet TAKE 1 TABLET BY MOUTH EVERY DAY    amLODIPine (NORVASC) 2.5 MG tablet TAKE 1 TABLET BY

## 2025-02-14 SDOH — ECONOMIC STABILITY: INCOME INSECURITY: IN THE LAST 12 MONTHS, WAS THERE A TIME WHEN YOU WERE NOT ABLE TO PAY THE MORTGAGE OR RENT ON TIME?: NO

## 2025-02-14 SDOH — ECONOMIC STABILITY: FOOD INSECURITY: WITHIN THE PAST 12 MONTHS, THE FOOD YOU BOUGHT JUST DIDN'T LAST AND YOU DIDN'T HAVE MONEY TO GET MORE.: NEVER TRUE

## 2025-02-14 SDOH — ECONOMIC STABILITY: TRANSPORTATION INSECURITY
IN THE PAST 12 MONTHS, HAS THE LACK OF TRANSPORTATION KEPT YOU FROM MEDICAL APPOINTMENTS OR FROM GETTING MEDICATIONS?: NO

## 2025-02-14 SDOH — ECONOMIC STABILITY: FOOD INSECURITY: WITHIN THE PAST 12 MONTHS, YOU WORRIED THAT YOUR FOOD WOULD RUN OUT BEFORE YOU GOT MONEY TO BUY MORE.: NEVER TRUE

## 2025-02-17 ENCOUNTER — OFFICE VISIT (OUTPATIENT)
Age: 75
End: 2025-02-17
Payer: MEDICARE

## 2025-02-17 VITALS
HEIGHT: 65 IN | SYSTOLIC BLOOD PRESSURE: 151 MMHG | OXYGEN SATURATION: 100 % | BODY MASS INDEX: 27.96 KG/M2 | TEMPERATURE: 98.3 F | WEIGHT: 167.8 LBS | RESPIRATION RATE: 18 BRPM | HEART RATE: 58 BPM | DIASTOLIC BLOOD PRESSURE: 59 MMHG

## 2025-02-17 DIAGNOSIS — I10 ESSENTIAL (PRIMARY) HYPERTENSION: ICD-10-CM

## 2025-02-17 DIAGNOSIS — K22.4 ESOPHAGEAL DYSMOTILITY: ICD-10-CM

## 2025-02-17 DIAGNOSIS — R53.83 OTHER FATIGUE: ICD-10-CM

## 2025-02-17 DIAGNOSIS — N18.31 STAGE 3A CHRONIC KIDNEY DISEASE (HCC): ICD-10-CM

## 2025-02-17 DIAGNOSIS — E78.2 MIXED HYPERLIPIDEMIA: ICD-10-CM

## 2025-02-17 DIAGNOSIS — I25.10 ATHEROSCLEROSIS OF NATIVE CORONARY ARTERY OF NATIVE HEART WITHOUT ANGINA PECTORIS: Primary | ICD-10-CM

## 2025-02-17 DIAGNOSIS — E11.59 TYPE 2 DIABETES MELLITUS WITH OTHER CIRCULATORY COMPLICATIONS (HCC): ICD-10-CM

## 2025-02-17 DIAGNOSIS — G47.33 OSA (OBSTRUCTIVE SLEEP APNEA): ICD-10-CM

## 2025-02-17 LAB
ALBUMIN SERPL-MCNC: 4.1 G/DL (ref 3.5–5)
ALBUMIN/GLOB SERPL: 1.2 (ref 1.1–2.2)
ALP SERPL-CCNC: 82 U/L (ref 45–117)
ALT SERPL-CCNC: 22 U/L (ref 12–78)
ANION GAP SERPL CALC-SCNC: 5 MMOL/L (ref 2–12)
AST SERPL-CCNC: 8 U/L (ref 15–37)
BILIRUB SERPL-MCNC: 1 MG/DL (ref 0.2–1)
BUN SERPL-MCNC: 17 MG/DL (ref 6–20)
BUN/CREAT SERPL: 15 (ref 12–20)
CALCIUM SERPL-MCNC: 10.3 MG/DL (ref 8.5–10.1)
CHLORIDE SERPL-SCNC: 104 MMOL/L (ref 97–108)
CHOLEST SERPL-MCNC: 184 MG/DL
CO2 SERPL-SCNC: 31 MMOL/L (ref 21–32)
CREAT SERPL-MCNC: 1.17 MG/DL (ref 0.55–1.02)
CREAT UR-MCNC: 72.2 MG/DL
GLOBULIN SER CALC-MCNC: 3.4 G/DL (ref 2–4)
GLUCOSE SERPL-MCNC: 84 MG/DL (ref 65–100)
HBA1C MFR BLD: 6.8 %
HDLC SERPL-MCNC: 45 MG/DL
HDLC SERPL: 4.1 (ref 0–5)
LDLC SERPL CALC-MCNC: 64.4 MG/DL (ref 0–100)
MICROALBUMIN UR-MCNC: 0.9 MG/DL
MICROALBUMIN/CREAT UR-RTO: 12 MG/G (ref 0–30)
POTASSIUM SERPL-SCNC: 3.6 MMOL/L (ref 3.5–5.1)
PROT SERPL-MCNC: 7.5 G/DL (ref 6.4–8.2)
SODIUM SERPL-SCNC: 140 MMOL/L (ref 136–145)
TRIGL SERPL-MCNC: 373 MG/DL
VLDLC SERPL CALC-MCNC: 74.6 MG/DL

## 2025-02-17 PROCEDURE — G8399 PT W/DXA RESULTS DOCUMENT: HCPCS | Performed by: FAMILY MEDICINE

## 2025-02-17 PROCEDURE — 3078F DIAST BP <80 MM HG: CPT | Performed by: FAMILY MEDICINE

## 2025-02-17 PROCEDURE — G8427 DOCREV CUR MEDS BY ELIG CLIN: HCPCS | Performed by: FAMILY MEDICINE

## 2025-02-17 PROCEDURE — 83036 HEMOGLOBIN GLYCOSYLATED A1C: CPT | Performed by: FAMILY MEDICINE

## 2025-02-17 PROCEDURE — 1123F ACP DISCUSS/DSCN MKR DOCD: CPT | Performed by: FAMILY MEDICINE

## 2025-02-17 PROCEDURE — 1126F AMNT PAIN NOTED NONE PRSNT: CPT | Performed by: FAMILY MEDICINE

## 2025-02-17 PROCEDURE — 3044F HG A1C LEVEL LT 7.0%: CPT | Performed by: FAMILY MEDICINE

## 2025-02-17 PROCEDURE — 99214 OFFICE O/P EST MOD 30 MIN: CPT | Performed by: FAMILY MEDICINE

## 2025-02-17 PROCEDURE — 1036F TOBACCO NON-USER: CPT | Performed by: FAMILY MEDICINE

## 2025-02-17 PROCEDURE — 1090F PRES/ABSN URINE INCON ASSESS: CPT | Performed by: FAMILY MEDICINE

## 2025-02-17 PROCEDURE — 1159F MED LIST DOCD IN RCRD: CPT | Performed by: FAMILY MEDICINE

## 2025-02-17 PROCEDURE — 3077F SYST BP >= 140 MM HG: CPT | Performed by: FAMILY MEDICINE

## 2025-02-17 PROCEDURE — G8419 CALC BMI OUT NRM PARAM NOF/U: HCPCS | Performed by: FAMILY MEDICINE

## 2025-02-17 PROCEDURE — 3046F HEMOGLOBIN A1C LEVEL >9.0%: CPT | Performed by: FAMILY MEDICINE

## 2025-02-17 PROCEDURE — 3017F COLORECTAL CA SCREEN DOC REV: CPT | Performed by: FAMILY MEDICINE

## 2025-02-17 PROCEDURE — 2022F DILAT RTA XM EVC RTNOPTHY: CPT | Performed by: FAMILY MEDICINE

## 2025-02-17 PROCEDURE — PBSHW AMB POC HEMOGLOBIN A1C: Performed by: FAMILY MEDICINE

## 2025-02-17 ASSESSMENT — ENCOUNTER SYMPTOMS
COUGH: 1
ABDOMINAL PAIN: 0
STRIDOR: 1

## 2025-02-17 ASSESSMENT — PATIENT HEALTH QUESTIONNAIRE - PHQ9
2. FEELING DOWN, DEPRESSED OR HOPELESS: NOT AT ALL
SUM OF ALL RESPONSES TO PHQ QUESTIONS 1-9: 0
1. LITTLE INTEREST OR PLEASURE IN DOING THINGS: NOT AT ALL
SUM OF ALL RESPONSES TO PHQ QUESTIONS 1-9: 0
SUM OF ALL RESPONSES TO PHQ QUESTIONS 1-9: 0
SUM OF ALL RESPONSES TO PHQ9 QUESTIONS 1 & 2: 0
SUM OF ALL RESPONSES TO PHQ QUESTIONS 1-9: 0

## 2025-02-17 NOTE — PROGRESS NOTES
Chief Complaint   Patient presents with    Follow-up     Patient is here today for a 3 month follow up.      \"Have you been to the ER, urgent care clinic since your last visit?  Hospitalized since your last visit?\"    2/10/25 [facility] for Influenza    “Have you seen or consulted any other health care providers outside of [facility] since your last visit?”    NO            Click Here for Release of Records Request

## 2025-03-04 ENCOUNTER — TELEPHONE (OUTPATIENT)
Age: 75
End: 2025-03-04

## 2025-03-04 NOTE — TELEPHONE ENCOUNTER
Patient called in requesting to please have an call back from a nurse to go over information for 03/07/25 appointment.       Patient # ~ [phone]

## 2025-03-05 NOTE — TELEPHONE ENCOUNTER
Spoke with patient, verified with 2 identifiers Patient had instructions ifor nuclear stress test in hand but wanted to make sure that everything was ok, stated.  Instructions provided over the phone at this time.

## 2025-03-07 ENCOUNTER — ANCILLARY PROCEDURE (OUTPATIENT)
Age: 75
End: 2025-03-07
Payer: MEDICARE

## 2025-03-07 VITALS
WEIGHT: 167 LBS | HEIGHT: 65 IN | HEART RATE: 59 BPM | BODY MASS INDEX: 27.82 KG/M2 | SYSTOLIC BLOOD PRESSURE: 130 MMHG | DIASTOLIC BLOOD PRESSURE: 70 MMHG

## 2025-03-07 VITALS
WEIGHT: 167 LBS | BODY MASS INDEX: 27.82 KG/M2 | DIASTOLIC BLOOD PRESSURE: 60 MMHG | SYSTOLIC BLOOD PRESSURE: 150 MMHG | HEIGHT: 65 IN

## 2025-03-07 DIAGNOSIS — Z98.61 CORONARY ANGIOPLASTY STATUS: ICD-10-CM

## 2025-03-07 DIAGNOSIS — I73.9 CLAUDICATION: ICD-10-CM

## 2025-03-07 DIAGNOSIS — E78.2 MIXED HYPERLIPIDEMIA: ICD-10-CM

## 2025-03-07 DIAGNOSIS — I20.9 ANGINA PECTORIS, UNSPECIFIED: ICD-10-CM

## 2025-03-07 DIAGNOSIS — I25.10 ATHEROSCLEROSIS OF NATIVE CORONARY ARTERY OF NATIVE HEART WITHOUT ANGINA PECTORIS: ICD-10-CM

## 2025-03-07 DIAGNOSIS — R06.09 DOE (DYSPNEA ON EXERTION): ICD-10-CM

## 2025-03-07 DIAGNOSIS — Z95.5 PRESENCE OF STENT IN RIGHT CORONARY ARTERY: ICD-10-CM

## 2025-03-07 DIAGNOSIS — E11.22 TYPE 2 DIABETES MELLITUS WITH DIABETIC CHRONIC KIDNEY DISEASE, UNSPECIFIED CKD STAGE, UNSPECIFIED WHETHER LONG TERM INSULIN USE (HCC): ICD-10-CM

## 2025-03-07 DIAGNOSIS — I10 ESSENTIAL (PRIMARY) HYPERTENSION: ICD-10-CM

## 2025-03-07 DIAGNOSIS — R06.02 SHORTNESS OF BREATH: ICD-10-CM

## 2025-03-07 LAB
ECHO BSA: 1.86 M2
NUC STRESS EJECTION FRACTION: 73 %
STRESS BASELINE DIAS BP: 80 MMHG
STRESS BASELINE HR: 59 BPM
STRESS BASELINE SYS BP: 150 MMHG
STRESS ESTIMATED WORKLOAD: 1 METS
STRESS O2 SAT PEAK: 97 %
STRESS O2 SAT REST: 99 %
STRESS PEAK DIAS BP: 70 MMHG
STRESS PEAK SYS BP: 140 MMHG
STRESS PERCENT HR ACHIEVED: 47 %
STRESS POST PEAK HR: 69 BPM
STRESS RATE PRESSURE PRODUCT: 9660 BPM*MMHG
STRESS ST DEPRESSION: 0 MM
STRESS TARGET HR: 146 BPM
TID: 1.2

## 2025-03-07 PROCEDURE — 93306 TTE W/DOPPLER COMPLETE: CPT | Performed by: INTERNAL MEDICINE

## 2025-03-07 PROCEDURE — A9500 TC99M SESTAMIBI: HCPCS | Performed by: SPECIALIST

## 2025-03-07 PROCEDURE — PBSHW PBB SHADOW CHARGE: Performed by: SPECIALIST

## 2025-03-07 PROCEDURE — 78452 HT MUSCLE IMAGE SPECT MULT: CPT | Performed by: SPECIALIST

## 2025-03-07 PROCEDURE — 93018 CV STRESS TEST I&R ONLY: CPT | Performed by: SPECIALIST

## 2025-03-07 PROCEDURE — PBSHW PBB SHADOW CHARGE: Performed by: INTERNAL MEDICINE

## 2025-03-07 PROCEDURE — 93016 CV STRESS TEST SUPVJ ONLY: CPT | Performed by: SPECIALIST

## 2025-03-07 RX ORDER — REGADENOSON 0.08 MG/ML
0.4 INJECTION, SOLUTION INTRAVENOUS
Status: COMPLETED | OUTPATIENT
Start: 2025-03-07 | End: 2025-03-07

## 2025-03-07 RX ORDER — TETRAKIS(2-METHOXYISOBUTYLISOCYANIDE)COPPER(I) TETRAFLUOROBORATE 1 MG/ML
26.1 INJECTION, POWDER, LYOPHILIZED, FOR SOLUTION INTRAVENOUS
Status: COMPLETED | OUTPATIENT
Start: 2025-03-07 | End: 2025-03-07

## 2025-03-07 RX ORDER — TETRAKIS(2-METHOXYISOBUTYLISOCYANIDE)COPPER(I) TETRAFLUOROBORATE 1 MG/ML
8.2 INJECTION, POWDER, LYOPHILIZED, FOR SOLUTION INTRAVENOUS
Status: COMPLETED | OUTPATIENT
Start: 2025-03-07 | End: 2025-03-07

## 2025-03-07 RX ORDER — AMINOPHYLLINE 25 MG/ML
100 INJECTION, SOLUTION INTRAVENOUS
Status: COMPLETED | OUTPATIENT
Start: 2025-03-07 | End: 2025-03-07

## 2025-03-07 RX ADMIN — TECHNETIUM TC-99M SESTAMIBI 8.2 MILLICURIE: 1 INJECTION INTRAVENOUS at 11:45

## 2025-03-07 RX ADMIN — TECHNETIUM TC-99M SESTAMIBI 26.1 MILLICURIE: 1 INJECTION INTRAVENOUS at 13:00

## 2025-03-07 RX ADMIN — REGADENOSON 0.4 MG: 0.08 INJECTION, SOLUTION INTRAVENOUS at 13:00

## 2025-03-07 RX ADMIN — AMINOPHYLLINE 100 MG: 25 INJECTION, SOLUTION INTRAVENOUS at 13:05

## 2025-03-11 ENCOUNTER — TELEPHONE (OUTPATIENT)
Age: 75
End: 2025-03-11

## 2025-03-11 NOTE — TELEPHONE ENCOUNTER
Returned patient's call on 3/11/2025 at 11:30 am and reassured patient that her trigger finger shot will not affect her TAQUERIA testing. Patient understood and will plan on coming in tomorrow for her scheduled test.

## 2025-03-11 NOTE — TELEPHONE ENCOUNTER
Patient is calling because she is going today to have a shot in her finger for her trigger finger and she would like to know if the medication will interfere with anything when she comes to have her procedure on 3/12/25.Patient has to by leave by 2 pm.    [phone]

## 2025-03-12 ENCOUNTER — ANCILLARY PROCEDURE (OUTPATIENT)
Age: 75
End: 2025-03-12
Payer: MEDICARE

## 2025-03-12 DIAGNOSIS — I73.9 CLAUDICATION: ICD-10-CM

## 2025-03-12 LAB
VAS IMMEDIATE LEFT ABI: 0.9
VAS IMMEDIATE LEFT POST TIBIAL BP: 166 MMHG
VAS IMMEDIATE RIGHT ABI: 0.96
VAS IMMEDIATE RIGHT BRACHIAL BP: 184 MMHG
VAS IMMEDIATE RIGHT POST TIBIAL BP: 176 MMHG
VAS LEFT ABI: 0.84
VAS LEFT ARM BP: 162 MMHG
VAS LEFT DORSALIS PEDIS BP: 147 MMHG
VAS LEFT PTA BP: 146 MMHG
VAS RIGHT ABI: 0.95
VAS RIGHT ARM BP: 174 MMHG
VAS RIGHT DORSALIS PEDIS BP: 147 MMHG
VAS RIGHT PTA BP: 165 MMHG
VAS TREADMILL TIME: 5 MIN

## 2025-03-12 PROCEDURE — 93922 UPR/L XTREMITY ART 2 LEVELS: CPT | Performed by: INTERNAL MEDICINE

## 2025-03-15 ENCOUNTER — RESULTS FOLLOW-UP (OUTPATIENT)
Age: 75
End: 2025-03-15

## 2025-03-15 LAB
ECHO AO ASC DIAM: 3.5 CM
ECHO AO ASCENDING AORTA INDEX: 1.91 CM/M2
ECHO AO ROOT DIAM: 3.3 CM
ECHO AO ROOT INDEX: 1.8 CM/M2
ECHO AV AREA PEAK VELOCITY: 2.2 CM2
ECHO AV AREA VTI: 2.2 CM2
ECHO AV AREA/BSA PEAK VELOCITY: 1.2 CM2/M2
ECHO AV AREA/BSA VTI: 1.2 CM2/M2
ECHO AV MEAN GRADIENT: 4 MMHG
ECHO AV MEAN VELOCITY: 1 M/S
ECHO AV PEAK GRADIENT: 6 MMHG
ECHO AV PEAK VELOCITY: 1.2 M/S
ECHO AV VELOCITY RATIO: 0.67
ECHO AV VTI: 30.5 CM
ECHO BSA: 1.86 M2
ECHO LA DIAMETER INDEX: 1.69 CM/M2
ECHO LA DIAMETER: 3.1 CM
ECHO LA TO AORTIC ROOT RATIO: 0.94
ECHO LA VOL A-L A2C: 48 ML (ref 22–52)
ECHO LA VOL A-L A4C: 34 ML (ref 22–52)
ECHO LA VOL BP: 41 ML (ref 22–52)
ECHO LA VOL MOD A2C: 47 ML (ref 22–52)
ECHO LA VOL MOD A4C: 32 ML (ref 22–52)
ECHO LA VOL/BSA BIPLANE: 22 ML/M2 (ref 16–34)
ECHO LA VOLUME AREA LENGTH: 43 ML
ECHO LA VOLUME INDEX A-L A2C: 26 ML/M2 (ref 16–34)
ECHO LA VOLUME INDEX A-L A4C: 19 ML/M2 (ref 16–34)
ECHO LA VOLUME INDEX AREA LENGTH: 23 ML/M2 (ref 16–34)
ECHO LA VOLUME INDEX MOD A2C: 26 ML/M2 (ref 16–34)
ECHO LA VOLUME INDEX MOD A4C: 17 ML/M2 (ref 16–34)
ECHO LV E' LATERAL VELOCITY: 6.48 CM/S
ECHO LV E' SEPTAL VELOCITY: 5.35 CM/S
ECHO LV EDV A2C: 65 ML
ECHO LV EDV A4C: 59 ML
ECHO LV EDV BP: 63 ML (ref 56–104)
ECHO LV EDV INDEX A4C: 32 ML/M2
ECHO LV EDV INDEX BP: 34 ML/M2
ECHO LV EDV NDEX A2C: 36 ML/M2
ECHO LV EF PHYSICIAN: 65 %
ECHO LV EJECTION FRACTION A2C: 64 %
ECHO LV EJECTION FRACTION A4C: 66 %
ECHO LV EJECTION FRACTION BIPLANE: 65 % (ref 55–100)
ECHO LV ESV A2C: 24 ML
ECHO LV ESV A4C: 20 ML
ECHO LV ESV BP: 22 ML (ref 19–49)
ECHO LV ESV INDEX A2C: 13 ML/M2
ECHO LV ESV INDEX A4C: 11 ML/M2
ECHO LV ESV INDEX BP: 12 ML/M2
ECHO LV FRACTIONAL SHORTENING: 36 % (ref 28–44)
ECHO LV INTERNAL DIMENSION DIASTOLE INDEX: 2.4 CM/M2
ECHO LV INTERNAL DIMENSION DIASTOLIC: 4.4 CM (ref 3.9–5.3)
ECHO LV INTERNAL DIMENSION SYSTOLIC INDEX: 1.53 CM/M2
ECHO LV INTERNAL DIMENSION SYSTOLIC: 2.8 CM
ECHO LV IVSD: 0.7 CM (ref 0.6–0.9)
ECHO LV IVSS: 1 CM
ECHO LV MASS 2D: 92.1 G (ref 67–162)
ECHO LV MASS INDEX 2D: 50.3 G/M2 (ref 43–95)
ECHO LV POSTERIOR WALL DIASTOLIC: 0.7 CM (ref 0.6–0.9)
ECHO LV POSTERIOR WALL SYSTOLIC: 1.3 CM
ECHO LV RELATIVE WALL THICKNESS RATIO: 0.32
ECHO LVOT AREA: 3.1 CM2
ECHO LVOT AV VTI INDEX: 0.67
ECHO LVOT DIAM: 2 CM
ECHO LVOT MEAN GRADIENT: 1 MMHG
ECHO LVOT PEAK GRADIENT: 3 MMHG
ECHO LVOT PEAK VELOCITY: 0.8 M/S
ECHO LVOT STROKE VOLUME INDEX: 35 ML/M2
ECHO LVOT SV: 64.1 ML
ECHO LVOT VTI: 20.4 CM
ECHO MV A VELOCITY: 1.43 M/S
ECHO MV AREA PHT: 2.3 CM2
ECHO MV AREA VTI: 1.6 CM2
ECHO MV E DECELERATION TIME (DT): 327 MS
ECHO MV E VELOCITY: 0.92 M/S
ECHO MV E/A RATIO: 0.64
ECHO MV E/E' LATERAL: 14.2
ECHO MV E/E' RATIO (AVERAGED): 15.7
ECHO MV E/E' SEPTAL: 17.2
ECHO MV LVOT VTI INDEX: 1.95
ECHO MV MAX VELOCITY: 1.5 M/S
ECHO MV MEAN GRADIENT: 2 MMHG
ECHO MV MEAN VELOCITY: 0.7 M/S
ECHO MV PEAK GRADIENT: 9 MMHG
ECHO MV PRESSURE HALF TIME (PHT): 94.8 MS
ECHO MV VTI: 39.7 CM
ECHO PV MAX VELOCITY: 0.7 M/S
ECHO PV PEAK GRADIENT: 2 MMHG
ECHO RV FREE WALL PEAK S': 10.8 CM/S
ECHO RV INTERNAL DIMENSION: 3.8 CM
ECHO RV TAPSE: 1.9 CM (ref 1.7–?)
ECHO RVOT PEAK GRADIENT: 2 MMHG
ECHO RVOT PEAK VELOCITY: 0.6 M/S

## 2025-03-15 PROCEDURE — 93306 TTE W/DOPPLER COMPLETE: CPT | Performed by: INTERNAL MEDICINE

## 2025-03-15 NOTE — RESULT ENCOUNTER NOTE
Please advise no evidence of any new blockages in the arteries of the heart.  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  If doing well, continue with medical management.  Let us know if any concerns about progressive chest pain or shortness of breath.  Follow-up as scheduled.    Future Appointments  4/15/2025  1:00 PM    Morgan Interiano MD [facility]              BS AMB  4/17/2025  11:10 AM   Marlene Saini APRN - * [place]                 BS AMB  5/19/2025  11:20 AM   Derrell Harvey MD [facility] DEP

## 2025-04-14 ASSESSMENT — SLEEP AND FATIGUE QUESTIONNAIRES
ESS TOTAL SCORE: 6
HAS YOUR RELATIONSHIP WITH FAMILY, FRIENDS OR WORK COLLEAGUES BEEN AFFECTED BECAUSE YOU ARE SLEEPY OR TIRED: YES, MODERATE
HOW LIKELY ARE YOU TO NOD OFF OR FALL ASLEEP WHILE SITTING AND TALKING TO SOMEONE: WOULD NEVER DOZE
DO YOU HAVE DIFFICULTY BEING AS ACTIVE AS YOU WANT TO BE IN THE MORNING BECAUSE YOU ARE SLEEPY OR TIRED: YES, MODERATE
DO YOU HAVE DIFFICULTY VISITING YOUR FAMILY OR FRIENDS IN THEIR HOME BECAUSE YOU BECOME SLEEPY OR TIRED: NO
HOW LIKELY ARE YOU TO NOD OFF OR FALL ASLEEP WHILE SITTING QUIETLY AFTER LUNCH WITHOUT ALCOHOL: SLIGHT CHANCE OF DOZING
DO YOU HAVE DIFFICULTY WATCHING A MOVIE OR VIDEO BECAUSE YOU BECOME SLEEPY OR TIRED: YES, A LITTLE
HOW LIKELY ARE YOU TO NOD OFF OR FALL ASLEEP WHILE SITTING AND READING: SLIGHT CHANCE OF DOZING
DO YOU HAVE DIFFICULTY OPERATING A MOTOR VEHICLE FOR LONG DISTANCES (GREATER THAN 100 MILES) BECAUSE YOU BECOME SLEEPY: NO
HOW LIKELY ARE YOU TO NOD OFF OR FALL ASLEEP WHILE SITTING INACTIVE IN A PUBLIC PLACE: SLIGHT CHANCE OF DOZING
HOW LIKELY ARE YOU TO NOD OFF OR FALL ASLEEP WHILE LYING DOWN TO REST IN THE AFTERNOON WHEN CIRCUMSTANCES PERMIT: SLIGHT CHANCE OF DOZING
HOW LIKELY ARE YOU TO NOD OFF OR FALL ASLEEP WHILE SITTING AND READING: SLIGHT CHANCE OF DOZING
FOSQ SCORE: 14
HOW LIKELY ARE YOU TO NOD OFF OR FALL ASLEEP IN A CAR, WHILE STOPPED FOR A FEW MINUTES IN TRAFFIC: WOULD NEVER DOZE
HOW LIKELY ARE YOU TO NOD OFF OR FALL ASLEEP WHILE SITTING AND TALKING TO SOMEONE: WOULD NEVER DOZE
DO YOU HAVE DIFFICULTY OPERATING A MOTOR VEHICLE FOR SHORT DISTANCES (LESS THAN 100 MILES) BECAUSE YOU BECOME SLEEPY: NO
HOW LIKELY ARE YOU TO NOD OFF OR FALL ASLEEP WHEN YOU ARE A PASSENGER IN A CAR FOR AN HOUR WITHOUT A BREAK: SLIGHT CHANCE OF DOZING
DO YOU HAVE DIFFICULTY BEING AS ACTIVE AS YOU WANT TO BE IN THE EVENING BECAUSE YOU ARE SLEEPY OR TIRED: YES, MODERATE
HOW LIKELY ARE YOU TO NOD OFF OR FALL ASLEEP WHILE SITTING INACTIVE IN A PUBLIC PLACE: SLIGHT CHANCE OF DOZING
DO YOU HAVE DIFFICULTY CONCENTRATING ON THE THINGS YOU DO BECAUSE YOU ARE SLEEPY OR TIRED: YES, MODERATE
HAS YOUR MOOD BEEN AFFECTED BECAUSE YOU ARE SLEEPY OR TIRED: YES, LITTLE
HOW LIKELY ARE YOU TO NOD OFF OR FALL ASLEEP WHILE SITTING QUIETLY AFTER LUNCH WITHOUT ALCOHOL: SLIGHT CHANCE OF DOZING
HOW LIKELY ARE YOU TO NOD OFF OR FALL ASLEEP WHILE WATCHING TV: SLIGHT CHANCE OF DOZING
DO YOU GENERALLY HAVE DIFFICULTY REMEMBERING THINGS BECAUSE YOU ARE SLEEPY OR TIRED: YES, MODERATE
HOW LIKELY ARE YOU TO NOD OFF OR FALL ASLEEP IN A CAR, WHILE STOPPED FOR A FEW MINUTES IN TRAFFIC: WOULD NEVER DOZE
HOW LIKELY ARE YOU TO NOD OFF OR FALL ASLEEP WHEN YOU ARE A PASSENGER IN A CAR FOR AN HOUR WITHOUT A BREAK: SLIGHT CHANCE OF DOZING
HOW LIKELY ARE YOU TO NOD OFF OR FALL ASLEEP WHILE WATCHING TV: SLIGHT CHANCE OF DOZING
HOW LIKELY ARE YOU TO NOD OFF OR FALL ASLEEP WHILE LYING DOWN TO REST IN THE AFTERNOON WHEN CIRCUMSTANCES PERMIT: SLIGHT CHANCE OF DOZING

## 2025-04-15 ENCOUNTER — OFFICE VISIT (OUTPATIENT)
Age: 75
End: 2025-04-15
Payer: MEDICARE

## 2025-04-15 VITALS
HEART RATE: 61 BPM | BODY MASS INDEX: 27.49 KG/M2 | HEIGHT: 65 IN | OXYGEN SATURATION: 99 % | WEIGHT: 165 LBS | DIASTOLIC BLOOD PRESSURE: 70 MMHG | SYSTOLIC BLOOD PRESSURE: 122 MMHG

## 2025-04-15 DIAGNOSIS — Z95.5 PRESENCE OF STENT IN RIGHT CORONARY ARTERY: ICD-10-CM

## 2025-04-15 DIAGNOSIS — I10 ESSENTIAL (PRIMARY) HYPERTENSION: ICD-10-CM

## 2025-04-15 DIAGNOSIS — I20.9 ANGINA PECTORIS, UNSPECIFIED: ICD-10-CM

## 2025-04-15 DIAGNOSIS — I73.9 CLAUDICATION: ICD-10-CM

## 2025-04-15 DIAGNOSIS — Z98.61 CORONARY ANGIOPLASTY STATUS: ICD-10-CM

## 2025-04-15 DIAGNOSIS — R06.02 SHORTNESS OF BREATH: ICD-10-CM

## 2025-04-15 DIAGNOSIS — E11.22 TYPE 2 DIABETES MELLITUS WITH DIABETIC CHRONIC KIDNEY DISEASE, UNSPECIFIED CKD STAGE, UNSPECIFIED WHETHER LONG TERM INSULIN USE (HCC): ICD-10-CM

## 2025-04-15 DIAGNOSIS — I25.10 ATHEROSCLEROSIS OF NATIVE CORONARY ARTERY OF NATIVE HEART WITHOUT ANGINA PECTORIS: ICD-10-CM

## 2025-04-15 DIAGNOSIS — E78.2 MIXED HYPERLIPIDEMIA: ICD-10-CM

## 2025-04-15 DIAGNOSIS — R06.09 DOE (DYSPNEA ON EXERTION): Primary | ICD-10-CM

## 2025-04-15 PROCEDURE — 3017F COLORECTAL CA SCREEN DOC REV: CPT | Performed by: INTERNAL MEDICINE

## 2025-04-15 PROCEDURE — 3078F DIAST BP <80 MM HG: CPT | Performed by: INTERNAL MEDICINE

## 2025-04-15 PROCEDURE — 1126F AMNT PAIN NOTED NONE PRSNT: CPT | Performed by: INTERNAL MEDICINE

## 2025-04-15 PROCEDURE — 93010 ELECTROCARDIOGRAM REPORT: CPT | Performed by: INTERNAL MEDICINE

## 2025-04-15 PROCEDURE — 3046F HEMOGLOBIN A1C LEVEL >9.0%: CPT | Performed by: INTERNAL MEDICINE

## 2025-04-15 PROCEDURE — 93005 ELECTROCARDIOGRAM TRACING: CPT | Performed by: INTERNAL MEDICINE

## 2025-04-15 PROCEDURE — 3074F SYST BP LT 130 MM HG: CPT | Performed by: INTERNAL MEDICINE

## 2025-04-15 PROCEDURE — 1123F ACP DISCUSS/DSCN MKR DOCD: CPT | Performed by: INTERNAL MEDICINE

## 2025-04-15 PROCEDURE — G8427 DOCREV CUR MEDS BY ELIG CLIN: HCPCS | Performed by: INTERNAL MEDICINE

## 2025-04-15 PROCEDURE — G8399 PT W/DXA RESULTS DOCUMENT: HCPCS | Performed by: INTERNAL MEDICINE

## 2025-04-15 PROCEDURE — 1090F PRES/ABSN URINE INCON ASSESS: CPT | Performed by: INTERNAL MEDICINE

## 2025-04-15 PROCEDURE — 99214 OFFICE O/P EST MOD 30 MIN: CPT | Performed by: INTERNAL MEDICINE

## 2025-04-15 PROCEDURE — 1036F TOBACCO NON-USER: CPT | Performed by: INTERNAL MEDICINE

## 2025-04-15 PROCEDURE — G8419 CALC BMI OUT NRM PARAM NOF/U: HCPCS | Performed by: INTERNAL MEDICINE

## 2025-04-15 PROCEDURE — 1159F MED LIST DOCD IN RCRD: CPT | Performed by: INTERNAL MEDICINE

## 2025-04-15 PROCEDURE — 3044F HG A1C LEVEL LT 7.0%: CPT | Performed by: INTERNAL MEDICINE

## 2025-04-15 PROCEDURE — G2211 COMPLEX E/M VISIT ADD ON: HCPCS | Performed by: INTERNAL MEDICINE

## 2025-04-15 PROCEDURE — 2022F DILAT RTA XM EVC RTNOPTHY: CPT | Performed by: INTERNAL MEDICINE

## 2025-04-15 ASSESSMENT — PATIENT HEALTH QUESTIONNAIRE - PHQ9
2. FEELING DOWN, DEPRESSED OR HOPELESS: NOT AT ALL
SUM OF ALL RESPONSES TO PHQ QUESTIONS 1-9: 0
1. LITTLE INTEREST OR PLEASURE IN DOING THINGS: NOT AT ALL
SUM OF ALL RESPONSES TO PHQ QUESTIONS 1-9: 0

## 2025-04-15 NOTE — PROGRESS NOTES
7/2015.  -Essentially normal echo 3/2025, Lexiscan Cardiolite 3/20/2025 with small fixed inferior wall defect consistent with previously known  with unsuccessful PCI x 2 attempts, testing performed because of dyspnea on exertion noted by children with a hot feeling in her face  -Normal echo and stress Myoview in 2022, likely attributable to improved collateral blood flow to the RCA territory   Continue ASA, Plavix: Given her  recommend she continue on Plavix.          Lab Results   Component Value Date     HGB 15.5 11/14/2024     Low threshold to stop Plavix if signs of bleeding or significant anemia.   Continue BB, CCB, Ranexa  -4/15/2025 advised to get back into regular cardiovascular exercise and gradually increase exercise to 30 minutes 4-5 times a week-she has a trip       HTN   Controlled with current therapy  Lab Results   Component Value Date    CREATININE 1.17 (H) 02/17/2025          HLD     Lab Results   Component Value Date    LDL 64.4 02/17/2025     Transaminases within normal limits same date      On statin. Lipids and labs followed by PCP       EFRAIN/sleep disturbance   On CPAP therapy, followed by Dr Coley  -Advised to discuss ongoing anxiety with PCP, could try melatonin OTC       DM   On oral agents       Leg fatigue / cramps   No occlusive disease per arterial duplex in 2018   She will call us if symptoms worsen and will repeat testing and order venous duplex  - TAQUERIA 3/2025 mildly abnormal:    Right side findings: Resting TAQUERIA is 0.95. This is within the normal range.    Left side findings: Resting TAQUERIA is 0.84. This is mildly decreased.    Rise in ankle pressures post exercise is consistent with adequate arterial perfusion within the bilateral lower extremity.       Hx S/p esophageal dilatation performed by Dr Salamanca in 8/19.   Now that Dr. Salamanca has retired, she follows with Dr. Nayak, with mild dysphagia at times and he states if he gets worse he can perform EGD at the same time of upcoming

## 2025-04-16 NOTE — PROGRESS NOTES
[address]   [place] [address]   Tel.  [phone]   Fax. [phone]  [address]   [place] [address]   Tel.  [phone]   Fax. [phone] [address] [place] [address]   Tel.  [phone]   Fax. [phone]     Alice Morocho (: 1950) is a 74 y.o. female, established patient, seen for positive airway pressure follow-up, she was last seen by me on 3/2024, previously seen by Dr. Coley on 2023, prior notes reviewed in detail.  In lab sleep test  showed AHI of 27/hr. A titration study 10/2021 showed events responding to CPAP therapy. She is seen today for follow up.     ASSESSMENT/PLAN:   Diagnosis Orders   1. EFRAIN (obstructive sleep apnea)  DME Order for (Specify) as OP      2. Primary hypertension        3. BMI 27.0-27.9,adult          AHI = 27 ().  On CPAP :  6-7 cmH2O. Set up .     She is adherent with PAP therapy and PAP continues to benefit patient and remains necessary for control of her sleep apnea.     Sleep Apnea - Pressure settings changed to APAP 6-9 cmH2O. She feels that she is waking up often at night and \"tossing and turning\".     Orders Placed This Encounter   Procedures    DME Order for (Specify) as OP     Diagnosis: (G47.33) EFRAIN (obstructive sleep apnea)  (primary encounter diagnosis)     Replacement Supplies for Positive Airway Pressure Therapy Device:   Duration of need: 99 months.      Full Face Mask 1 every 3 months.   Full Face Mask Cushion 1 per month.       Headgear 1 every 6 months.   Positive Airway Pressure chinstrap 1 every 6 months.     Tubing with heating element 1 every 3 months.     Filter(s) Disposable 2 per month.   Filter(s) Non-Disposable 1 every 6 months.   .    Water Chamber for Humidifier (Replace) 1 every 6 months.    Marlene Saini [facility] NPI: [id number]    Electronically signed. Date:- 25     * Counseling was provided regarding

## 2025-04-17 ENCOUNTER — CLINICAL DOCUMENTATION (OUTPATIENT)
Age: 75
End: 2025-04-17

## 2025-04-17 ENCOUNTER — OFFICE VISIT (OUTPATIENT)
Age: 75
End: 2025-04-17
Payer: MEDICARE

## 2025-04-17 VITALS
BODY MASS INDEX: 27.74 KG/M2 | OXYGEN SATURATION: 97 % | SYSTOLIC BLOOD PRESSURE: 139 MMHG | HEART RATE: 58 BPM | HEIGHT: 65 IN | DIASTOLIC BLOOD PRESSURE: 63 MMHG | WEIGHT: 166.5 LBS

## 2025-04-17 DIAGNOSIS — G47.33 OSA (OBSTRUCTIVE SLEEP APNEA): Primary | ICD-10-CM

## 2025-04-17 DIAGNOSIS — I10 PRIMARY HYPERTENSION: ICD-10-CM

## 2025-04-17 PROCEDURE — 99214 OFFICE O/P EST MOD 30 MIN: CPT | Performed by: NURSE PRACTITIONER

## 2025-04-17 NOTE — PATIENT INSTRUCTIONS
[address]  [place] [address]  Tel.  [phone]  Fax. [phone] [address]  [place] [address]  Tel.  [phone]  Fax. [phone] [address] [place] [address]  Tel.  [phone]  Fax. [phone]     Learning About CPAP for Sleep Apnea  What is CPAP?              CPAP is a small machine that you use at home every night while you sleep. It increases air pressure in your throat to keep your airway open. When you have sleep apnea, this can help you sleep better so you feel much better. CPAP stands for \"continuous positive airway pressure.\"  The CPAP machine will have one of the following:  A mask that covers your nose and mouth  Prongs that fit into your nose  A mask that covers your nose only, the most common type. This type is called NCPAP. The N stands for \"nasal.\"  Why is it done?  CPAP is usually the best treatment for obstructive sleep apnea. It is the first treatment choice and the most widely used. Your doctor may suggest CPAP if you have:  Moderate to severe sleep apnea.  Sleep apnea and coronary artery disease (CAD) or heart failure.  How does it help?  CPAP can help you have more normal sleep, so you feel less sleepy and more alert during the daytime.  CPAP may help keep heart failure or other heart problems from getting worse.  NCPAP may help lower your blood pressure.  If you use CPAP, your bed partner may also sleep better because you are not snoring or restless.  What are the side effects?  Some people who use CPAP have:  A dry or stuffy nose and a sore throat.  Irritated skin on the face.  Sore eyes.  Bloating.  If you have any of these problems, work with your doctor to fix them. Here are some things you can try:  Be sure the mask or nasal prongs fit well.  See if your doctor can adjust the pressure of your CPAP.  If your nose is dry, try a humidifier.  If your nose is runny or stuffy, try decongestant medicine or a steroid

## 2025-04-30 ENCOUNTER — TELEPHONE (OUTPATIENT)
Age: 75
End: 2025-04-30

## 2025-04-30 NOTE — TELEPHONE ENCOUNTER
Patient is calling because she needs a medication clearance letter saying that she can hold her Plavix for 5 days before she has her colonoscopy and endoscopy.Patient is schedule for 5/7/25.    Patient would like a copy of her medication clearance so she can have when she goes to have her procedures done.      Kennedy Nayak  [phone] office  [phone] fax    [phone] home patient  [phone] cell

## 2025-04-30 NOTE — TELEPHONE ENCOUNTER
Patient states that she will be having a colonoscopy done on May 7 with  she need to get a referral for a colonoscopy she can pick it up when ready please give her a call @ [phone] or [phone]

## 2025-05-01 ENCOUNTER — TELEPHONE (OUTPATIENT)
Age: 75
End: 2025-05-01

## 2025-05-01 NOTE — TELEPHONE ENCOUNTER
Pre-Procedure Cardiac Clearance Request:    Facility: [facility]    Procedure Date:  05-    Procedure: Endoscopy    Surgeon: Morgan Nayak    Anesthesia: propofol    Request for Interruption of Anticoagulants:     Plavix, ASA  May stop anticoagulant how many days prior and when to resume    Is patient cleared from a cardiac standpoint to proceed with upcoming procedure.     Please advise.

## 2025-05-01 NOTE — TELEPHONE ENCOUNTER
Spoke to the pt and she stated Dr. Nayak office needs to know if she needs to adjust any of her diabetic medications adjusted because her colonoscopy is at 12 noon.  She can be reached at [phone] if need.  If any medications need to be adjusted I will fax the information to his office per the pt.

## 2025-05-01 NOTE — TELEPHONE ENCOUNTER
Called Dr. Nayak office to make sure the pt does not need a referral and spoke to Ed[place] and she stated the pt does not need a referral.  She also stated that their office had faxed a medication adjustment form to Dr. Morgan Interiano's office and have not received it back as of yet.  She stated the pt colonoscopy is next week and without that form the pt will no able to have the procedure.  Pt called and made aware.

## 2025-05-03 NOTE — TELEPHONE ENCOUNTER
The patient is at low cardiac risk to proceed with the procedure, and can interrupt blood thinners if necessary, for the following length of time individualized for each blood thinner, with the shorter and or longer end of the duration depending on surgical bleeding risk as per the proceduralist/surgeon:    Usually Dr. Nayak does not request to stop aspirin for endoscopy but if desired, could stop aspirin for 5 to 7 days.    Resume blood thinners as per the surgeon/proceduralist when felt to be safe from a surgical/procedural standpoint.

## 2025-05-06 ENCOUNTER — CLINICAL DOCUMENTATION (OUTPATIENT)
Age: 75
End: 2025-05-06

## 2025-05-07 ENCOUNTER — ANESTHESIA EVENT (OUTPATIENT)
Facility: HOSPITAL | Age: 75
End: 2025-05-07
Payer: MEDICARE

## 2025-05-07 ENCOUNTER — HOSPITAL ENCOUNTER (OUTPATIENT)
Facility: HOSPITAL | Age: 75
Setting detail: OUTPATIENT SURGERY
Discharge: HOME | End: 2025-05-07
Attending: SPECIALIST | Admitting: SPECIALIST
Payer: MEDICARE

## 2025-05-07 ENCOUNTER — ANESTHESIA (OUTPATIENT)
Facility: HOSPITAL | Age: 75
End: 2025-05-07
Payer: MEDICARE

## 2025-05-07 VITALS
SYSTOLIC BLOOD PRESSURE: 119 MMHG | HEART RATE: 63 BPM | WEIGHT: 163.7 LBS | BODY MASS INDEX: 27.95 KG/M2 | HEIGHT: 64 IN | OXYGEN SATURATION: 99 % | RESPIRATION RATE: 13 BRPM | TEMPERATURE: 96.7 F | DIASTOLIC BLOOD PRESSURE: 58 MMHG

## 2025-05-07 PROCEDURE — 7100000011 HC PHASE II RECOVERY - ADDTL 15 MIN: Performed by: SPECIALIST

## 2025-05-07 PROCEDURE — 2709999900 HC NON-CHARGEABLE SUPPLY: Performed by: SPECIALIST

## 2025-05-07 PROCEDURE — 2720000010 HC SURG SUPPLY STERILE: Performed by: SPECIALIST

## 2025-05-07 PROCEDURE — 2580000003 HC RX 258: Performed by: SPECIALIST

## 2025-05-07 PROCEDURE — 6360000002 HC RX W HCPCS

## 2025-05-07 PROCEDURE — 88305 TISSUE EXAM BY PATHOLOGIST: CPT

## 2025-05-07 PROCEDURE — 3700000001 HC ADD 15 MINUTES (ANESTHESIA): Performed by: SPECIALIST

## 2025-05-07 PROCEDURE — 3600007512: Performed by: SPECIALIST

## 2025-05-07 PROCEDURE — 3700000000 HC ANESTHESIA ATTENDED CARE: Performed by: SPECIALIST

## 2025-05-07 PROCEDURE — 3600007502: Performed by: SPECIALIST

## 2025-05-07 PROCEDURE — 7100000010 HC PHASE II RECOVERY - FIRST 15 MIN: Performed by: SPECIALIST

## 2025-05-07 RX ORDER — SODIUM CHLORIDE 0.9 % (FLUSH) 0.9 %
5-40 SYRINGE (ML) INJECTION PRN
Status: DISCONTINUED | OUTPATIENT
Start: 2025-05-07 | End: 2025-05-07 | Stop reason: HOSPADM

## 2025-05-07 RX ORDER — LIDOCAINE HYDROCHLORIDE 20 MG/ML
INJECTION, SOLUTION EPIDURAL; INFILTRATION; INTRACAUDAL; PERINEURAL
Status: DISCONTINUED | OUTPATIENT
Start: 2025-05-07 | End: 2025-05-07 | Stop reason: SDUPTHER

## 2025-05-07 RX ORDER — SODIUM CHLORIDE 0.9 % (FLUSH) 0.9 %
5-40 SYRINGE (ML) INJECTION EVERY 12 HOURS SCHEDULED
Status: DISCONTINUED | OUTPATIENT
Start: 2025-05-07 | End: 2025-05-07 | Stop reason: HOSPADM

## 2025-05-07 RX ORDER — SODIUM CHLORIDE 9 MG/ML
INJECTION, SOLUTION INTRAVENOUS PRN
Status: DISCONTINUED | OUTPATIENT
Start: 2025-05-07 | End: 2025-05-07 | Stop reason: HOSPADM

## 2025-05-07 RX ADMIN — PROPOFOL 20 MG: 10 INJECTION, EMULSION INTRAVENOUS at 13:00

## 2025-05-07 RX ADMIN — LIDOCAINE HYDROCHLORIDE 80 MG: 20 INJECTION, SOLUTION EPIDURAL; INFILTRATION; INTRACAUDAL; PERINEURAL at 12:51

## 2025-05-07 RX ADMIN — PROPOFOL 20 MG: 10 INJECTION, EMULSION INTRAVENOUS at 13:04

## 2025-05-07 RX ADMIN — PROPOFOL 20 MG: 10 INJECTION, EMULSION INTRAVENOUS at 13:07

## 2025-05-07 RX ADMIN — PROPOFOL 20 MG: 10 INJECTION, EMULSION INTRAVENOUS at 12:55

## 2025-05-07 RX ADMIN — PROPOFOL 20 MG: 10 INJECTION, EMULSION INTRAVENOUS at 12:57

## 2025-05-07 RX ADMIN — SODIUM CHLORIDE: 9 INJECTION, SOLUTION INTRAVENOUS at 12:40

## 2025-05-07 RX ADMIN — PROPOFOL 50 MG: 10 INJECTION, EMULSION INTRAVENOUS at 12:53

## 2025-05-07 RX ADMIN — PROPOFOL 20 MG: 10 INJECTION, EMULSION INTRAVENOUS at 13:02

## 2025-05-07 RX ADMIN — PROPOFOL 20 MG: 10 INJECTION, EMULSION INTRAVENOUS at 13:09

## 2025-05-07 RX ADMIN — SODIUM CHLORIDE: 9 INJECTION, SOLUTION INTRAVENOUS at 13:10

## 2025-05-07 RX ADMIN — PROPOFOL 100 MG: 10 INJECTION, EMULSION INTRAVENOUS at 12:51

## 2025-05-07 ASSESSMENT — PAIN - FUNCTIONAL ASSESSMENT: PAIN_FUNCTIONAL_ASSESSMENT: NONE - DENIES PAIN

## 2025-05-07 NOTE — OP NOTE
Esophagogastroduodenoscopy Procedure Note      Alice Morocho  1950  [id number]    Indication:  Dysphagia, h/o motility disorder dx by Dr. Salamanca      Endoscopist: Morgan Nayak MD    Referring Provider:  Derrell Harvey MD    Sedation:  MAC anesthesia Propofol    Procedure Details:  After infomed consent was obtained for the procedure, with all risks and benefits of procedure explained the patient was taken to the endoscopy suite and placed in the left lateral decubitus position.  Following sequential administration of sedation as per above, the endoscope was inserted into the mouth and advanced under direct vision to second portion of the duodenum.  A careful inspection was made as the gastroscope was withdrawn, including a retroflexed view of the proximal stomach; findings and interventions are described below.      Findings:     Esophagus:   + Mild muscular resistance in the lower esophagus proximal to the EGJ at 40 cm s/p balloon dilation with 18 mm x 60 mm then 19 mm x 60 sec  - Normal mucosa throughout esophagus.    Stomach:   The gastric mucosa appeared normal.   The fundus was found to be normal with no lesions noted on retroflexion.     Duodenum:   The bulb and post bulbar mucosa is normal in appearance to the second portion. The duodenal folds appeared normal.      Therapies:  see above    Specimen: none            Complications:   None were encountered during the procedure.    EBL:  None.          Recommendations:   -F/U post procedure    Morgan Nayak MD  5/7/2025  1:22 PM             no

## 2025-05-07 NOTE — PROGRESS NOTES
ARRIVAL INFORMATION:  Verified patient name and date of birth, scheduled procedure, and informed consent.     : Ed () contact number: [phone]  Physician and staff can share information with the .     Receive texts: No    Belongings with patient include:  Clothing,None    GI FOCUSED ASSESSMENT:  Neuro: Awake, alert, oriented x4  Respiratory: even and unlabored   GI: soft and non-distended  EKG Rhythm: normal sinus rhythm    Education:Reviewed general discharge instructions and  information.      The risks and benefits of the bite block have been explained to patient.  Patient verbalizes understanding.
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
## 2025-05-07 NOTE — PROGRESS NOTES
CRE balloon dilatation of the esophagus   18 mm Balloon inflated to 3 ATMs and held for 60 seconds.  19 mm Balloon inflated to 4.5 ATMs and held for 60 seconds.    No subcutaneous crepitus of the chest or cervical region was noted post dilatation.       Endoscopy Case End Note:     EGD: 1258: Procedure scope was pre-cleaned, per protocol, at bedside by LEONIE Villanueva.       COLON: 1314: Procedure scope was pre-cleaned, per protocol, at bedside by LEONIE Villanueva.      Report received from anesthesia.  See anesthesia flowsheet for intra-procedure vital signs and events.    Belongings remain under stretcher with patient.

## 2025-05-07 NOTE — OP NOTE
Colonoscopy Procedure Note    Indications:   Screening colonoscopy    Referring Physician: Derrell Harvey MD  Anesthesia/Sedation: MAC anesthesia Propofol  Endoscopist:  Dr. Morgan Nayak    Procedure in Detail:  Informed consent was obtained for the procedure, including sedation.  Risks of perforation, hemorrhage, adverse drug reaction, and aspiration were discussed. The patient was placed in the left lateral decubitus position.  Based on the pre-procedure assessment, including review of the patient's medical history, medications, allergies, and review of systems, she had been deemed to be an appropriate candidate for moderate sedation; she was therefore sedated with the medications listed above.   The patient was monitored continuously with ECG tracing, pulse oximetry, blood pressure monitoring, and direct observations.      A rectal examination was performed. The [id number] was inserted into the rectum and advanced under direct vision to the cecum, which was identified by the ileocecal valve and appendiceal orifice.  The quality of the colonic preparation was adequate.  A careful inspection was made as the colonoscope was withdrawn, including a retroflexed view of the rectum; findings and interventions are described below.  Appropriate photodocumentation was obtained.    Findings:    Scope advanced to the cecum.   Diffuse diverticulosis throughout the colon.   5 mm sessile polyp in the transverse s/p cold snare removal.   Normal mucosa throughout.    Therapies:  see above    Specimen: Specimens were collected as described above and sent to pathology.     Complications: None were encountered during the procedure.     EBL: < 10 cc.    Recommendations:     - Repeat colonoscopy in 7 years.    Signed By: Morgan Nayak MD                        May 7, 2025

## 2025-05-07 NOTE — ANESTHESIA PRE PROCEDURE
Department of Anesthesiology  Preprocedure Note       Name:  Alice Morocho   Age:  75 y.o.  :  1950                                          MRN:  [id number]         Date:  2025      Surgeon: Surgeon(s):  Morgan Nayak MD    Procedure: Procedure(s):  COLONOSCOPY, ESOPHAGOGASTRODUODENOSCOPY  .    Medications prior to admission:   Prior to Admission medications    Medication Sig Start Date End Date Taking? Authorizing Provider   dextromethorphan-guaiFENesin (MUCINEX DM)  MG per extended release tablet Take 1 tablet by mouth every 12 hours as needed   Yes Oswaldo Jewell MD   famotidine (PEPCID) 40 MG tablet TAKE 1 TABLET BY MOUTH TWICE DAILY 10/28/24  Yes Derrell Harvey MD   triamterene-hydroCHLOROthiazide (MAXZIDE-25) 37.5-25 MG per tablet TAKE 1 TABLET BY MOUTH EVERY MORNING 24  Yes Derrell Harvey MD   sucralfate (CARAFATE) 1 GM tablet TAKE 1 TABLET BY MOUTH FOUR TIMES DAILY AS NEEDED FOR PAIN 24  Yes Derrell Harvey MD   ranolazine (RANEXA) 1000 MG extended release tablet TAKE 1 TABLET BY MOUTH TWICE DAILY 24  Yes Morgan Interiano MD   glimepiride (AMARYL) 2 MG tablet Take 1.5 tablets by mouth every morning 24  Yes Derrell Harvey MD   Calcium Carb-Cholecalciferol (CALTRATE 600+D3 SOFT) 600-20 MG-MCG CHEW daily   Yes Oswaldo Jewell MD   Methylcobalamin 500 MCG CHEW tablet   Yes Oswaldo Jewell MD   fluticasone (FLONASE) 50 MCG/ACT nasal spray SHAKE LIQUID AND USE 2 SPRAYS IN EACH NOSTRIL EVERY DAY 10/4/23  Yes Derrell Harvey MD   RABEprazole (ACIPHEX) 20 MG tablet TAKE 1 TABLET BY MOUTH EVERY DAY BEFORE MEALS 4/10/23  Yes Oswaldo Jewell MD   ibuprofen (ADVIL;MOTRIN) 200 MG tablet every 8 hours as needed 16  Yes Oswaldo Jewell MD   calcium carbonate 1500 (600 Ca) MG TABS tablet daily as needed 16  Yes Oswaldo Jewell MD   alum Hydroxide-Mag Carbonate (GAVISCON EXTRA STRENGTH)

## 2025-05-07 NOTE — ANESTHESIA POSTPROCEDURE EVALUATION
Department of Anesthesiology  Postprocedure Note    Patient: Alice Morocho  MRN: [id number]  YOB: 1950  Date of evaluation: 5/7/2025    Procedure Summary       Date: 05/07/25 Room / Location: [facility] ENDO 04 / [facility] ENDOSCOPY    Anesthesia Start: 1248 Anesthesia Stop: 1318    Procedures:       COLONOSCOPY, ESOPHAGOGASTRODUODENOSCOPY (Lower GI Region)      . (Upper GI Region) Diagnosis:       Bloating      Chronic idiopathic constipation      Esophageal dysphagia      Gastroesophageal reflux disease, unspecified whether esophagitis present      LLQ abdominal mass      (Bloating [R14.0])      (Chronic idiopathic constipation [K59.04])      (Esophageal dysphagia [R13.19])      (Gastroesophageal reflux disease, unspecified whether esophagitis present [K21.9])      (LLQ abdominal mass [R19.04])    Surgeons: Morgan Nayak MD Responsible Provider: Jimena Dee MD    Anesthesia Type: MAC ASA Status: 3            Anesthesia Type: MAC    Raymond Phase I: Raymond Score: 10    Raymond Phase II: Raymond Score: 10    Anesthesia Post Evaluation    Patient location during evaluation: bedside  Patient participation: complete - patient participated  Level of consciousness: awake and alert  Pain scale: Controlled per protocol.  Airway patency: patent  Nausea & Vomiting: no nausea and no vomiting  Cardiovascular status: hemodynamically stable  Respiratory status: acceptable  Hydration status: stable  Multimodal analgesia pain management approach  Pain management: adequate    There were no known notable events for this encounter.

## 2025-05-07 NOTE — DISCHARGE INSTRUCTIONS
[place]  [id number]  1950    COLON / EGD DISCHARGE INSTRUCTIONS    CALL M.D.  ANY SIGN OF:   Increasing pain, nausea, vomiting  Abdominal distension (swelling)  New increased bleeding (oral or rectal)  Fever (chills)  Pain in chest area  Bloody discharge from nose or mouth  Shortness of breath    For 24 hours after general anesthesia or intravenous analgesia / sedation  you may experience:  Drowsiness, dizziness, sleepiness, or confusion  Difficulty remembering or delayed reaction times  Difficulty with your balance, especially while walking, move slowly and carefully, do not make sudden position changes  Difficulty focusing or blurred vision    Discomfort:  Sore throat- throat lozenges or warm salt water gargle  Redness at IV site- apply warm compress to area; if redness or soreness persist- contact your physician  There may be a slight amount of blood passed from the rectum  Gaseous discomfort- walking, belching will help relieve any discomfort    DIET:   - however -  remember your colon is empty and a heavy meal will produce gas.   Avoid these foods:  vegetables, fried / greasy foods, carbonated drinks for today     ACTIVITY:  You may  resume your normal daily activities it is recommended that you spend the remainder of the day resting -  avoid any strenuous activity.  You may not operate a vehicle for 12 hours  You may not engage in an occupation involving machinery or appliances for rest of today  You may not drink alcoholic beverages for at least 12 hours  Avoid making any critical decisions for at least 24 hour    MEDICATIONS:   Tylenol as needed for pain.   Restart Plavix tomorrow, 05/08/25    Follow-up Instructions:   Call Dr. Nayak for results of procedure / biopsy in 7 days at telephone #  [phone]    Findings:  Polyp removed from colon                          Balloon dilation of lower esophagus      Patient Education on Sedation / Analgesia Administered for Procedure      For 24 hours

## 2025-05-15 ENCOUNTER — TELEPHONE (OUTPATIENT)
Age: 75
End: 2025-05-15

## 2025-05-15 SDOH — HEALTH STABILITY: PHYSICAL HEALTH: ON AVERAGE, HOW MANY DAYS PER WEEK DO YOU ENGAGE IN MODERATE TO STRENUOUS EXERCISE (LIKE A BRISK WALK)?: 2 DAYS

## 2025-05-15 SDOH — HEALTH STABILITY: PHYSICAL HEALTH: ON AVERAGE, HOW MANY MINUTES DO YOU ENGAGE IN EXERCISE AT THIS LEVEL?: 20 MIN

## 2025-05-15 ASSESSMENT — LIFESTYLE VARIABLES
HOW OFTEN DO YOU HAVE SIX OR MORE DRINKS ON ONE OCCASION: 1
HOW OFTEN DO YOU HAVE A DRINK CONTAINING ALCOHOL: MONTHLY OR LESS
HOW MANY STANDARD DRINKS CONTAINING ALCOHOL DO YOU HAVE ON A TYPICAL DAY: 1 OR 2
HOW OFTEN DO YOU HAVE A DRINK CONTAINING ALCOHOL: 2
HOW MANY STANDARD DRINKS CONTAINING ALCOHOL DO YOU HAVE ON A TYPICAL DAY: 1

## 2025-05-15 ASSESSMENT — PATIENT HEALTH QUESTIONNAIRE - PHQ9
SUM OF ALL RESPONSES TO PHQ QUESTIONS 1-9: 0
2. FEELING DOWN, DEPRESSED OR HOPELESS: NOT AT ALL
1. LITTLE INTEREST OR PLEASURE IN DOING THINGS: NOT AT ALL
SUM OF ALL RESPONSES TO PHQ QUESTIONS 1-9: 0

## 2025-05-19 ENCOUNTER — OFFICE VISIT (OUTPATIENT)
Age: 75
End: 2025-05-19
Payer: MEDICARE

## 2025-05-19 VITALS
BODY MASS INDEX: 28.85 KG/M2 | RESPIRATION RATE: 18 BRPM | DIASTOLIC BLOOD PRESSURE: 82 MMHG | OXYGEN SATURATION: 99 % | SYSTOLIC BLOOD PRESSURE: 144 MMHG | HEIGHT: 64 IN | TEMPERATURE: 97.8 F | WEIGHT: 169 LBS | HEART RATE: 65 BPM

## 2025-05-19 DIAGNOSIS — E11.59 TYPE 2 DIABETES MELLITUS WITH OTHER CIRCULATORY COMPLICATIONS (HCC): ICD-10-CM

## 2025-05-19 DIAGNOSIS — I25.10 ATHEROSCLEROSIS OF NATIVE CORONARY ARTERY OF NATIVE HEART WITHOUT ANGINA PECTORIS: ICD-10-CM

## 2025-05-19 DIAGNOSIS — I10 ESSENTIAL (PRIMARY) HYPERTENSION: ICD-10-CM

## 2025-05-19 DIAGNOSIS — M25.551 PAIN OF RIGHT HIP: ICD-10-CM

## 2025-05-19 DIAGNOSIS — E78.2 MIXED HYPERLIPIDEMIA: ICD-10-CM

## 2025-05-19 DIAGNOSIS — K22.4 ESOPHAGEAL DYSMOTILITY: ICD-10-CM

## 2025-05-19 DIAGNOSIS — Z95.5 PRESENCE OF STENT IN RIGHT CORONARY ARTERY: ICD-10-CM

## 2025-05-19 DIAGNOSIS — N18.31 STAGE 3A CHRONIC KIDNEY DISEASE (HCC): ICD-10-CM

## 2025-05-19 DIAGNOSIS — Z00.00 MEDICARE ANNUAL WELLNESS VISIT, SUBSEQUENT: Primary | ICD-10-CM

## 2025-05-19 LAB — HBA1C MFR BLD: 6.8 %

## 2025-05-19 PROCEDURE — 2022F DILAT RTA XM EVC RTNOPTHY: CPT | Performed by: FAMILY MEDICINE

## 2025-05-19 PROCEDURE — 83036 HEMOGLOBIN GLYCOSYLATED A1C: CPT | Performed by: FAMILY MEDICINE

## 2025-05-19 PROCEDURE — G8419 CALC BMI OUT NRM PARAM NOF/U: HCPCS | Performed by: FAMILY MEDICINE

## 2025-05-19 PROCEDURE — 3046F HEMOGLOBIN A1C LEVEL >9.0%: CPT | Performed by: FAMILY MEDICINE

## 2025-05-19 PROCEDURE — 3078F DIAST BP <80 MM HG: CPT | Performed by: FAMILY MEDICINE

## 2025-05-19 PROCEDURE — 99213 OFFICE O/P EST LOW 20 MIN: CPT | Performed by: FAMILY MEDICINE

## 2025-05-19 PROCEDURE — G8427 DOCREV CUR MEDS BY ELIG CLIN: HCPCS | Performed by: FAMILY MEDICINE

## 2025-05-19 PROCEDURE — 3017F COLORECTAL CA SCREEN DOC REV: CPT | Performed by: FAMILY MEDICINE

## 2025-05-19 PROCEDURE — 1090F PRES/ABSN URINE INCON ASSESS: CPT | Performed by: FAMILY MEDICINE

## 2025-05-19 PROCEDURE — 3077F SYST BP >= 140 MM HG: CPT | Performed by: FAMILY MEDICINE

## 2025-05-19 PROCEDURE — 1036F TOBACCO NON-USER: CPT | Performed by: FAMILY MEDICINE

## 2025-05-19 PROCEDURE — G8399 PT W/DXA RESULTS DOCUMENT: HCPCS | Performed by: FAMILY MEDICINE

## 2025-05-19 PROCEDURE — 1123F ACP DISCUSS/DSCN MKR DOCD: CPT | Performed by: FAMILY MEDICINE

## 2025-05-19 PROCEDURE — 3044F HG A1C LEVEL LT 7.0%: CPT | Performed by: FAMILY MEDICINE

## 2025-05-19 PROCEDURE — 1159F MED LIST DOCD IN RCRD: CPT | Performed by: FAMILY MEDICINE

## 2025-05-19 PROCEDURE — PBSHW AMB POC HEMOGLOBIN A1C: Performed by: FAMILY MEDICINE

## 2025-05-19 PROCEDURE — G0439 PPPS, SUBSEQ VISIT: HCPCS | Performed by: FAMILY MEDICINE

## 2025-05-19 PROCEDURE — 1126F AMNT PAIN NOTED NONE PRSNT: CPT | Performed by: FAMILY MEDICINE

## 2025-05-19 RX ORDER — CLOPIDOGREL BISULFATE 75 MG/1
75 TABLET ORAL DAILY
Qty: 90 TABLET | Refills: 1 | Status: SHIPPED | OUTPATIENT
Start: 2025-05-19

## 2025-05-19 RX ORDER — AMLODIPINE BESYLATE 2.5 MG/1
2.5 TABLET ORAL DAILY
Qty: 90 TABLET | Refills: 1 | Status: SHIPPED | OUTPATIENT
Start: 2025-05-19

## 2025-05-19 RX ORDER — METOPROLOL SUCCINATE 25 MG/1
25 TABLET, EXTENDED RELEASE ORAL DAILY
Qty: 90 TABLET | Refills: 1 | Status: SHIPPED | OUTPATIENT
Start: 2025-05-19

## 2025-05-19 RX ORDER — PREDNISONE 10 MG/1
10 TABLET ORAL 2 TIMES DAILY
Qty: 14 TABLET | Refills: 0 | Status: SHIPPED | OUTPATIENT
Start: 2025-05-19 | End: 2025-05-26

## 2025-05-19 SDOH — ECONOMIC STABILITY: FOOD INSECURITY: WITHIN THE PAST 12 MONTHS, THE FOOD YOU BOUGHT JUST DIDN'T LAST AND YOU DIDN'T HAVE MONEY TO GET MORE.: NEVER TRUE

## 2025-05-19 SDOH — ECONOMIC STABILITY: FOOD INSECURITY: WITHIN THE PAST 12 MONTHS, YOU WORRIED THAT YOUR FOOD WOULD RUN OUT BEFORE YOU GOT MONEY TO BUY MORE.: NEVER TRUE

## 2025-05-19 ASSESSMENT — ENCOUNTER SYMPTOMS
CHEST TIGHTNESS: 0
BLOOD IN STOOL: 0
ABDOMINAL PAIN: 0
SHORTNESS OF BREATH: 0
WHEEZING: 0
ANAL BLEEDING: 0
STRIDOR: 0

## 2025-05-19 NOTE — TELEPHONE ENCOUNTER
Refill Request Received for the Following Medication     Requested Prescriptions     Pending Prescriptions Disp Refills    clopidogrel (PLAVIX) 75 MG tablet [Pharmacy Med Name: CLOPIDOGREL 75MG TABLETS] 90 tablet 3     Sig: TAKE 1 TABLET BY MOUTH DAILY    metoprolol succinate (TOPROL XL) 25 MG extended release tablet [Pharmacy Med Name: METOPROLOL ER SUCCINATE 25MG TABS] 90 tablet 4     Sig: TAKE 1 TABLET BY MOUTH EVERY DAY    amLODIPine (NORVASC) 2.5 MG tablet [Pharmacy Med Name: AMLODIPINE BESYLATE 2.5MG TABLETS] 90 tablet 4     Sig: TAKE 1 TABLET BY MOUTH EVERY DAY     Last Prescribed: 05-    Last Appointment With Me:  4/15/2025     Future Appointments:  Future Appointments   Date Time Provider Department Center   5/19/2025 11:20 AM Derrell Harvey MD [facility] DEP   10/20/2025  2:40 PM Morgan Interiano MD [facility] BS [facility]   5/21/2026  1:30 PM Marlene Saini, APRN - NP [facility] BS AMB

## 2025-05-19 NOTE — PROGRESS NOTES
Chief Complaint   Patient presents with    Medicare AWV     Patient is here today for her medicare annual wellness exam and 3 month follow up.     \"Have you been to the ER, urgent care clinic since your last visit?  Hospitalized since your last visit?\"    5/7/25 [facility] for Colonoscopy, EGD    “Have you seen or consulted any other health care providers outside of [facility] since your last visit?”    NO            Click Here for Release of Records Request    
  alum Hydroxide-Mag Carbonate (GAVISCON EXTRA STRENGTH) 160-105 MG CHEW as needed Yes Provider, MD Oswaldo   ASPIRIN 81 PO Take 81 mg by mouth daily Yes Provider, MD Oswaldo   Cyanocobalamin (VITAMIN B-12 PO) daily Yes Provider, MD Oswaldo   Lancets MISC Check Blood sugar 3 times daily Dx. Code E11.9 Yes Automatic Reconciliation, Ar   coenzyme Q10 200 MG CAPS capsule Take by mouth daily Yes Automatic Reconciliation, Ar   cyclobenzaprine (FLEXERIL) 10 MG tablet Take by mouth 3 times daily as needed Yes Automatic Reconciliation, Ar       CareTeam (Including outside providers/suppliers regularly involved in providing care):   Patient Care Team:  Derrell Harvey MD as PCP - General  Derrell Harvey MD [facility]  Knisey Coley MD as Physician  Morgan Interiano MD as Physician  Tessie Frank MD as Physician     Recommendations for Preventive Services Due: see orders and patient instructions/AVS.  Recommended screening schedule for the next 5-10 years is provided to the patient in written form: see Patient Instructions/AVS.     Reviewed and updated this visit:  Tobacco  Allergies  Meds  Sexual Hx                      Attending Physician: Derrell Harvey MD

## 2025-05-20 LAB
ALBUMIN SERPL-MCNC: 3.7 G/DL (ref 3.5–5)
ALBUMIN/GLOB SERPL: 1.2 (ref 1.1–2.2)
ALP SERPL-CCNC: 81 U/L (ref 45–117)
ALT SERPL-CCNC: 20 U/L (ref 12–78)
ANION GAP SERPL CALC-SCNC: 7 MMOL/L (ref 2–12)
AST SERPL-CCNC: 17 U/L (ref 15–37)
BASOPHILS # BLD: 0.07 K/UL (ref 0–0.1)
BASOPHILS NFR BLD: 0.8 % (ref 0–1)
BILIRUB SERPL-MCNC: 1 MG/DL (ref 0.2–1)
BUN SERPL-MCNC: 16 MG/DL (ref 6–20)
BUN/CREAT SERPL: 13 (ref 12–20)
CALCIUM SERPL-MCNC: 9.6 MG/DL (ref 8.5–10.1)
CHLORIDE SERPL-SCNC: 107 MMOL/L (ref 97–108)
CHOLEST SERPL-MCNC: 141 MG/DL
CO2 SERPL-SCNC: 28 MMOL/L (ref 21–32)
CREAT SERPL-MCNC: 1.26 MG/DL (ref 0.55–1.02)
DIFFERENTIAL METHOD BLD: ABNORMAL
EOSINOPHIL # BLD: 0.1 K/UL (ref 0–0.4)
EOSINOPHIL NFR BLD: 1.2 % (ref 0–7)
ERYTHROCYTE [DISTWIDTH] IN BLOOD BY AUTOMATED COUNT: 12.4 % (ref 11.5–14.5)
GLOBULIN SER CALC-MCNC: 3.2 G/DL (ref 2–4)
GLUCOSE SERPL-MCNC: 159 MG/DL (ref 65–100)
HCT VFR BLD AUTO: 44.3 % (ref 35–47)
HDLC SERPL-MCNC: 41 MG/DL
HDLC SERPL: 3.4 (ref 0–5)
HGB BLD-MCNC: 14.4 G/DL (ref 11.5–16)
IMM GRANULOCYTES # BLD AUTO: 0.08 K/UL (ref 0–0.04)
IMM GRANULOCYTES NFR BLD AUTO: 1 % (ref 0–0.5)
LDLC SERPL CALC-MCNC: 29.6 MG/DL (ref 0–100)
LYMPHOCYTES # BLD: 1.94 K/UL (ref 0.8–3.5)
LYMPHOCYTES NFR BLD: 23.4 % (ref 12–49)
MCH RBC QN AUTO: 29.9 PG (ref 26–34)
MCHC RBC AUTO-ENTMCNC: 32.5 G/DL (ref 30–36.5)
MCV RBC AUTO: 92.1 FL (ref 80–99)
MONOCYTES # BLD: 0.65 K/UL (ref 0–1)
MONOCYTES NFR BLD: 7.8 % (ref 5–13)
NEUTS SEG # BLD: 5.46 K/UL (ref 1.8–8)
NEUTS SEG NFR BLD: 65.8 % (ref 32–75)
NRBC # BLD: 0 K/UL (ref 0–0.01)
NRBC BLD-RTO: 0 PER 100 WBC
PLATELET # BLD AUTO: 268 K/UL (ref 150–400)
PMV BLD AUTO: 10 FL (ref 8.9–12.9)
POTASSIUM SERPL-SCNC: 3.4 MMOL/L (ref 3.5–5.1)
PROT SERPL-MCNC: 6.9 G/DL (ref 6.4–8.2)
RBC # BLD AUTO: 4.81 M/UL (ref 3.8–5.2)
SODIUM SERPL-SCNC: 142 MMOL/L (ref 136–145)
TRIGL SERPL-MCNC: 352 MG/DL
VLDLC SERPL CALC-MCNC: 70.4 MG/DL
WBC # BLD AUTO: 8.3 K/UL (ref 3.6–11)

## 2025-05-20 RX ORDER — DAPAGLIFLOZIN 5 MG/1
5 TABLET, FILM COATED ORAL DAILY
Qty: 90 TABLET | Refills: 3 | Status: SHIPPED | OUTPATIENT
Start: 2025-05-20

## 2025-05-20 NOTE — TELEPHONE ENCOUNTER
Last appointment: 5/19/25  Next appointment: 8/19/25  Previous refill encounter(s): 8/16/24 #90 with 3 refills    Requested Prescriptions     Pending Prescriptions Disp Refills    dapagliflozin (FARXIGA) 5 MG tablet [Pharmacy Med Name: DAPAGLIFLOZIN 5MG TABLETS] 90 tablet 3     Sig: TAKE 1 TABLET BY MOUTH DAILY         For Pharmacy Admin Tracking Only    Program: Medication Refill  CPA in place:    Recommendation Provided To:   Intervention Detail: New Rx: 1, reason: Patient Preference  Intervention Accepted By:   Gap Closed?:    Time Spent (min): 5

## 2025-05-21 ENCOUNTER — RESULTS FOLLOW-UP (OUTPATIENT)
Age: 75
End: 2025-05-21

## 2025-05-21 DIAGNOSIS — E78.2 MIXED HYPERLIPIDEMIA: Primary | ICD-10-CM

## 2025-05-21 RX ORDER — ROSUVASTATIN CALCIUM 10 MG/1
10 TABLET, COATED ORAL DAILY
Qty: 90 TABLET | Refills: 1 | Status: SHIPPED | OUTPATIENT
Start: 2025-05-21

## 2025-06-01 DIAGNOSIS — F43.21 GRIEF ASSOCIATED WITH LOSS OF FETUS: ICD-10-CM

## 2025-06-02 RX ORDER — ALPRAZOLAM 0.25 MG
TABLET ORAL
Qty: 30 TABLET | Refills: 2 | Status: SHIPPED | OUTPATIENT
Start: 2025-06-02 | End: 2025-08-31

## 2025-06-02 NOTE — TELEPHONE ENCOUNTER
Last appointment: 5/19/25  Next appointment: 8/19/25  Previous refill encounter(s): 8/17/24 #30 with 2 refills    Requested Prescriptions     Pending Prescriptions Disp Refills    ALPRAZolam (XANAX) 0.25 MG tablet [Pharmacy Med Name: ALPRAZOLAM 0.25MG TABLETS] 30 tablet 2     Sig: TAKE 1 TABLET BY MOUTH THREE TIMES DAILY AS NEEDED FOR ANXIETY. MAX DAILY AMOUNT: 0.75 MG         For Pharmacy Admin Tracking Only    Program: Medication Refill  CPA in place:    Recommendation Provided To:   Intervention Detail: New Rx: 1, reason: Patient Preference  Intervention Accepted By:   Gap Closed?:    Time Spent (min): 5

## 2025-06-05 RX ORDER — GLIMEPIRIDE 2 MG/1
TABLET ORAL
Qty: 135 TABLET | Refills: 3 | Status: SHIPPED | OUTPATIENT
Start: 2025-06-05

## 2025-06-05 NOTE — TELEPHONE ENCOUNTER
Last appointment: 5/19/25  Next appointment: 8/19/25  Previous refill encounter(s): 5/17/24    Requested Prescriptions     Pending Prescriptions Disp Refills    glimepiride (AMARYL) 2 MG tablet [Pharmacy Med Name: GLIMEPIRIDE 2MG TABLETS] 135 tablet 3     Sig: TAKE 1 AND 1/2 TABLETS BY MOUTH EVERY MORNING         For Pharmacy Admin Tracking Only    Program: Medication Refill  CPA in place:    Recommendation Provided To:   Intervention Detail: New Rx: 1, reason: Patient Preference  Intervention Accepted By:   Gap Closed?:    Time Spent (min): 5

## 2025-08-19 ENCOUNTER — OFFICE VISIT (OUTPATIENT)
Age: 75
End: 2025-08-19
Payer: MEDICARE

## 2025-08-19 VITALS
HEIGHT: 64 IN | SYSTOLIC BLOOD PRESSURE: 118 MMHG | WEIGHT: 161.8 LBS | TEMPERATURE: 97.3 F | HEART RATE: 59 BPM | OXYGEN SATURATION: 99 % | DIASTOLIC BLOOD PRESSURE: 62 MMHG | BODY MASS INDEX: 27.62 KG/M2 | RESPIRATION RATE: 18 BRPM

## 2025-08-19 DIAGNOSIS — Z95.5 PRESENCE OF STENT IN RIGHT CORONARY ARTERY: ICD-10-CM

## 2025-08-19 DIAGNOSIS — N18.31 STAGE 3A CHRONIC KIDNEY DISEASE (HCC): ICD-10-CM

## 2025-08-19 DIAGNOSIS — F41.9 ANXIETY: ICD-10-CM

## 2025-08-19 DIAGNOSIS — K22.4 ESOPHAGEAL DYSMOTILITY: ICD-10-CM

## 2025-08-19 DIAGNOSIS — E78.2 MIXED HYPERLIPIDEMIA: ICD-10-CM

## 2025-08-19 DIAGNOSIS — G47.33 OSA (OBSTRUCTIVE SLEEP APNEA): ICD-10-CM

## 2025-08-19 DIAGNOSIS — E11.59 TYPE 2 DIABETES MELLITUS WITH OTHER CIRCULATORY COMPLICATIONS (HCC): ICD-10-CM

## 2025-08-19 DIAGNOSIS — I10 ESSENTIAL (PRIMARY) HYPERTENSION: Primary | ICD-10-CM

## 2025-08-19 DIAGNOSIS — I25.10 ATHEROSCLEROSIS OF NATIVE CORONARY ARTERY OF NATIVE HEART WITHOUT ANGINA PECTORIS: ICD-10-CM

## 2025-08-19 LAB — HBA1C MFR BLD: 6.7 %

## 2025-08-19 PROCEDURE — 1123F ACP DISCUSS/DSCN MKR DOCD: CPT | Performed by: FAMILY MEDICINE

## 2025-08-19 PROCEDURE — 1125F AMNT PAIN NOTED PAIN PRSNT: CPT | Performed by: FAMILY MEDICINE

## 2025-08-19 PROCEDURE — PBSHW AMB POC HEMOGLOBIN A1C: Performed by: FAMILY MEDICINE

## 2025-08-19 PROCEDURE — 3044F HG A1C LEVEL LT 7.0%: CPT | Performed by: FAMILY MEDICINE

## 2025-08-19 PROCEDURE — 83036 HEMOGLOBIN GLYCOSYLATED A1C: CPT | Performed by: FAMILY MEDICINE

## 2025-08-19 PROCEDURE — 1159F MED LIST DOCD IN RCRD: CPT | Performed by: FAMILY MEDICINE

## 2025-08-19 PROCEDURE — 3074F SYST BP LT 130 MM HG: CPT | Performed by: FAMILY MEDICINE

## 2025-08-19 PROCEDURE — 1090F PRES/ABSN URINE INCON ASSESS: CPT | Performed by: FAMILY MEDICINE

## 2025-08-19 PROCEDURE — G8419 CALC BMI OUT NRM PARAM NOF/U: HCPCS | Performed by: FAMILY MEDICINE

## 2025-08-19 PROCEDURE — 99213 OFFICE O/P EST LOW 20 MIN: CPT | Performed by: FAMILY MEDICINE

## 2025-08-19 PROCEDURE — 3078F DIAST BP <80 MM HG: CPT | Performed by: FAMILY MEDICINE

## 2025-08-19 PROCEDURE — G8427 DOCREV CUR MEDS BY ELIG CLIN: HCPCS | Performed by: FAMILY MEDICINE

## 2025-08-19 PROCEDURE — 2022F DILAT RTA XM EVC RTNOPTHY: CPT | Performed by: FAMILY MEDICINE

## 2025-08-19 PROCEDURE — 3046F HEMOGLOBIN A1C LEVEL >9.0%: CPT | Performed by: FAMILY MEDICINE

## 2025-08-19 PROCEDURE — G8399 PT W/DXA RESULTS DOCUMENT: HCPCS | Performed by: FAMILY MEDICINE

## 2025-08-19 PROCEDURE — 1036F TOBACCO NON-USER: CPT | Performed by: FAMILY MEDICINE

## 2025-08-19 PROCEDURE — 3017F COLORECTAL CA SCREEN DOC REV: CPT | Performed by: FAMILY MEDICINE

## 2025-08-19 RX ORDER — LORAZEPAM 0.5 MG/1
0.5 TABLET ORAL 2 TIMES DAILY PRN
Qty: 60 TABLET | Refills: 2 | Status: SHIPPED | OUTPATIENT
Start: 2025-08-19 | End: 2025-11-17

## 2025-08-19 ASSESSMENT — ENCOUNTER SYMPTOMS
CHEST TIGHTNESS: 0
ABDOMINAL DISTENTION: 0
CHOKING: 0
ABDOMINAL PAIN: 0
ANAL BLEEDING: 0

## 2025-08-19 ASSESSMENT — PATIENT HEALTH QUESTIONNAIRE - PHQ9
1. LITTLE INTEREST OR PLEASURE IN DOING THINGS: NOT AT ALL
SUM OF ALL RESPONSES TO PHQ QUESTIONS 1-9: 0
2. FEELING DOWN, DEPRESSED OR HOPELESS: NOT AT ALL

## 2025-08-20 ENCOUNTER — RESULTS FOLLOW-UP (OUTPATIENT)
Age: 75
End: 2025-08-20

## 2025-08-20 LAB
ALBUMIN SERPL-MCNC: 3.9 G/DL (ref 3.5–5.2)
ALBUMIN/GLOB SERPL: 1.3 (ref 1.1–2.2)
ALP SERPL-CCNC: 66 U/L (ref 35–104)
ALT SERPL-CCNC: 16 U/L (ref 10–35)
ANION GAP SERPL CALC-SCNC: 13 MMOL/L (ref 2–14)
AST SERPL-CCNC: 22 U/L (ref 10–35)
BILIRUB SERPL-MCNC: 1 MG/DL (ref 0–1.2)
BUN SERPL-MCNC: 17 MG/DL (ref 8–23)
BUN/CREAT SERPL: 14 (ref 12–20)
CALCIUM SERPL-MCNC: 10 MG/DL (ref 8.8–10.2)
CHLORIDE SERPL-SCNC: 101 MMOL/L (ref 98–107)
CHOLEST SERPL-MCNC: 146 MG/DL (ref 0–200)
CO2 SERPL-SCNC: 25 MMOL/L (ref 20–29)
CREAT SERPL-MCNC: 1.2 MG/DL (ref 0.6–1)
GLOBULIN SER CALC-MCNC: 3 G/DL (ref 2–4)
GLUCOSE SERPL-MCNC: 114 MG/DL (ref 65–100)
HDLC SERPL-MCNC: 43 MG/DL (ref 40–60)
HDLC SERPL: 3.4 (ref 0–5)
LDLC SERPL CALC-MCNC: 42 MG/DL (ref 0–100)
POTASSIUM SERPL-SCNC: 3.7 MMOL/L (ref 3.5–5.1)
PROT SERPL-MCNC: 6.9 G/DL (ref 6.4–8.3)
SODIUM SERPL-SCNC: 138 MMOL/L (ref 136–145)
TRIGL SERPL-MCNC: 309 MG/DL (ref 0–150)
VLDLC SERPL CALC-MCNC: 62 MG/DL

## 2025-08-25 RX ORDER — CLOPIDOGREL BISULFATE 75 MG/1
75 TABLET ORAL DAILY
Qty: 90 TABLET | Refills: 1 | Status: SHIPPED | OUTPATIENT
Start: 2025-08-25

## 2025-08-25 RX ORDER — AMLODIPINE BESYLATE 2.5 MG/1
2.5 TABLET ORAL DAILY
Qty: 90 TABLET | Refills: 1 | Status: SHIPPED | OUTPATIENT
Start: 2025-08-25

## 2025-08-25 RX ORDER — METOPROLOL SUCCINATE 25 MG/1
25 TABLET, EXTENDED RELEASE ORAL DAILY
Qty: 90 TABLET | Refills: 1 | Status: SHIPPED | OUTPATIENT
Start: 2025-08-25

## (undated) DEVICE — TRAP ENDOSCP POLYP 2 CHMBR DRAWER TYP

## (undated) DEVICE — SET GRAV CK VLV NEEDLESS ST 3 GANGED 4WAY STPCOCK HI FLO 10

## (undated) DEVICE — Device: Brand: BALLOON3

## (undated) DEVICE — KIT IV STRT W CHLORAPREP PD 1ML

## (undated) DEVICE — COVIDIEN KENDALL DL DISPOSABLE 3 LEAD SY: Brand: MEDLINE RENEWAL

## (undated) DEVICE — ENDO CARRY-ON PROCEDURE KIT INCLUDES ENZYMATIC SPONGE, GAUZE, BIOHAZARD LABEL, TRAY, LUBRICANT, DIRTY SCOPE LABEL, WATER LABEL, TRAY, DRAWSTRING PAD, AND DEFENDO 4-PIECE KIT.: Brand: ENDO CARRY-ON PROCEDURE KIT

## (undated) DEVICE — 3M™ TEGADERM™ TRANSPARENT FILM DRESSING FRAME STYLE, 1624W, 2-3/8 IN X 2-3/4 IN (6 CM X 7 CM), 100/CT 4CT/CASE: Brand: 3M™ TEGADERM™

## (undated) DEVICE — CANN NASAL O2 CAPNOGRAPHY AD -- FILTERLINE

## (undated) DEVICE — SOLIDIFIER FLUID 3000 CC ABSORB

## (undated) DEVICE — KENDALL DL ECG CABLE AND LEAD WIRE SYSTEM, 3-LEAD, SINGLE PATIENT USE: Brand: KENDALL

## (undated) DEVICE — DEVICE INFL 60ML 15ATM DISPOSABLE STERIFLATE CRE

## (undated) DEVICE — SET ADMIN 16ML TBNG L100IN 2 Y INJ SITE IV PIGGY BK DISP

## (undated) DEVICE — CONTINU-FLO SOLUTION SET, 2 INJECTION SITES, MALE LUER LOCK ADAPTER WITH RETRACTABLE COLLAR, LARGE BORE STOPCOCK WITH ROTATING MALE LUER LOCK EXTENSION SET, 2 INJECTION SITES, MALE LUER LOCK ADAPTER WITH RETRACTABLE COLLAR: Brand: INTERLINK/CONTINU-FLO

## (undated) DEVICE — 1200 GUARD II KIT W/5MM TUBE W/O VAC TUBE: Brand: GUARDIAN

## (undated) DEVICE — ENDOSCOPIC KIT COMPLIANCE ENDOKIT

## (undated) DEVICE — SET EXTN TBNG L BOR 4 W STPCOCK ST 32IN PRIMING VOL 6ML

## (undated) DEVICE — WRISTBAND ID AD W1XL11.5IN RED POLY ALRG PREPRINTED PERM

## (undated) DEVICE — Device: Brand: SINGLE USE SOFT BRUSH

## (undated) DEVICE — Device

## (undated) DEVICE — SOLIDIFIER MEDC 1200ML -- CONVERT TO 356117

## (undated) DEVICE — SYRINGE MED 20ML STD CLR PLAS LUERLOCK TIP N CTRL DISP

## (undated) DEVICE — KENDALL RADIOLUCENT FOAM MONITORING ELECTRODE -RECTANGULAR SHAPE: Brand: KENDALL

## (undated) DEVICE — NEONATAL-ADULT SPO2 SENSOR: Brand: NELLCOR

## (undated) DEVICE — BW-400L DISP SNGL-END CLEANINGBRUSH: Brand: OLYMPUS

## (undated) DEVICE — KIT COMPLIANCE W ENDOGLDE + 11 NO BRSH ENDOKT

## (undated) DEVICE — BITEBLOCK 54FR W/ DENT RIM BLOX

## (undated) DEVICE — CATH IV AUTOGRD BC BLU 22GA 25 -- INSYTE

## (undated) DEVICE — SNARE ENDOSCP AD L240CM LOOP W10MM SHTH DIA2.4MM RND INSUL

## (undated) DEVICE — TIP SUCT TRNSPAR RIB SURF STD BLB RIG NVENT W/ 5IN1 CONN DYND50138] MEDLINE INDUSTRIES INC]

## (undated) DEVICE — CONTAINER SPEC 20 ML LID NEUT BUFF FORMALIN 10 % POLYPR STS

## (undated) DEVICE — BAG BELONG PT PERS CLEAR HANDL

## (undated) DEVICE — NEEDLE HYPO 18GA L1.5IN PNK S STL HUB POLYPR SHLD REG BVL

## (undated) DEVICE — BW-412T DISP COMBO CLEANING BRUSH: Brand: SINGLE USE COMBINATION CLEANING BRUSH

## (undated) DEVICE — IV START KIT: Brand: MEDLINE

## (undated) DEVICE — BASIN EMSIS 16OZ GRAPHITE PLAS KID SHP MOLD GRAD FOR ORAL

## (undated) DEVICE — ESOPHAGEAL BALLOON DILATATION CATHETER: Brand: CRE FIXED WIRE

## (undated) DEVICE — CUFF BLD PRSS AD CLTH SGL TB W/ BAYNT CONN ROUNDED CORNER

## (undated) DEVICE — BLOCK BITE ENDO --

## (undated) DEVICE — SOLUTION LACTATED RINGERS INJECTION USP

## (undated) DEVICE — CATHETER IV 20GA L1.16IN OD1.0414-1.1176MM ID0.762-0.8382MM